# Patient Record
Sex: MALE | URBAN - METROPOLITAN AREA
[De-identification: names, ages, dates, MRNs, and addresses within clinical notes are randomized per-mention and may not be internally consistent; named-entity substitution may affect disease eponyms.]

---

## 2023-10-03 ENCOUNTER — HOSPITAL ENCOUNTER (EMERGENCY)
Facility: HOSPITAL | Age: 68
Discharge: HOME | End: 2023-10-03
Attending: EMERGENCY MEDICINE
Payer: MEDICARE

## 2023-10-03 VITALS
BODY MASS INDEX: 22.38 KG/M2 | OXYGEN SATURATION: 98 % | HEIGHT: 75 IN | SYSTOLIC BLOOD PRESSURE: 184 MMHG | TEMPERATURE: 99 F | WEIGHT: 180 LBS | HEART RATE: 76 BPM | RESPIRATION RATE: 18 BRPM | DIASTOLIC BLOOD PRESSURE: 90 MMHG
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
## 2023-10-03 DIAGNOSIS — M25.562 ACUTE PAIN OF BOTH KNEES: ICD-10-CM

## 2023-10-03 DIAGNOSIS — S39.012A STRAIN OF LUMBAR REGION, INITIAL ENCOUNTER: ICD-10-CM

## 2023-10-03 DIAGNOSIS — M25.561 ACUTE PAIN OF BOTH KNEES: ICD-10-CM

## 2023-10-03 PROBLEM — F11.10: Status: ACTIVE | Noted: 2023-10-03

## 2023-10-03 PROBLEM — R73.03 PREDIABETES: Status: ACTIVE | Noted: 2023-10-03

## 2023-10-03 PROBLEM — M54.9 CHRONIC BACK PAIN: Status: ACTIVE | Noted: 2023-10-03

## 2023-10-03 PROBLEM — F43.12 CHRONIC POST-TRAUMATIC STRESS DISORDER (PTSD): Status: ACTIVE | Noted: 2023-10-03

## 2023-10-03 PROBLEM — B18.2 CHRONIC HEPATITIS C VIRUS INFECTION: Status: ACTIVE | Noted: 2023-10-03

## 2023-10-03 PROBLEM — G89.29 CHRONIC BACK PAIN: Status: ACTIVE | Noted: 2023-10-03

## 2023-10-03 PROBLEM — M93.90 OSTEOCHONDROPATHY: Status: ACTIVE | Noted: 2023-10-03

## 2023-10-03 PROCEDURE — 99284 EMERGENCY DEPT VISIT MOD MDM: CPT

## 2023-10-03 PROCEDURE — 25000003 PHARM REV CODE 250: Performed by: PHYSICIAN ASSISTANT

## 2023-10-03 RX ORDER — METHOCARBAMOL 500 MG/1
1000 TABLET, FILM COATED ORAL 3 TIMES DAILY
Qty: 12 TABLET | Refills: 0 | Status: SHIPPED | OUTPATIENT
Start: 2023-10-03 | End: 2023-10-05

## 2023-10-03 RX ORDER — MELOXICAM 7.5 MG/1
7.5 TABLET ORAL DAILY
Qty: 12 TABLET | Refills: 0 | Status: SHIPPED | OUTPATIENT
Start: 2023-10-03

## 2023-10-03 RX ORDER — DICLOFENAC SODIUM 10 MG/G
2 GEL TOPICAL 3 TIMES DAILY
Qty: 100 G | Refills: 0 | Status: SHIPPED | OUTPATIENT
Start: 2023-10-03 | End: 2023-10-13

## 2023-10-03 RX ORDER — IBUPROFEN 600 MG/1
600 TABLET ORAL
Status: COMPLETED | OUTPATIENT
Start: 2023-10-03 | End: 2023-10-03

## 2023-10-03 RX ADMIN — IBUPROFEN 600 MG: 600 TABLET ORAL at 11:10
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
## 2023-10-03 NOTE — DISCHARGE INSTRUCTIONS
Thank you for coming to our Emergency Department today. It is important to remember that some problems or medical conditions are difficult to diagnose and may not be found or addressed during your Emergency Department visit.  These conditions often start with non-specific symptoms and can only be diagnosed on follow up visits with your primary care physician or specialist when the symptoms continue or change. Please remember that all medical conditions can change, and we cannot predict how you will be feeling tomorrow or the next day. Return to the ER with any questions/concerns, new/concerning symptoms, worsening or failure to improve.       Be sure to follow up with your primary care doctor and review all labs/imaging/tests that were performed during your ER visit with them. It is very common for us to identify non-emergent incidental findings which must be followed up with your primary care physician.  Some labs/imaging/tests may be outside of the normal range, and require non-emergent follow-up and/or further investigation/treatment/procedures/testing to help diagnose/exclude/prevent complications or other potentially serious medical conditions. Some abnormalities may not have been discussed or addressed during your ER visit.     An ER visit does not replace a primary care visit, and many screening tests or follow-up tests cannot be ordered by an ER doctor or performed by the ER. Some tests may even require pre-approval.    If you do not have a primary care doctor, you may contact the one listed on your discharge paperwork or you may also call the [facility] Appointment Desk at [phone] , or [address] [place] [address] at  [phone] to schedule an appointment, or establish care with a primary care doctor or even a specialist and to obtain information about local resources. It is important to your health that you have a primary care doctor.    Please take all medications as directed. We have done our best to select  a medication for you that will treat your condition however, all medications may potentially have side-effects and it is impossible to predict which medications may give you side-effects or what those side-effects (if any) those medications may give you.  If you feel that you are having a negative effect or side-effect of any medication you should stop taking those medications immediately and seek medical attention. If you feel that you are having a life-threatening reaction call 911.        Do not drive, swim, climb to height, take a bath, operate heavy machinery, drink alcohol or take potentially sedating medications, sign any legal documents or make any important decisions for 24 hours if you have received any pain medications, sedatives or mood altering drugs during your ER visit or within 24 hours of taking them if they have been prescribed to you.     You can find additional resources for Dentists, hearing aids, durable medical equipment, low cost pharmacies and other resources at [email or web address]

## 2025-07-01 ENCOUNTER — RESULTS FOLLOW-UP (OUTPATIENT)
Dept: EMERGENCY MEDICINE | Facility: HOSPITAL | Age: 70
End: 2025-07-01

## 2025-07-01 ENCOUNTER — HOSPITAL ENCOUNTER (INPATIENT)
Facility: HOSPITAL | Age: 70
LOS: 24 days | Discharge: HOSPICE | DRG: 025 | End: 2025-07-25
Attending: EMERGENCY MEDICINE | Admitting: EMERGENCY MEDICINE
Payer: MEDICARE

## 2025-07-01 DIAGNOSIS — Z51.5 PALLIATIVE CARE ENCOUNTER: ICD-10-CM

## 2025-07-01 DIAGNOSIS — Z71.89 ADVANCE CARE PLANNING: ICD-10-CM

## 2025-07-01 DIAGNOSIS — C79.31 SECONDARY MALIGNANT NEOPLASM OF BRAIN: ICD-10-CM

## 2025-07-01 DIAGNOSIS — G93.89 BRAIN MASS: Primary | ICD-10-CM

## 2025-07-01 DIAGNOSIS — R76.8 HEPATITIS C ANTIBODY POSITIVE IN BLOOD: ICD-10-CM

## 2025-07-01 DIAGNOSIS — R53.1 WEAKNESS: ICD-10-CM

## 2025-07-01 DIAGNOSIS — R27.0 ATAXIA: ICD-10-CM

## 2025-07-01 DIAGNOSIS — F19.90 IVDU (INTRAVENOUS DRUG USER): ICD-10-CM

## 2025-07-01 DIAGNOSIS — R41.82 ALTERED MENTAL STATUS: ICD-10-CM

## 2025-07-01 DIAGNOSIS — R53.81 DEBILITY: ICD-10-CM

## 2025-07-01 DIAGNOSIS — R13.10 DYSPHAGIA, UNSPECIFIED TYPE: ICD-10-CM

## 2025-07-01 DIAGNOSIS — R91.8 LUNG MASS: ICD-10-CM

## 2025-07-01 PROBLEM — G93.9 BRAIN LESION: Status: ACTIVE | Noted: 2025-07-01

## 2025-07-01 PROBLEM — R26.9 GAIT DISTURBANCE: Status: ACTIVE | Noted: 2025-07-01

## 2025-07-01 PROBLEM — F19.10 POLYSUBSTANCE ABUSE: Status: ACTIVE | Noted: 2025-07-01

## 2025-07-01 LAB
ABSOLUTE EOSINOPHIL (OHS): 0 K/UL
ABSOLUTE MONOCYTE (OHS): 0.32 K/UL (ref 0.3–1)
ABSOLUTE NEUTROPHIL COUNT (OHS): 4.85 K/UL (ref 1.8–7.7)
ALBUMIN SERPL BCP-MCNC: 4.5 G/DL (ref 3.5–5.2)
ALP SERPL-CCNC: 109 UNIT/L (ref 40–150)
ALT SERPL W/O P-5'-P-CCNC: 27 UNIT/L (ref 10–44)
AMMONIA PLAS-SCNC: 44 UMOL/L (ref 10–50)
AMPHET UR QL SCN: NEGATIVE
ANION GAP (OHS): 13 MMOL/L (ref 8–16)
AST SERPL-CCNC: 39 UNIT/L (ref 11–45)
BARBITURATE SCN PRESENT UR: NEGATIVE
BASOPHILS # BLD AUTO: 0.04 K/UL
BASOPHILS NFR BLD AUTO: 0.7 %
BENZODIAZ UR QL SCN: NEGATIVE
BILIRUB SERPL-MCNC: 0.8 MG/DL (ref 0.1–1)
BUN SERPL-MCNC: 9 MG/DL (ref 8–23)
CALCIUM SERPL-MCNC: 9.7 MG/DL (ref 8.7–10.5)
CANNABINOIDS UR QL SCN: NEGATIVE
CHLORIDE SERPL-SCNC: 100 MMOL/L (ref 95–110)
CO2 SERPL-SCNC: 21 MMOL/L (ref 23–29)
COCAINE UR QL SCN: NEGATIVE
CREAT SERPL-MCNC: 0.7 MG/DL (ref 0.5–1.4)
CREAT UR-MCNC: 26 MG/DL (ref 23–375)
CRP SERPL-MCNC: 8 MG/L
ERYTHROCYTE [DISTWIDTH] IN BLOOD BY AUTOMATED COUNT: 12.9 % (ref 11.5–14.5)
ERYTHROCYTE [SEDIMENTATION RATE] IN BLOOD BY PHOTOMETRIC METHOD: 24 MM/HR
ETHANOL SERPL-MCNC: <10 MG/DL
GFR SERPLBLD CREATININE-BSD FMLA CKD-EPI: >60 ML/MIN/1.73/M2
GLUCOSE SERPL-MCNC: 113 MG/DL (ref 70–110)
HCT VFR BLD AUTO: 45.1 % (ref 40–54)
HCV AB SERPL QL IA: REACTIVE
HGB BLD-MCNC: 14.9 GM/DL (ref 14–18)
HIV 1+2 AB+HIV1 P24 AG SERPL QL IA: NORMAL
IMM GRANULOCYTES # BLD AUTO: 0.01 K/UL (ref 0–0.04)
IMM GRANULOCYTES NFR BLD AUTO: 0.2 % (ref 0–0.5)
INFLUENZA A MOLECULAR (OHS): NEGATIVE
INFLUENZA B MOLECULAR (OHS): NEGATIVE
LYMPHOCYTES # BLD AUTO: 0.78 K/UL (ref 1–4.8)
MAGNESIUM SERPL-MCNC: 2 MG/DL (ref 1.6–2.6)
MCH RBC QN AUTO: 30.4 PG (ref 27–31)
MCHC RBC AUTO-ENTMCNC: 33 G/DL (ref 32–36)
MCV RBC AUTO: 92 FL (ref 82–98)
METHADONE UR QL SCN: NEGATIVE
NUCLEATED RBC (/100WBC) (OHS): 0 /100 WBC
OHS QRS DURATION: 104 MS
OHS QTC CALCULATION: 467 MS
OPIATES UR QL SCN: NEGATIVE
PCP UR QL: NEGATIVE
PHOSPHATE SERPL-MCNC: 3.6 MG/DL (ref 2.7–4.5)
PLATELET # BLD AUTO: 234 K/UL (ref 150–450)
PMV BLD AUTO: 9.1 FL (ref 9.2–12.9)
POTASSIUM SERPL-SCNC: 4 MMOL/L (ref 3.5–5.1)
PROT SERPL-MCNC: 8.6 GM/DL (ref 6–8.4)
RBC # BLD AUTO: 4.9 M/UL (ref 4.6–6.2)
RELATIVE EOSINOPHIL (OHS): 0 %
RELATIVE LYMPHOCYTE (OHS): 13 % (ref 18–48)
RELATIVE MONOCYTE (OHS): 5.3 % (ref 4–15)
RELATIVE NEUTROPHIL (OHS): 80.8 % (ref 38–73)
SARS-COV-2 RDRP RESP QL NAA+PROBE: NEGATIVE
SODIUM SERPL-SCNC: 134 MMOL/L (ref 136–145)
T PALLIDUM IGG+IGM SER QL: REACTIVE
WBC # BLD AUTO: 6 K/UL (ref 3.9–12.7)

## 2025-07-01 PROCEDURE — 87502 INFLUENZA DNA AMP PROBE: CPT | Performed by: PHYSICIAN ASSISTANT

## 2025-07-01 PROCEDURE — 86803 HEPATITIS C AB TEST: CPT | Performed by: PHYSICIAN ASSISTANT

## 2025-07-01 PROCEDURE — 99291 CRITICAL CARE FIRST HOUR: CPT | Mod: ,,,

## 2025-07-01 PROCEDURE — 84100 ASSAY OF PHOSPHORUS: CPT | Performed by: PHYSICIAN ASSISTANT

## 2025-07-01 PROCEDURE — 85025 COMPLETE CBC W/AUTO DIFF WBC: CPT | Performed by: EMERGENCY MEDICINE

## 2025-07-01 PROCEDURE — 25000003 PHARM REV CODE 250: Performed by: EMERGENCY MEDICINE

## 2025-07-01 PROCEDURE — U0002 COVID-19 LAB TEST NON-CDC: HCPCS | Performed by: PHYSICIAN ASSISTANT

## 2025-07-01 PROCEDURE — 80307 DRUG TEST PRSMV CHEM ANLYZR: CPT | Performed by: PHYSICIAN ASSISTANT

## 2025-07-01 PROCEDURE — 84425 ASSAY OF VITAMIN B-1: CPT | Performed by: PHYSICIAN ASSISTANT

## 2025-07-01 PROCEDURE — 85652 RBC SED RATE AUTOMATED: CPT | Performed by: STUDENT IN AN ORGANIZED HEALTH CARE EDUCATION/TRAINING PROGRAM

## 2025-07-01 PROCEDURE — 96365 THER/PROPH/DIAG IV INF INIT: CPT

## 2025-07-01 PROCEDURE — 93005 ELECTROCARDIOGRAM TRACING: CPT

## 2025-07-01 PROCEDURE — 99291 CRITICAL CARE FIRST HOUR: CPT

## 2025-07-01 PROCEDURE — 63600175 PHARM REV CODE 636 W HCPCS

## 2025-07-01 PROCEDURE — 94761 N-INVAS EAR/PLS OXIMETRY MLT: CPT

## 2025-07-01 PROCEDURE — 86592 SYPHILIS TEST NON-TREP QUAL: CPT | Performed by: PHYSICIAN ASSISTANT

## 2025-07-01 PROCEDURE — 87040 BLOOD CULTURE FOR BACTERIA: CPT | Performed by: PHYSICIAN ASSISTANT

## 2025-07-01 PROCEDURE — 20000000 HC ICU ROOM

## 2025-07-01 PROCEDURE — 87389 HIV-1 AG W/HIV-1&-2 AB AG IA: CPT | Performed by: PHYSICIAN ASSISTANT

## 2025-07-01 PROCEDURE — 83735 ASSAY OF MAGNESIUM: CPT | Performed by: EMERGENCY MEDICINE

## 2025-07-01 PROCEDURE — 96375 TX/PRO/DX INJ NEW DRUG ADDON: CPT

## 2025-07-01 PROCEDURE — 86593 SYPHILIS TEST NON-TREP QUANT: CPT | Performed by: PHYSICIAN ASSISTANT

## 2025-07-01 PROCEDURE — 96368 THER/DIAG CONCURRENT INF: CPT

## 2025-07-01 PROCEDURE — 93010 ELECTROCARDIOGRAM REPORT: CPT | Mod: ,,, | Performed by: INTERNAL MEDICINE

## 2025-07-01 PROCEDURE — 80053 COMPREHEN METABOLIC PANEL: CPT | Performed by: EMERGENCY MEDICINE

## 2025-07-01 PROCEDURE — 25000003 PHARM REV CODE 250: Performed by: PHYSICIAN ASSISTANT

## 2025-07-01 PROCEDURE — 63600175 PHARM REV CODE 636 W HCPCS: Mod: JZ,TB | Performed by: EMERGENCY MEDICINE

## 2025-07-01 PROCEDURE — 63600175 PHARM REV CODE 636 W HCPCS: Performed by: PHYSICIAN ASSISTANT

## 2025-07-01 PROCEDURE — 82077 ASSAY SPEC XCP UR&BREATH IA: CPT | Performed by: PHYSICIAN ASSISTANT

## 2025-07-01 PROCEDURE — 25500020 PHARM REV CODE 255: Performed by: EMERGENCY MEDICINE

## 2025-07-01 PROCEDURE — 82140 ASSAY OF AMMONIA: CPT | Performed by: PHYSICIAN ASSISTANT

## 2025-07-01 PROCEDURE — 86141 C-REACTIVE PROTEIN HS: CPT | Performed by: EMERGENCY MEDICINE

## 2025-07-01 RX ORDER — HEPARIN SODIUM 5000 [USP'U]/ML
5000 INJECTION, SOLUTION INTRAVENOUS; SUBCUTANEOUS EVERY 8 HOURS
Status: DISCONTINUED | OUTPATIENT
Start: 2025-07-02 | End: 2025-07-02

## 2025-07-01 RX ORDER — THIAMINE HYDROCHLORIDE 100 MG/ML
400 INJECTION, SOLUTION INTRAMUSCULAR; INTRAVENOUS
Status: COMPLETED | OUTPATIENT
Start: 2025-07-01 | End: 2025-07-01

## 2025-07-01 RX ORDER — SODIUM,POTASSIUM PHOSPHATES 280-250MG
2 POWDER IN PACKET (EA) ORAL
Status: DISCONTINUED | OUTPATIENT
Start: 2025-07-01 | End: 2025-07-02

## 2025-07-01 RX ORDER — HYDRALAZINE HYDROCHLORIDE 20 MG/ML
10 INJECTION INTRAMUSCULAR; INTRAVENOUS EVERY 4 HOURS PRN
Status: DISCONTINUED | OUTPATIENT
Start: 2025-07-01 | End: 2025-07-01

## 2025-07-01 RX ORDER — CEFEPIME HYDROCHLORIDE 2 G/1
2 INJECTION, POWDER, FOR SOLUTION INTRAVENOUS
Status: DISCONTINUED | OUTPATIENT
Start: 2025-07-01 | End: 2025-07-02

## 2025-07-01 RX ORDER — HALOPERIDOL LACTATE 5 MG/ML
5 INJECTION, SOLUTION INTRAMUSCULAR ONCE
Status: COMPLETED | OUTPATIENT
Start: 2025-07-01 | End: 2025-07-01

## 2025-07-01 RX ORDER — ACETAMINOPHEN 500 MG
1000 TABLET ORAL
Status: COMPLETED | OUTPATIENT
Start: 2025-07-01 | End: 2025-07-01

## 2025-07-01 RX ORDER — LEVETIRACETAM 500 MG/5ML
500 INJECTION, SOLUTION, CONCENTRATE INTRAVENOUS EVERY 12 HOURS
Status: DISCONTINUED | OUTPATIENT
Start: 2025-07-01 | End: 2025-07-02

## 2025-07-01 RX ORDER — ACETAMINOPHEN 325 MG/1
650 TABLET ORAL EVERY 6 HOURS PRN
Status: DISCONTINUED | OUTPATIENT
Start: 2025-07-01 | End: 2025-07-02

## 2025-07-01 RX ORDER — NICARDIPINE HYDROCHLORIDE 0.2 MG/ML
0-15 INJECTION INTRAVENOUS CONTINUOUS
Status: DISCONTINUED | OUTPATIENT
Start: 2025-07-01 | End: 2025-07-02

## 2025-07-01 RX ORDER — MIDAZOLAM HYDROCHLORIDE 1 MG/ML
1 INJECTION, SOLUTION INTRAMUSCULAR; INTRAVENOUS EVERY 10 MIN PRN
Status: COMPLETED | OUTPATIENT
Start: 2025-07-01 | End: 2025-07-01

## 2025-07-01 RX ORDER — LABETALOL HCL 20 MG/4 ML
10 SYRINGE (ML) INTRAVENOUS EVERY 4 HOURS PRN
Status: DISCONTINUED | OUTPATIENT
Start: 2025-07-01 | End: 2025-07-04

## 2025-07-01 RX ORDER — SODIUM CHLORIDE 0.9 % (FLUSH) 0.9 %
10 SYRINGE (ML) INJECTION
Status: DISCONTINUED | OUTPATIENT
Start: 2025-07-01 | End: 2025-07-25 | Stop reason: HOSPADM

## 2025-07-01 RX ORDER — ONDANSETRON HYDROCHLORIDE 2 MG/ML
4 INJECTION, SOLUTION INTRAVENOUS EVERY 4 HOURS PRN
Status: DISCONTINUED | OUTPATIENT
Start: 2025-07-01 | End: 2025-07-25 | Stop reason: HOSPADM

## 2025-07-01 RX ORDER — LANOLIN ALCOHOL/MO/W.PET/CERES
800 CREAM (GRAM) TOPICAL
Status: DISCONTINUED | OUTPATIENT
Start: 2025-07-01 | End: 2025-07-02

## 2025-07-01 RX ORDER — HYDRALAZINE HYDROCHLORIDE 20 MG/ML
10 INJECTION INTRAMUSCULAR; INTRAVENOUS EVERY 4 HOURS PRN
Status: DISCONTINUED | OUTPATIENT
Start: 2025-07-01 | End: 2025-07-04

## 2025-07-01 RX ORDER — AMOXICILLIN 250 MG
1 CAPSULE ORAL 2 TIMES DAILY
Status: DISCONTINUED | OUTPATIENT
Start: 2025-07-01 | End: 2025-07-02

## 2025-07-01 RX ORDER — BISACODYL 10 MG/1
10 SUPPOSITORY RECTAL DAILY PRN
Status: DISCONTINUED | OUTPATIENT
Start: 2025-07-01 | End: 2025-07-06

## 2025-07-01 RX ORDER — POLYETHYLENE GLYCOL 3350 17 G/17G
17 POWDER, FOR SOLUTION ORAL DAILY
Status: DISCONTINUED | OUTPATIENT
Start: 2025-07-02 | End: 2025-07-02

## 2025-07-01 RX ADMIN — LABETALOL HYDROCHLORIDE 10 MG: 5 INJECTION, SOLUTION INTRAVENOUS at 10:07

## 2025-07-01 RX ADMIN — MIDAZOLAM HYDROCHLORIDE 1 MG: 2 INJECTION, SOLUTION INTRAMUSCULAR; INTRAVENOUS at 08:07

## 2025-07-01 RX ADMIN — PENICILLIN G BENZATHINE 2.4 MILLION UNITS: 1200000 INJECTION, SUSPENSION INTRAMUSCULAR at 07:07

## 2025-07-01 RX ADMIN — HALOPERIDOL LACTATE 5 MG: 5 INJECTION, SOLUTION INTRAMUSCULAR at 10:07

## 2025-07-01 RX ADMIN — NICARDIPINE HYDROCHLORIDE 2.5 MG/HR: 0.2 INJECTION, SOLUTION INTRAVENOUS at 11:07

## 2025-07-01 RX ADMIN — ACETAMINOPHEN 1000 MG: 500 TABLET ORAL at 06:07

## 2025-07-01 RX ADMIN — VANCOMYCIN HYDROCHLORIDE 1500 MG: 1.5 INJECTION, POWDER, LYOPHILIZED, FOR SOLUTION INTRAVENOUS at 06:07

## 2025-07-01 RX ADMIN — PIPERACILLIN SODIUM AND TAZOBACTAM SODIUM 4.5 G: 4; .5 INJECTION, POWDER, LYOPHILIZED, FOR SOLUTION INTRAVENOUS at 05:07

## 2025-07-01 RX ADMIN — CEFEPIME 2 G: 2 INJECTION, POWDER, FOR SOLUTION INTRAVENOUS at 09:07

## 2025-07-01 RX ADMIN — LEVETIRACETAM 500 MG: 500 INJECTION, SOLUTION, CONCENTRATE INTRAVENOUS at 09:07

## 2025-07-01 RX ADMIN — SODIUM CHLORIDE, POTASSIUM CHLORIDE, SODIUM LACTATE AND CALCIUM CHLORIDE 1000 ML: 600; 310; 30; 20 INJECTION, SOLUTION INTRAVENOUS at 02:07

## 2025-07-01 RX ADMIN — THIAMINE HYDROCHLORIDE 400 MG: 100 INJECTION, SOLUTION INTRAMUSCULAR; INTRAVENOUS at 02:07

## 2025-07-01 RX ADMIN — IOHEXOL 100 ML: 350 INJECTION, SOLUTION INTRAVENOUS at 05:07

## 2025-07-01 RX ADMIN — HYDRALAZINE HYDROCHLORIDE 10 MG: 20 INJECTION, SOLUTION INTRAMUSCULAR; INTRAVENOUS at 09:07

## 2025-07-01 NOTE — ED TRIAGE NOTES
Via EMS from [facility] detox, been there for 1 month, detoxing from heroin. Past week he has had generalized weakness and falls. Pt c/o generalized weakness, denies any pain, CP or SOB.
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
## 2025-07-01 NOTE — HPI
Patient is 69yo M with generalized weakness, falls, and paucity of speech. 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 On exam, patient's voice is barely audible but patient appropriately answers questions and follows commands. Patient's brother states that patient had normal speech and gait when they last saw each other about 1 month ago.    Neurosurgery consulted due to multiple ring-enhancing lesions seen on CT brain.

## 2025-07-01 NOTE — ED PROVIDER NOTES
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      70-year-old male presenting for generalized weakness for about a week now.  History is difficult to obtain as the patient is altered.  Additional history provided by nurse at [facility] who reports that patient got to their facility about a month ago and at baseline walks with a cane and is alert and oriented.  Noticed that he has seemed weaker over the past week and over the past 3 days has had balance disturbance and gait issues.  They also noticed that he has been speaking more softly in his speech is hard to understand.  They denied any fever, he fell yesterday, unsure if he hit his head.      Review of patient's allergies indicates:  No Known Allergies  History reviewed. No pertinent past medical history.  History reviewed. No pertinent surgical history.  No family history on file.  Social History[1]  Review of Systems    Physical Exam     Initial Vitals [07/01/25 1351]   BP Pulse Resp Temp SpO2   (!) 150/84 90 18 98.4 °F (36.9 °C) 95 %      MAP       --         Physical Exam    Nursing note and vitals reviewed.  Constitutional: He appears ill.   HENT:   Head: Normocephalic and atraumatic.   Eyes: EOM are normal.   Pupils pinpoint bilaterally   Cardiovascular:  Normal rate, regular rhythm, normal heart sounds and intact distal pulses.     Exam reveals no gallop and no friction rub.       No murmur heard.  Pulmonary/Chest: Breath sounds normal.   Abdominal: Abdomen is soft.     Neurological: He is alert. GCS eye subscore is 4. GCS verbal subscore is 4. GCS motor subscore is 5.   Alert, no facial droop.  Speech is quiet but not slurred.  He follows most commands but not compliant with all cranial nerve testing   Skin: Skin is warm and dry.         ED Course   Critical Care    Date/Time: 7/1/2025 5:55 PM    Performed by:  Ivette Mi PA-C  Authorized by: Ariadne Medel MD  Direct patient critical care time: 10 minutes  Additional history critical care time: 5 minutes  Ordering / reviewing critical care time: 5 minutes  Documentation critical care time: 5 minutes  Consulting other physicians critical care time: 5 minutes  Consult with family critical care time: 5 minutes  Other critical care time: 0 minutes  Total critical care time (exclusive of procedural time) : 35 minutes  Critical care time was exclusive of separately billable procedures and treating other patients and teaching time.  Critical care was necessary to treat or prevent imminent or life-threatening deterioration of the following conditions: CNS failure or compromise.  Critical care was time spent personally by me on the following activities: development of treatment plan with patient or surrogate, discussions with consultants, evaluation of patient's response to treatment, examination of patient, obtaining history from patient or surrogate, ordering and performing treatments and interventions, ordering and review of laboratory studies, ordering and review of radiographic studies and re-evaluation of patient's condition.        Labs Reviewed   HEPATITIS C ANTIBODY - Abnormal       Result Value    Hep C Ab Interp Reactive (*)    COMPREHENSIVE METABOLIC PANEL - Abnormal    Sodium 134 (*)     Potassium 4.0      Chloride 100      CO2 21 (*)     Glucose 113 (*)     BUN 9      Creatinine 0.7      Calcium 9.7      Protein Total 8.6 (*)     Albumin 4.5      Bilirubin Total 0.8            AST 39      ALT 27      Anion Gap 13      eGFR >60     CBC WITH DIFFERENTIAL - Abnormal    WBC 6.00      RBC 4.90      HGB 14.9      HCT 45.1      MCV 92      MCH 30.4      MCHC 33.0      RDW 12.9      Platelet Count 234      MPV 9.1 (*)     Nucleated RBC 0      Neut % 80.8 (*)     Lymph % 13.0 (*)     Mono % 5.3      Eos % 0.0      Basophil % 0.7      Imm Grans % 0.2      Neut #  4.85      Lymph # 0.78 (*)     Mono # 0.32      Eos # 0.00      Baso # 0.04      Imm Grans # 0.01     TREPONEMA PALLIDIUM ANTIBODIES IGG, IGM - Abnormal    Treponema Pallidum Antibodies IgG, IgM Reactive (*)    SEDIMENTATION RATE - Abnormal    Sed Rate 24 (*)    HIGH SENSITIVITY CRP - Abnormal    CRP, High Sensitivity 8.00 (*)    INFLUENZA A & B BY MOLECULAR - Normal    INFLUENZA A MOLECULAR Negative      INFLUENZA B MOLECULAR  Negative     HIV 1 / 2 ANTIBODY - Normal    HIV 1/2 Ag/Ab Non-Reactive     MAGNESIUM - Normal    Magnesium  2.0     PHOSPHORUS - Normal    Phosphorus Level 3.6     ALCOHOL,MEDICAL (ETHANOL) - Normal    Alcohol, Serum <10     AMMONIA - Normal    Ammonia 44     SARS-COV-2 RNA AMPLIFICATION, QUAL - Normal    SARS COV-2 Molecular Negative     CULTURE, BLOOD   CULTURE, BLOOD   CBC W/ AUTO DIFFERENTIAL    Narrative:     The following orders were created for panel order CBC auto differential.  Procedure                               Abnormality         Status                     ---------                               -----------         ------                     CBC with Differential[[id number]]       Abnormal            Final result                 Please view results for these tests on the individual orders.   HEP C VIRUS HOLD SPECIMEN   VITAMIN B1   DRUG SCREEN PANEL, URINE EMERGENCY   RPR   HEPATITIS C RNA, QUANTITATIVE, PCR   PROCALCITONIN     EKG Readings: (Independently Interpreted)   Initial Reading: No STEMI. Rhythm: Normal Sinus Rhythm. Heart Rate: 78. ST Segments: Normal ST Segments. Clinical Impression: Normal Sinus Rhythm     ECG Results              EKG 12-lead (Final result)        Collection Time Result Time QRS Duration OHS QTC Calculation    07/01/25 14:01:41 07/01/25 15:35:55 104 467                     Final result by Interface, Lab In [facility] (07/01/25 15:36:10)                   Narrative:    Test Reason : R53.1,    Vent. Rate :  78 BPM     Atrial Rate :  78 BPM     P-R Int :  148 ms          QRS Dur : 104 ms      QT Int : 410 ms       P-R-T Axes :  83  77  62 degrees    QTcB Int : 467 ms    Sinus rhythm with Premature atrial complexes  Otherwise normal ECG  No previous ECGs available  Confirmed by Kike Begum ([id number]) on 7/1/2025 3:35:41 PM    Referred By:            Confirmed By: Kike Begum                                  Imaging Results              X-Ray Abdomen AP 1 View (KUB) (In process)                       CT Chest Abdomen Pelvis With IV Contrast (XPD) NO Oral Contrast (Final result)  Result time 07/01/25 18:25:11      Final result by Carter Ramirez MD (07/01/25 18:25:11)                   Impression:      3 x 2 x 1.6 cm spiculated mass anterior segment right upper lobe compatible with bronchogenic carcinoma.    Bulky adenopathy in the mediastinum and base of the neck on the right with largest matted adenopathy approaching 5 x 3.5 x 2 cm in the superior pericardial recess compressing but not obstructing the superior vena cava.  Findings most compatible with metastatic adenopathy.    Consider outpatient PET scan and pulmonary medicine follow-up.    Findings were communicated to Dr. Ariadne Medel in the ED with confirmation of receipt.    This report was flagged in Epic as abnormal.      Electronically signed by: Carter Ramirez  Date:    07/01/2025  Time:    18:25               Narrative:    EXAMINATION:  CT CHEST ABDOMEN PELVIS WITH IV CONTRAST (XPD)    CLINICAL HISTORY:  Metastatic disease evaluation;    TECHNIQUE:  Arterial phase axial CT images were obtained through the chest, abdomen and pelvis following IV administration of 100 mL of Omnipaque 350 contrast. Coronal, sagittal and 3D reconstructions were submitted and interpreted.    COMPARISON:  None    FINDINGS:  Thoracic aorta: Normal    Abdominal aorta: Normal    Abdominal arteries: Ectatic common iliac arteries with no dilatation or stenosis.    Base of Neck: No significant abnormality.    Thoracic soft tissues:  Unremarkable.    Heart: Normal size. No effusion.    Pulmonary vasculature: Pulmonary arteries distribute normally.  There are four pulmonary veins.    Bonny/Mediastinum: Matted bertram enlargement in the superior pericardial recess, right hilum, subcarinal and base of the neck with the largest bertram configuration in the superior pericardial recess measuring 5 x 3.5 x 2 cm.  It mildly compresses the superior vena cava with no chest wall varices.    Esophagus: Unremarkable.    Airways: Patent.    Lungs/Pleura: Spiculated mass in the anterior segment of the right upper lobe with solid component measuring 2 x 2.9 by 1.6 cm.  Scattered emphysematous change.  No lobar consolidation.  No significant effusion.    Liver: Normal in size and attenuation, with no focal hepatic lesions.    Gallbladder: No calcified gallstones.    Bile Ducts: No evidence of dilated ducts.    Pancreas: No mass or peripancreatic fat stranding.    Spleen: Unremarkable.    Adrenals: Unremarkable.    Kidneys/ Ureters: No radiodense stones or hydronephrosis.  The ureters are normal in course and caliber.    Bladder: No evidence of wall thickening.    Reproductive organs: Unremarkable.    GI Tract/Mesentery: No evidence of bowel obstruction or inflammation.    Peritoneal Space: No ascites. No free air.    Retroperitoneum: No significant adenopathy.    Abdominal wall: Unremarkable.    Bones: No acute fracture or aggressive-appearing lytic or blastic lesion.  Spondylosis throughout the thoracic and lumbar spine with multilevel lumbar stenosis due to hypertrophic bony formation and congenitally short pedicles.                                        CT Head Without Contrast (Final result)  Result time 07/01/25 17:23:03      Final result by Alex Mendoza MD (07/01/25 17:23:03)                   Impression:      1. Rounded lesions throughout the brain concerning for metastatic lesions versus intracranial abscesses.  Recommend further evaluation with  contrast-enhanced MRI.  2. Effacement of the 4th ventricle by the right cerebellar lesion with enlargement of the temporal horns of the lateral ventricles concerning for early hydrocephalus.  3. Diffuse hypoattenuation throughout the supratentorial white matter, nonspecific but may reflect severe chronic microvascular ischemic change, sequela of a nonspecific infectious or non-infectious/inflammatory leukoencephalopathy, post treatment changes or other toxic metabolic insult.  4. Additional findings as above.  This report was flagged in Epic as abnormal.    These findings were discovered at 16:25 on 07/01/2025 and relayed to Katherine Alvarenga RN via telephone by Rodriguez Luo MD at 16:35 on 07/01/2025.  Updated findings were relayed to Ivette Rosas PA-C via secure chat at 16:50 on 07/01/2025.    Electronically signed by resident: Rodriguez Luo  Date:    07/01/2025  Time:    16:21    Electronically signed by: Alex Mendoza MD  Date:    07/01/2025  Time:    17:23               Narrative:    EXAMINATION:  CT HEAD WITHOUT CONTRAST    CLINICAL HISTORY:  Mental status change, unknown cause    TECHNIQUE:  Low dose axial CT images obtained throughout the head without the use of intravenous contrast.  Axial, sagittal and coronal reconstructions were performed.    COMPARISON:  None.    FINDINGS:  Intracranial compartment:    Rounded hypodensity with hyperdense rim in the left frontal operculum measuring 1.8 x 1.9 x 2.1 cm with suspected adjacent vasogenic edema.  Additional rounded hypodensity with hyperdense rim measuring 2.6 x 2.4 x 2.2 cm with adjacent vasogenic edema in the right cerebellar hemisphere. Punctate hyperdense focus in the subcortical white matter of the right frontal parietal region (series 2, image 24). Additional questioned subcentimeter hyperdense rounded focus in the left parasagittal parietal lobe (series 2, image 27). No significant midline shift.    Diffuse and confluent hypoattenuation  throughout the supratentorial white matter.    The right cerebellar rounded lesion exerts mass effect on the 4th ventricle.  There is enlargement of the temporal horns of the lateral ventricles concerning for early hydrocephalus.    No extra-axial blood or fluid collections.    Skull/extracranial contents (limited evaluation):    Remote left medial orbital wall fracture.  No displaced calvarial fracture.    Mastoid air cells are clear.  Patchy mucosal thickening of the left frontal sinus and ethmoid air cells.                        Preliminary result by Rodriguez Luo MD (07/01/25 16:53:35)                   Impression:      1. Rounded lesions throughout the brain concerning for cerebral abscesses versus metastatic lesions.  Recommend further evaluation with contrast-enhanced MRI.  2. Effacement of the 4th ventricle by the right cerebellar lesion with enlargement of the temporal horns of the lateral ventricles concerning for early hydrocephalus.  3. Diffuse hypoattenuation throughout the supratentorial white matter, nonspecific but may reflect severe chronic microvascular ischemic change, sequela of a nonspecific infectious or non-infectious/inflammatory leukoencephalopathy, or other toxic metabolic insult.  4. Additional findings as above.  This report was flagged in Epic as abnormal.    These findings were discovered at 16:25 on 07/01/2025 and relayed to Katherine Alvarenga RN via telephone by Rodriguez Luo MD at 16:35 on 07/01/2025.  Updated findings were relayed to Ivette Rosas PA-C via secure chat at 16:50 on 07/01/2025.    Electronically signed by resident: Rodriguez Luo  Date:    07/01/2025  Time:    16:21                 Narrative:    EXAMINATION:  CT HEAD WITHOUT CONTRAST    CLINICAL HISTORY:  Mental status change, unknown cause    TECHNIQUE:  Low dose axial CT images obtained throughout the head without the use of intravenous contrast.  Axial, sagittal and coronal reconstructions were  performed.    COMPARISON:  None.    FINDINGS:  Intracranial compartment:    Rounded hypodensity with hyperdense rim in the left frontal operculum measuring 1.8 x 1.9 x 2.1 cm with suspected adjacent vasogenic edema.  Additional rounded hypodensity with hyperdense rim measuring 2.6 x 2.4 x 2.2 cm with adjacent vasogenic edema in the right cerebellar hemisphere. Punctate hyperdense focus in the subcortical white matter of the right frontal parietal region (series 2, image 24). Additional questioned subcentimeter hyperdense rounded focus in the left parasagittal parietal lobe (series 2, image 27). No significant midline shift.    Diffuse and confluent hypoattenuation throughout the supratentorial white matter.    The right cerebellar rounded lesion exerts mass effect on the 4th ventricle.  There is enlargement of the temporal horns of the lateral ventricles concerning for early hydrocephalus.    No extra-axial blood or fluid collections.    Skull/extracranial contents (limited evaluation):    Remote left medial orbital wall fracture.  No displaced calvarial fracture.    Mastoid air cells are clear.  Patchy mucosal thickening of the left frontal sinus and ethmoid air cells.                        Preliminary result by Rodriguez Luo MD (07/01/25 16:37:42)                   Impression:      1. Rounded hypodensities in the left frontal operculum and right cerebellar hemisphere with hyperdense rims and surrounding vasogenic edema.  Additional punctate hyperdense focus in the right frontoparietal region and left parasagittal parietal lobe.  Findings are nonspecific but worrisome for septic emboli with development of cerebral abscesses in this patient with reported history of IV drug use.  Differential considerations include hemorrhagic metastases versus subacute infarcts or contusions.  Further evaluation with contrast-enhanced MRI is recommended.  2. Diffuse in the fluid hypoattenuation throughout the supratentorial white  matter, nonspecific but may reflect severe chronic microvascular ischemic change, sequela of a nonspecific infectious or non-infectious/inflammatory leukoencephalopathy, or other toxic metabolic insult.  3. Additional findings as above.  This report was flagged in Epic as abnormal.    These findings were discovered at 16:25 on 07/01/2025 and relayed to Katherine Alvarenga RN via telephone by Rodriguez Luo MD at 16:35 on 07/01/2025.    Electronically signed by resident: Rodriguez Luo  Date:    07/01/2025  Time:    16:21                 Narrative:    EXAMINATION:  CT HEAD WITHOUT CONTRAST    CLINICAL HISTORY:  Mental status change, unknown cause;    TECHNIQUE:  Low dose axial CT images obtained throughout the head without the use of intravenous contrast.  Axial, sagittal and coronal reconstructions were performed.    COMPARISON:  None.    FINDINGS:  Intracranial compartment:    Mild generalized cerebral volume loss with compensatory enlargement ventricles and sulci.  No evidence of acute hydrocephalus.    Diffuse and confluent hypoattenuation throughout the supratentorial white matter.  Rounded hypodensity with hyperdense rim in the left frontal operculum measuring 1.8 x 1.9 x 2.1 cm with suspected adjacent vasogenic edema.  Additional rounded hypodensity with hyperdense rim measuring 2.6 x 2.4 x 2.2 cm with adjacent vasogenic edema in the right cerebellar hemisphere.  Punctate hyperdense focus in the subcortical white matter of the right frontal parietal region (series 2, image 24).  Additional questioned subcentimeter hyperdense rounded focus in the left parasagittal parietal lobe (series 2, image 27).  No significant midline shift.    No extra-axial blood or fluid collections.    Skull/extracranial contents (limited evaluation):    Remote left medial orbital wall fracture.  No displaced calvarial fracture.    Mastoid air cells are clear.  Patchy mucosal thickening of the left frontal sinus and ethmoid air cells.                                         X-Ray Chest AP Portable (Final result)  Result time 07/01/25 14:49:44      Final result by Kike Ruiz MD (07/01/25 14:49:44)                   Impression:      Possible lung mass.  This report was flagged in Epic as abnormal.      Electronically signed by: Kike Ruiz MD  Date:    07/01/2025  Time:    14:49               Narrative:    EXAMINATION:  XR CHEST AP PORTABLE    CLINICAL HISTORY:  altered mental status;    TECHNIQUE:  Single frontal view of the chest was performed.    COMPARISON:  None    FINDINGS:  Cardiac size is normal.  There is a possible mass above the right hilum measuring about 25 mm in diameter that may represent barium is lung cancer.  Minimal thickening of the pleura in the right hand is noted.  CT is recommended.                                       Medications   thiamine injection 400 mg (400 mg Intravenous Given 7/1/25 1445)   lactated ringers bolus 1,000 mL (0 mLs Intravenous Stopped 7/1/25 1705)   piperacillin-tazobactam (ZOSYN) 4.5 g in D5W 100 mL IVPB (MB+) (0 g Intravenous Stopped 7/1/25 1839)   vancomycin 1,500 mg in 0.9% NaCl 250 mL IVPB (admixture device) (1,500 mg Intravenous New Bag 7/1/25 1807)   iohexoL (OMNIPAQUE 350) injection 100 mL (100 mLs Intravenous Given 7/1/25 1752)   acetaminophen tablet 1,000 mg (1,000 mg Oral Given 7/1/25 1833)   penicillin G benzathine (BICILLIN LA) injection 2.4 Million Units (2.4 Million Units Intramuscular Given 7/1/25 1910)     Medical Decision Making  70-year-old male presenting for generalized weakness.  He is hypertensive with otherwise normal vitals.  He appears ill.    Differential diagnosis:  Differential is wide, concerns include thiamine deficiency, electrolyte derangement, dehydration, ICH.  I did consider CVA, however he is a window for treatment for acute stroke given symptoms for at least 3 days, no indication to call stroke code.  No overt infectious symptoms, however he does have  history of IVDU so he is high-risk    Will check labs, head CT, EKG, chest x-ray reassess.    CT shows a large ring enhancing lesion with subsequent development of hydrocephalus.  Concerning for brain abscess versus metastatic disease.  Covered with broad-spectrum antibiotics, blood cultures obtained and pending. Chest x-ray shows a mass long as well.  Case discussed with Neurosurgery who evaluated the patient, no acute surgical intervention at this time but they do recommend MRI and admission to neuro critical care.    Patient became febrile in the ED with low-grade fever of 100.6° F. CT chest/abdomen/pelvis shows spiculated mass in the lung concerning for malignancy.  MRI is pending.  Patient will be admitted to neuro ICU for further management.  Patient and family comfortable with admission.  I discussed this patient with my supervising physician.      Amount and/or Complexity of Data Reviewed  Labs: ordered. Decision-making details documented in ED Course.  Radiology: ordered and independent interpretation performed. Decision-making details documented in ED Course.  ECG/medicine tests: ordered and independent interpretation performed.    Risk  OTC drugs.  Prescription drug management.  Decision regarding hospitalization.               ED Course as of 07/01/25 1939 Tue Jul 01, 2025 1418 Case discussed with nurse at [facility] who reports that at baseline, the patient ambulated by himself with a cane, alert, oriented and normal speech.  However over the past week he has been seeming more weak and over the past 3 days has had unsteadiness with his gait and has a very low voice this has been gradually worsening but seemed worse this morning which prompted them to call an ambulance. [CC]   1433 Hemoglobin: 14.9 [CC]   1433 WBC: 6.00 [CC]   1433 Platelet Count: 234 [CC]   1446 Ammonia: 44 [CC]   1446 Alcohol, Serum: <10 [CC]   1446 Magnesium : 2.0 [CC]   1446 Phosphorus Level: 3.6 [CC]   1446 Sodium(!): 134 [CC]    1505 Treponema Pallidum Antibodies (IgG, IgM)(!): Reactive  I discussed this finding with the patient, he denies being treated for syphilis in the past.  Neuro syphilis? [CC]   1524 Hepatitis C Ab(!): Reactive  I discussed this finding with the patient.  He was not clear to me whether or not this is new but will follow up PCR [CC]   1524 HIV 1/2 Ag/Ab: Non-Reactive [CC]   1619 CT Head Without Contrast  Per my independent interpretation, there is significant encephalomalacia as well as a ring enhancing lesion [CC]   1652 Discussed with Radiology, concern for development of obstructive hydrocephalus.  Case discussed with Neurosurgery who will come evaluate the patient. [CC]   1715 Family requested that I update jean-paul Shah who is a nurse.  I spoke with her at [phone], updated on plan of care [CC]   1830 CT Chest Abdomen Pelvis With IV Contrast (XPD) NO Oral Contrast(!)  Concerning for lung malignancy [CC]   1832 Case discussed with neuro critical care who will admit to their service [CC]   1848 Jean-Paul updated on plan of care [CC]      ED Course User Index  [CC] Ivette Mi PA-C                           Clinical Impression:  Final diagnoses:  [R53.1] Weakness  [R41.82] Altered mental status  [G93.89] Brain mass (Primary)  [R91.8] Lung mass  [R76.8] Hepatitis C antibody positive in blood  [F19.90] IVDU (intravenous drug user)          ED Disposition Condition    Admit                       [1]   Social History  Tobacco Use    Smoking status: Unknown        Ivette Mi PA-C  07/01/25 1939

## 2025-07-01 NOTE — SUBJECTIVE & OBJECTIVE
"History reviewed. No pertinent past medical history.    History reviewed. No pertinent surgical history.    Social History[1]    No family history on file.    Review of patient's allergies indicates:  No Known Allergies      Medications:  Continuous Infusions:  Scheduled Meds:   penicillin G procaine-penicillin G benzathine  2.4 Million Units Intramuscular ED 1 Time    vancomycin (VANCOCIN) IV (PEDS and ADULTS)  20 mg/kg Intravenous ED 1 Time     PRN Meds:     Review of Systems  Objective:     Weight: 77.1 kg (170 lb)  Body mass index is 23.06 kg/m².  Vital Signs (Most Recent):  Temp: 98.4 °F (36.9 °C) (07/01/25 1351)  Pulse: 78 (07/01/25 1515)  Resp: 16 (07/01/25 1515)  BP: (!) 177/98 (07/01/25 1515)  SpO2: 99 % (07/01/25 1515) Vital Signs (24h Range):  Temp:  [98.4 °F (36.9 °C)] 98.4 °F (36.9 °C)  Pulse:  [74-90] 78  Resp:  [14-20] 16  SpO2:  [95 %-100 %] 99 %  BP: (150-177)/(84-98) 177/98         E4V5M6  AOx3  PERRL  EOMI  BUE 5/5  BLE 5/5  Pronator drift present on right side  Slower coordination seen with right hand on nose-to-finger test      Significant Labs:  Recent Labs   Lab 07/01/25  1409   *   *   K 4.0      CO2 21*   BUN 9   CREATININE 0.7   CALCIUM 9.7   MG 2.0     Recent Labs   Lab 07/01/25  1409   WBC 6.00   HGB 14.9   HCT 45.1        No results for input(s): "LABPT", "INR", "APTT" in the last 48 hours.  Microbiology Results (last 7 days)       Procedure Component Value Units Date/Time    Blood culture #1 **CANNOT BE ORDERED STAT** [[id number]] Collected: 07/01/25 1702    Order Status: Sent Specimen: Blood from Peripheral, Forearm, Left     Blood culture #2 **CANNOT BE ORDERED STAT** [[id number]] Collected: 07/01/25 1702    Order Status: Sent Specimen: Blood from Peripheral, Lower Arm, Left     Influenza A & B by Molecular [[id number]]  (Normal) Collected: 07/01/25 1449    Order Status: Completed Specimen: Nasal Swab Updated: 07/01/25 1537     INFLUENZA A MOLECULAR Negative "     INFLUENZA B MOLECULAR  Negative            Significant Diagnostics:  I have reviewed and interpreted all pertinent imaging results/findings within the past 24 hours.       [1]   Social History  Socioeconomic History    Marital status: Single   Tobacco Use    Smoking status: Unknown

## 2025-07-01 NOTE — FIRST PROVIDER EVALUATION
Medical screening examination initiated.  I have conducted a focused provider triage encounter, findings are as follows:    Brief history of present illness:  71 yo M detoxing from heroin and ETOH for 1month per EMS. C/o weakness    Vitals:    07/01/25 1351   BP: (!) 150/84   Pulse: 90   Resp: 18   Temp: 98.4 °F (36.9 °C)   TempSrc: Oral   SpO2: 95%   Weight: 77.1 kg (170 lb)   Height: 6' (1.829 m)       Pertinent physical exam:  paucity of speech    Brief workup plan:  cbc, cmp, magnesium    Preliminary workup initiated; this workup will be continued and followed by the physician or advanced practice provider that is assigned to the patient when roomed.

## 2025-07-01 NOTE — ASSESSMENT & PLAN NOTE
Assessment  71yo M with generalized weakness, falls, and paucity of speech presenting with multiple ring enhancing lesions on brain CT.    -No acute neurosurgical interventions   -Ok diet   -Ok subq heparin  -Pending MRI  -Inflammation panel - ESR, CRP, pro-all   -WBC wnl  -Admit to neuro ICU

## 2025-07-01 NOTE — CONSULTS
"[facility]  Neurosurgery  Consult Note    Subjective:     History of Present Illness: Patient is 71yo M with generalized weakness, falls, and paucity of speech. 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 On exam, patient's voice is barely audible but patient appropriately answers questions and follows commands. Patient's brother states that patient had normal speech and gait when they last saw each other about 1 month ago.    Neurosurgery consulted due to multiple ring-enhancing lesions seen on CT brain.    Post-Op Info:  * No surgery found *       History reviewed. No pertinent past medical history.    History reviewed. No pertinent surgical history.    Social History[1]    No family history on file.    Review of patient's allergies indicates:  No Known Allergies      Medications:  Continuous Infusions:  Scheduled Meds:   penicillin G procaine-penicillin G benzathine  2.4 Million Units Intramuscular ED 1 Time    vancomycin (VANCOCIN) IV (PEDS and ADULTS)  20 mg/kg Intravenous ED 1 Time     PRN Meds:     Review of Systems  Objective:     Weight: 77.1 kg (170 lb)  Body mass index is 23.06 kg/m².  Vital Signs (Most Recent):  Temp: 98.4 °F (36.9 °C) (07/01/25 1351)  Pulse: 78 (07/01/25 1515)  Resp: 16 (07/01/25 1515)  BP: (!) 177/98 (07/01/25 1515)  SpO2: 99 % (07/01/25 1515) Vital Signs (24h Range):  Temp:  [98.4 °F (36.9 °C)] 98.4 °F (36.9 °C)  Pulse:  [74-90] 78  Resp:  [14-20] 16  SpO2:  [95 %-100 %] 99 %  BP: (150-177)/(84-98) 177/98         E4V5M6  AOx3  PERRL  EOMI  BUE 5/5  BLE 5/5  Pronator drift present on right side  Slower coordination seen with right hand on nose-to-finger test      Significant Labs:  Recent Labs   Lab 07/01/25  1409   *   *   K 4.0      CO2 21*   BUN 9   CREATININE 0.7   CALCIUM 9.7   MG 2.0     Recent Labs   Lab 07/01/25  1409   WBC 6.00   HGB 14.9   HCT 45.1        No results for input(s): "LABPT", "INR", "APTT" in the " last 48 hours.  Microbiology Results (last 7 days)       Procedure Component Value Units Date/Time    Blood culture #1 **CANNOT BE ORDERED STAT** [[id number]] Collected: 07/01/25 1702    Order Status: Sent Specimen: Blood from Peripheral, Forearm, Left     Blood culture #2 **CANNOT BE ORDERED STAT** [[id number]] Collected: 07/01/25 1702    Order Status: Sent Specimen: Blood from Peripheral, Lower Arm, Left     Influenza A & B by Molecular [[id number]]  (Normal) Collected: 07/01/25 1449    Order Status: Completed Specimen: Nasal Swab Updated: 07/01/25 1530     INFLUENZA A MOLECULAR Negative     INFLUENZA B MOLECULAR  Negative            Significant Diagnostics:  I have reviewed and interpreted all pertinent imaging results/findings within the past 24 hours.    Assessment/Plan:     Ataxia  Assessment  71yo M with generalized weakness, falls, and paucity of speech presenting with multiple ring enhancing lesions on brain CT.    -No acute neurosurgical interventions   -Ok diet   -Ok subq heparin  -Pending MRI  -Inflammation panel - ESR, CRP, pro-all   -WBC wnl  -Admit to neuro ICU        Ronal Lima MD  Neurosurgery  [facility]         [1]   Social History  Socioeconomic History    Marital status: Single   Tobacco Use    Smoking status: Unknown

## 2025-07-02 PROBLEM — A53.0 POSITIVE SEROLOGY FOR SYPHILIS: Status: ACTIVE | Noted: 2025-07-02

## 2025-07-02 LAB
ABSOLUTE EOSINOPHIL (OHS): 0.07 K/UL
ABSOLUTE MONOCYTE (OHS): 0.61 K/UL (ref 0.3–1)
ABSOLUTE NEUTROPHIL COUNT (OHS): 9.87 K/UL (ref 1.8–7.7)
ALBUMIN SERPL BCP-MCNC: 4.2 G/DL (ref 3.5–5.2)
ALP SERPL-CCNC: 98 UNIT/L (ref 40–150)
ALT SERPL W/O P-5'-P-CCNC: 26 UNIT/L (ref 10–44)
ANION GAP (OHS): 13 MMOL/L (ref 8–16)
AORTIC SIZE INDEX (SOV): 1.6 CM/M2
APTT PPP: 26 SECONDS (ref 21–32)
AST SERPL-CCNC: 37 UNIT/L (ref 11–45)
AV AREA BY CONTINUOUS VTI: 3.3 CM2
AV INDEX (PROSTH): 1.06
AV LVOT MEAN GRADIENT: 1 MMHG
AV LVOT PEAK GRADIENT: 2 MMHG
AV MEAN GRADIENT: 1 MMHG
AV PEAK GRADIENT: 2 MMHG
AV VALVE AREA BY VELOCITY RATIO: 3.1 CM²
AV VALVE AREA: 3.3 CM2
AV VELOCITY RATIO: 1
BASOPHILS # BLD AUTO: 0.05 K/UL
BASOPHILS NFR BLD AUTO: 0.4 %
BILIRUB SERPL-MCNC: 1.2 MG/DL (ref 0.1–1)
BILIRUB UR QL STRIP.AUTO: NEGATIVE
BSA FOR ECHO PROCEDURE: 1.99 M2
BUN SERPL-MCNC: 11 MG/DL (ref 8–23)
CALCIUM SERPL-MCNC: 9.3 MG/DL (ref 8.7–10.5)
CHLORIDE SERPL-SCNC: 102 MMOL/L (ref 95–110)
CHOLEST SERPL-MCNC: 154 MG/DL (ref 120–199)
CHOLEST/HDLC SERPL: 2.5 {RATIO} (ref 2–5)
CLARITY CSF: CLEAR
CLARITY UR: CLEAR
CO2 SERPL-SCNC: 23 MMOL/L (ref 23–29)
COLOR CSF: COLORLESS
COLOR UR AUTO: YELLOW
CREAT SERPL-MCNC: 0.8 MG/DL (ref 0.5–1.4)
CSF TUBE NUMBER (OHS): 1
CSF TUBE NUMBER (OHS): 1
CV ECHO LV RWT: 0.4 CM
DOP CALC AO PEAK VEL: 0.7 M/S
DOP CALC AO VTI: 14.8 CM
DOP CALC LVOT AREA: 3.1 CM2
DOP CALC LVOT DIAMETER: 2 CM
DOP CALC LVOT PEAK VEL: 0.7 M/S
DOP CALC LVOT STROKE VOLUME: 49.3 CM3
DOP CALCLVOT PEAK VEL VTI: 15.7 CM
E WAVE DECELERATION TIME: 395 MS
E/A RATIO: 0.76
E/E' RATIO: 6 M/S
EAG (OHS): 120 MG/DL (ref 68–131)
ECHO EF ESTIMATED: 59 %
ECHO LV POSTERIOR WALL: 0.9 CM (ref 0.6–1.1)
ERYTHROCYTE [DISTWIDTH] IN BLOOD BY AUTOMATED COUNT: 12.8 % (ref 11.5–14.5)
FRACTIONAL SHORTENING: 31.1 % (ref 28–44)
FREEZE AND HOLD: NORMAL
GFR SERPLBLD CREATININE-BSD FMLA CKD-EPI: >60 ML/MIN/1.73/M2
GLUCOSE CSF-MCNC: 86 MG/DL (ref 40–70)
GLUCOSE SERPL-MCNC: 121 MG/DL (ref 70–110)
GLUCOSE UR QL STRIP: NEGATIVE
HBA1C MFR BLD: 5.8 % (ref 4–5.6)
HCT VFR BLD AUTO: 39.9 % (ref 40–54)
HDLC SERPL-MCNC: 61 MG/DL (ref 40–75)
HDLC SERPL: 39.6 % (ref 20–50)
HGB BLD-MCNC: 13.4 GM/DL (ref 14–18)
HGB UR QL STRIP: NEGATIVE
IMM GRANULOCYTES # BLD AUTO: 0.03 K/UL (ref 0–0.04)
IMM GRANULOCYTES NFR BLD AUTO: 0.3 % (ref 0–0.5)
INDIRECT COOMBS: NORMAL
INR PPP: 1.1 (ref 0.8–1.2)
INTERVENTRICULAR SEPTUM: 0.6 CM (ref 0.6–1.1)
KETONES UR QL STRIP: NEGATIVE
LA MAJOR: 4.7 CM
LA MINOR: 5.2 CM
LA WIDTH: 3.5 CM
LDLC SERPL CALC-MCNC: 83 MG/DL (ref 63–159)
LEFT ATRIUM SIZE: 2.6 CM
LEFT ATRIUM VOLUME INDEX: 19 ML/M2
LEFT ATRIUM VOLUME: 38 CM3
LEFT INTERNAL DIMENSION IN SYSTOLE: 3.1 CM (ref 2.1–4)
LEFT VENTRICLE DIASTOLIC VOLUME INDEX: 45.77 ML/M2
LEFT VENTRICLE DIASTOLIC VOLUME: 92 ML
LEFT VENTRICLE MASS INDEX: 52 G/M2
LEFT VENTRICLE SYSTOLIC VOLUME INDEX: 18.9 ML/M2
LEFT VENTRICLE SYSTOLIC VOLUME: 38 ML
LEFT VENTRICULAR INTERNAL DIMENSION IN DIASTOLE: 4.5 CM (ref 3.5–6)
LEFT VENTRICULAR MASS: 104.5 G
LEUKOCYTE ESTERASE UR QL STRIP: NEGATIVE
LV LATERAL E/E' RATIO: 5.1
LV SEPTAL E/E' RATIO: 8.2
LYMPHOCYTES # BLD AUTO: 0.81 K/UL (ref 1–4.8)
MAGNESIUM SERPL-MCNC: 2.1 MG/DL (ref 1.6–2.6)
MCH RBC QN AUTO: 30.3 PG (ref 27–31)
MCHC RBC AUTO-ENTMCNC: 33.6 G/DL (ref 32–36)
MCV RBC AUTO: 90 FL (ref 82–98)
MONOS+MACROS NFR CSF MANUAL: 100 % (ref 15–45)
MV PEAK A VEL: 0.54 M/S
MV PEAK E VEL: 0.41 M/S
NITRITE UR QL STRIP: NEGATIVE
NONHDLC SERPL-MCNC: 93 MG/DL
NUCLEATED RBC (/100WBC) (OHS): 0 /100 WBC
OHS CV RV/LV RATIO: 0.6 CM
PH UR STRIP: 8 [PH]
PHOSPHATE SERPL-MCNC: 3.1 MG/DL (ref 2.7–4.5)
PISA TR MAX VEL: 2.3 M/S
PLATELET # BLD AUTO: 252 K/UL (ref 150–450)
PMV BLD AUTO: 9.3 FL (ref 9.2–12.9)
POCT GLUCOSE: 102 MG/DL (ref 70–110)
POCT GLUCOSE: 138 MG/DL (ref 70–110)
POCT GLUCOSE: 147 MG/DL (ref 70–110)
POTASSIUM SERPL-SCNC: 3.7 MMOL/L (ref 3.5–5.1)
PROT CSF-MCNC: 9 MG/DL (ref 15–40)
PROT SERPL-MCNC: 8 GM/DL (ref 6–8.4)
PROT UR QL STRIP: ABNORMAL
PROTHROMBIN TIME: 11.7 SECONDS (ref 9–12.5)
RA MAJOR: 4.71 CM
RA PRESSURE ESTIMATED: 15 MMHG
RA WIDTH: 3.38 CM
RBC # BLD AUTO: 4.42 M/UL (ref 4.6–6.2)
RBC # CSF: 115 /CU MM
RELATIVE EOSINOPHIL (OHS): 0.6 %
RELATIVE LYMPHOCYTE (OHS): 7.1 % (ref 18–48)
RELATIVE MONOCYTE (OHS): 5.3 % (ref 4–15)
RELATIVE NEUTROPHIL (OHS): 86.3 % (ref 38–73)
RH BLD: NORMAL
RH BLD: NORMAL
RIGHT VENTRICLE DIASTOLIC BASEL DIMENSION: 2.7 CM
RPR SER QL: REACTIVE
RPR SER-TITR: ABNORMAL {TITER}
RV TB RVSP: 17 MMHG
SINUS: 3.3 CM
SODIUM SERPL-SCNC: 138 MMOL/L (ref 136–145)
SP GR UR STRIP: >=1.03
SPECIMEN OUTDATE: NORMAL
SPECIMEN VOL CSF: 4.5 ML
STJ: 3 CM
TDI LATERAL: 0.08 M/S
TDI SEPTAL: 0.05 M/S
TDI: 0.07 M/S
TRICUSPID ANNULAR PLANE SYSTOLIC EXCURSION: 2.4 CM
TRIGL SERPL-MCNC: 50 MG/DL (ref 30–150)
TSH SERPL-ACNC: 1.03 UIU/ML (ref 0.4–4)
TV PEAK GRADIENT: 21 MMHG
TV REST PULMONARY ARTERY PRESSURE: 36 MMHG
UROBILINOGEN UR STRIP-ACNC: NEGATIVE EU/DL
WBC # BLD AUTO: 11.44 K/UL (ref 3.9–12.7)
WBC # CSF: 1 /CU MM
Z-SCORE OF LEFT VENTRICULAR DIMENSION IN END DIASTOLE: -2.69
Z-SCORE OF LEFT VENTRICULAR DIMENSION IN END SYSTOLE: -1.21

## 2025-07-02 PROCEDURE — 86255 FLUORESCENT ANTIBODY SCREEN: CPT

## 2025-07-02 PROCEDURE — 5A1945Z RESPIRATORY VENTILATION, 24-96 CONSECUTIVE HOURS: ICD-10-PCS | Performed by: PSYCHIATRY & NEUROLOGY

## 2025-07-02 PROCEDURE — 84157 ASSAY OF PROTEIN OTHER: CPT | Performed by: STUDENT IN AN ORGANIZED HEALTH CARE EDUCATION/TRAINING PROGRAM

## 2025-07-02 PROCEDURE — 20000000 HC ICU ROOM

## 2025-07-02 PROCEDURE — 27100171 HC OXYGEN HIGH FLOW UP TO 24 HOURS

## 2025-07-02 PROCEDURE — 63600175 PHARM REV CODE 636 W HCPCS

## 2025-07-02 PROCEDURE — 87206 SMEAR FLUORESCENT/ACID STAI: CPT

## 2025-07-02 PROCEDURE — 51798 US URINE CAPACITY MEASURE: CPT

## 2025-07-02 PROCEDURE — 25000003 PHARM REV CODE 250: Performed by: INTERNAL MEDICINE

## 2025-07-02 PROCEDURE — 86592 SYPHILIS TEST NON-TREP QUAL: CPT | Performed by: PSYCHIATRY & NEUROLOGY

## 2025-07-02 PROCEDURE — 81003 URINALYSIS AUTO W/O SCOPE: CPT

## 2025-07-02 PROCEDURE — 87556 M.TUBERCULO DNA AMP PROBE: CPT

## 2025-07-02 PROCEDURE — 25000003 PHARM REV CODE 250: Performed by: PSYCHIATRY & NEUROLOGY

## 2025-07-02 PROCEDURE — 25500020 PHARM REV CODE 255: Performed by: PSYCHIATRY & NEUROLOGY

## 2025-07-02 PROCEDURE — 99900035 HC TECH TIME PER 15 MIN (STAT)

## 2025-07-02 PROCEDURE — 94761 N-INVAS EAR/PLS OXIMETRY MLT: CPT

## 2025-07-02 PROCEDURE — 85610 PROTHROMBIN TIME: CPT

## 2025-07-02 PROCEDURE — 86592 SYPHILIS TEST NON-TREP QUAL: CPT | Performed by: STUDENT IN AN ORGANIZED HEALTH CARE EDUCATION/TRAINING PROGRAM

## 2025-07-02 PROCEDURE — 99291 CRITICAL CARE FIRST HOUR: CPT | Mod: 25,,, | Performed by: PSYCHIATRY & NEUROLOGY

## 2025-07-02 PROCEDURE — 86592 SYPHILIS TEST NON-TREP QUAL: CPT

## 2025-07-02 PROCEDURE — 84443 ASSAY THYROID STIM HORMONE: CPT

## 2025-07-02 PROCEDURE — 0BH17EZ INSERTION OF ENDOTRACHEAL AIRWAY INTO TRACHEA, VIA NATURAL OR ARTIFICIAL OPENING: ICD-10-PCS | Performed by: PSYCHIATRY & NEUROLOGY

## 2025-07-02 PROCEDURE — 84100 ASSAY OF PHOSPHORUS: CPT

## 2025-07-02 PROCEDURE — 99223 1ST HOSP IP/OBS HIGH 75: CPT | Mod: ,,, | Performed by: STUDENT IN AN ORGANIZED HEALTH CARE EDUCATION/TRAINING PROGRAM

## 2025-07-02 PROCEDURE — 25000003 PHARM REV CODE 250

## 2025-07-02 PROCEDURE — 87522 HEPATITIS C REVRS TRNSCRPJ: CPT | Performed by: INTERNAL MEDICINE

## 2025-07-02 PROCEDURE — 85730 THROMBOPLASTIN TIME PARTIAL: CPT

## 2025-07-02 PROCEDURE — 99223 1ST HOSP IP/OBS HIGH 75: CPT | Mod: ,,, | Performed by: INTERNAL MEDICINE

## 2025-07-02 PROCEDURE — 99000 SPECIMEN HANDLING OFFICE-LAB: CPT | Performed by: STUDENT IN AN ORGANIZED HEALTH CARE EDUCATION/TRAINING PROGRAM

## 2025-07-02 PROCEDURE — 63600175 PHARM REV CODE 636 W HCPCS: Performed by: PSYCHIATRY & NEUROLOGY

## 2025-07-02 PROCEDURE — 99900026 HC AIRWAY MAINTENANCE (STAT)

## 2025-07-02 PROCEDURE — 83036 HEMOGLOBIN GLYCOSYLATED A1C: CPT

## 2025-07-02 PROCEDURE — 94799 UNLISTED PULMONARY SVC/PX: CPT

## 2025-07-02 PROCEDURE — 009630Z DRAINAGE OF CEREBRAL VENTRICLE WITH DRAINAGE DEVICE, PERCUTANEOUS APPROACH: ICD-10-PCS | Performed by: STUDENT IN AN ORGANIZED HEALTH CARE EDUCATION/TRAINING PROGRAM

## 2025-07-02 PROCEDURE — 86403 PARTICLE AGGLUT ANTBDY SCRN: CPT

## 2025-07-02 PROCEDURE — 86900 BLOOD TYPING SEROLOGIC ABO: CPT | Mod: 91

## 2025-07-02 PROCEDURE — 94002 VENT MGMT INPAT INIT DAY: CPT

## 2025-07-02 PROCEDURE — 80053 COMPREHEN METABOLIC PANEL: CPT

## 2025-07-02 PROCEDURE — 87070 CULTURE OTHR SPECIMN AEROBIC: CPT | Performed by: STUDENT IN AN ORGANIZED HEALTH CARE EDUCATION/TRAINING PROGRAM

## 2025-07-02 PROCEDURE — 89051 BODY FLUID CELL COUNT: CPT | Performed by: STUDENT IN AN ORGANIZED HEALTH CARE EDUCATION/TRAINING PROGRAM

## 2025-07-02 PROCEDURE — 85025 COMPLETE CBC W/AUTO DIFF WBC: CPT

## 2025-07-02 PROCEDURE — 86900 BLOOD TYPING SEROLOGIC ABO: CPT | Performed by: PSYCHIATRY & NEUROLOGY

## 2025-07-02 PROCEDURE — 87798 DETECT AGENT NOS DNA AMP: CPT

## 2025-07-02 PROCEDURE — 82945 GLUCOSE OTHER FLUID: CPT | Performed by: STUDENT IN AN ORGANIZED HEALTH CARE EDUCATION/TRAINING PROGRAM

## 2025-07-02 PROCEDURE — 80061 LIPID PANEL: CPT

## 2025-07-02 PROCEDURE — 83735 ASSAY OF MAGNESIUM: CPT

## 2025-07-02 PROCEDURE — 87116 MYCOBACTERIA CULTURE: CPT

## 2025-07-02 PROCEDURE — 31500 INSERT EMERGENCY AIRWAY: CPT | Mod: ,,, | Performed by: PSYCHIATRY & NEUROLOGY

## 2025-07-02 PROCEDURE — G0545 PR VISIT INHERENT TO INPT OR OBS CARE, INFECTIOUS DISEASE: HCPCS | Mod: ,,, | Performed by: INTERNAL MEDICINE

## 2025-07-02 PROCEDURE — A9585 GADOBUTROL INJECTION: HCPCS | Performed by: PSYCHIATRY & NEUROLOGY

## 2025-07-02 RX ORDER — MIDAZOLAM HYDROCHLORIDE 1 MG/ML
INJECTION, SOLUTION INTRAMUSCULAR; INTRAVENOUS
Status: COMPLETED
Start: 2025-07-02 | End: 2025-07-02

## 2025-07-02 RX ORDER — FENTANYL CITRATE 50 UG/ML
INJECTION, SOLUTION INTRAMUSCULAR; INTRAVENOUS
Status: COMPLETED
Start: 2025-07-02 | End: 2025-07-02

## 2025-07-02 RX ORDER — LIDOCAINE HYDROCHLORIDE AND EPINEPHRINE 10; 10 UG/ML; MG/ML
10 INJECTION, SOLUTION INFILTRATION; PERINEURAL ONCE
Status: COMPLETED | OUTPATIENT
Start: 2025-07-02 | End: 2025-07-02

## 2025-07-02 RX ORDER — ROCURONIUM BROMIDE 10 MG/ML
INJECTION, SOLUTION INTRAVENOUS
Status: COMPLETED
Start: 2025-07-02 | End: 2025-07-02

## 2025-07-02 RX ORDER — METRONIDAZOLE 500 MG/100ML
500 INJECTION, SOLUTION INTRAVENOUS
Status: DISCONTINUED | OUTPATIENT
Start: 2025-07-02 | End: 2025-07-03

## 2025-07-02 RX ORDER — FENTANYL CITRATE 50 UG/ML
100 INJECTION, SOLUTION INTRAMUSCULAR; INTRAVENOUS ONCE
Refills: 0 | Status: DISCONTINUED | OUTPATIENT
Start: 2025-07-02 | End: 2025-07-03

## 2025-07-02 RX ORDER — SODIUM,POTASSIUM PHOSPHATES 280-250MG
2 POWDER IN PACKET (EA) ORAL
Status: DISCONTINUED | OUTPATIENT
Start: 2025-07-02 | End: 2025-07-16

## 2025-07-02 RX ORDER — PROPOFOL 10 MG/ML
VIAL (ML) INTRAVENOUS
Status: DISPENSED
Start: 2025-07-02 | End: 2025-07-02

## 2025-07-02 RX ORDER — PROPOFOL 10 MG/ML
0-50 INJECTION, EMULSION INTRAVENOUS CONTINUOUS
Status: DISCONTINUED | OUTPATIENT
Start: 2025-07-02 | End: 2025-07-04

## 2025-07-02 RX ORDER — DEXMEDETOMIDINE HYDROCHLORIDE 4 UG/ML
0-1.4 INJECTION, SOLUTION INTRAVENOUS CONTINUOUS
Status: DISCONTINUED | OUTPATIENT
Start: 2025-07-02 | End: 2025-07-02

## 2025-07-02 RX ORDER — PROPOFOL 10 MG/ML
INJECTION, EMULSION INTRAVENOUS
Status: COMPLETED
Start: 2025-07-02 | End: 2025-07-02

## 2025-07-02 RX ORDER — MIDAZOLAM HYDROCHLORIDE 1 MG/ML
10 INJECTION, SOLUTION INTRAMUSCULAR; INTRAVENOUS ONCE
Status: DISCONTINUED | OUTPATIENT
Start: 2025-07-02 | End: 2025-07-03

## 2025-07-02 RX ORDER — GLUCAGON 1 MG
1 KIT INJECTION
Status: DISCONTINUED | OUTPATIENT
Start: 2025-07-02 | End: 2025-07-08

## 2025-07-02 RX ORDER — FAMOTIDINE 20 MG/1
20 TABLET, FILM COATED ORAL 2 TIMES DAILY
Status: DISCONTINUED | OUTPATIENT
Start: 2025-07-02 | End: 2025-07-03

## 2025-07-02 RX ORDER — PHENYLEPHRINE HCL IN 0.9% NACL 1 MG/10 ML
SYRINGE (ML) INTRAVENOUS
Status: COMPLETED
Start: 2025-07-02 | End: 2025-07-02

## 2025-07-02 RX ORDER — POLYETHYLENE GLYCOL 3350 17 G/17G
17 POWDER, FOR SOLUTION ORAL DAILY
Status: DISCONTINUED | OUTPATIENT
Start: 2025-07-03 | End: 2025-07-04

## 2025-07-02 RX ORDER — SUCCINYLCHOLINE CHLORIDE 20 MG/ML
INJECTION INTRAMUSCULAR; INTRAVENOUS
Status: COMPLETED
Start: 2025-07-02 | End: 2025-07-02

## 2025-07-02 RX ORDER — DEXAMETHASONE SODIUM PHOSPHATE 4 MG/ML
10 INJECTION, SOLUTION INTRA-ARTICULAR; INTRALESIONAL; INTRAMUSCULAR; INTRAVENOUS; SOFT TISSUE ONCE
Status: COMPLETED | OUTPATIENT
Start: 2025-07-02 | End: 2025-07-02

## 2025-07-02 RX ORDER — MIDAZOLAM HYDROCHLORIDE 1 MG/ML
2 INJECTION, SOLUTION INTRAMUSCULAR; INTRAVENOUS ONCE
Status: COMPLETED | OUTPATIENT
Start: 2025-07-02 | End: 2025-07-02

## 2025-07-02 RX ORDER — FENTANYL CITRATE-0.9 % NACL/PF 10 MCG/ML
0-250 PLASTIC BAG, INJECTION (ML) INTRAVENOUS CONTINUOUS
Status: DISCONTINUED | OUTPATIENT
Start: 2025-07-02 | End: 2025-07-04

## 2025-07-02 RX ORDER — FENTANYL CITRATE 50 UG/ML
50 INJECTION, SOLUTION INTRAMUSCULAR; INTRAVENOUS ONCE
Refills: 0 | Status: COMPLETED | OUTPATIENT
Start: 2025-07-02 | End: 2025-07-02

## 2025-07-02 RX ORDER — AMOXICILLIN 250 MG
1 CAPSULE ORAL 2 TIMES DAILY
Status: DISCONTINUED | OUTPATIENT
Start: 2025-07-02 | End: 2025-07-04

## 2025-07-02 RX ORDER — CEFTRIAXONE 2 G/1
2 INJECTION, POWDER, FOR SOLUTION INTRAMUSCULAR; INTRAVENOUS
Status: DISCONTINUED | OUTPATIENT
Start: 2025-07-02 | End: 2025-07-03

## 2025-07-02 RX ORDER — ACETAMINOPHEN 325 MG/1
650 TABLET ORAL EVERY 6 HOURS PRN
Status: DISCONTINUED | OUTPATIENT
Start: 2025-07-02 | End: 2025-07-07

## 2025-07-02 RX ORDER — FENTANYL CITRATE-0.9 % NACL/PF 10 MCG/ML
0-500 PLASTIC BAG, INJECTION (ML) INTRAVENOUS CONTINUOUS
Refills: 0 | Status: DISCONTINUED | OUTPATIENT
Start: 2025-07-02 | End: 2025-07-02

## 2025-07-02 RX ORDER — ETOMIDATE 2 MG/ML
20 INJECTION INTRAVENOUS ONCE
Status: COMPLETED | OUTPATIENT
Start: 2025-07-02 | End: 2025-07-02

## 2025-07-02 RX ORDER — DEXAMETHASONE SODIUM PHOSPHATE 10 MG/ML
10 INJECTION INTRAMUSCULAR; INTRAVENOUS ONCE
Status: DISCONTINUED | OUTPATIENT
Start: 2025-07-02 | End: 2025-07-02

## 2025-07-02 RX ORDER — ROCURONIUM BROMIDE 10 MG/ML
80 INJECTION, SOLUTION INTRAVENOUS ONCE
Status: COMPLETED | OUTPATIENT
Start: 2025-07-02 | End: 2025-07-02

## 2025-07-02 RX ORDER — DEXAMETHASONE SODIUM PHOSPHATE 4 MG/ML
4 INJECTION, SOLUTION INTRA-ARTICULAR; INTRALESIONAL; INTRAMUSCULAR; INTRAVENOUS; SOFT TISSUE EVERY 6 HOURS
Status: DISCONTINUED | OUTPATIENT
Start: 2025-07-02 | End: 2025-07-02

## 2025-07-02 RX ORDER — DEXAMETHASONE SODIUM PHOSPHATE 4 MG/ML
4 INJECTION, SOLUTION INTRA-ARTICULAR; INTRALESIONAL; INTRAMUSCULAR; INTRAVENOUS; SOFT TISSUE EVERY 6 HOURS
Status: DISCONTINUED | OUTPATIENT
Start: 2025-07-03 | End: 2025-07-10

## 2025-07-02 RX ORDER — ETOMIDATE 2 MG/ML
INJECTION INTRAVENOUS
Status: COMPLETED
Start: 2025-07-02 | End: 2025-07-02

## 2025-07-02 RX ORDER — CALCIUM GLUCONATE 20 MG/ML
2 INJECTION, SOLUTION INTRAVENOUS
Status: DISCONTINUED | OUTPATIENT
Start: 2025-07-02 | End: 2025-07-04

## 2025-07-02 RX ORDER — MAGNESIUM SULFATE HEPTAHYDRATE 40 MG/ML
2 INJECTION, SOLUTION INTRAVENOUS
Status: DISCONTINUED | OUTPATIENT
Start: 2025-07-02 | End: 2025-07-04

## 2025-07-02 RX ORDER — CALCIUM GLUCONATE 20 MG/ML
1 INJECTION, SOLUTION INTRAVENOUS
Status: DISCONTINUED | OUTPATIENT
Start: 2025-07-02 | End: 2025-07-04

## 2025-07-02 RX ORDER — CALCIUM GLUCONATE 20 MG/ML
3 INJECTION, SOLUTION INTRAVENOUS
Status: DISCONTINUED | OUTPATIENT
Start: 2025-07-02 | End: 2025-07-04

## 2025-07-02 RX ORDER — INSULIN ASPART 100 [IU]/ML
0-5 INJECTION, SOLUTION INTRAVENOUS; SUBCUTANEOUS EVERY 6 HOURS PRN
Status: DISCONTINUED | OUTPATIENT
Start: 2025-07-02 | End: 2025-07-08

## 2025-07-02 RX ORDER — GADOBUTROL 604.72 MG/ML
8 INJECTION INTRAVENOUS
Status: COMPLETED | OUTPATIENT
Start: 2025-07-02 | End: 2025-07-02

## 2025-07-02 RX ORDER — MUPIROCIN 20 MG/G
OINTMENT TOPICAL 2 TIMES DAILY
Status: COMPLETED | OUTPATIENT
Start: 2025-07-02 | End: 2025-07-06

## 2025-07-02 RX ADMIN — MIDAZOLAM HYDROCHLORIDE 2 MG: 1 INJECTION, SOLUTION INTRAMUSCULAR; INTRAVENOUS at 01:07

## 2025-07-02 RX ADMIN — ROCURONIUM BROMIDE 80 MG: 10 INJECTION, SOLUTION INTRAVENOUS at 11:07

## 2025-07-02 RX ADMIN — GADOBUTROL 8 ML: 604.72 INJECTION INTRAVENOUS at 03:07

## 2025-07-02 RX ADMIN — PROPOFOL 49.94 MCG/KG/MIN: 10 INJECTION, EMULSION INTRAVENOUS at 08:07

## 2025-07-02 RX ADMIN — Medication 100 MCG/HR: at 01:07

## 2025-07-02 RX ADMIN — MIDAZOLAM 2 MG: 1 INJECTION INTRAMUSCULAR; INTRAVENOUS at 03:07

## 2025-07-02 RX ADMIN — DEXMEDETOMIDINE HYDROCHLORIDE 0.2 MCG/KG/HR: 4 INJECTION INTRAVENOUS at 12:07

## 2025-07-02 RX ADMIN — LIDOCAINE HYDROCHLORIDE,EPINEPHRINE BITARTRATE 10 ML: 10; .01 INJECTION, SOLUTION INFILTRATION; PERINEURAL at 02:07

## 2025-07-02 RX ADMIN — MUPIROCIN: 20 OINTMENT TOPICAL at 09:07

## 2025-07-02 RX ADMIN — HYDRALAZINE HYDROCHLORIDE 10 MG: 20 INJECTION, SOLUTION INTRAMUSCULAR; INTRAVENOUS at 12:07

## 2025-07-02 RX ADMIN — MIDAZOLAM 2 MG: 1 INJECTION INTRAMUSCULAR; INTRAVENOUS at 01:07

## 2025-07-02 RX ADMIN — POTASSIUM BICARBONATE 50 MEQ: 977.5 TABLET, EFFERVESCENT ORAL at 09:07

## 2025-07-02 RX ADMIN — DEXMEDETOMIDINE HYDROCHLORIDE 1.4 MCG/KG/HR: 4 INJECTION INTRAVENOUS at 05:07

## 2025-07-02 RX ADMIN — PROPOFOL 20 MCG/KG/MIN: 10 INJECTION, EMULSION INTRAVENOUS at 12:07

## 2025-07-02 RX ADMIN — ETOMIDATE 20 MG: 2 INJECTION INTRAVENOUS at 11:07

## 2025-07-02 RX ADMIN — CEFTRIAXONE 2 G: 2 INJECTION, POWDER, FOR SOLUTION INTRAMUSCULAR; INTRAVENOUS at 06:07

## 2025-07-02 RX ADMIN — FENTANYL CITRATE 50 MCG: 50 INJECTION INTRAMUSCULAR; INTRAVENOUS at 11:07

## 2025-07-02 RX ADMIN — DEXAMETHASONE SODIUM PHOSPHATE 4 MG: 4 INJECTION INTRA-ARTICULAR; INTRALESIONAL; INTRAMUSCULAR; INTRAVENOUS; SOFT TISSUE at 11:07

## 2025-07-02 RX ADMIN — METRONIDAZOLE 500 MG: 5 INJECTION, SOLUTION INTRAVENOUS at 09:07

## 2025-07-02 RX ADMIN — CEFEPIME 2 G: 2 INJECTION, POWDER, FOR SOLUTION INTRAVENOUS at 05:07

## 2025-07-02 RX ADMIN — ROCURONIUM BROMIDE 80 MG: 10 INJECTION INTRAVENOUS at 11:07

## 2025-07-02 RX ADMIN — HEPARIN SODIUM 5000 UNITS: 5000 INJECTION, SOLUTION INTRAVENOUS; SUBCUTANEOUS at 06:07

## 2025-07-02 RX ADMIN — DEXMEDETOMIDINE HYDROCHLORIDE 1 MCG/KG/HR: 4 INJECTION INTRAVENOUS at 10:07

## 2025-07-02 RX ADMIN — METRONIDAZOLE 500 MG: 5 INJECTION, SOLUTION INTRAVENOUS at 06:07

## 2025-07-02 RX ADMIN — DEXAMETHASONE SODIUM PHOSPHATE 10 MG: 4 INJECTION INTRA-ARTICULAR; INTRALESIONAL; INTRAMUSCULAR; INTRAVENOUS; SOFT TISSUE at 02:07

## 2025-07-02 RX ADMIN — VANCOMYCIN HYDROCHLORIDE 1250 MG: 1.25 INJECTION, POWDER, LYOPHILIZED, FOR SOLUTION INTRAVENOUS at 06:07

## 2025-07-02 RX ADMIN — SENNOSIDES AND DOCUSATE SODIUM 1 TABLET: 50; 8.6 TABLET ORAL at 09:07

## 2025-07-02 RX ADMIN — Medication 25 MCG/HR: at 12:07

## 2025-07-02 RX ADMIN — LABETALOL HYDROCHLORIDE 10 MG: 5 INJECTION, SOLUTION INTRAVENOUS at 11:07

## 2025-07-02 RX ADMIN — FAMOTIDINE 20 MG: 20 TABLET, FILM COATED ORAL at 09:07

## 2025-07-02 RX ADMIN — PROPOFOL 50 MCG/KG/MIN: 10 INJECTION, EMULSION INTRAVENOUS at 04:07

## 2025-07-02 RX ADMIN — FENTANYL CITRATE 50 MCG: 50 INJECTION, SOLUTION INTRAMUSCULAR; INTRAVENOUS at 11:07

## 2025-07-02 RX ADMIN — MUPIROCIN: 20 OINTMENT TOPICAL at 06:07

## 2025-07-02 NOTE — SIGNIFICANT EVENT
Patient requiring ETT in order to get MRI to guide further treatment. Discussed risks, benefits, and alternatives with Adydennis Beaulieu (patients niece) who demonstrates understanding and agreed to the procedure. All questions answered to the best of my ability.    ____________________  Derick Gomez MD  Emergency Medicine Resident  10:54 AM 7/2/2025

## 2025-07-02 NOTE — EICU
Virtual ICU Quality Rounds    Admit Date: 7/1/2025  Hospital Day: 1    ICU Day: 11h    24H Vital Sign Range:  Temp:  [97.8 °F (36.6 °C)-100.6 °F (38.1 °C)]   Pulse:  [60-93]   Resp:  [10-72]   BP: ()/()   SpO2:  [95 %-100 %]     VICU Surveillance Screening  LDA reconciliation : Yes

## 2025-07-02 NOTE — PT/OT/SLP PROGRESS
Occupational Therapy      Patient Name:  Goran Carlos   MRN:  [id number]    Patient not seen today secondary to agitation/combativeness.   Will follow-up in pm 7/2 or on 7/3.    7/2/2025

## 2025-07-02 NOTE — ED NOTES
Report received from Yanique RN. Assume care of pt. Pt resting comfortably and independently repositioned in stretcher with bed locked in lowest position for safety. NAD noted at this time. Respirations even and unlabored and visible chest rise noted.  Patient offered bathroom assistance and denies need at this time. Pt instructed to call if assistance is needed. Pt on continuous cardiac, BP, and O2 monitoring. Call light within reach. Family at bedside. No needs at this time. Will continue to monitor periodically

## 2025-07-02 NOTE — PROGRESS NOTES
"Pharmacokinetic Initial Assessment: IV Vancomycin    Assessment/Plan:    Initiate intravenous vancomycin with loading dose of 1500 mg once followed by a maintenance dose of vancomycin 1250mg IV every 12 hours  Desired empiric serum trough concentration is 15 to 20 mcg/mL  Draw vancomycin trough level 60 min prior to fourth dose on 7/3 at approximately 0500  Pharmacy will continue to follow and monitor vancomycin.      Please contact pharmacy at extension [phone] with any questions regarding this assessment.     Thank you for the consult,   Hui Janie       Patient brief summary:  Goran Carlos is a 69 y.o. male initiated on antimicrobial therapy with IV Vancomycin for treatment of suspected potential     Drug Allergies:   Review of patient's allergies indicates:  No Known Allergies    Actual Body Weight:   77.1kg    Renal Function:   Estimated Creatinine Clearance: 108.6 mL/min (based on SCr of 0.7 mg/dL).,     Dialysis Method (if applicable):  N/A    CBC (last 72 hours):  Recent Labs   Lab Result Units 07/01/25  1409   WBC K/uL 6.00   HGB gm/dL 14.9   HCT % 45.1   Platelet Count K/uL 234   Lymph % % 13.0*   Mono % % 5.3   Eos % % 0.0   Basophil % % 0.7       Metabolic Panel (last 72 hours):  Recent Labs   Lab Result Units 07/01/25  1409 07/01/25  1916   Sodium mmol/L 134*  --    Potassium mmol/L 4.0  --    Chloride mmol/L 100  --    CO2 mmol/L 21*  --    Glucose mg/dL 113*  --    BUN mg/dL 9  --    Creatinine mg/dL 0.7  --    Urine Creatinine mg/dL  --  26.0   Albumin g/dL 4.5  --    Bilirubin Total mg/dL 0.8  --    ALP unit/L 109  --    AST unit/L 39  --    ALT unit/L 27  --    Magnesium  mg/dL 2.0  --    Phosphorus Level mg/dL 3.6  --        Drug levels (last 3 results):  No results for input(s): "VANCOMYCINRA", "VANCORANDOM", "VANCOMYCINPE", "VANCOPEAK", "VANCOMYCINTR", "VANCOTROUGH" in the last 72 hours.    Microbiologic Results:  Microbiology Results (last 7 days)       Procedure Component Value Units " Date/Time    Culture, Respiratory with Gram Stain [[id number]]     Order Status: Sent Specimen: Respiratory     Blood culture #1 **CANNOT BE ORDERED STAT** [[id number]] Collected: 07/01/25 1702    Order Status: Sent Specimen: Blood from Peripheral, Forearm, Left     Blood culture #2 **CANNOT BE ORDERED STAT** [[id number]] Collected: 07/01/25 1702    Order Status: Sent Specimen: Blood from Peripheral, Lower Arm, Left     Influenza A & B by Molecular [[id number]]  (Normal) Collected: 07/01/25 1449    Order Status: Completed Specimen: Nasal Swab Updated: 07/01/25 1537     INFLUENZA A MOLECULAR Negative     INFLUENZA B MOLECULAR  Negative

## 2025-07-02 NOTE — PROCEDURES
"Intubation    Date/Time: 7/2/2025 12:48 PM  Location procedure was performed: [facility] NEURO CRITICAL CARE    Performed by: Derick Gomez MD  Authorized by: Chilo Miguel MD  Assisting provider: Chilo Miguel MD  Consent Done: Yes  Consent: Verbal consent obtained. Written consent not obtained  Risks and benefits: risks, benefits and alternatives were discussed  Consent given by: Emergency contact (niece)  Patient identity confirmed: name  Time out: Immediately prior to procedure a "time out" was called to verify the correct patient, procedure, equipment, support staff and site/side marked as required.  Indications: airway protection  Intubation method: video-assisted  Patient status: paralyzed (RSI)  Preoxygenation: mask  Sedatives: etomidate  Paralytic: rocuronium  Laryngoscope size: Mac 4  Tube size: 7.5 mm  Tube type: cuffed  Number of attempts: 1  Cricoid pressure: no  Cords visualized: yes  Post-procedure assessment: chest rise and ETCO2 monitor  Breath sounds: clear  Cuff inflated: yes  ETT to lip: 24 cm  Tube secured with: ETT cuenca  Chest x-ray interpreted by me and radiologist.  Patient tolerance: Patient tolerated the procedure well with no immediate complications  Complications: No  Specimens: No  Implants: No         "

## 2025-07-02 NOTE — ASSESSMENT & PLAN NOTE
Was brought in from [facility], where he has been for the past month  Educate on cessation when apporpriate

## 2025-07-02 NOTE — PROGRESS NOTES
[facility]  Neurocritical Care  Progress Note    Admit Date: 7/1/2025  Service Date: 07/02/2025  Length of Stay: 1    Subjective:     Chief Complaint: Ataxia    History of Present Illness: 71 y/o M presenting from [facility] for generalized weakness for about a week now. History obtained from rehab nurse. She reported that he got to their facility about a month ago and, at baseline, walks with a cane and is alert and oriented. His nurse noticed that he has seemed weaker over the past week and over the past 3 days has had increased balance disturbance and gait issues. They also noticed that he has been speaking more softly in his speech is harder to understand. He has not had any infectious symptoms. He fell yesterday, unsure if he hit his head. CT head without contrast revealed multiple lesions of hypodensity with a hyperdense rim, notably in the left temporal lobe and right cerebellum. There were additional small hyperdensities in the right frontoparietal and left parasagittal parietal lobe. There is mass effect and partial effacement of the 4th ventricle secondary to the cerebellar lesion with developing hydrocephalus without MLS. CT of the chest, abdomen, and pelvis with IV contrast showed RUL mass with mediastinal adenopathy. Labs revealed Hep C and treponema antibodies were positive. Hep C PCR pending. Penicillin G was administered. A sepsis workup was completed and antibiotics were initiated. An MRI brain with and without contrast was ordered, but patient was unable to complete due to persistent movement after sedatives were administered. Prior to MRI, he was alert but drowsy. He was able to tell me his name, but told me he was 53 years old, the year was 2003, and he did not know the current place or reason for being here. He had generalized weakness but was slightly more weak on the right side. He followed simple commands, but did not attempt to follow more complex commands. He is admitted  to NCC with NSGY consult.    Hospital Course: 7/2/2025: MRI with significant motion artifact. Required presidex overnight secondary to agitation.     Interval History:  See hospital course      Objective:     Vitals:  Temp: 97.8 °F (36.6 °C)  Pulse: 60  Rhythm: normal sinus rhythm  BP: 104/67  MAP (mmHg): 81  Resp: 11  SpO2: 100 %    Temp  Min: 97.8 °F (36.6 °C)  Max: 100.6 °F (38.1 °C)  Pulse  Min: 60  Max: 93  BP  Min: 94/55  Max: 187/99  MAP (mmHg)  Min: 70  Max: 135  Resp  Min: 10  Max: 72  SpO2  Min: 95 %  Max: 100 %    07/01 0701 - 07/02 0700  In: 471.6 [I.V.:150.5]  Out: 1069 [Urine:1069]            Physical Exam  Vitals and nursing note reviewed.   Constitutional:       General: He is not in acute distress.     Comments: Somnolent but arousable to pain. Soft restrains in place   HENT:      Head: Normocephalic and atraumatic.      Nose:      Comments: NGT secured in place     Mouth/Throat:      Mouth: Mucous membranes are moist.      Pharynx: Oropharynx is clear.   Eyes:      Extraocular Movements: Extraocular movements intact.      Pupils: Pupils are equal, round, and reactive to light.   Cardiovascular:      Rate and Rhythm: Normal rate.   Pulmonary:      Effort: Pulmonary effort is normal. No respiratory distress.   Abdominal:      General: Abdomen is flat. There is no distension.      Palpations: Abdomen is soft.      Tenderness: There is no guarding or rebound.   Musculoskeletal:      Cervical back: Normal range of motion and neck supple.      Right lower leg: No edema.      Left lower leg: No edema.   Skin:     General: Skin is warm and dry.      Capillary Refill: Capillary refill takes less than 2 seconds.     Neuro:  E2 V3 M5 (GCS 10). Somnolent but arousable to pain. PERRL. On obvious facial asymmetry. HARRISON. Able to state name but very dysarthric. Unable to further test motor/sensation due to level of consciousness.       Medications:  ContinuousdexmedeTOMIDine (Precedex) infusion (titrating), Last  Rate: 1 mcg/kg/hr (07/02/25 1024)    ScheduledceFEPime IV (PEDS and ADULTS), 2 g, Q8H  dexAMETHasone (Decadron) IV (PEDS and ADULTS), 10 mg, Once  dexAMETHasone (Decadron) IV (PEDS and ADULTS), 4 mg, Q6H  metroNIDAZOLE IV (PEDS and ADULTS), 500 mg, Q8H  polyethylene glycol, 17 g, Daily  senna-docusate, 1 tablet, BID  vancomycin (VANCOCIN) IV (PEDS and ADULTS), 15 mg/kg, Q12H    PRNacetaminophen, 650 mg, Q6H PRN  bisacodyL, 10 mg, Daily PRN  dextrose 50%, 12.5 g, PRN  glucagon (human recombinant), 1 mg, PRN  hydrALAZINE, 10 mg, Q4H PRN  insulin aspart U-100, 0-5 Units, Q6H PRN  labetalol, 10 mg, Q4H PRN  magnesium oxide, 800 mg, PRN  magnesium oxide, 800 mg, PRN  ondansetron, 4 mg, Q4H PRN  potassium bicarbonate, 35 mEq, PRN  potassium bicarbonate, 50 mEq, PRN  potassium bicarbonate, 60 mEq, PRN  potassium, sodium phosphates, 2 packet, PRN  potassium, sodium phosphates, 2 packet, PRN  potassium, sodium phosphates, 2 packet, PRN  sodium chloride 0.9%, 10 mL, PRN  vancomycin - pharmacy to dose, , pharmacy to manage frequency      Today I personally reviewed pertinent medications, lines/drains/airways, imaging, cardiology results, laboratory results, microbiology results, notably:    Diet  Diet NPO  Diet NPO    Assessment/Plan:     Neuro  Brain lesion  70 y/o M brought to ED from [facility] (hx of heroin and ETOH) for progressive weakness, gait disturbance, confusion, and decreased volume when speaking. CT head without contrast revealed multiple lesions of hypodensity with a hyperdense rim, notably in the left temporal lobe and right cerebellum. There were additional small hyperdensities in the right frontoparietal and left parasagittal parietal lobe. There is mass effect and partial effacement of the 4th ventricle secondary to the cerebellar lesion with developing hydrocephalus without MLS. CT of the chest, abdomen, and pelvis with IV contrast showed RUL mass with mediastinal adenopathy. Labs revealed Hep C and  treponema antibodies were positive. Hep C PCR pending. Penicillin G was administered. A sepsis workup was completed and antibiotics were initiated. Plan for ETT intubation and brain MRI this AM. Neurosurgery plan to place EVD.    Admit to NCC  Q1h neuro checks, I&Os, and vitals   CBC, CMP, mag, phos daily   A1c, TSH, lipid panel, coags  Echo, EKG, CXR   NSGY following   Holding steroids until MRI complete  Vanc and Cefepime  Blood, sputum, urine pending  Reportedly has had no recent infectious symptoms  Received Penicillin G in ED  Keppra ppx  SBP <140   Cardene  Prn labetalol, hydralazine   PRN Zofran 4mg q4h  SCDs; [facility]  PT/OT/SLP    AMS (altered mental status)  See primary problem    Psychiatric  Polysubstance abuse  Was brought in from [facility], where he has been for the past month  Educate on cessation when apporpriate    Pulmonary  Mass of upper lobe of right lung  Noted on CXR and CT chest with IV contrast  Saturating well on RA  No lung consolidations or obstructive process  No known history of cancer    Other  Hepatitis C antibody positive  PCR pending  No known history of Hep C per patient    Gait disturbance  See primary problem          The patient is being Prophylaxed for:  Venous Thromboembolism with: Mechanical  Stress Ulcer with: None  Ventilator Pneumonia with: none    Activity Orders            Turn patient starting at 07/01 2200    Elevate HOB starting at 07/01 2040    Diet NPO: NPO starting at 07/01 2040    Straight Cath starting at 07/01 1925          Full Code    Derick Gomez MD  Neurocritical Care  [facility]

## 2025-07-02 NOTE — ASSESSMENT & PLAN NOTE
Noted on CXR and CT chest with IV contrast  Saturating well on RA  No lung consolidations or obstructive process  No known history of cancer

## 2025-07-02 NOTE — HPI
71 y/o M presenting from [facility] for generalized weakness for about a week now. History obtained from rehab nurse. She reported that he got to their facility about a month ago and, at baseline, walks with a cane and is alert and oriented. His nurse noticed that he has seemed weaker over the past week and over the past 3 days has had increased balance disturbance and gait issues. They also noticed that he has been speaking more softly in his speech is harder to understand. He has not had any infectious symptoms. He fell yesterday, unsure if he hit his head. CT head without contrast revealed multiple lesions of hypodensity with a hyperdense rim, notably in the left temporal lobe and right cerebellum. There were additional small hyperdensities in the right frontoparietal and left parasagittal parietal lobe. There is mass effect and partial effacement of the 4th ventricle secondary to the cerebellar lesion with developing hydrocephalus without MLS. CT of the chest, abdomen, and pelvis with IV contrast showed RUL mass with mediastinal adenopathy. Labs revealed Hep C and treponema antibodies were positive. Hep C PCR pending. Penicillin G was administered. A sepsis workup was completed and antibiotics were initiated. An MRI brain with and without contrast was ordered, but patient was unable to complete due to persistent movement after sedatives were administered. Prior to MRI, he was alert but drowsy. He was able to tell me his name, but told me he was 53 years old, the year was 2003, and he did not know the current place or reason for being here. He had generalized weakness but was slightly more weak on the right side. He followed simple commands, but did not attempt to follow more complex commands. He is admitted to [facility] with NSGY consult.

## 2025-07-02 NOTE — ASSESSMENT & PLAN NOTE
68 y/o M brought to ED from [facility] (hx of heroin and ETOH) for progressive weakness, gait disturbance, confusion, and decreased volume when speaking. CT head without contrast revealed multiple lesions of hypodensity with a hyperdense rim, notably in the left temporal lobe and right cerebellum. There were additional small hyperdensities in the right frontoparietal and left parasagittal parietal lobe. There is mass effect and partial effacement of the 4th ventricle secondary to the cerebellar lesion with developing hydrocephalus without MLS. CT of the chest, abdomen, and pelvis with IV contrast showed RUL mass with mediastinal adenopathy. Labs revealed Hep C and treponema antibodies were positive. Hep C PCR pending. Penicillin G was administered. A sepsis workup was completed and antibiotics were initiated. An MRI brain with and without contrast was ordered, but patient was unable to complete due to persistent movement after sedatives were administered.    Admit to [facility]  Q1h neuro checks, I&Os, and vitals   CBC, CMP, mag, phos daily   A1c, TSH, lipid panel, coags  Echo, EKG, CXR   NSGY following   Holding steroids until MRI complete  Vanc and Cefepime  Blood, sputum, urine pending  Reportedly has had no recent infectious symptoms  Received Penicillin G in ED  Keppra ppx  SBP <140   Cardene  Prn labetalol, hydralazine   PRN Zofran 4mg q4h  SCDs; [facility]  PT/OT/SLP

## 2025-07-02 NOTE — PLAN OF CARE
"[facility] Care Plan  POC reviewed with [place] and family at 1400. Patient verbalized understanding. Questions and concerns addressed. No acute events today. Pt progressing toward goals. Will continue to monitor. See below and flowsheets for full assessment and VS info.     -Patient intubated  -EVD placed  -Traveled to CT x 2 and MRI w/wo  -CXG bath and linen changed  -Propofol and Fentanyl infusions  -EVD open at 20        Is this a stroke patient? no    Neuro:  [place] Coma Scale  Best Eye Response: 3-->(E3) to speech  Best Motor Response: 5-->(M5) localizes pain  Best Verbal Response: 1-->(V1) none (Intubated)  [place] Coma Scale Score: 9  Assessment Qualifiers: Patient chemically sedated or paralyzed, No eye obstruction present  Pupil PERRLA: yes     24 hr Temp:  [97.8 °F (36.6 °C)-99.2 °F (37.3 °C)]     CV:   Rhythm: normal sinus rhythm  BP goals:   SBP < 160  MAP > 65    Resp:      Vent Mode: A/C  Set Rate: 18 BPM  Oxygen Concentration (%): 50  Vt Set: 470 mL  PEEP/CPAP: 5 cmH20    Plan: Intubated today for airway protection    GI/:     Diet/Nutrition Received: NPO  Last Bowel Movement: 07/01/25  Voiding Characteristics: external catheter    Intake/Output Summary (Last 24 hours) at 7/2/2025 1848  Last data filed at 7/2/2025 1845  Gross per 24 hour   Intake 1151.59 ml   Output 1819 ml   Net -667.41 ml          Labs/Accuchecks:  Recent Labs   Lab 07/02/25  0324   WBC 11.44   RBC 4.42*   HGB 13.4*   HCT 39.9*         Recent Labs   Lab 07/02/25  0324      K 3.7   CO2 23      BUN 11   CREATININE 0.8   ALKPHOS 98   ALT 26   AST 37   BILITOT 1.2*      Recent Labs   Lab 07/02/25  0324   PROTIME 11.7   INR 1.1   APTT 26.0    No results for input(s): "CPK", "CPKMB", "TROPONINI", "MB" in the last 168 hours.    Electrolytes: N/A - electrolytes WDL  Accuchecks: none    Gtts:   fentanyl  0-250 mcg/hr Intravenous Continuous 7.5 mL/hr at 07/02/25 1801 75 mcg/hr at 07/02/25 1801    propofoL  0-50 " mcg/kg/min Intravenous Continuous 23.1 mL/hr at 25 50 mcg/kg/min at 25 180       LDA/Wounds:    Davi Risk Assessment  Sensory Perception: 3-->slightly limited  Moisture: 3-->occasionally moist  Activity: 1-->bedfast  Mobility: 3-->slightly limited  Nutrition: 2-->probably inadequate  Friction and Shear: 2-->potential problem  Davi Score: 14    Is your davi score 12 or less? no            Restraints:   Restraint Order  Length of Order: Order good for next 24 hours or when removed.  Date that the current order will : 25  Time that the current order will : 514  Order Upon Application: Yes    [facility]

## 2025-07-02 NOTE — NURSING
1330- MD Orlin & MD Janie with NSGY team at bedside at this time preparing for EVD placement at bedside. All equipment/supplies set up and meds ready at bedside. Timeout: Completed.     1331 - 2mg Versed IVP administered as ordered.     1400 - EVD placed without any complications. Orders carried out.     1450 - Pt transported to CT as ordered with IV pump pole, portable monitor & ventilator by RN x 2 & RT via ICU bed. VSS. Will continue to monitor closely.

## 2025-07-02 NOTE — SUBJECTIVE & OBJECTIVE
Past Medical History:   Diagnosis Date    ETOH abuse     Gait disturbance     Heroin abuse      History reviewed. No pertinent surgical history.   Medications Ordered Prior to Encounter[1]   Allergies: Patient has no known allergies.  No family history on file.  Social History[2]  Review of Systems   Reason unable to perform ROS: Confused.     Objective:     Vitals:    Temp: 98.3 °F (36.8 °C)  Pulse: 75  BP: (!) 181/103  MAP (mmHg): 135  Resp: 14  SpO2: 98 %    Temp  Min: 98.3 °F (36.8 °C)  Max: 100.6 °F (38.1 °C)  Pulse  Min: 74  Max: 91  BP  Min: 150/84  Max: 187/99  MAP (mmHg)  Min: 122  Max: 135  Resp  Min: 14  Max: 20  SpO2  Min: 95 %  Max: 100 %    No intake/output data recorded.            Physical Exam  Vitals and nursing note reviewed.   Constitutional:       General: He is not in acute distress.     Appearance: He is normal weight. He is not toxic-appearing.   HENT:      Head: Normocephalic and atraumatic.      Nose: Nose normal.      Mouth/Throat:      Mouth: Mucous membranes are moist.      Pharynx: Oropharynx is clear.   Cardiovascular:      Rate and Rhythm: Normal rate and regular rhythm.      Pulses: Normal pulses.   Pulmonary:      Effort: Pulmonary effort is normal.      Comments: On RA  Abdominal:      General: Abdomen is flat. There is no distension.      Palpations: Abdomen is soft.      Tenderness: There is no abdominal tenderness.   Musculoskeletal:         General: No swelling or tenderness. Normal range of motion.      Cervical back: Normal range of motion. No rigidity or tenderness.   Skin:     General: Skin is warm and dry.      Capillary Refill: Capillary refill takes less than 2 seconds.   Neurological:      Mental Status: He is alert.      Comments:   E4 V4 M6  Alert but drowsy. He was able to tell me his name, but told me he was 53 years old, the year was 2003, and he did not know the current place or reason for being here. He had generalized weakness but was slightly more weak on the  right side. He followed simple commands, but did not attempt to follow more complex commands. Speech soft. Mild dysarthria. Word finding difficulties.   Pupils equal and pinpoint.  No obvious facial asymmetry.  Tongue midline. Shoulder shrug symmetric.  Motor:  RUE 4/5  RLE 4/5  LUE 4/5  LLE 4/5  Coordination intact in LUE. Would not attempt in RUE.  Sensation intact and symmetric throughout      Gait deferred     Today I personally reviewed pertinent medications, lines/drains/airways, imaging, cardiology results, laboratory results, microbiology results         [1]   No current facility-administered medications on file prior to encounter.     No current outpatient medications on file prior to encounter.   [2]   Social History  Tobacco Use    Smoking status: Former     Types: Cigarettes   Substance Use Topics    Alcohol use: Not Currently    Drug use: Not Currently     Types: Heroin

## 2025-07-02 NOTE — CONSULTS
[facility]  Infectious Disease  Consult Note    Patient Name: Goran Carlos  MRN: [id number]  Admission Date: 7/1/2025  Hospital Length of Stay: 1 days  Attending Physician: Chilo Miguel MD  Primary Care Provider: No primary care provider on file.     Isolation Status: No active isolations    Patient information was obtained from past medical records and ER records.      Inpatient consult to Infectious Diseases  Consult performed by: Brandie Henderson MD  Consult ordered by: Juanjo Corea, [facility]        Assessment/Plan:     Neuro  Brain lesion  Etiology of brain lesions unclear. Unable to obtain history from patient however potential infectious etiologies include abscess and septic emboli. In the absence of bacteremia septic emboli would be less likely. HIV screening test negative.  Agree with empiric antimicrobials however, favor de-escalating cefepime to ceftriaxone to reduce risk of encephalopathy from cefepime neurotoxicity.   Continue metronidazole and vancomycin.  Monitor vancomycin trough.   Further management per NCC and Neurosurgery.     ID  Positive serology for syphilis  I have reviewed hospital notes from  NCC service and other specialty providers. I have also reviewed CBC, CMP/BMP,  cultures and imaging with my interpretation as documented.     Positive Treponema pallidum antibody. Pending reflex RPR to assess for false positive vs active infection. Received penicillin G 2.4 million units x 1 on 7/1.  Will follow RPR.  May need to consider diagnostic LP to assess for neurosyphilis once RPR is available.  Discussed management plan with the staff and/or members from NCC service.      Other  Hepatitis C antibody positive  No past testing available for review.  Will add on HCV RNA PCR to assess for chronic infection.         Thank you for your consult. I will follow-up with patient. Please contact us if you have any additional questions.    Brandie Hines  "MD  Infectious Disease  [facility]    75 minutes of total time spent on the encounter, which includes face to face time and non-face to face time preparing to see the patient (eg, review of tests), obtaining and/or reviewing separately obtained history, documenting clinical information in the electronic or other health record, independently interpreting results (not separately reported) and communicating results to the patient/family/caregiver, or care coordination (not separately reported).     Subjective:     Principal Problem: Ataxia    HPI: A 70-year-old man with substance abuse disorder undergoing [facility] program who was brought in for evaluation due to generalized weakness of one week duration. This was associated with gait and balance disturbances over 3 days. In [facility] ED he was afebrile. Laboratory work up showed normal WBC count, a creatinine level of 0.7, a positive RPR, and a positive HCV ab. Imaging revealed rounded lesions throughout the brain and a spiculated mass in the RUL with adenopathy. He was admitted to [facility] for further management however the patient became agitated requiring chemical and physical restraints.     Infectious Disease consulted for "potential cns infection, treponema positive, recent hx of IV drug use"        Past Medical History:   Diagnosis Date    ETOH abuse     Gait disturbance     Heroin abuse        History reviewed. No pertinent surgical history.    Review of patient's allergies indicates:  No Known Allergies    Medications:  No medications prior to admission.     Antibiotics (From admission, onward)      Start     Stop Route Frequency Ordered    07/02/25 1145  mupirocin 2 % ointment         07/07/25 0859 Nasl 2 times daily 07/02/25 1043    07/02/25 0900  metronidazole IVPB 500 mg         -- IV Every 8 hours (non-standard times) 07/02/25 0753    07/02/25 0600  vancomycin 1,250 mg in 0.9% NaCl 250 mL IVPB (admixture device)         -- IV Every 12 hours " "(non-standard times) 07/01/25 2151 07/01/25 2245  ceFEPIme injection 2 g         -- IV Every 8 hours (non-standard times) 07/01/25 2136 07/01/25 2234  vancomycin - pharmacy to dose  (vancomycin IVPB (PEDS and ADULTS))        Placed in "And" Linked Group    -- IV pharmacy to manage frequency 07/01/25 2136          Antifungals (From admission, onward)      None          Antivirals (From admission, onward)      None               There is no immunization history on file for this patient.    Family History    None       Social History     Socioeconomic History    Marital status: Single   Tobacco Use    Smoking status: Former     Types: Cigarettes   Substance and Sexual Activity    Alcohol use: Not Currently    Drug use: Not Currently     Types: Heroin     Social Drivers of Health     Financial Resource Strain: Patient Unable To Answer (7/2/2025)    Overall Financial Resource Strain (CARDIA)     Difficulty of Paying Living Expenses: Patient unable to answer   Food Insecurity: Patient Unable To Answer (7/2/2025)    Hunger Vital Sign     Worried About Running Out of Food in the Last Year: Patient unable to answer     Ran Out of Food in the Last Year: Patient unable to answer   Transportation Needs: Patient Unable To Answer (7/2/2025)    PRAPARE - Transportation     Lack of Transportation (Medical): Patient unable to answer     Lack of Transportation (Non-Medical): Patient unable to answer   Stress: Patient Unable To Answer (7/2/2025)    [place] [employer] [place] [employer] - Occupational Stress Questionnaire     Feeling of Stress : Patient unable to answer   Housing Stability: Patient Unable To Answer (7/2/2025)    Housing Stability Vital Sign     Unable to Pay for Housing in the Last Year: Patient unable to answer     Homeless in the Last Year: Patient unable to answer     Review of Systems   Unable to perform ROS: Mental status change     Objective:     Vital Signs (Most Recent):  Temp: 97.8 °F (36.6 °C) " (07/02/25 1330)  Pulse: 75 (07/02/25 1350)  Resp: 18 (07/02/25 1130)  BP: (!) 84/56 (07/02/25 1350)  SpO2: 100 % (07/02/25 1350) Vital Signs (24h Range):  Temp:  [97.8 °F (36.6 °C)-100.6 °F (38.1 °C)] 97.8 °F (36.6 °C)  Pulse:  [60-93] 75  Resp:  [10-72] 18  SpO2:  [95 %-100 %] 100 %  BP: ()/() 84/56     Weight: 77.1 kg (169 lb 15.6 oz)  Body mass index is 22.43 kg/m².    Estimated Creatinine Clearance: 95 mL/min (based on SCr of 0.8 mg/dL).     Physical Exam  Vitals reviewed.   Constitutional:       Appearance: He is well-developed.   HENT:      Head: Normocephalic and atraumatic.      Right Ear: External ear normal.      Left Ear: External ear normal.   Eyes:      Conjunctiva/sclera: Conjunctivae normal.   Neck:      Thyroid: No thyromegaly.   Cardiovascular:      Rate and Rhythm: Normal rate and regular rhythm.      Heart sounds: Normal heart sounds. No murmur heard.  Pulmonary:      Effort: Pulmonary effort is normal.      Breath sounds: Normal breath sounds. No wheezing or rales.   Abdominal:      General: Bowel sounds are normal.      Palpations: Abdomen is soft. There is no mass.      Tenderness: There is no abdominal tenderness. There is no rebound.   Musculoskeletal:         General: Normal range of motion.      Cervical back: Normal range of motion and neck supple.   Lymphadenopathy:      Cervical: No cervical adenopathy.   Skin:     General: Skin is warm and dry.   Neurological:      Mental Status: He is easily aroused. He is lethargic and disoriented.          Significant Labs: CBC:   Recent Labs   Lab 07/01/25  1409 07/02/25  0324   WBC 6.00 11.44   HGB 14.9 13.4*   HCT 45.1 39.9*    252     CMP:   Recent Labs   Lab 07/01/25  1409 07/02/25  0324   * 138   K 4.0 3.7    102   CO2 21* 23   * 121*   BUN 9 11   CREATININE 0.7 0.8   CALCIUM 9.7 9.3   PROT 8.6* 8.0   ALBUMIN 4.5 4.2   BILITOT 0.8 1.2*   ALKPHOS 109 98   AST 39 37   ALT 27 26   ANIONGAP 13 13      Microbiology Results (last 7 days)       Procedure Component Value Units Date/Time    Blood culture #1 **CANNOT BE ORDERED STAT** [[id number]]  (Normal) Collected: 07/01/25 1702    Order Status: Completed Specimen: Blood from Peripheral, Forearm, Left Updated: 07/02/25 0602     Blood Culture No Growth After 6 Hours    Blood culture #2 **CANNOT BE ORDERED STAT** [[id number]]  (Normal) Collected: 07/01/25 1702    Order Status: Completed Specimen: Blood from Peripheral, Lower Arm, Left Updated: 07/02/25 0602     Blood Culture No Growth After 6 Hours    Culture, Respiratory with Gram Stain [[id number]]     Order Status: Sent Specimen: Respiratory     Influenza A & B by Molecular [[id number]]  (Normal) Collected: 07/01/25 1449    Order Status: Completed Specimen: Nasal Swab Updated: 07/01/25 1537     INFLUENZA A MOLECULAR Negative     INFLUENZA B MOLECULAR  Negative            Significant Imaging: I have reviewed all pertinent imaging results/findings within the past 24 hours.

## 2025-07-02 NOTE — EICU
Called to the room for Time out, Procedural surveillance, and Documentation assistance    [x] Verified patient name   [x] Verified patient   [x] Read from armband    11:27  Etomidate 20 mg IV followed by Rocuronium 80 mg IV given per bedside RN.  11:29  Intubation per Dr. Gomez supervised by Dr. Miguel w/ # 7.5 ETT using u/s guidance.    Procedure checklist: Time out flowsheet complete, CXR ordered, LDA added, Patient tolerated well, and RSI.

## 2025-07-02 NOTE — ASSESSMENT & PLAN NOTE
Was brought in from [facility], where he has been for the past month  Educate on cessation when apporpriate

## 2025-07-02 NOTE — PROGRESS NOTES
[facility]  Neurosurgery  Progress Note    Subjective:     History of Present Illness: Patient is 69yo M with generalized weakness, falls, and paucity of speech. 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 On exam, patient's voice is barely audible but patient appropriately answers questions and follows commands. Patient's brother states that patient had normal speech and gait when they last saw each other about 1 month ago.    Neurosurgery consulted due to multiple ring-enhancing lesions seen on CT brain.    Post-Op Info:  * No surgery found *       Interval History: patient more combative yesterday and  overnight and requiring sedation with precedex and versed. In 4 point restratins. MRI brain w wo attempted and aborted due to patient moving too much                  Review of Systems  Objective:     Weight: 77.1 kg (170 lb)  Body mass index is 23.06 kg/m².  Vital Signs (Most Recent):  Temp: 99.2 °F (37.3 °C) (07/02/25 0331)  Pulse: 64 (07/02/25 0501)  Resp: 10 (07/02/25 0501)  BP: 121/69 (07/02/25 0501)  SpO2: 98 % (07/02/25 0501) Vital Signs (24h Range):  Temp:  [98.3 °F (36.8 °C)-100.6 °F (38.1 °C)] 99.2 °F (37.3 °C)  Pulse:  [64-93] 64  Resp:  [10-72] 10  SpO2:  [95 %-100 %] 98 %  BP: ()/() 121/69         E2V4M5; sedated with precedex and versed  PERRL  AAOx0, mumbling some understandable words  Doesn't follow commands  Moves all extremities at least antigravity strength, in 4 point restraints  Non focal motor exam          Significant Diagnostics:  I have reviewed and interpreted all pertinent imaging results/findings within the past 24 hours.  Assessment/Plan:     * Ataxia  Assessment  69yo M with generalized weakness, falls, and paucity of speech presenting with multiple ring enhancing lesions on brain CT.    Imaging:  - CTH 7/1 showed multiple brain masses concerning for mets with surrounding edema, can't rule out abscesses. Mass in cerebellum with  effacement of 4th ventricule outflow with concern for developing hydrocephalus  - MRI brain 7/1: non diagnostic due to motion  - CT CAP 7/1: spiculated R lung mass and lymph node adenopathy in chest and neck concerning for metastatic disease  - repeat CTH pending today to rule out worsening hydro  - MRI brain w wo pending    Plan:  - admitted to [facility]  - EVD watch, if ventricles bigger on CTH will place EVD  - recommend oncology consult; would appreciate opinion on life expectancy when able to comment  - recommend patient get lung biopsy for workup of new mass  - infectious workup less concerning for abscesses but need MRI to rule out  - no plan for surgical intervention at this time; will follow for biopsy results of lung  - final neurosurgical decision pending results of lung biopsy    Dispo: ongoing    Discussed with Dr. Ric Lomas MD  Neurosurgery  [facility]

## 2025-07-02 NOTE — PLAN OF CARE
[facility] - Neuro Critical Care  Initial Discharge Assessment       Primary Care Provider: No primary care provider on file.    Admission Diagnosis: Lung mass [R91.8]  Altered mental status [R41.82]  Weakness [R53.1]  Brain mass [G93.89]  IVDU (intravenous drug user) [F19.90]  Hepatitis C antibody positive in blood [R76.8]    Admission Date: 7/1/2025  Expected Discharge Date: 7/8/2025    Transition of Care Barriers: None    Payor: [employer] MEDICARE / Plan: [employer] MEDICARE PPO / Product Type: Medicare Advantage /     Extended Emergency Contact Information  Primary Emergency Contact: BrittneePablo  Mobile Phone: [phone]  Relation: Other  Preferred language: English   needed? No  Secondary Emergency Contact: Pepper Ramirez  Mobile Phone: [phone]  Relation: Other  Preferred language: English   needed? No    Discharge Plan A: Home  Discharge Plan B: Other    No Pharmacies Listed    Initial Assessment (most recent)       Adult Discharge Assessment - 07/02/25 1528          Discharge Assessment    Assessment Type Discharge Planning Assessment     Confirmed/corrected address, phone number and insurance Yes     Confirmed Demographics Correct on Facesheet     Source of Information family     Communicated CARA with patient/caregiver Yes     People in Home alone     Do you expect to return to your current living situation? Yes     Do you have help at home or someone to help you manage your care at home? No     Prior to hospitilization cognitive status: Unable to Assess     Current cognitive status: Coma/Sedated/Intubated     Walking or Climbing Stairs Difficulty yes     Walking or Climbing Stairs ambulation difficulty, requires equipment     Mobility Management cane     Dressing/Bathing Difficulty no     Home Layout Able to live on 1st floor     Equipment Currently Used at Home none     Readmission within 30 days? No     Patient currently being followed by outpatient case management? No     Do  you currently have service(s) that help you manage your care at home? No     Do you take prescription medications? Yes     Do you have prescription coverage? Yes     Do you have any problems affording any of your prescribed medications? No     Is the patient taking medications as prescribed? yes     Who is going to help you get home at discharge? Pepper Ramirez  [phone]     How do you get to doctors appointments? family or friend will provide     Are you on dialysis? No     Do you take coumadin? No     Discharge Plan A Home     Discharge Plan B Other     DME Needed Upon Discharge  none     Discharge Plan discussed with: --   jean-paul Pabon and kal    Transition of Care Barriers None        Physical Activity    On average, how many days per week do you engage in moderate to strenuous exercise (like a brisk walk)? 0 days     On average, how many minutes do you engage in exercise at this level? 0 min        Financial Resource Strain    How hard is it for you to pay for the very basics like food, housing, medical care, and heating? Patient unable to answer        Housing Stability    In the last 12 months, was there a time when you were not able to pay the mortgage or rent on time? Patient unable to answer     At any time in the past 12 months, were you homeless or living in a shelter (including now)? Patient unable to answer        Transportation Needs    In the past 12 months, has lack of transportation kept you from medical appointments or from getting medications? Patient unable to answer     In the past 12 months, has lack of transportation kept you from meetings, work, or from getting things needed for daily living? Patient unable to answer        Food Insecurity    Within the past 12 months, you worried that your food would run out before you got the money to buy more. Patient unable to answer     Within the past 12 months, the food you bought just didn't last and you didn't have money to get more.  Patient unable to answer        Stress    Do you feel stress - tense, restless, nervous, or anxious, or unable to sleep at night because your mind is troubled all the time - these days? Patient unable to answer        Social Isolation    How often do you feel lonely or isolated from those around you?  Patient unable to answer        Alcohol Use    Q1: How often do you have a drink containing alcohol? Patient unable to answer     Q2: How many drinks containing alcohol do you have on a typical day when you are drinking? Patient unable to answer     Q3: How often do you have six or more drinks on one occasion? Patient unable to answer        Utilities    In the past 12 months has the electric, gas, oil, or water company threatened to shut off services in your home? Patient unable to answer        Health Literacy    How often do you need to have someone help you when you read instructions, pamphlets, or other written material from your doctor or pharmacy? Patient unable to respond                      The SW met with the patient efrain Pabon James [phone] and  Fabiana Blas at bedside. The SW placed name and contact information on the blackboard in the patient's room. Use preferred pharmacy / bedside delivery for any necessary medications at the time of discharge. The patient is independent with all ADLs. The patient is not on Dialysis or Coumadin. The Patient uses a cane but does not use home oxygen.  The patient's relatives will provide assistance to the patient upon discharge. The patient's relatives will provide transportation upon discharge. SW will continue to follow for course of hospitalization.  A discharge planning booklet was left at bedside.        Discharge Plan A and Plan B have been determined by review of patient's clinical status, future medical and therapeutic needs, and coverage/benefits for post-acute care in coordination with multidisciplinary team members.    Ele Richmond MSW, LCSW [facility] [place]  Case Management Dept.

## 2025-07-02 NOTE — PROCEDURES
Indications, risks, and benefits explained to surrogate decision maker and informed consent obtained. A time-out was completed verifying correct patient, procedure, and site. All pre-operative labs and imaging were reviewed. The scalp was clipped. Patient was prepped with ChloraPrep and draped in a sterile manner. Incisions were infiltrated with 1% Xylocaine with epinephrine 1:[id number] and post-procedural antibiotics were given. An incision was made on the right side of the head at the mid-pupillary line, 11 cm behind the nasion. A self-retaining retractor was placed. A burrhole was drilled with the cranial twist drill and the dura was punctured with the EVD catheter trochar. The ventricular catheter was then passed into the ventricle and brought out through a separate stab wound, the depth of catheter was 7 cm from the outer table of the skull. There was intermittent drainage of CSF after that. The catheter was secured to the scalp with #2-0 silk suture, stapled to the scalp at various places along its length, and the incision was closed with staples. The patient tolerated the procedure well. No complications. Sponge and needle counts were correct. Blood loss is minimal. Post-operative CTH was ordered for confirmation of catheter placement.    Ashutosh Pettit  Neurosurgery  [facility]

## 2025-07-02 NOTE — NURSING
OK to give 10mg Dexamethasone IVP once. Scheduled 4mg Dexamethasone dose to be rescheduled per PharmD. Order carried out. Will continue to monitor closely.

## 2025-07-02 NOTE — CONSULTS
Inpatient consult to Physical Medicine Rehab  Consult performed by: Vaishali Agosto NP  Consult ordered by: Juanjo Corea, AGACNP-BC        Consult received.      Vaishali Agosto NP  Physical Medicine & Rehabilitation   07/02/2025

## 2025-07-02 NOTE — SUBJECTIVE & OBJECTIVE
Interval History:  See hospital course      Objective:     Vitals:  Temp: 97.8 °F (36.6 °C)  Pulse: 60  Rhythm: normal sinus rhythm  BP: 104/67  MAP (mmHg): 81  Resp: 11  SpO2: 100 %    Temp  Min: 97.8 °F (36.6 °C)  Max: 100.6 °F (38.1 °C)  Pulse  Min: 60  Max: 93  BP  Min: 94/55  Max: 187/99  MAP (mmHg)  Min: 70  Max: 135  Resp  Min: 10  Max: 72  SpO2  Min: 95 %  Max: 100 %    07/01 0701 - 07/02 0700  In: 471.6 [I.V.:150.5]  Out: 1069 [Urine:1069]            Physical Exam  Vitals and nursing note reviewed.   Constitutional:       General: He is not in acute distress.     Comments: Somnolent but arousable to pain. Soft restrains in place   HENT:      Head: Normocephalic and atraumatic.      Nose:      Comments: NGT secured in place     Mouth/Throat:      Mouth: Mucous membranes are moist.      Pharynx: Oropharynx is clear.   Eyes:      Extraocular Movements: Extraocular movements intact.      Pupils: Pupils are equal, round, and reactive to light.   Cardiovascular:      Rate and Rhythm: Normal rate.   Pulmonary:      Effort: Pulmonary effort is normal. No respiratory distress.   Abdominal:      General: Abdomen is flat. There is no distension.      Palpations: Abdomen is soft.      Tenderness: There is no guarding or rebound.   Musculoskeletal:      Cervical back: Normal range of motion and neck supple.      Right lower leg: No edema.      Left lower leg: No edema.   Skin:     General: Skin is warm and dry.      Capillary Refill: Capillary refill takes less than 2 seconds.     Neuro:  E2 V3 M5 (GCS 10). Somnolent but arousable to pain. PERRL. On obvious facial asymmetry. HARRISON. Able to state name but very dysarthric. Unable to further test motor/sensation due to level of consciousness.       Medications:  ContinuousdexmedeTOMIDine (Precedex) infusion (titrating), Last Rate: 1 mcg/kg/hr (07/02/25 1024)    ScheduledceFEPime IV (PEDS and ADULTS), 2 g, Q8H  dexAMETHasone (Decadron) IV (PEDS and ADULTS), 10 mg,  Once  dexAMETHasone (Decadron) IV (PEDS and ADULTS), 4 mg, Q6H  metroNIDAZOLE IV (PEDS and ADULTS), 500 mg, Q8H  polyethylene glycol, 17 g, Daily  senna-docusate, 1 tablet, BID  vancomycin (VANCOCIN) IV (PEDS and ADULTS), 15 mg/kg, Q12H    PRNacetaminophen, 650 mg, Q6H PRN  bisacodyL, 10 mg, Daily PRN  dextrose 50%, 12.5 g, PRN  glucagon (human recombinant), 1 mg, PRN  hydrALAZINE, 10 mg, Q4H PRN  insulin aspart U-100, 0-5 Units, Q6H PRN  labetalol, 10 mg, Q4H PRN  magnesium oxide, 800 mg, PRN  magnesium oxide, 800 mg, PRN  ondansetron, 4 mg, Q4H PRN  potassium bicarbonate, 35 mEq, PRN  potassium bicarbonate, 50 mEq, PRN  potassium bicarbonate, 60 mEq, PRN  potassium, sodium phosphates, 2 packet, PRN  potassium, sodium phosphates, 2 packet, PRN  potassium, sodium phosphates, 2 packet, PRN  sodium chloride 0.9%, 10 mL, PRN  vancomycin - pharmacy to dose, , pharmacy to manage frequency      Today I personally reviewed pertinent medications, lines/drains/airways, imaging, cardiology results, laboratory results, microbiology results, notably:    Diet  Diet NPO  Diet NPO

## 2025-07-02 NOTE — ASSESSMENT & PLAN NOTE
Assessment  69yo M with generalized weakness, falls, and paucity of speech presenting with multiple ring enhancing lesions on brain CT.    Imaging:  - CTH 7/1 showed multiple brain masses concerning for mets with surrounding edema, can't rule out abscesses. Mass in cerebellum with effacement of 4th ventricule outflow with concern for developing hydrocephalus  - MRI brain 7/1: non diagnostic due to motion  - CT CAP 7/1: spiculated R lung mass and lymph node adenopathy in chest and neck concerning for metastatic disease    Plan:  - admitted to [facility]  - EVD watch  - MRI brain w wo pending  - recommend oncology consult; would appreciate opinion on life expectancy when able to comment  - recommend patient get lung biopsy for workup of new mass  - infectious workup less concerning for abscesses but need MRI to rule out  - no plan for surgical intervention at this time; will follow for biopsy results of lung  - final neurosurgical decision pending results of lung biopsy    Dispo: ongoing    Discussed with Dr. Larios

## 2025-07-02 NOTE — H&P
[facility] - Neuro Critical Care  Neurocritical Care  History & Physical    Admit Date: 7/1/2025  Service Date: 07/01/2025  Length of Stay: 0    Subjective:     Chief Complaint: Brain lesion    History of Present Illness: 69 y/o M presenting from [facility] for generalized weakness for about a week now. History obtained from rehab nurse. She reported that he got to their facility about a month ago and, at baseline, walks with a cane and is alert and oriented. His nurse noticed that he has seemed weaker over the past week and over the past 3 days has had increased balance disturbance and gait issues. They also noticed that he has been speaking more softly in his speech is harder to understand. He has not had any infectious symptoms. He fell yesterday, unsure if he hit his head. CT head without contrast revealed multiple lesions of hypodensity with a hyperdense rim, notably in the left temporal lobe and right cerebellum. There were additional small hyperdensities in the right frontoparietal and left parasagittal parietal lobe. There is mass effect and partial effacement of the 4th ventricle secondary to the cerebellar lesion with developing hydrocephalus without MLS. CT of the chest, abdomen, and pelvis with IV contrast showed RUL mass with mediastinal adenopathy. Labs revealed Hep C and treponema antibodies were positive. Hep C PCR pending. Penicillin G was administered. A sepsis workup was completed and antibiotics were initiated. An MRI brain with and without contrast was ordered, but patient was unable to complete due to persistent movement after sedatives were administered. Prior to MRI, he was alert but drowsy. He was able to tell me his name, but told me he was 53 years old, the year was 2003, and he did not know the current place or reason for being here. He had generalized weakness but was slightly more weak on the right side. He followed simple commands, but did not attempt to follow more complex commands. He  is admitted to [facility] with NSGY consult.      Past Medical History:   Diagnosis Date    ETOH abuse     Gait disturbance     Heroin abuse      History reviewed. No pertinent surgical history.   Medications Ordered Prior to Encounter[1]   Allergies: Patient has no known allergies.  No family history on file.  Social History[2]  Review of Systems   Reason unable to perform ROS: Confused.     Objective:     Vitals:    Temp: 98.3 °F (36.8 °C)  Pulse: 75  BP: (!) 181/103  MAP (mmHg): 135  Resp: 14  SpO2: 98 %    Temp  Min: 98.3 °F (36.8 °C)  Max: 100.6 °F (38.1 °C)  Pulse  Min: 74  Max: 91  BP  Min: 150/84  Max: 187/99  MAP (mmHg)  Min: 122  Max: 135  Resp  Min: 14  Max: 20  SpO2  Min: 95 %  Max: 100 %    No intake/output data recorded.            Physical Exam  Vitals and nursing note reviewed.   Constitutional:       General: He is not in acute distress.     Appearance: He is normal weight. He is not toxic-appearing.   HENT:      Head: Normocephalic and atraumatic.      Nose: Nose normal.      Mouth/Throat:      Mouth: Mucous membranes are moist.      Pharynx: Oropharynx is clear.   Cardiovascular:      Rate and Rhythm: Normal rate and regular rhythm.      Pulses: Normal pulses.   Pulmonary:      Effort: Pulmonary effort is normal.      Comments: On RA  Abdominal:      General: Abdomen is flat. There is no distension.      Palpations: Abdomen is soft.      Tenderness: There is no abdominal tenderness.   Musculoskeletal:         General: No swelling or tenderness. Normal range of motion.      Cervical back: Normal range of motion. No rigidity or tenderness.   Skin:     General: Skin is warm and dry.      Capillary Refill: Capillary refill takes less than 2 seconds.   Neurological:      Mental Status: He is alert.      Comments:   E4 V4 M6  Alert but drowsy. He was able to tell me his name, but told me he was 53 years old, the year was 2003, and he did not know the current place or reason for being here. He had generalized  weakness but was slightly more weak on the right side. He followed simple commands, but did not attempt to follow more complex commands. Speech soft. Mild dysarthria. Word finding difficulties.   Pupils equal and pinpoint.  No obvious facial asymmetry.  Tongue midline. Shoulder shrug symmetric.  Motor:  RUE 4/5  RLE 4/5  LUE 4/5  LLE 4/5  Coordination intact in LUE. Would not attempt in RUE.  Sensation intact and symmetric throughout      Gait deferred     Today I personally reviewed pertinent medications, lines/drains/airways, imaging, cardiology results, laboratory results, microbiology results    Assessment/Plan:     Neuro  * Brain lesion  70 y/o M brought to ED from [facility] (hx of heroin and ETOH) for progressive weakness, gait disturbance, confusion, and decreased volume when speaking. CT head without contrast revealed multiple lesions of hypodensity with a hyperdense rim, notably in the left temporal lobe and right cerebellum. There were additional small hyperdensities in the right frontoparietal and left parasagittal parietal lobe. There is mass effect and partial effacement of the 4th ventricle secondary to the cerebellar lesion with developing hydrocephalus without MLS. CT of the chest, abdomen, and pelvis with IV contrast showed RUL mass with mediastinal adenopathy. Labs revealed Hep C and treponema antibodies were positive. Hep C PCR pending. Penicillin G was administered. A sepsis workup was completed and antibiotics were initiated. An MRI brain with and without contrast was ordered, but patient was unable to complete due to persistent movement after sedatives were administered.    Admit to [facility]  Q1h neuro checks, I&Os, and vitals   CBC, CMP, mag, phos daily   A1c, TSH, lipid panel, coags  Echo, EKG, CXR   NSGY following   Holding steroids until MRI complete  Vanc and Cefepime  Blood, sputum, urine pending  Reportedly has had no recent infectious symptoms  Received Penicillin G in ED  Keppra ppx  SBP  <140   Cardene  Prn labetalol, hydralazine   PRN Zofran 4mg q4h  SCDs; SQH  PT/OT/SLP    AMS (altered mental status)  See primary problem    Psychiatric  Polysubstance abuse  Was brought in from [facility] [employer] [employer] [facility], where he has been for the past month  Educate on cessation when apporpriate    Pulmonary  Mass of upper lobe of right lung  Noted on CXR and CT chest with IV contrast  Saturating well on RA  No lung consolidations or obstructive process  No known history of cancer    Other  Hepatitis C antibody positive  PCR pending  No known history of Hep C per patient    Gait disturbance  See primary problem          The patient is being Prophylaxed for:  Venous Thromboembolism with: Mechanical or Chemical  Stress Ulcer with: Not Applicable   Ventilator Pneumonia with: not applicable    Activity Orders            Turn patient starting at 07/01 2200    Elevate HOB starting at 07/01 2040    Diet NPO: NPO starting at 07/01 2040    Straight Cath starting at 07/01 1925          Full Code    Critical care time spent on the evaluation and treatment of severe organ dysfunction, review of pertinent labs and imaging studies, discussions with consulting providers and discussions with patient/family: 55 minutes.     Juanjo Corea [facility]-BC  Neurocritical Care  [facility] Neuro Critical Care       [1]   No current facility-administered medications on file prior to encounter.     No current outpatient medications on file prior to encounter.   [2]   Social History  Tobacco Use    Smoking status: Former     Types: Cigarettes   Substance Use Topics    Alcohol use: Not Currently    Drug use: Not Currently     Types: Heroin

## 2025-07-02 NOTE — PT/OT/SLP PROGRESS
Speech Language Pathology  Discharge    Goran Carlos  MRN: [id number]    Patient not seen today secondary to pt intubated at this time. Please re-consult when medically appropriate. No further ST warranted at this time.   7/2/2025

## 2025-07-02 NOTE — HPI
"A 70-year-old man with substance abuse disorder undergoing [facility] program who was brought in for evaluation due to generalized weakness of one week duration. This was associated with gait and balance disturbances over 3 days. In [facility] ED he was afebrile. Laboratory work up showed normal WBC count, a creatinine level of 0.7, a positive RPR, and a positive HCV ab. Imaging revealed rounded lesions throughout the brain and a spiculated mass in the RUL with adenopathy. He was admitted to [facility] for further management however the patient became agitated requiring chemical and physical restraints.     Infectious Disease consulted for "potential cns infection, treponema positive, recent hx of IV drug use"      "

## 2025-07-02 NOTE — ASSESSMENT & PLAN NOTE
Etiology of brain lesions unclear. Unable to obtain history from patient however potential infectious etiologies include abscess and septic emboli. In the absence of bacteremia septic emboli would be less likely. HIV screening test negative.  Agree with empiric antimicrobials however, favor de-escalating cefepime to ceftriaxone to reduce risk of encephalopathy from cefepime neurotoxicity.   Continue metronidazole and vancomycin.  Monitor vancomycin trough.   Further management per NCC and Neurosurgery.

## 2025-07-02 NOTE — RESPIRATORY THERAPY
Patient intubated @ bedside with size 7.5 ETT placed 24 @ lips.  ETT is intact, patent and secured with commercial tube cuenca.  Placed patient on MV set on documented settings.

## 2025-07-02 NOTE — PLAN OF CARE
07/02/25 1432   Rounds   Attendance Provider;;Charge nurse;Occupational therapist   Discharge Plan A Other  ([facility])   Why the patient remains in the hospital Requires continued medical care   Transition of Care Barriers None     Ele Richmond MSW, LCSW  [facility]  Case Management Dept.

## 2025-07-02 NOTE — ASSESSMENT & PLAN NOTE
I have reviewed hospital notes from  [facility] service and other specialty providers. I have also reviewed CBC, CMP/BMP,  cultures and imaging with my interpretation as documented.     Positive Treponema pallidum antibody. Pending reflex RPR to assess for false positive vs active infection. Received penicillin G 2.4 million units x 1 on 7/1.  Will follow RPR.  May need to consider diagnostic LP to assess for neurosyphilis once RPR is available.  Discussed management plan with the staff and/or members from [facility] service.

## 2025-07-02 NOTE — NURSING
Patient arrived to [facility] from ED     Report received from: ED R[place]    Type of stroke/diagnosis:  Lesions     Current symptoms: Alert and oriented to self somewhat situation, FREYA CASIANO, following commands x4    Skin Assessment done: Yes  Wounds noted: none    Skin Assessment Verified by:  JHON Richey RN [place] Completed? Pending     Patient Belongings on Admit: Cane, glasses, water bottle, dentures, chain    NCC notified: CLEO Corea

## 2025-07-02 NOTE — EICU
Virtual ICU Admission    Admit Date: 2025  LOS: 0  Code Status: Full Code   : 1955  Bed: 9078/9078 A:     Diagnosis: Altered Mental Status,Brain mass     Patient  has a past medical history of ETOH abuse, Gait disturbance, and Heroin abuse.    Last VS: BP (!) 181/103 (BP Location: Right arm, Patient Position: Lying)   Pulse 75   Temp 98.3 °F (36.8 °C) (Oral)   Resp 14   Ht 6' (1.829 m)   Wt 77.1 kg (170 lb)   SpO2 98%   BMI 23.06 kg/m²       VICU Review    VICU nurse assessment :  [place] completed, LDA documentation reconciliation completed, and VTE prophylaxis review        Nursing orders placed : IP MAXINE Peripheral IV Access

## 2025-07-02 NOTE — ASSESSMENT & PLAN NOTE
70 y/o M brought to ED from [facility] (hx of heroin and ETOH) for progressive weakness, gait disturbance, confusion, and decreased volume when speaking. CT head without contrast revealed multiple lesions of hypodensity with a hyperdense rim, notably in the left temporal lobe and right cerebellum. There were additional small hyperdensities in the right frontoparietal and left parasagittal parietal lobe. There is mass effect and partial effacement of the 4th ventricle secondary to the cerebellar lesion with developing hydrocephalus without MLS. CT of the chest, abdomen, and pelvis with IV contrast showed RUL mass with mediastinal adenopathy. Labs revealed Hep C and treponema antibodies were positive. Hep C PCR pending. Penicillin G was administered. A sepsis workup was completed and antibiotics were initiated. Plan for ETT intubation and brain MRI this AM. Neurosurgery plan to place EVD.    Admit to NCC  Q1h neuro checks, I&Os, and vitals   CBC, CMP, mag, phos daily   A1c, TSH, lipid panel, coags  Echo, EKG, CXR   NSGY following   Holding steroids until MRI complete  Vanc and Cefepime  Blood, sputum, urine pending  Reportedly has had no recent infectious symptoms  Received Penicillin G in ED  Keppra ppx  SBP <140   Cardene  Prn labetalol, hydralazine   PRN Zofran 4mg q4h  SCDs; SQH  PT/OT/SLP

## 2025-07-02 NOTE — SUBJECTIVE & OBJECTIVE
"Past Medical History:   Diagnosis Date    ETOH abuse     Gait disturbance     Heroin abuse        History reviewed. No pertinent surgical history.    Review of patient's allergies indicates:  No Known Allergies    Medications:  No medications prior to admission.     Antibiotics (From admission, onward)      Start     Stop Route Frequency Ordered    07/02/25 1145  mupirocin 2 % ointment         07/07/25 0859 Nasl 2 times daily 07/02/25 1043    07/02/25 0900  metronidazole IVPB 500 mg         -- IV Every 8 hours (non-standard times) 07/02/25 0753    07/02/25 0600  vancomycin 1,250 mg in 0.9% NaCl 250 mL IVPB (admixture device)         -- IV Every 12 hours (non-standard times) 07/01/25 2151    07/01/25 2245  ceFEPIme injection 2 g         -- IV Every 8 hours (non-standard times) 07/01/25 2136    07/01/25 2234  vancomycin - pharmacy to dose  (vancomycin IVPB (PEDS and ADULTS))        Placed in "And" Linked Group    -- IV pharmacy to manage frequency 07/01/25 2136          Antifungals (From admission, onward)      None          Antivirals (From admission, onward)      None               There is no immunization history on file for this patient.    Family History    None       Social History     Socioeconomic History    Marital status: Single   Tobacco Use    Smoking status: Former     Types: Cigarettes   Substance and Sexual Activity    Alcohol use: Not Currently    Drug use: Not Currently     Types: Heroin     Social Drivers of Health     Financial Resource Strain: Patient Unable To Answer (7/2/2025)    Overall Financial Resource Strain (CARDIA)     Difficulty of Paying Living Expenses: Patient unable to answer   Food Insecurity: Patient Unable To Answer (7/2/2025)    Hunger Vital Sign     Worried About Running Out of Food in the Last Year: Patient unable to answer     Ran Out of Food in the Last Year: Patient unable to answer   Transportation Needs: Patient Unable To Answer (7/2/2025)    PRAPARE - Transportation     " Lack of Transportation (Medical): Patient unable to answer     Lack of Transportation (Non-Medical): Patient unable to answer   Stress: Patient Unable To Answer (7/2/2025)    [employer] - Occupational Stress Questionnaire     Feeling of Stress : Patient unable to answer   Housing Stability: Patient Unable To Answer (7/2/2025)    Housing Stability Vital Sign     Unable to Pay for Housing in the Last Year: Patient unable to answer     Homeless in the Last Year: Patient unable to answer     Review of Systems   Unable to perform ROS: Mental status change     Objective:     Vital Signs (Most Recent):  Temp: 97.8 °F (36.6 °C) (07/02/25 1330)  Pulse: 75 (07/02/25 1350)  Resp: 18 (07/02/25 1130)  BP: (!) 84/56 (07/02/25 1350)  SpO2: 100 % (07/02/25 1350) Vital Signs (24h Range):  Temp:  [97.8 °F (36.6 °C)-100.6 °F (38.1 °C)] 97.8 °F (36.6 °C)  Pulse:  [60-93] 75  Resp:  [10-72] 18  SpO2:  [95 %-100 %] 100 %  BP: ()/() 84/56     Weight: 77.1 kg (169 lb 15.6 oz)  Body mass index is 22.43 kg/m².    Estimated Creatinine Clearance: 95 mL/min (based on SCr of 0.8 mg/dL).     Physical Exam  Vitals reviewed.   Constitutional:       Appearance: He is well-developed.   HENT:      Head: Normocephalic and atraumatic.      Right Ear: External ear normal.      Left Ear: External ear normal.   Eyes:      Conjunctiva/sclera: Conjunctivae normal.   Neck:      Thyroid: No thyromegaly.   Cardiovascular:      Rate and Rhythm: Normal rate and regular rhythm.      Heart sounds: Normal heart sounds. No murmur heard.  Pulmonary:      Effort: Pulmonary effort is normal.      Breath sounds: Normal breath sounds. No wheezing or rales.   Abdominal:      General: Bowel sounds are normal.      Palpations: Abdomen is soft. There is no mass.      Tenderness: There is no abdominal tenderness. There is no rebound.   Musculoskeletal:         General: Normal range of motion.      Cervical back: Normal range of motion  and neck supple.   Lymphadenopathy:      Cervical: No cervical adenopathy.   Skin:     General: Skin is warm and dry.   Neurological:      Mental Status: He is easily aroused. He is lethargic and disoriented.          Significant Labs: CBC:   Recent Labs   Lab 07/01/25  1409 07/02/25  0324   WBC 6.00 11.44   HGB 14.9 13.4*   HCT 45.1 39.9*    252     CMP:   Recent Labs   Lab 07/01/25  1409 07/02/25  0324   * 138   K 4.0 3.7    102   CO2 21* 23   * 121*   BUN 9 11   CREATININE 0.7 0.8   CALCIUM 9.7 9.3   PROT 8.6* 8.0   ALBUMIN 4.5 4.2   BILITOT 0.8 1.2*   ALKPHOS 109 98   AST 39 37   ALT 27 26   ANIONGAP 13 13     Microbiology Results (last 7 days)       Procedure Component Value Units Date/Time    Blood culture #1 **CANNOT BE ORDERED STAT** [[id number]]  (Normal) Collected: 07/01/25 1702    Order Status: Completed Specimen: Blood from Peripheral, Forearm, Left Updated: 07/02/25 0602     Blood Culture No Growth After 6 Hours    Blood culture #2 **CANNOT BE ORDERED STAT** [[id number]]  (Normal) Collected: 07/01/25 1702    Order Status: Completed Specimen: Blood from Peripheral, Lower Arm, Left Updated: 07/02/25 0602     Blood Culture No Growth After 6 Hours    Culture, Respiratory with Gram Stain [[id number]]     Order Status: Sent Specimen: Respiratory     Influenza A & B by Molecular [[id number]]  (Normal) Collected: 07/01/25 1449    Order Status: Completed Specimen: Nasal Swab Updated: 07/01/25 1537     INFLUENZA A MOLECULAR Negative     INFLUENZA B MOLECULAR  Negative            Significant Imaging: I have reviewed all pertinent imaging results/findings within the past 24 hours.

## 2025-07-02 NOTE — HOSPITAL COURSE
7/2/2025: MRI with significant motion artifact. Required presidex overnight secondary to agitation  7/3/2025: MRI favors neoplastic brain lesions. CSF studies pending. Started on Zyprexa and phenobarbital for agitation, R subclavian CVC placed for central access  7/4/2025: NAEON. Extubated without complications.  07/05/2025 NAEON. EVD @20. Adjusted oxycodone frequency q8. Increased zyprexa 20 BID. Precedex for agitation. Plan for biopsy Monday w/ NSGY.  07/06/2025 NAEON. Added on 100q4 FWF. Continues to require precedex for agitation. OR tomorrow.  07/07/2025: AF. SHARRON. Exam stable. OR today for SOC crani for tumor resection. ICPs 0-14, 44cc output. PRN versed given for agitation. Precedex at 1.2. Hydral prn x1 for SBP >160. Remains in restraints.  07/08/2025: AF. SHARRON. POD1 s/p SOC. CT/MRI from overnight reviewed. EVD open at 10, ICPs -2-14, output 33cc. Versed PRN x1 given for agitation. Agitation improved with Clonidine. Cardene at 12.5 for SBP <160. Hydralazine prn x1 given. Multiple loose BMs. Off propofol and ketamine. Extubated this morning.  07/09/2025: Tolerated extubation overnight, more awake and cooperative on exam this AM, off precedex gtt overnight w/ versed PRN x1 only. Maxed on cardene gtt, add losartan 50 mg qday, increase clonidine to 0.2 mg TID for BP control. EVD @ 10, plan for repeat head CT Friday w/ EVD wean over weekend per discussion w/ NSGY  07/10/2025: NAEON. Afebrile. Pt removed NGT overnight, replaced. No longer on cardene, required hydralazine x1 and labetalol x2 PRNs. Losartan increased to 100mg. EVD open at 10, will plan to keep and wean over weekend per NSGY. Steroid taper per NSGY beginning today.  07/11/2025 NAEON. Neuro exam slowly improving, weaning phenobarb. Remains hypertensive, increasing clonidine to 0.3 mg TID and adding amlodipine 10 mg qday. CT head stable, EVD clamped -> plan for repeat head CT Monday morning per NSGY  07/12/2025 NAEON. Neuro exam stable, weaning  phenobarb and dexamethasone.EVD clamped, CT Monday.  07/13/2025 NAEON, tolerating EVD clamp trial. Neuro exam continuing to slowly improved. Repeat head CT tomorrow.   07/14/2025 NAEON, repeat head CT overnight stable w/ increased hydrocephalus or evidence of encephalocele formation, plan to pull EVD today. Add coreg 6.25 mg BID for uptrending BP. Holding FWF given borderline low Na. Weaning dex per NSGY  7/15/2025 NAEO, continue phenobarb taper. SLP following, failed modified today. Rehab placement pending. Intermittently agitated but overall improved from last week.

## 2025-07-02 NOTE — NURSING
Decision to intubated made by Lamont GRESHAM and health care team. Sandoval GRESHAM performed with assistance of Lamont GRESHAM.    1127 20 mg etomidate IVP. 80 mg rocuronium given IVP.    1129 7.5 ETT secured 24 @ the lip. Color change noted. Bilateral breath sounds auscultated. CXR obtained. Precedex gtt restarted @ 1.5 mcg/kg/min per Lamont GRESHAM.

## 2025-07-03 ENCOUNTER — ANESTHESIA EVENT (OUTPATIENT)
Dept: SURGERY | Facility: HOSPITAL | Age: 70
End: 2025-07-03
Payer: MEDICARE

## 2025-07-03 LAB
ABSOLUTE EOSINOPHIL (OHS): 0.03 K/UL
ABSOLUTE MONOCYTE (OHS): 0.69 K/UL (ref 0.3–1)
ABSOLUTE NEUTROPHIL COUNT (OHS): 7.04 K/UL (ref 1.8–7.7)
ACID FAST MOD KINY STN SPEC: NORMAL
ALBUMIN SERPL BCP-MCNC: 3.8 G/DL (ref 3.5–5.2)
ALLENS TEST: ABNORMAL
ALP SERPL-CCNC: 84 UNIT/L (ref 40–150)
ALT SERPL W/O P-5'-P-CCNC: 20 UNIT/L (ref 10–44)
ANION GAP (OHS): 14 MMOL/L (ref 8–16)
AST SERPL-CCNC: 34 UNIT/L (ref 11–45)
BASOPHILS # BLD AUTO: 0.04 K/UL
BASOPHILS NFR BLD AUTO: 0.5 %
BILIRUB SERPL-MCNC: 0.6 MG/DL (ref 0.1–1)
BUN SERPL-MCNC: 30 MG/DL (ref 8–23)
CALCIUM SERPL-MCNC: 9.1 MG/DL (ref 8.7–10.5)
CHLORIDE SERPL-SCNC: 103 MMOL/L (ref 95–110)
CO2 SERPL-SCNC: 21 MMOL/L (ref 23–29)
CREAT SERPL-MCNC: 1.5 MG/DL (ref 0.5–1.4)
CRYPTOC AG CSF QL IA.RAPID: NEGATIVE
DELSYS: ABNORMAL
ERYTHROCYTE [DISTWIDTH] IN BLOOD BY AUTOMATED COUNT: 13.4 % (ref 11.5–14.5)
ERYTHROCYTE [SEDIMENTATION RATE] IN BLOOD BY WESTERGREN METHOD: 18 MM/H
FIO2: 50
GFR SERPLBLD CREATININE-BSD FMLA CKD-EPI: 50 ML/MIN/1.73/M2
GLUCOSE SERPL-MCNC: 118 MG/DL (ref 70–110)
HCO3 UR-SCNC: 23.3 MMOL/L (ref 24–28)
HCT VFR BLD AUTO: 41.4 % (ref 40–54)
HCV RNA SERPL NAA+PROBE-LOG IU: NOT DETECTED {LOG_IU}/ML
HGB BLD-MCNC: 13.2 GM/DL (ref 14–18)
HOLD SPECIMEN: NORMAL
HOLD SPECIMEN: NORMAL
IMM GRANULOCYTES # BLD AUTO: 0.05 K/UL (ref 0–0.04)
IMM GRANULOCYTES NFR BLD AUTO: 0.6 % (ref 0–0.5)
LYMPHOCYTES # BLD AUTO: 0.81 K/UL (ref 1–4.8)
MAGNESIUM SERPL-MCNC: 2.2 MG/DL (ref 1.6–2.6)
MCH RBC QN AUTO: 30.6 PG (ref 27–31)
MCHC RBC AUTO-ENTMCNC: 31.9 G/DL (ref 32–36)
MCV RBC AUTO: 96 FL (ref 82–98)
MODE: ABNORMAL
NUCLEATED RBC (/100WBC) (OHS): 0 /100 WBC
PCO2 BLDA: 37.2 MMHG (ref 35–45)
PEEP: 5
PH SMN: 7.41 [PH] (ref 7.35–7.45)
PHOSPHATE SERPL-MCNC: 5.6 MG/DL (ref 2.7–4.5)
PLATELET # BLD AUTO: 226 K/UL (ref 150–450)
PMV BLD AUTO: 9.4 FL (ref 9.2–12.9)
PO2 BLDA: 50 MMHG (ref 40–60)
POC BE: -1 MMOL/L (ref -2–2)
POC SATURATED O2: 85 % (ref 95–100)
POC TCO2: 24 MMOL/L (ref 24–29)
POCT GLUCOSE: 105 MG/DL (ref 70–110)
POCT GLUCOSE: 112 MG/DL (ref 70–110)
POTASSIUM SERPL-SCNC: 4.5 MMOL/L (ref 3.5–5.1)
PROT SERPL-MCNC: 7.2 GM/DL (ref 6–8.4)
RBC # BLD AUTO: 4.32 M/UL (ref 4.6–6.2)
RELATIVE EOSINOPHIL (OHS): 0.3 %
RELATIVE LYMPHOCYTE (OHS): 9.4 % (ref 18–48)
RELATIVE MONOCYTE (OHS): 8 % (ref 4–15)
RELATIVE NEUTROPHIL (OHS): 81.2 % (ref 38–73)
RPR SER QL: NORMAL
SAMPLE: ABNORMAL
SITE: ABNORMAL
SODIUM SERPL-SCNC: 138 MMOL/L (ref 136–145)
SP02: 100
VANCOMYCIN TROUGH SERPL-MCNC: 28.3 UG/ML (ref 10–22)
VT: 470
WBC # BLD AUTO: 8.66 K/UL (ref 3.9–12.7)

## 2025-07-03 PROCEDURE — 99900035 HC TECH TIME PER 15 MIN (STAT)

## 2025-07-03 PROCEDURE — 02HV33Z INSERTION OF INFUSION DEVICE INTO SUPERIOR VENA CAVA, PERCUTANEOUS APPROACH: ICD-10-PCS | Performed by: PSYCHIATRY & NEUROLOGY

## 2025-07-03 PROCEDURE — 97167 OT EVAL HIGH COMPLEX 60 MIN: CPT

## 2025-07-03 PROCEDURE — 63600175 PHARM REV CODE 636 W HCPCS: Performed by: PSYCHIATRY & NEUROLOGY

## 2025-07-03 PROCEDURE — 80053 COMPREHEN METABOLIC PANEL: CPT

## 2025-07-03 PROCEDURE — 99232 SBSQ HOSP IP/OBS MODERATE 35: CPT | Mod: GC,,, | Performed by: NEUROLOGICAL SURGERY

## 2025-07-03 PROCEDURE — G0545 PR VISIT INHERENT TO INPT OR OBS CARE, INFECTIOUS DISEASE: HCPCS | Mod: ,,, | Performed by: INTERNAL MEDICINE

## 2025-07-03 PROCEDURE — 25000003 PHARM REV CODE 250

## 2025-07-03 PROCEDURE — 25000003 PHARM REV CODE 250: Performed by: PSYCHIATRY & NEUROLOGY

## 2025-07-03 PROCEDURE — 20000000 HC ICU ROOM

## 2025-07-03 PROCEDURE — 27100171 HC OXYGEN HIGH FLOW UP TO 24 HOURS

## 2025-07-03 PROCEDURE — 94761 N-INVAS EAR/PLS OXIMETRY MLT: CPT

## 2025-07-03 PROCEDURE — 83735 ASSAY OF MAGNESIUM: CPT

## 2025-07-03 PROCEDURE — 63600175 PHARM REV CODE 636 W HCPCS

## 2025-07-03 PROCEDURE — 36556 INSERT NON-TUNNEL CV CATH: CPT | Mod: ,,, | Performed by: PSYCHIATRY & NEUROLOGY

## 2025-07-03 PROCEDURE — 85025 COMPLETE CBC W/AUTO DIFF WBC: CPT

## 2025-07-03 PROCEDURE — 99291 CRITICAL CARE FIRST HOUR: CPT | Mod: 25,GC,, | Performed by: PSYCHIATRY & NEUROLOGY

## 2025-07-03 PROCEDURE — 63600175 PHARM REV CODE 636 W HCPCS: Performed by: NURSE PRACTITIONER

## 2025-07-03 PROCEDURE — 84100 ASSAY OF PHOSPHORUS: CPT

## 2025-07-03 PROCEDURE — 25000003 PHARM REV CODE 250: Performed by: INTERNAL MEDICINE

## 2025-07-03 PROCEDURE — 99233 SBSQ HOSP IP/OBS HIGH 50: CPT | Mod: ,,, | Performed by: INTERNAL MEDICINE

## 2025-07-03 PROCEDURE — 94003 VENT MGMT INPAT SUBQ DAY: CPT

## 2025-07-03 PROCEDURE — 99900026 HC AIRWAY MAINTENANCE (STAT)

## 2025-07-03 PROCEDURE — 97112 NEUROMUSCULAR REEDUCATION: CPT

## 2025-07-03 PROCEDURE — 80202 ASSAY OF VANCOMYCIN: CPT | Performed by: PSYCHIATRY & NEUROLOGY

## 2025-07-03 PROCEDURE — 87070 CULTURE OTHR SPECIMN AEROBIC: CPT

## 2025-07-03 RX ORDER — PHENOBARBITAL 30 MG/1
60 TABLET ORAL EVERY 8 HOURS
Status: DISCONTINUED | OUTPATIENT
Start: 2025-07-03 | End: 2025-07-04

## 2025-07-03 RX ORDER — MIDAZOLAM HYDROCHLORIDE 1 MG/ML
5 INJECTION, SOLUTION INTRAMUSCULAR; INTRAVENOUS ONCE
Status: COMPLETED | OUTPATIENT
Start: 2025-07-03 | End: 2025-07-03

## 2025-07-03 RX ORDER — FOLIC ACID 1 MG/1
1 TABLET ORAL DAILY
Status: DISCONTINUED | OUTPATIENT
Start: 2025-07-03 | End: 2025-07-22

## 2025-07-03 RX ORDER — OLANZAPINE 5 MG/1
15 TABLET, FILM COATED ORAL 2 TIMES DAILY
Status: DISCONTINUED | OUTPATIENT
Start: 2025-07-03 | End: 2025-07-05

## 2025-07-03 RX ORDER — HALOPERIDOL LACTATE 5 MG/ML
5 INJECTION, SOLUTION INTRAMUSCULAR ONCE
Status: COMPLETED | OUTPATIENT
Start: 2025-07-03 | End: 2025-07-03

## 2025-07-03 RX ORDER — CEFAZOLIN SODIUM 1 G/3ML
1 INJECTION, POWDER, FOR SOLUTION INTRAMUSCULAR; INTRAVENOUS
Status: DISCONTINUED | OUTPATIENT
Start: 2025-07-03 | End: 2025-07-14

## 2025-07-03 RX ORDER — MIDAZOLAM HYDROCHLORIDE 1 MG/ML
2 INJECTION, SOLUTION INTRAMUSCULAR; INTRAVENOUS ONCE
Status: DISCONTINUED | OUTPATIENT
Start: 2025-07-03 | End: 2025-07-03

## 2025-07-03 RX ORDER — MIDAZOLAM HYDROCHLORIDE 1 MG/ML
2 INJECTION, SOLUTION INTRAMUSCULAR; INTRAVENOUS ONCE
Status: COMPLETED | OUTPATIENT
Start: 2025-07-03 | End: 2025-07-03

## 2025-07-03 RX ORDER — PHENOBARBITAL 60 MG/1
60 TABLET ORAL ONCE
Status: COMPLETED | OUTPATIENT
Start: 2025-07-03 | End: 2025-07-03

## 2025-07-03 RX ORDER — MIDAZOLAM HYDROCHLORIDE 1 MG/ML
INJECTION, SOLUTION INTRAMUSCULAR; INTRAVENOUS
Status: COMPLETED
Start: 2025-07-03 | End: 2025-07-03

## 2025-07-03 RX ORDER — MIDAZOLAM HYDROCHLORIDE 5 MG/ML
5 INJECTION INTRAMUSCULAR; INTRAVENOUS ONCE
Status: DISCONTINUED | OUTPATIENT
Start: 2025-07-03 | End: 2025-07-03

## 2025-07-03 RX ORDER — OXYCODONE HYDROCHLORIDE 5 MG/1
5 TABLET ORAL EVERY 6 HOURS
Refills: 0 | Status: DISCONTINUED | OUTPATIENT
Start: 2025-07-03 | End: 2025-07-07

## 2025-07-03 RX ORDER — HALOPERIDOL LACTATE 5 MG/ML
5 INJECTION, SOLUTION INTRAMUSCULAR EVERY 6 HOURS PRN
Status: DISCONTINUED | OUTPATIENT
Start: 2025-07-03 | End: 2025-07-03

## 2025-07-03 RX ORDER — THIAMINE HCL 100 MG
100 TABLET ORAL DAILY
Status: DISCONTINUED | OUTPATIENT
Start: 2025-07-03 | End: 2025-07-22

## 2025-07-03 RX ORDER — HALOPERIDOL LACTATE 5 MG/ML
INJECTION, SOLUTION INTRAMUSCULAR
Status: COMPLETED
Start: 2025-07-03 | End: 2025-07-03

## 2025-07-03 RX ORDER — FAMOTIDINE 20 MG/1
20 TABLET, FILM COATED ORAL DAILY
Status: DISCONTINUED | OUTPATIENT
Start: 2025-07-04 | End: 2025-07-07

## 2025-07-03 RX ORDER — MIDAZOLAM HYDROCHLORIDE 1 MG/ML
10 INJECTION, SOLUTION INTRAMUSCULAR; INTRAVENOUS ONCE
Status: DISCONTINUED | OUTPATIENT
Start: 2025-07-03 | End: 2025-07-03

## 2025-07-03 RX ORDER — MIDAZOLAM HYDROCHLORIDE 1 MG/ML
INJECTION, SOLUTION INTRAMUSCULAR; INTRAVENOUS
Status: DISPENSED
Start: 2025-07-03 | End: 2025-07-03

## 2025-07-03 RX ORDER — HEPARIN SODIUM 5000 [USP'U]/ML
5000 INJECTION, SOLUTION INTRAVENOUS; SUBCUTANEOUS EVERY 8 HOURS
Status: DISCONTINUED | OUTPATIENT
Start: 2025-07-03 | End: 2025-07-06

## 2025-07-03 RX ORDER — MIDAZOLAM HYDROCHLORIDE 1 MG/ML
2 INJECTION, SOLUTION INTRAMUSCULAR; INTRAVENOUS ONCE
Status: CANCELLED | OUTPATIENT
Start: 2025-07-03

## 2025-07-03 RX ADMIN — PROPOFOL 50 MCG/KG/MIN: 10 INJECTION, EMULSION INTRAVENOUS at 12:07

## 2025-07-03 RX ADMIN — CEFTRIAXONE 2 G: 2 INJECTION, POWDER, FOR SOLUTION INTRAMUSCULAR; INTRAVENOUS at 04:07

## 2025-07-03 RX ADMIN — FOLIC ACID 1 MG: 1 TABLET ORAL at 09:07

## 2025-07-03 RX ADMIN — PROPOFOL 50 MCG/KG/MIN: 10 INJECTION, EMULSION INTRAVENOUS at 06:07

## 2025-07-03 RX ADMIN — PROPOFOL 50 MCG/KG/MIN: 10 INJECTION, EMULSION INTRAVENOUS at 11:07

## 2025-07-03 RX ADMIN — Medication 150 MCG/HR: at 03:07

## 2025-07-03 RX ADMIN — MIDAZOLAM 5 MG: 1 INJECTION INTRAMUSCULAR; INTRAVENOUS at 10:07

## 2025-07-03 RX ADMIN — CEFAZOLIN 1 G: 330 INJECTION, POWDER, FOR SOLUTION INTRAMUSCULAR; INTRAVENOUS at 05:07

## 2025-07-03 RX ADMIN — OXYCODONE HYDROCHLORIDE 5 MG: 5 TABLET ORAL at 05:07

## 2025-07-03 RX ADMIN — PROPOFOL 50 MCG/KG/MIN: 10 INJECTION, EMULSION INTRAVENOUS at 01:07

## 2025-07-03 RX ADMIN — PHENOBARBITAL 60 MG: 30 TABLET ORAL at 09:07

## 2025-07-03 RX ADMIN — PHENOBARBITAL 60 MG: 30 TABLET ORAL at 01:07

## 2025-07-03 RX ADMIN — HALOPERIDOL LACTATE 5 MG: 5 INJECTION, SOLUTION INTRAMUSCULAR at 07:07

## 2025-07-03 RX ADMIN — HEPARIN SODIUM 5000 UNITS: 5000 INJECTION INTRAVENOUS; SUBCUTANEOUS at 01:07

## 2025-07-03 RX ADMIN — SENNOSIDES AND DOCUSATE SODIUM 1 TABLET: 50; 8.6 TABLET ORAL at 09:07

## 2025-07-03 RX ADMIN — OLANZAPINE 15 MG: 5 TABLET, FILM COATED ORAL at 09:07

## 2025-07-03 RX ADMIN — PROPOFOL 50 MCG/KG/MIN: 10 INJECTION, EMULSION INTRAVENOUS at 05:07

## 2025-07-03 RX ADMIN — Medication 100 MG: at 09:07

## 2025-07-03 RX ADMIN — THERA TABS 1 TABLET: TAB at 09:07

## 2025-07-03 RX ADMIN — MIDAZOLAM HYDROCHLORIDE 5 MG: 1 INJECTION, SOLUTION INTRAMUSCULAR; INTRAVENOUS at 10:07

## 2025-07-03 RX ADMIN — HEPARIN SODIUM 5000 UNITS: 5000 INJECTION INTRAVENOUS; SUBCUTANEOUS at 09:07

## 2025-07-03 RX ADMIN — FAMOTIDINE 20 MG: 20 TABLET, FILM COATED ORAL at 09:07

## 2025-07-03 RX ADMIN — DEXAMETHASONE SODIUM PHOSPHATE 4 MG: 4 INJECTION INTRA-ARTICULAR; INTRALESIONAL; INTRAMUSCULAR; INTRAVENOUS; SOFT TISSUE at 05:07

## 2025-07-03 RX ADMIN — DEXAMETHASONE SODIUM PHOSPHATE 4 MG: 4 INJECTION INTRA-ARTICULAR; INTRALESIONAL; INTRAMUSCULAR; INTRAVENOUS; SOFT TISSUE at 11:07

## 2025-07-03 RX ADMIN — CEFAZOLIN 1 G: 330 INJECTION, POWDER, FOR SOLUTION INTRAMUSCULAR; INTRAVENOUS at 09:07

## 2025-07-03 RX ADMIN — MUPIROCIN: 20 OINTMENT TOPICAL at 09:07

## 2025-07-03 RX ADMIN — METRONIDAZOLE 500 MG: 5 INJECTION, SOLUTION INTRAVENOUS at 12:07

## 2025-07-03 RX ADMIN — POLYETHYLENE GLYCOL 3350 17 G: 17 POWDER, FOR SOLUTION ORAL at 09:07

## 2025-07-03 RX ADMIN — SODIUM CHLORIDE, POTASSIUM CHLORIDE, SODIUM LACTATE AND CALCIUM CHLORIDE 1000 ML: 600; 310; 30; 20 INJECTION, SOLUTION INTRAVENOUS at 08:07

## 2025-07-03 RX ADMIN — MIDAZOLAM 2 MG: 1 INJECTION INTRAMUSCULAR; INTRAVENOUS at 02:07

## 2025-07-03 RX ADMIN — OXYCODONE HYDROCHLORIDE 5 MG: 5 TABLET ORAL at 12:07

## 2025-07-03 RX ADMIN — VANCOMYCIN HYDROCHLORIDE 1250 MG: 1.25 INJECTION, POWDER, LYOPHILIZED, FOR SOLUTION INTRAVENOUS at 05:07

## 2025-07-03 RX ADMIN — PROPOFOL 50 MCG/KG/MIN: 10 INJECTION, EMULSION INTRAVENOUS at 10:07

## 2025-07-03 RX ADMIN — MIDAZOLAM 5 MG: 1 INJECTION INTRAMUSCULAR; INTRAVENOUS at 08:07

## 2025-07-03 RX ADMIN — PHENOBARBITAL 60 MG: 60 TABLET ORAL at 11:07

## 2025-07-03 NOTE — PROCEDURES
Central Line    Date/Time: 7/3/2025 11:26 AM    Performed by: Derick Gomez MD  Authorized by: Chilo Miguel MD    Supervisor Name:  Chilo Miguel MD  Location procedure was performed:  [facility]URO [facility]  Assisting Provider:  Chilo Miguel MD  Consent Done ?:  Yes  Time out complete?: Verified correct patient, procedure, equipment, staff, and site/side    Indications:  Vascular access  Anesthesia:  Local infiltration  Local anesthetic:  Lidocaine 1% without epinephrine  Anesthetic total (ml):  7  Preparation:  Skin prepped with ChloraPrep  Skin prep agent dried: Skin prep agent completely dried prior to procedure    Sterile barriers: All five maximal sterile barriers used - gloves, gown, cap, mask and large sterile sheet    Hand hygiene: Hand hygiene performed immediately prior to central venous catheter insertion    Location:  Right subclavian  Catheter type:  Triple lumen  Catheter size:  7 Fr  Inserted Catheter Length (cm):  16  Ultrasound guidance: No    Manometry: Yes    Number of attempts:  2  Securement:  Line sutured  Complications: No    Specimens: No    Implants: No    Guidewire: guidewire removed intact    Adverse Events:  None

## 2025-07-03 NOTE — PROGRESS NOTES
[facility]  Neurocritical Care  Progress Note    Admit Date: 7/1/2025  Service Date: 07/03/2025  Length of Stay: 2    Subjective:     Chief Complaint: Ataxia    History of Present Illness: 69 y/o M presenting from [facility] beds at the [facility] for generalized weakness for about a week now. History obtained from rehab nurse. She reported that he got to their facility about a month ago and, at baseline, walks with a cane and is alert and oriented. His nurse noticed that he has seemed weaker over the past week and over the past 3 days has had increased balance disturbance and gait issues. They also noticed that he has been speaking more softly in his speech is harder to understand. He has not had any infectious symptoms. He fell yesterday, unsure if he hit his head. CT head without contrast revealed multiple lesions of hypodensity with a hyperdense rim, notably in the left temporal lobe and right cerebellum. There were additional small hyperdensities in the right frontoparietal and left parasagittal parietal lobe. There is mass effect and partial effacement of the 4th ventricle secondary to the cerebellar lesion with developing hydrocephalus without MLS. CT of the chest, abdomen, and pelvis with IV contrast showed RUL mass with mediastinal adenopathy. Labs revealed Hep C and treponema antibodies were positive. Hep C PCR pending. Penicillin G was administered. A sepsis workup was completed and antibiotics were initiated. An MRI brain with and without contrast was ordered, but patient was unable to complete due to persistent movement after sedatives were administered. Prior to MRI, he was alert but drowsy. He was able to tell me his name, but told me he was 53 years old, the year was 2003, and he did not know the current place or reason for being here. He had generalized weakness but was slightly more weak on the right side. He followed simple commands, but did not attempt to follow more complex commands. He is admitted  to NCC with NSGY consult.    Hospital Course: 7/2/2025: MRI with significant motion artifact. Required presidex overnight secondary to agitation  7/3/2025: MRI favors neoplastic brain lesions. CSF studies pending. Started on Zyprexa and phenobarbital for agitation, R subclavian CVC placed for central access    Interval History:  See hospital course      Objective:     Vitals:  Temp: 97.8 °F (36.6 °C)  Pulse: 64  Rhythm: normal sinus rhythm  BP: 101/67  MAP (mmHg): 79  ICP Mean (mmHg): 14 mmHg  Resp: 18  SpO2: 100 %  Oxygen Concentration (%): 50  Vent Mode: A/C  Set Rate: 18 BPM  Vt Set: 470 mL  PEEP/CPAP: 5 cmH20  Peak Airway Pressure: 15 cmH20  Mean Airway Pressure: 7 cmH20  Plateau Pressure: 0 cmH20    Temp  Min: 97.4 °F (36.3 °C)  Max: 98.7 °F (37.1 °C)  Pulse  Min: 64  Max: 108  BP  Min: 80/52  Max: 199/113  MAP (mmHg)  Min: 62  Max: 119  ICP Mean (mmHg)  Min: -3 mmHg  Max: 16 mmHg  Resp  Min: 16  Max: 45  SpO2  Min: 95 %  Max: 100 %  Oxygen Concentration (%)  Min: 50  Max: 100    07/02 0701 - 07/03 0700  In: 1675.4 [I.V.:744.9]  Out: 1615 [Urine:1600; Drains:15]            Physical Exam  Vitals and nursing note reviewed.   Constitutional:       General: He is not in acute distress.     Comments: Somnolent but arousable to pain. Soft restrains in place   HENT:      Head: Normocephalic and atraumatic.      Nose:      Comments: NGT secured in place     Mouth/Throat:      Mouth: Mucous membranes are moist.      Pharynx: Oropharynx is clear.   Eyes:      Extraocular Movements: Extraocular movements intact.      Pupils: Pupils are equal, round, and reactive to light.   Cardiovascular:      Rate and Rhythm: Normal rate.   Pulmonary:      Effort: Pulmonary effort is normal. No respiratory distress.      Comments: ETT secured in place. Equal breath sounds bilaterally.  Abdominal:      General: Abdomen is flat. There is no distension.      Palpations: Abdomen is soft.      Tenderness: There is no guarding or rebound.    Musculoskeletal:      Cervical back: Neck supple.      Right lower leg: No edema.      Left lower leg: No edema.   Skin:     General: Skin is warm and dry.      Capillary Refill: Capillary refill takes less than 2 seconds.   Neurological:      Comments: Sedated and comfortable appearing. Arousable to pain, moves all extremities            Medications:  Continuousfentanyl, Last Rate: 250 mcg/hr (07/03/25 1101)  propofoL, Last Rate: 50 mcg/kg/min (07/03/25 1101)    ScheduledceFAZolin (Ancef) IV (PEDS and ADULTS), 1 g, Q8H  dexAMETHasone (Decadron) IV (PEDS and ADULTS), 4 mg, Q6H  [START ON 7/4/2025] famotidine, 20 mg, Daily  folic acid, 1 mg, Daily  heparin (porcine), 5,000 Units, Q8H  lactated ringers, 1,000 mL, Once  multivitamin, 1 tablet, Daily  mupirocin, , BID  OLANZapine, 15 mg, BID  oxyCODONE, 5 mg, Q6H  PHENobarbitaL, 60 mg, Q8H  polyethylene glycol, 17 g, Daily  senna-docusate, 1 tablet, BID  thiamine, 100 mg, Daily    PRNacetaminophen, 650 mg, Q6H PRN  bisacodyL, 10 mg, Daily PRN  calcium gluconate IVPB, 1 g, PRN  calcium gluconate IVPB, 2 g, PRN  calcium gluconate IVPB, 3 g, PRN  dextrose 50%, 12.5 g, PRN  fentanyl, 50 mcg, Q1H PRN  glucagon (human recombinant), 1 mg, PRN  hydrALAZINE, 10 mg, Q4H PRN  insulin aspart U-100, 0-5 Units, Q6H PRN  labetalol, 10 mg, Q4H PRN  magnesium sulfate 2 g IVPB, 2 g, PRN  magnesium sulfate 2 g IVPB, 2 g, PRN  ondansetron, 4 mg, Q4H PRN  potassium bicarbonate, 35 mEq, PRN  potassium bicarbonate, 50 mEq, PRN  potassium bicarbonate, 60 mEq, PRN  potassium, sodium phosphates, 2 packet, PRN  potassium, sodium phosphates, 2 packet, PRN  potassium, sodium phosphates, 2 packet, PRN  sodium chloride 0.9%, 10 mL, PRN      Today I personally reviewed pertinent medications, lines/drains/airways, imaging, cardiology results, laboratory results, microbiology results, notably:    Diet  Diet NPO  Diet NPO    Review of Systems  Assessment/Plan:     Neuro  Brain lesion  70 y/o M brought  to ED from [facility] (hx of heroin and ETOH) for progressive weakness, gait disturbance, confusion, and decreased volume when speaking. CT head without contrast revealed multiple lesions of hypodensity with a hyperdense rim, notably in the left temporal lobe and right cerebellum. There were additional small hyperdensities in the right frontoparietal and left parasagittal parietal lobe. There is mass effect and partial effacement of the 4th ventricle secondary to the cerebellar lesion with developing hydrocephalus without MLS. CT of the chest, abdomen, and pelvis with IV contrast showed RUL mass with mediastinal adenopathy. Labs revealed Hep C and treponema antibodies were positive. Hep C PCR pending. Penicillin G was administered. A sepsis workup was completed and antibiotics were initiated. Plan for ETT intubation and brain MRI this AM. Neurosurgery plan to place EVD.    Admit to NCC  Q1h neuro checks, I&Os, and vitals   Daily CBC, CMP, mag, phos  NSGY following; plan for OR Monday  Holding steroids until MRI complete  Discontinue Vanc and Cefepime  CSF studies pending  Received Penicillin G in ED  Zyprexa and phenobarbital for agitation/EtOH withdrawal  Starting TF  Folate/thiamine/MV  SBP <140   Cardene  Prn labetalol, hydralazine   PRN Zofran 4mg q4h  SCDs; SQH  PT/OT/SLP    AMS (altered mental status)  See primary problem    Psychiatric  Polysubstance abuse  Was brought in from [facility], where he has been for the past month  Educate on cessation when appropriate  Starting phenobarbital  Home Oxycodone to avoid withdrawals    Pulmonary  Mass of upper lobe of right lung  Noted on CXR and CT chest with IV contrast  Saturating well on RA; incidental PTX on CXR, CT chest pending  No lung consolidations or obstructive process  No known history of cancer    Other  Hepatitis C antibody positive  PCR pending  No known history of Hep C per patient    Gait disturbance  See primary problem          The patient is being  Prophylaxed for:  Venous Thromboembolism with: None  Stress Ulcer with: H2B  Ventilator Pneumonia with: chlorhexidine oral care    Activity Orders            Turn patient starting at 07/03 1124    Elevate HOB Elevate HOB 30-45 degrees during feeding unless contraindicated starting at 07/03 0802    Elevate HOB starting at 07/01 2040    Diet NPO: NPO starting at 07/01 2040    Straight Cath starting at 07/01 1925          Full Code    Derick Gomez MD  Neurocritical Care  [facility]

## 2025-07-03 NOTE — ANESTHESIA PREPROCEDURE EVALUATION
[facility]  Anesthesia Pre-Operative Evaluation     Patient Name: Goran Carlos  YOB: 1955  MRN: [id number]  [facility]: [id number]      Code Status: Full Code   Date of Procedure: 7/7/2025  Anesthesia: General Procedure: Procedure(s) (LRB):  CRANIOTOMY, SUBOCCIPITAL (Right)  Pre-Operative Diagnosis: Brain mass [G93.89]  Proceduralist: Surgeons and Role:     * Blane Larios, DO - Primary          SUBJECTIVE:     Pre-operative evaluation for Procedure(s) (LRB):  CRANIOTOMY, SUBOCCIPITAL (Right)     07/03/2025    Patient intubated. Consent obtained over the phone with daughter Ron Carlos. Two nurses witnessed.    Goran Carlos is a 68 y/o M with PMHx substance use disorder undergoing [facility] program who was brought in for evaluation due to generalized weakness of one week duration. This was associated with gait and balance disturbances over 3 days. In [facility] [place] he was afebrile. Laboratory work up showed normal WBC count, a creatinine level of 0.7, a positive RPR, and a positive HCV ab. Imaging revealed rounded lesions throughout the brain and a spiculated mass in the RUL with adenopathy. He was admitted to [facility].    Patient now presents for the above procedure(s).       Anticoagulants   Medication Route Frequency        ________________________________________  Results for orders placed during the hospital encounter of 07/01/25    Echo    Interpretation Summary    Left Ventricle: The left ventricle is normal in size. Ventricular mass is normal. Normal wall thickness. Normal wall motion. There is low normal systolic function with a visually estimated ejection fraction of 50 - 55%. There is normal diastolic function.    Right Ventricle: The right ventricle is normal in size measuring 2.7 cm. Wall thickness is normal. Systolic function is normal.    Right Atrium: The right atrium is dilated.    Pulmonary Artery: The estimated pulmonary artery systolic pressure is 36 mmHg.    IVC/SVC: Elevated  venous pressure at 15 mmHg.    ________________________________________    Prev airway: 7/2/2025  Intubation method: video-assisted  Patient status: paralyzed (RSI)  Preoxygenation: mask  Sedatives: etomidate  Paralytic: rocuronium  Laryngoscope size: Mac 4  Tube size: 7.5 mm  Tube type: cuffed  Number of attempts: 1  Cricoid pressure: no  Cords visualized: yes  Post-procedure assessment: chest rise and ETCO2 monitor  Breath sounds: clear  Cuff inflated: yes  ETT to lip: 24 cm  Tube secured with: ETT cuenca  Chest x-ray interpreted by me and radiologist.  Patient tolerance: Patient tolerated the procedure well with no immediate complications  Complications: No  Specimens: No  Implants: No    Vent Mode: A/C  Oxygen Concentration (%):  [] 50  Resp Rate Total:  [18 br/min-32 br/min] 18 br/min  Vt Set:  [470 mL] 470 mL  PEEP/CPAP:  [5 cmH20] 5 cmH20  Mean Airway Pressure:  [8.2 hzG52-21 cmH20] 9.2 cmH20         LDA:   Peripheral IV 07/01/25 1703 20 G 1 3/4 in Anterior;Left;Proximal Forearm (Active)   Site Assessment Clean;Dry;Intact;No redness;No swelling 07/03/25 0301   Line Securement Device Secured with sutureless device 07/02/25 1901   Extremity Assessment Distal to IV No swelling;No warmth;No redness;No abnormal discoloration 07/03/25 0301   Dressing Status Clean;Dry;Intact 07/03/25 0301   Dressing Intervention Integrity maintained 07/03/25 0301   Dressing Change Due 07/07/25 07/03/25 0301   Site Change Due 07/07/25 07/02/25 1901   Reason Not Rotated Not due 07/03/25 0301   Number of days: 1       Peripheral IV 07/01/25 1859 20 G Anterior;Right Upper Arm (Active)   Site Assessment Clean;Dry;Intact;No redness;No swelling 07/03/25 0301   Line Securement Device Secured with sutureless device 07/02/25 1901   Extremity Assessment Distal to IV No warmth;No swelling;No redness;No abnormal discoloration 07/03/25 0301   Dressing Status Clean;Dry;Intact 07/03/25 0301   Dressing Intervention Integrity maintained  07/03/25 0301   Dressing Change Due 07/07/25 07/03/25 0301   Site Change Due 07/07/25 07/02/25 1901   Reason Not Rotated Not due 07/03/25 0301   Number of days: 1            NG/OG Tube 07/02/25 0522 Left nostril (Active)   Placement Check placement verified by x-ray 07/03/25 0301   Tolerance no signs/symptoms of discomfort 07/03/25 0301   Securement secured to nostril center w/ adhesive device 07/03/25 0301   Clamp Status/Tolerance clamped 07/03/25 0301   Suction Setting/Drainage Method suction at the bedside 07/03/25 0301   Insertion Site Appearance no redness, warmth, tenderness, skin breakdown, drainage 07/03/25 0301   Drainage None 07/03/25 0301   Flush/Irrigation flushed w/;water;no resistance met 07/03/25 0301   Number of days: 1       Male External Urinary Catheter 07/02/25 0521 Large (Active)   Collection Container Urimeter 07/03/25 0301   Securement Method secured to top of thigh w/ adhesive device 07/03/25 0301   Skin no redness;no breakdown 07/03/25 0301   Tolerance no signs/symptoms of discomfort 07/03/25 0301   Output (mL) 200 mL 07/03/25 0501   Catheter Change Date 07/02/25 07/03/25 0301   Catheter Change Time 0501 07/03/25 0301   Number of days: 1            ICP/Ventriculostomy 07/02/25 1409 Right (Active)   Level of Ventriculostomy (cm above) 20 07/02/25 1201   Status Open to drainage 07/03/25 0601   Site Assessment Clean;Dry 07/03/25 0601   Site Drainage No drainage 07/03/25 0601   Waveform normal waveform 07/02/25 1901   Output (mL) 15 mL 07/03/25 0702   CSF Color clear 07/03/25 0601   Dressing Status Clean;Dry;Intact 07/03/25 0601   Interventions HOB degrees;bed controls locked;level adjusted per order;zeroed 07/03/25 0601   Number of days: 0       Drips:    fentanyl  0-250 mcg/hr Intravenous Continuous 7.5 mL/hr at 07/03/25 0601 75 mcg/hr at 07/03/25 0601    propofoL  0-50 mcg/kg/min Intravenous Continuous 20.8 mL/hr at 07/03/25 0601 45 mcg/kg/min at 07/03/25 0601       Problem List[1]    Review  of patient's allergies indicates:  No Known Allergies    Current Inpatient Medications:    cefTRIAXone (Rocephin) IV (PEDS and ADULTS)  2 g Intravenous Q12H    dexAMETHasone (Decadron) IV (PEDS and ADULTS)  4 mg Intravenous Q6H    famotidine  20 mg Per NG tube BID    fentaNYL  100 mcg Intravenous Once    metroNIDAZOLE IV (PEDS and ADULTS)  500 mg Intravenous Q8H    midazolam        midazolam  2 mg Intravenous Once    mupirocin   Nasal BID    polyethylene glycol  17 g Per NG tube Daily    senna-docusate  1 tablet Per NG tube BID       Medications Ordered Prior to Encounter[2]    History reviewed. No pertinent surgical history.    Social History:  Tobacco Use: Medium Risk (7/1/2025)    Patient History     Smoking Tobacco Use: Former     Smokeless Tobacco Use: Unknown     Passive Exposure: Not on file       Alcohol Use: Patient Unable To Answer (7/2/2025)    AUDIT-C     Frequency of Alcohol Consumption: Patient unable to answer     Average Number of Drinks: Patient unable to answer     Frequency of Binge Drinking: Patient unable to answer       OBJECTIVE:     Vital Signs Range:  BMI Readings from Last 1 Encounters:   07/02/25 22.43 kg/m²       Temp:  [36.7 °C (98 °F)-37.1 °C (98.7 °F)]   Pulse:  [71-88]   Resp:  [17-30]   BP: ()/(56-78)   SpO2:  [100 %]        Significant Labs:        Component Value Date/Time    WBC 8.66 07/03/2025 0023    HGB 13.2 (L) 07/03/2025 0023    HCT 41.4 07/03/2025 0023     07/03/2025 0023     07/03/2025 0154    K 4.5 07/03/2025 0154     07/03/2025 0154    CO2 21 (L) 07/03/2025 0154     (H) 07/03/2025 0154    BUN 30 (H) 07/03/2025 0154    CREATININE 1.5 (H) 07/03/2025 0154    MG 2.2 07/03/2025 0154    PHOS 5.6 (H) 07/03/2025 0154    CALCIUM 9.1 07/03/2025 0154    ALBUMIN 3.8 07/03/2025 0154    PROT 7.2 07/03/2025 0154    ALKPHOS 84 07/03/2025 0154    BILITOT 0.6 07/03/2025 0154    AST 34 07/03/2025 0154    ALT 20 07/03/2025 0154    INR 1.1 07/02/2025 0324     HGBA1C 5.8 (H) 07/02/2025 0324        Please see Results Review for additional labs.     Diagnostic Studies: No relevant studies.    EKG:   Results for orders placed or performed during the hospital encounter of 07/01/25   EKG 12-lead    Collection Time: 07/01/25  2:01 PM   Result Value Ref Range    QRS Duration 104 ms    [facility] QTC Calculation 467 ms    Narrative    Test Reason : R53.1,    Vent. Rate :  78 BPM     Atrial Rate :  78 BPM     P-R Int : 148 ms          QRS Dur : 104 ms      QT Int : 410 ms       P-R-T Axes :  83  77  62 degrees    QTcB Int : 467 ms    Sinus rhythm with Premature atrial complexes  Otherwise normal ECG  No previous ECGs available  Confirmed by Kike Begum ([id number]) on 7/1/2025 3:35:41 PM    Referred By:            Confirmed By: Kike Begum       ECHO:  See subjective, if available.      ASSESSMENT/PLAN:     Pre-op Assessment    I have reviewed the Patient Summary Reports.     I have reviewed the Nursing Notes. I have reviewed the NPO Status.   I have reviewed the Medications.     Review of Systems  Anesthesia Hx:  No previous Anesthesia   History of prior surgery of interest to airway management or planning:          Denies Family Hx of Anesthesia complications.    Denies Personal Hx of Anesthesia complications.                    Social:  Recreational Drugs, Former Smoker, Alcohol Use       Hematology/Oncology:       -- Denies Anemia:                                  Cardiovascular:     Denies Pacemaker.  Denies Hypertension.   Denies MI.  Denies CAD.    Denies CABG/stent.             Patient not on beta blockers                          Pulmonary:    Denies COPD.  Denies Asthma.     RUL mass               Renal/:   Denies Chronic Renal Disease.                Hepatic/GI:      Denies GERD.    Not Taking GLP-1 Agonists            Neurological:    Denies CVA.    Denies Seizures.    Brain lesion                            Endocrine:  Denies Diabetes.         Denies Obesity / BMI > 30, Denies  Morbid Obesity / BMI > 40      Physical Exam  General: Intubated  Airway:  Pre-Existing Airway: Oral Endotracheal tube        Anesthesia Plan  Type of Anesthesia, risks & benefits discussed:    Anesthesia Type: Gen ETT, Gen Supraglottic Airway  Intra-op Monitoring Plan: Standard ASA Monitors and Art Line  Post Op Pain Control Plan: multimodal analgesia and IV/PO Opioids PRN  Induction:  IV  Airway Plan: Direct and Video, Post-Induction  Informed Consent: Informed consent signed with the Patient and all parties understand the risks and agree with anesthesia plan.  All questions answered.   ASA Score: 3  Day of Surgery Review of History & Physical: H&P Update referred to the surgeon/provider.    Ready For Surgery From Anesthesia Perspective.     .           [1]   Patient Active Problem List  Diagnosis    AMS (altered mental status)    Ataxia    Polysubstance abuse    Brain lesion    Gait disturbance    Hepatitis C antibody positive    Mass of upper lobe of right lung    Positive serology for syphilis   [2]   No current facility-administered medications on file prior to encounter.     No current outpatient medications on file prior to encounter.

## 2025-07-03 NOTE — CONSULTS
VANCOMYCIN DOSING BY PHARMACY DISCONTINUATION NOTE    Goran Carlos is a 69 y.o. male who had been consulted for vancomycin dosing.    The pharmacy consult for vancomycin dosing has been discontinued.     Vancomycin Dosing by Pharmacy Consult will sign-off. Please reconsult if necessary. Thank you for allowing us to participate in this patient's care.     Katie Burns, MaximilianoD   [phone]

## 2025-07-03 NOTE — SUBJECTIVE & OBJECTIVE
"Interval History: ". Patient sedated and intubated. Underwent EVC placement. CSF cell count not suggestive of infectious etiology.    Review of Systems   Unable to perform ROS: Other     Objective:     Vital Signs (Most Recent):  Temp: 97.8 °F (36.6 °C) (07/03/25 0915)  Pulse: 68 (07/03/25 1000)  Resp: 18 (07/03/25 1000)  BP: 109/66 (07/03/25 1000)  SpO2: 100 % (07/03/25 1000) Vital Signs (24h Range):  Temp:  [97.4 °F (36.3 °C)-98.7 °F (37.1 °C)] 97.8 °F (36.6 °C)  Pulse:  [] 68  Resp:  [16-45] 18  SpO2:  [95 %-100 %] 100 %  BP: ()/() 109/66     Weight: 77.1 kg (169 lb 15.6 oz)  Body mass index is 22.43 kg/m².    Estimated Creatinine Clearance: 50.7 mL/min (A) (based on SCr of 1.5 mg/dL (H)).     Physical Exam  Vitals and nursing note reviewed.   Constitutional:       Appearance: He is ill-appearing.      Interventions: He is sedated and intubated.   HENT:      Head:     Cardiovascular:      Rate and Rhythm: Normal rate and regular rhythm.      Heart sounds: No murmur heard.  Pulmonary:      Effort: Pulmonary effort is normal. He is intubated.      Breath sounds: Normal breath sounds.   Skin:     General: Skin is warm and dry.   Neurological:      Mental Status: He is lethargic.          Significant Labs: CBC:   Recent Labs   Lab 07/01/25  1409 07/02/25  0324 07/03/25  0023   WBC 6.00 11.44 8.66   HGB 14.9 13.4* 13.2*   HCT 45.1 39.9* 41.4    252 226     CMP:   Recent Labs   Lab 07/01/25  1409 07/02/25  0324 07/03/25  0154   * 138 138   K 4.0 3.7 4.5    102 103   CO2 21* 23 21*   * 121* 118*   BUN 9 11 30*   CREATININE 0.7 0.8 1.5*   CALCIUM 9.7 9.3 9.1   PROT 8.6* 8.0 7.2   ALBUMIN 4.5 4.2 3.8   BILITOT 0.8 1.2* 0.6   ALKPHOS 109 98 84   AST 39 37 34   ALT 27 26 20   ANIONGAP 13 13 14     CSF: No results for input(s): "CSFCULTURE" in the last 4320 hours.  Microbiology Results (last 7 days)       Procedure Component Value Units Date/Time    CSF culture [[id number]] " Collected: 07/02/25 1808    Order Status: Completed Specimen: CSF (Spinal Fluid) from CSF Tap, Tube 3 Updated: 07/03/25 0249     GRAM STAIN Cytospin indicates:      No WBCs      No organisms seen    Gram stain [[id number]] Collected: 07/02/25 1808    Order Status: Canceled Specimen: CSF (Spinal Fluid) from CSF Tap, Tube 3 Updated: 07/03/25 0203    Afb Culture Stain [[id number]] Collected: 07/02/25 1808    Order Status: Sent Specimen: CSF (Spinal Fluid) from CSF Tap, Tube 3 Updated: 07/03/25 0202    AFB Culture & Smear [[id number]] Collected: 07/02/25 1808    Order Status: Sent Specimen: CSF (Spinal Fluid) from CSF Tap, Tube 3 Updated: 07/03/25 0202    Blood culture #1 **CANNOT BE ORDERED STAT** [[id number]]  (Normal) Collected: 07/01/25 1702    Order Status: Completed Specimen: Blood from Peripheral, Forearm, Left Updated: 07/03/25 0011     Blood Culture No Growth After 24 Hours    Blood culture #2 **CANNOT BE ORDERED STAT** [[id number]]  (Normal) Collected: 07/01/25 1702    Order Status: Completed Specimen: Blood from Peripheral, Lower Arm, Left Updated: 07/03/25 0011     Blood Culture No Growth After 24 Hours    Cryptococcal antigen, CSF [[id number]] Collected: 07/02/25 1808    Order Status: Sent Specimen: CSF (Spinal Fluid) from CSF Tap, Tube 3 Updated: 07/02/25 2017    Culture, Respiratory with Gram Stain [[id number]]     Order Status: Sent Specimen: Respiratory     Influenza A & B by Molecular [[id number]]  (Normal) Collected: 07/01/25 1449    Order Status: Completed Specimen: Nasal Swab Updated: 07/01/25 1537     INFLUENZA A MOLECULAR Negative     INFLUENZA B MOLECULAR  Negative            Significant Imaging: I have reviewed all pertinent imaging results/findings within the past 24 hours.

## 2025-07-03 NOTE — SUBJECTIVE & OBJECTIVE
Interval History: MRI completed overnight, remains agitated on Prop/Fent. EVD in good position, ICP 8 in room. ICP 3-18.     Medications:  Continuous Infusions:   fentanyl  0-250 mcg/hr Intravenous Continuous 7.5 mL/hr at 07/03/25 0501 75 mcg/hr at 07/03/25 0501    propofoL  0-50 mcg/kg/min Intravenous Continuous 23.1 mL/hr at 07/03/25 0536 50 mcg/kg/min at 07/03/25 0536     Scheduled Meds:   cefTRIAXone (Rocephin) IV (PEDS and ADULTS)  2 g Intravenous Q12H    dexAMETHasone (Decadron) IV (PEDS and ADULTS)  4 mg Intravenous Q6H    famotidine  20 mg Per NG tube BID    fentaNYL  100 mcg Intravenous Once    metroNIDAZOLE IV (PEDS and ADULTS)  500 mg Intravenous Q8H    mupirocin   Nasal BID    polyethylene glycol  17 g Per NG tube Daily    senna-docusate  1 tablet Per NG tube BID    vancomycin (VANCOCIN) IV (PEDS and ADULTS)  15 mg/kg Intravenous Q12H     PRN Meds:  Current Facility-Administered Medications:     acetaminophen, 650 mg, Per NG tube, Q6H PRN    bisacodyL, 10 mg, Rectal, Daily PRN    calcium gluconate IVPB, 1 g, Intravenous, PRN    calcium gluconate IVPB, 2 g, Intravenous, PRN    calcium gluconate IVPB, 3 g, Intravenous, PRN    dextrose 50%, 12.5 g, Intravenous, PRN    fentanyl, 50 mcg, Intravenous, Q1H PRN    glucagon (human recombinant), 1 mg, Intramuscular, PRN    hydrALAZINE, 10 mg, Intravenous, Q4H PRN    insulin aspart U-100, 0-5 Units, Subcutaneous, Q6H PRN    labetalol, 10 mg, Intravenous, Q4H PRN    magnesium sulfate 2 g IVPB, 2 g, Intravenous, PRN    magnesium sulfate 2 g IVPB, 2 g, Intravenous, PRN    ondansetron, 4 mg, Intravenous, Q4H PRN    potassium bicarbonate, 35 mEq, Per NG tube, PRN    potassium bicarbonate, 50 mEq, Per NG tube, PRN    potassium bicarbonate, 60 mEq, Per NG tube, PRN    potassium, sodium phosphates, 2 packet, Per NG tube, PRN    potassium, sodium phosphates, 2 packet, Per NG tube, PRN    potassium, sodium phosphates, 2 packet, Per NG tube, PRN    sodium chloride 0.9%, 10  mL, Intravenous, PRN    Pharmacy to dose Vancomycin consult, , , Once **AND** vancomycin - pharmacy to dose, , Intravenous, pharmacy to manage frequency     Review of Systems  Objective:     Weight: 77.1 kg (169 lb 15.6 oz)  Body mass index is 22.43 kg/m².  Vital Signs (Most Recent):  Temp: 98 °F (36.7 °C) (07/03/25 0301)  Pulse: 71 (07/03/25 0501)  Resp: (!) 28 (07/03/25 0620)  BP: 115/62 (07/03/25 0501)  SpO2: 100 % (07/03/25 0501) Vital Signs (24h Range):  Temp:  [97.4 °F (36.3 °C)-98.7 °F (37.1 °C)] 98 °F (36.7 °C)  Pulse:  [60-88] 71  Resp:  [11-34] 28  SpO2:  [95 %-100 %] 100 %  BP: ()/() 115/62                Vent Mode: A/C  Oxygen Concentration (%):  [] 50  Resp Rate Total:  [18 br/min-32 br/min] 18 br/min  Vt Set:  [470 mL] 470 mL  PEEP/CPAP:  [5 cmH20] 5 cmH20  Mean Airway Pressure:  [8.2 lzR92-95 cmH20] 9.2 cmH20             NG/OG Tube 07/02/25 0522 Left nostril (Active)   Placement Check placement verified by x-ray 07/03/25 0301   Tolerance no signs/symptoms of discomfort 07/03/25 0301   Securement secured to nostril center w/ adhesive device 07/03/25 0301   Clamp Status/Tolerance clamped 07/03/25 0301   Suction Setting/Drainage Method suction at the bedside 07/03/25 0301   Insertion Site Appearance no redness, warmth, tenderness, skin breakdown, drainage 07/03/25 0301   Drainage None 07/03/25 0301   Flush/Irrigation flushed w/;water;no resistance met 07/03/25 0301       Male External Urinary Catheter 07/02/25 0521 Large (Active)   Collection Container Urimeter 07/03/25 0301   Securement Method secured to top of thigh w/ adhesive device 07/03/25 0301   Skin no redness;no breakdown 07/03/25 0301   Tolerance no signs/symptoms of discomfort 07/03/25 0301   Output (mL) 200 mL 07/03/25 0501   Catheter Change Date 07/02/25 07/03/25 0301   Catheter Change Time 0501 07/03/25 0301            ICP/Ventriculostomy 07/02/25 1409 Right (Active)   Level of Ventriculostomy (cm above) 20 07/02/25 1201    Status Open to drainage 07/03/25 0501   Site Assessment Clean;Dry 07/03/25 0501   Site Drainage No drainage 07/03/25 0501   Waveform normal waveform 07/02/25 1901   Output (mL) 15 mL 07/03/25 0401   CSF Color clear 07/03/25 0501   Dressing Status Clean;Dry;Intact 07/03/25 0501   Interventions HOB degrees;bed controls locked;level adjusted per order;zeroed 07/03/25 0501          Physical Exam         Neurosurgery Physical Exam  [id number]; sedated with precedex and fent  PERRL  Does not nod to questions  Doesn't follow commands  Moves all extremities at least antigravity strength, in 4 point restraints  Non focal motor exam    Significant Labs:  Recent Labs   Lab 07/01/25  1409 07/02/25  0324 07/03/25  0154   * 121* 118*   * 138 138   K 4.0 3.7 4.5    102 103   CO2 21* 23 21*   BUN 9 11 30*   CREATININE 0.7 0.8 1.5*   CALCIUM 9.7 9.3 9.1   MG 2.0 2.1 2.2     Recent Labs   Lab 07/01/25  1409 07/02/25  0324 07/03/25  0023   WBC 6.00 11.44 8.66   HGB 14.9 13.4* 13.2*   HCT 45.1 39.9* 41.4    252 226     Recent Labs   Lab 07/02/25  0324   INR 1.1   APTT 26.0     Microbiology Results (last 7 days)       Procedure Component Value Units Date/Time    CSF culture [[id number]] Collected: 07/02/25 1808    Order Status: Completed Specimen: CSF (Spinal Fluid) from CSF Tap, Tube 3 Updated: 07/03/25 0249     GRAM STAIN Cytospin indicates:      No WBCs      No organisms seen    Gram stain [[id number]] Collected: 07/02/25 1808    Order Status: Canceled Specimen: CSF (Spinal Fluid) from CSF Tap, Tube 3 Updated: 07/03/25 0203    Afb Culture Stain [[id number]] Collected: 07/02/25 1808    Order Status: Sent Specimen: CSF (Spinal Fluid) from CSF Tap, Tube 3 Updated: 07/03/25 0202    AFB Culture & Smear [[id number]] Collected: 07/02/25 1808    Order Status: Sent Specimen: CSF (Spinal Fluid) from CSF Tap, Tube 3 Updated: 07/03/25 0202    Blood culture #1 **CANNOT BE ORDERED STAT** [[id number]]  (Normal) Collected:  07/01/25 1702    Order Status: Completed Specimen: Blood from Peripheral, Forearm, Left Updated: 07/03/25 0011     Blood Culture No Growth After 24 Hours    Blood culture #2 **CANNOT BE ORDERED STAT** [[id number]]  (Normal) Collected: 07/01/25 1702    Order Status: Completed Specimen: Blood from Peripheral, Lower Arm, Left Updated: 07/03/25 0011     Blood Culture No Growth After 24 Hours    Cryptococcal antigen, CSF [[id number]] Collected: 07/02/25 1808    Order Status: Sent Specimen: CSF (Spinal Fluid) from CSF Tap, Tube 3 Updated: 07/02/25 2017    Culture, Respiratory with Gram Stain [[id number]]     Order Status: Sent Specimen: Respiratory     Influenza A & B by Molecular [[id number]]  (Normal) Collected: 07/01/25 1449    Order Status: Completed Specimen: Nasal Swab Updated: 07/01/25 1537     INFLUENZA A MOLECULAR Negative     INFLUENZA B MOLECULAR  Negative          All pertinent labs from the last 24 hours have been reviewed.    Significant Diagnostics:  I have reviewed all pertinent imaging results/findings within the past 24 hours.

## 2025-07-03 NOTE — EICU
Called to the room for Time out    [x] Verified patient name   [x] Verified patient   [x] Read from armband    Central line    Procedure checklist: Time out flowsheet complete, Bedside procedure charge ordered, and Other supply charges

## 2025-07-03 NOTE — PLAN OF CARE
"[facility] Care Plan  POC reviewed with Burr Brown and family at 0800. Family verbalized understanding. Questions and concerns addressed. Will continue to monitor. See below and flowsheets for full assessment and VS info.     Extreme agitation this AM  Fentanyl @ 150mcg/hr.  Propofol @ 40mcg/kg/min  Right subclavian TLC inserted.  CT chest done.   EVD @ 20cm H20  TF started @ 10cc/hr goal 30cc/hr  In/out cath x1 500cc.   Plan for brain biopsy Monday      Is this a stroke patient? no    Neuro:  [place] Coma Scale  Best Eye Response: 4-->(E4) spontaneous  Best Motor Response: 4-->(M4) withdraws from pain  Best Verbal Response: 1-->(V1) none  Goldie Coma Scale Score: 9  Assessment Qualifiers: Patient chemically sedated or paralyzed, Patient intubated  Pupil PERRLA: yes     24 hr Temp:  [97.4 °F (36.3 °C)-98.9 °F (37.2 °C)]     CV:   Rhythm: normal sinus rhythm  BP goals:   SBP < 140  MAP > 65    Resp:      Vent Mode: A/C  Set Rate: 18 BPM  Oxygen Concentration (%): 50  Vt Set: 470 mL  PEEP/CPAP: 5 cmH20    Plan: wean to extubate    GI/:     Diet/Nutrition Received: NPO  Last Bowel Movement: 07/01/25  Voiding Characteristics: external catheter    Intake/Output Summary (Last 24 hours) at 7/3/2025 1614  Last data filed at 7/3/2025 1601  Gross per 24 hour   Intake 2688.55 ml   Output 1938 ml   Net 750.55 ml          Labs/Accuchecks:  Recent Labs   Lab 07/03/25  0023   WBC 8.66   RBC 4.32*   HGB 13.2*   HCT 41.4         Recent Labs   Lab 07/03/25  0154      K 4.5   CO2 21*      BUN 30*   CREATININE 1.5*   ALKPHOS 84   ALT 20   AST 34   BILITOT 0.6      Recent Labs   Lab 07/02/25  0324   PROTIME 11.7   INR 1.1   APTT 26.0    No results for input(s): "CPK", "CPKMB", "TROPONINI", "MB" in the last 168 hours.    Electrolytes: N/A - electrolytes WDL  Accuchecks: Q6H    Gtts:   fentanyl  0-250 mcg/hr Intravenous Continuous 15 mL/hr at 07/03/25 1601 150 mcg/hr at 07/03/25 1601    propofoL  0-50 mcg/kg/min " Intravenous Continuous 18.5 mL/hr at 25 1601 40 mcg/kg/min at 25 1601       LDA/Wounds:    Davi Risk Assessment  Sensory Perception: 3-->slightly limited  Moisture: 3-->occasionally moist  Activity: 1-->bedfast  Mobility: 3-->slightly limited  Nutrition: 2-->probably inadequate  Friction and Shear: 2-->potential problem  Davi Score: 14    Is your davi score 12 or less? no            Restraints:   Restraint Order  Length of Order: Order good for next 24 hours or when removed.  Date that the current order will : 25  Time that the current order will : 324  Order Upon Application: Yes    [facility]

## 2025-07-03 NOTE — PROGRESS NOTES
Pharmacokinetic Assessment Follow Up: IV Vancomycin    Vancomycin serum concentration assessment/plan:  Trough resulted as 28.3 mcg/mL; Goal 15-20 mcg/mL, Meningitis  Patient meets criteria for YOVANNY  Hold Vancomycin today; re-dose when random level is less than 20 mcg/mL  Next level to be drawn on 7/4/25 with AM labs    Drug levels (last 3 results):  Recent Labs   Lab Result Units 07/03/25  0048   Vancomycin Trough ug/ml 28.3*       Pharmacy will continue to follow and monitor vancomycin.    Please contact pharmacy at [email or web address] [phone] for questions regarding this assessment.    Thank you for the consult,   Katie Burns       Patient brief summary:  Goran Carlos is a 69 y.o. male initiated on antimicrobial therapy with IV Vancomycin for treatment of meningitis    The patient's current regimen is pulse dosing     Drug Allergies:   Review of patient's allergies indicates:  No Known Allergies    Actual Body Weight:   77.1 kg     Renal Function:   Estimated Creatinine Clearance: 50.7 mL/min (A) (based on SCr of 1.5 mg/dL (H)).,     Dialysis Method (if applicable):  N/A    CBC (last 72 hours):  Recent Labs   Lab Result Units 07/01/25  1409 07/02/25  0324 07/03/25  0023   WBC K/uL 6.00 11.44 8.66   HGB gm/dL 14.9 13.4* 13.2*   Hemoglobin A1c %  --  5.8*  --    HCT % 45.1 39.9* 41.4   Platelet Count K/uL 234 252 226   Lymph % % 13.0* 7.1* 9.4*   Mono % % 5.3 5.3 8.0   Eos % % 0.0 0.6 0.3   Basophil % % 0.7 0.4 0.5       Metabolic Panel (last 72 hours):  Recent Labs   Lab Result Units 07/01/25  1409 07/01/25  1916 07/02/25  0324 07/02/25  0338 07/02/25  1808 07/03/25  0154   Sodium mmol/L 134*  --  138  --   --  138   Potassium mmol/L 4.0  --  3.7  --   --  4.5   Chloride mmol/L 100  --  102  --   --  103   CO2 mmol/L 21*  --  23  --   --  21*   Glucose mg/dL 113*  --  121*  --   --  118*   Glucose CSF  mg/dL  --   --   --   --  86*  --    Glucose, UA   --   --   --  Negative  --   --    BUN mg/dL 9  --  11  --   --  30*    Creatinine mg/dL 0.7  --  0.8  --   --  1.5*   Urine Creatinine mg/dL  --  26.0  --   --   --   --    Albumin g/dL 4.5  --  4.2  --   --  3.8   Bilirubin Total mg/dL 0.8  --  1.2*  --   --  0.6   ALP unit/L 109  --  98  --   --  84   AST unit/L 39  --  37  --   --  34   ALT unit/L 27  --  26  --   --  20   Magnesium  mg/dL 2.0  --  2.1  --   --  2.2   Phosphorus Level mg/dL 3.6  --  3.1  --   --  5.6*       Vancomycin Administrations:  vancomycin given in the last 96 hours                     vancomycin 1,250 mg in 0.9% NaCl 250 mL IVPB (admixture device) (mg) 1,250 mg New Bag 07/03/25 0511     1,250 mg New Bag 07/02/25 1845     1,250 mg New Bag  0621    vancomycin 1,500 mg in 0.9% NaCl 250 mL IVPB (admixture device) (mg) 1,500 mg New Bag 07/01/25 1807                    Microbiologic Results:  Microbiology Results (last 7 days)       Procedure Component Value Units Date/Time    CSF culture [[id number]] Collected: 07/02/25 1808    Order Status: Completed Specimen: CSF (Spinal Fluid) from CSF Tap, Tube 3 Updated: 07/03/25 0249     GRAM STAIN Cytospin indicates:      No WBCs      No organisms seen    Gram stain [[id number]] Collected: 07/02/25 1808    Order Status: Canceled Specimen: CSF (Spinal Fluid) from CSF Tap, Tube 3 Updated: 07/03/25 0203    Afb Culture Stain [[id number]] Collected: 07/02/25 1808    Order Status: Sent Specimen: CSF (Spinal Fluid) from CSF Tap, Tube 3 Updated: 07/03/25 0202    AFB Culture & Smear [[id number]] Collected: 07/02/25 1808    Order Status: Sent Specimen: CSF (Spinal Fluid) from CSF Tap, Tube 3 Updated: 07/03/25 0202    Blood culture #1 **CANNOT BE ORDERED STAT** [[id number]]  (Normal) Collected: 07/01/25 1702    Order Status: Completed Specimen: Blood from Peripheral, Forearm, Left Updated: 07/03/25 0011     Blood Culture No Growth After 24 Hours    Blood culture #2 **CANNOT BE ORDERED STAT** [[id number]]  (Normal) Collected: 07/01/25 1702    Order Status: Completed Specimen: Blood  from Peripheral, Lower Arm, Left Updated: 07/03/25 0011     Blood Culture No Growth After 24 Hours    Cryptococcal antigen, CSF [[id number]] Collected: 07/02/25 1808    Order Status: Sent Specimen: CSF (Spinal Fluid) from CSF Tap, Tube 3 Updated: 07/02/25 2017    Culture, Respiratory with Gram Stain [[id number]]     Order Status: Sent Specimen: Respiratory     Influenza A & B by Molecular [[id number]]  (Normal) Collected: 07/01/25 1449    Order Status: Completed Specimen: Nasal Swab Updated: 07/01/25 1537     INFLUENZA A MOLECULAR Negative     INFLUENZA B MOLECULAR  Negative

## 2025-07-03 NOTE — PROGRESS NOTES
[facility]  Infectious Disease  Progress Note    Patient Name: Goran Carlos  MRN: [id number]  Admission Date: 7/1/2025  Length of Stay: 2 days  Attending Physician: Chilo Miguel MD  Primary Care Provider: No primary care provider on file.    Isolation Status: No active isolations  Assessment/Plan:      Neuro  Brain lesion  Etiology of brain lesions unclear. Highly suspicious for neoplastic disease. HIV screening test negative. Underwent EVD placement. CSF studies not suggestive of infectious process.   Agree with cefazolin per EVD protocol.  Further management per NCC.  Re-consult if fluid cultures become positive or as needed after surgical intervention.     ID  Positive serology for syphilis  I have reviewed hospital notes from  NCC service and other specialty providers. I have also reviewed CBC, CMP/BMP,  cultures and imaging with my interpretation as documented.     Positive Treponema pallidum antibody. RPR 1:1 suggestive of false positive or past treated infection. Received penicillin G 2.4 million units x 1 on 7/1. CSF analysis not suggestive of neurosyphilis at this time however VDRL in process.   Re-consult if RPR is positive for further recommendations.       Other  Hepatitis C antibody positive  No past testing available for review.  HCV RNA PCR  in process to assess for chronic infection.   If detectable HCV PCR then will need outpatient referral to HCV Clinic.         Anticipated Disposition: per primary    Thank you for your consult. I will sign off. Please contact us if you have any additional questions.    Brandie Hines MD  Infectious Disease  [facility]    50 minutes of total time spent on the encounter, which includes face to face time and non-face to face time preparing to see the patient (eg, review of tests), obtaining and/or reviewing separately obtained history, documenting clinical information in the electronic or other health record,  "independently interpreting results (not separately reported) and communicating results to the patient/family/caregiver, or care coordination (not separately reported).     Subjective:     Principal Problem:Ataxia    HPI: A 70-year-old man with substance abuse disorder undergoing [facility] program who was brought in for evaluation due to generalized weakness of one week duration. This was associated with gait and balance disturbances over 3 days. In [facility] ED he was afebrile. Laboratory work up showed normal WBC count, a creatinine level of 0.7, a positive RPR, and a positive HCV ab. Imaging revealed rounded lesions throughout the brain and a spiculated mass in the RUL with adenopathy. He was admitted to [facility] for further management however the patient became agitated requiring chemical and physical restraints.     Infectious Disease consulted for "potential cns infection, treponema positive, recent hx of IV drug use"      Interval History: "". Patient sedated and intubated. Underwent EVC placement. CSF cell count not suggestive of infectious etiology.    Review of Systems   Unable to perform ROS: Other     Objective:     Vital Signs (Most Recent):  Temp: 97.8 °F (36.6 °C) (07/03/25 0915)  Pulse: 68 (07/03/25 1000)  Resp: 18 (07/03/25 1000)  BP: 109/66 (07/03/25 1000)  SpO2: 100 % (07/03/25 1000) Vital Signs (24h Range):  Temp:  [97.4 °F (36.3 °C)-98.7 °F (37.1 °C)] 97.8 °F (36.6 °C)  Pulse:  [] 68  Resp:  [16-45] 18  SpO2:  [95 %-100 %] 100 %  BP: ()/() 109/66     Weight: 77.1 kg (169 lb 15.6 oz)  Body mass index is 22.43 kg/m².    Estimated Creatinine Clearance: 50.7 mL/min (A) (based on SCr of 1.5 mg/dL (H)).     Physical Exam  Vitals and nursing note reviewed.   Constitutional:       Appearance: He is ill-appearing.      Interventions: He is sedated and intubated.   HENT:      Head:     Cardiovascular:      Rate and Rhythm: Normal rate and regular rhythm.      Heart sounds: No murmur " "heard.  Pulmonary:      Effort: Pulmonary effort is normal. He is intubated.      Breath sounds: Normal breath sounds.   Skin:     General: Skin is warm and dry.   Neurological:      Mental Status: He is lethargic.          Significant Labs: CBC:   Recent Labs   Lab 07/01/25  1409 07/02/25  0324 07/03/25  0023   WBC 6.00 11.44 8.66   HGB 14.9 13.4* 13.2*   HCT 45.1 39.9* 41.4    252 226     CMP:   Recent Labs   Lab 07/01/25  1409 07/02/25  0324 07/03/25  0154   * 138 138   K 4.0 3.7 4.5    102 103   CO2 21* 23 21*   * 121* 118*   BUN 9 11 30*   CREATININE 0.7 0.8 1.5*   CALCIUM 9.7 9.3 9.1   PROT 8.6* 8.0 7.2   ALBUMIN 4.5 4.2 3.8   BILITOT 0.8 1.2* 0.6   ALKPHOS 109 98 84   AST 39 37 34   ALT 27 26 20   ANIONGAP 13 13 14     CSF: No results for input(s): "CSFCULTURE" in the last 4320 hours.  Microbiology Results (last 7 days)       Procedure Component Value Units Date/Time    CSF culture [[id number]] Collected: 07/02/25 1808    Order Status: Completed Specimen: CSF (Spinal Fluid) from CSF Tap, Tube 3 Updated: 07/03/25 0249     GRAM STAIN Cytospin indicates:      No WBCs      No organisms seen    Gram stain [[id number]] Collected: 07/02/25 1808    Order Status: Canceled Specimen: CSF (Spinal Fluid) from CSF Tap, Tube 3 Updated: 07/03/25 0203    Afb Culture Stain [[id number]] Collected: 07/02/25 1808    Order Status: Sent Specimen: CSF (Spinal Fluid) from CSF Tap, Tube 3 Updated: 07/03/25 0202    AFB Culture & Smear [[id number]] Collected: 07/02/25 1808    Order Status: Sent Specimen: CSF (Spinal Fluid) from CSF Tap, Tube 3 Updated: 07/03/25 0202    Blood culture #1 **CANNOT BE ORDERED STAT** [[id number]]  (Normal) Collected: 07/01/25 1702    Order Status: Completed Specimen: Blood from Peripheral, Forearm, Left Updated: 07/03/25 0011     Blood Culture No Growth After 24 Hours    Blood culture #2 **CANNOT BE ORDERED STAT** [[id number]]  (Normal) Collected: 07/01/25 1702    Order Status: " Completed Specimen: Blood from Peripheral, Lower Arm, Left Updated: 07/03/25 0011     Blood Culture No Growth After 24 Hours    Cryptococcal antigen, CSF [[id number]] Collected: 07/02/25 1808    Order Status: Sent Specimen: CSF (Spinal Fluid) from CSF Tap, Tube 3 Updated: 07/02/25 2017    Culture, Respiratory with Gram Stain [[id number]]     Order Status: Sent Specimen: Respiratory     Influenza A & B by Molecular [[id number]]  (Normal) Collected: 07/01/25 1449    Order Status: Completed Specimen: Nasal Swab Updated: 07/01/25 1537     INFLUENZA A MOLECULAR Negative     INFLUENZA B MOLECULAR  Negative            Significant Imaging: I have reviewed all pertinent imaging results/findings within the past 24 hours.

## 2025-07-03 NOTE — ASSESSMENT & PLAN NOTE
68 y/o M brought to ED from [facility] Det[facility] (hx of heroin and ETOH) for progressive weakness, gait disturbance, confusion, and decreased volume when speaking. CT head without contrast revealed multiple lesions of hypodensity with a hyperdense rim, notably in the left temporal lobe and right cerebellum. There were additional small hyperdensities in the right frontoparietal and left parasagittal parietal lobe. There is mass effect and partial effacement of the 4th ventricle secondary to the cerebellar lesion with developing hydrocephalus without MLS. CT of the chest, abdomen, and pelvis with IV contrast showed RUL mass with mediastinal adenopathy. Labs revealed Hep C and treponema antibodies were positive. Hep C PCR pending. Penicillin G was administered. A sepsis workup was completed and antibiotics were initiated. Plan for ETT intubation and brain MRI this AM. Neurosurgery plan to place EVD.    Admit to NCC  Q1h neuro checks, I&Os, and vitals   Daily CBC, CMP, mag, phos  NSGY following; plan for OR Monday  Holding steroids until MRI complete  Discontinue Vanc and Cefepime  CSF studies pending  Received Penicillin G in ED  Zyprexa and phenobarbital for agitation/EtOH withdrawal  Starting TF  Folate/thiamine/MV  SBP <140   Cardene  Prn labetalol, hydralazine   PRN Zofran 4mg q4h  SCDs; SQH  PT/OT/SLP

## 2025-07-03 NOTE — ASSESSMENT & PLAN NOTE
Assessment  71yo M with generalized weakness, falls, and paucity of speech presenting with multiple ring enhancing lesions on brain CT.    Imaging:  - CTH 7/1 showed multiple brain masses concerning for mets with surrounding edema, can't rule out abscesses. Mass in cerebellum with effacement of 4th ventricule outflow with concern for developing hydrocephalus  - MRI brain 7/1: non diagnostic due to motion  - CT CAP 7/1: spiculated R lung mass and lymph node adenopathy in chest and neck concerning for metastatic disease  - MRI Brain W/WO 7/2: multifocal enhancing lesions c/f metastatic disease, largest R cerebellum w/mass effect on 4th.    Plan:  - admitted to NCC  - EVD open at 20, abx while in place; transduce hourly and document hourly output; M/Th CSF while remains in place starting 7/7  - Cont Dex 4q6 with Ppi  - AED per NCC  - recommend oncology consult; would appreciate opinion on life expectancy when able to comment  - infectious workup less concerning for abscesses final Cx pending  - tentatively scheduled for SOC for tumor on 7/7 for resection of cerebellar mass  - medical management/WTE per NCC    Dispo: ongoing, tentative OR Monday    Discussed with Dr. Larios

## 2025-07-03 NOTE — SUBJECTIVE & OBJECTIVE
Interval History:  See hospital course      Objective:     Vitals:  Temp: 97.8 °F (36.6 °C)  Pulse: 64  Rhythm: normal sinus rhythm  BP: 101/67  MAP (mmHg): 79  ICP Mean (mmHg): 14 mmHg  Resp: 18  SpO2: 100 %  Oxygen Concentration (%): 50  Vent Mode: A/C  Set Rate: 18 BPM  Vt Set: 470 mL  PEEP/CPAP: 5 cmH20  Peak Airway Pressure: 15 cmH20  Mean Airway Pressure: 7 cmH20  Plateau Pressure: 0 cmH20    Temp  Min: 97.4 °F (36.3 °C)  Max: 98.7 °F (37.1 °C)  Pulse  Min: 64  Max: 108  BP  Min: 80/52  Max: 199/113  MAP (mmHg)  Min: 62  Max: 119  ICP Mean (mmHg)  Min: -3 mmHg  Max: 16 mmHg  Resp  Min: 16  Max: 45  SpO2  Min: 95 %  Max: 100 %  Oxygen Concentration (%)  Min: 50  Max: 100    07/02 0701 - 07/03 0700  In: 1675.4 [I.V.:744.9]  Out: 1615 [Urine:1600; Drains:15]            Physical Exam  Vitals and nursing note reviewed.   Constitutional:       General: He is not in acute distress.     Comments: Somnolent but arousable to pain. Soft restrains in place   HENT:      Head: Normocephalic and atraumatic.      Nose:      Comments: NGT secured in place     Mouth/Throat:      Mouth: Mucous membranes are moist.      Pharynx: Oropharynx is clear.   Eyes:      Extraocular Movements: Extraocular movements intact.      Pupils: Pupils are equal, round, and reactive to light.   Cardiovascular:      Rate and Rhythm: Normal rate.   Pulmonary:      Effort: Pulmonary effort is normal. No respiratory distress.      Comments: ETT secured in place. Equal breath sounds bilaterally.  Abdominal:      General: Abdomen is flat. There is no distension.      Palpations: Abdomen is soft.      Tenderness: There is no guarding or rebound.   Musculoskeletal:      Cervical back: Neck supple.      Right lower leg: No edema.      Left lower leg: No edema.   Skin:     General: Skin is warm and dry.      Capillary Refill: Capillary refill takes less than 2 seconds.   Neurological:      Comments: Sedated and comfortable appearing. Arousable to pain, moves  all extremities            Medications:  Continuousfentanyl, Last Rate: 250 mcg/hr (07/03/25 1101)  propofoL, Last Rate: 50 mcg/kg/min (07/03/25 1101)    ScheduledceFAZolin (Ancef) IV (PEDS and ADULTS), 1 g, Q8H  dexAMETHasone (Decadron) IV (PEDS and ADULTS), 4 mg, Q6H  [START ON 7/4/2025] famotidine, 20 mg, Daily  folic acid, 1 mg, Daily  heparin (porcine), 5,000 Units, Q8H  lactated ringers, 1,000 mL, Once  multivitamin, 1 tablet, Daily  mupirocin, , BID  OLANZapine, 15 mg, BID  oxyCODONE, 5 mg, Q6H  PHENobarbitaL, 60 mg, Q8H  polyethylene glycol, 17 g, Daily  senna-docusate, 1 tablet, BID  thiamine, 100 mg, Daily    PRNacetaminophen, 650 mg, Q6H PRN  bisacodyL, 10 mg, Daily PRN  calcium gluconate IVPB, 1 g, PRN  calcium gluconate IVPB, 2 g, PRN  calcium gluconate IVPB, 3 g, PRN  dextrose 50%, 12.5 g, PRN  fentanyl, 50 mcg, Q1H PRN  glucagon (human recombinant), 1 mg, PRN  hydrALAZINE, 10 mg, Q4H PRN  insulin aspart U-100, 0-5 Units, Q6H PRN  labetalol, 10 mg, Q4H PRN  magnesium sulfate 2 g IVPB, 2 g, PRN  magnesium sulfate 2 g IVPB, 2 g, PRN  ondansetron, 4 mg, Q4H PRN  potassium bicarbonate, 35 mEq, PRN  potassium bicarbonate, 50 mEq, PRN  potassium bicarbonate, 60 mEq, PRN  potassium, sodium phosphates, 2 packet, PRN  potassium, sodium phosphates, 2 packet, PRN  potassium, sodium phosphates, 2 packet, PRN  sodium chloride 0.9%, 10 mL, PRN      Today I personally reviewed pertinent medications, lines/drains/airways, imaging, cardiology results, laboratory results, microbiology results, notably:    Diet  Diet NPO  Diet NPO    Review of Systems

## 2025-07-03 NOTE — ASSESSMENT & PLAN NOTE
Was brought in from [facility], where he has been for the past month  Educate on cessation when appropriate  Starting phenobarbital  Home Oxycodone to avoid withdrawals

## 2025-07-03 NOTE — PT/OT/SLP EVAL
Occupational Therapy   Evaluation    Name: Goran Carlos  MRN: [id number]  Admitting Diagnosis: Ataxia  Recent Surgery: Procedure(s) (LRB):  CRANIOTOMY, SUBOCCIPITAL (Right)      Recommendations:     Discharge Recommendations:  (pending extubation)  Discharge Equipment Recommendations:   (pending extubation)  Barriers to discharge:  None    Assessment:     Goran Carlos is a 69 y.o. male with a medical diagnosis of Ataxia.  He presents with performance deficits affecting function: impaired functional mobility, impaired self care skills, impaired balance, decreased lower extremity function, decreased upper extremity function, decreased coordination, impaired cognition, impaired endurance.      Rehab Prognosis: Fair; patient would benefit from acute skilled OT services to address these deficits and reach maximum level of function.       Plan:     Patient to be seen 3 x/week to address the above listed problems via cognitive retraining, neuromuscular re-education, therapeutic exercises, therapeutic activities, self-care/home management  Plan of Care Expires: 07/30/25  Plan of Care Reviewed with: patient    Subjective     Patient:  Nonverbal; intubated    Occupational Profile:  Per chart review, at baseline patient walking with cane; alert and oriented.  Balance declined a week ago and speech became soft and harder to understand.      Per dtg who lives out of town: patient resides in [place] alone, (perhaps his brother resides with him) in one story home.  Patient is right handed.  PTA patient not driving, independent with ADLs; unemployed.  Hobbies:  swimming.  :      Pain/Comfort:  Pain Rating 1: 0/10  Pain Rating Post-Intervention 1: 0/10    Patients cultural, spiritual, Latter-day conflicts given the current situation: no    Objective:     Communicated with: Nurse prior to session.  Patient found supine with bed alarm, restraints, Condom Catheter, NG tube, peripheral IV, pulse ox (continuous), ventilator, telemetry  upon OT entry to room.    General Precautions: Standard, NPO, aspiration, seizure  Orthopedic Precautions: N/A  Braces: N/A  Respiratory Status: Ventilator    Occupational Performance:    Bed Mobility:    Patient completed Rolling/Turning to Left with  maximal assistance  Patient completed Rolling/Turning to Right with maximal assistance    Functional Mobility/Transfers:  Dependent drawsheet transfers to lift up to the HOB    Activities of Daily Living:  Feeding:  NPO      Cognitive/Visual Perceptual:  Cognitive/Psychosocial Skills:     -       Oriented to: Daily orientation provided   -       Follows Commands/attention:Unable to follow commands    Physical Exam:  Postural examination/scapula alignment:    -       Rounded shoulders  Upper Extremity Range of Motion:     -       Right Upper Extremity: AAROM WNL  -       Left Upper Extremity: AAROM WNL    AMPAC 6 Click ADL:  AMPAC Total Score: 6    Treatment & Education:  Daily orientation provided.  AAROM performed bilateral UE/LEs one set x 10 rep in all planes of motion.  Patient unable to follow commands.  Patient calm throughout the session.  Provided multi-sensory stimulation to prevent sensory deprivation and improve clinical outcomes.  Positioning provided for midline orientation with bilateral UEs elevated and heels lifted off mattress.   Family not present during the session.    Patient left supine with all lines intact, call button in reach, bed alarm on, and restraints reapplied at end of session    GOALS:   Multidisciplinary Problems       Occupational Therapy Goals          Problem: Occupational Therapy    Goal Priority Disciplines Outcome Interventions   Occupational Therapy Goal     OT, PT/OT Progressing    Description: Goals set 7/3 with an expiration date, 7/30:  Patient will increase functional independence with ADLs by performing:    Patient will demonstrate rolling to the right with min assist.  Not met   Patient will demonstrate rolling to the  left with min assist.   Not met  Patient will demonstrate supine -sit with min  assist.   Not met  Patient will demonstrate stand pivot transfers with mod assist.   Not met  Patient will demonstrate grooming while seated with min assist.   Not met  Patient will demonstrate upper body dressing with min assist while seated EOB.   Not met  Patient will demonstrate lower body dressing with mod assist while seated EOB.   Not met  Patient will demonstrate toileting with mod assist.   Not met  Patient will demonstrate bathing while seated EOB with mod assist.   Not met  Patient's family / caregiver will demonstrate independence and safety with assisting patient with self-care skills and functional mobility.     Not met  Patient's family / caregiver will demonstrate independence with providing ROM and changes in bed positioning.   Not met  Patient and/or patient's family will verbalize understanding of stroke prevention guidelines, personal risk factors and stroke warning signs via teachback method.  Not met                                History:     Past Medical History:   Diagnosis Date    ETOH abuse     Gait disturbance     Heroin abuse        History reviewed. No pertinent surgical history.    Time Tracking:     OT Date of Treatment: 07/03/25  OT Start Time: 0400  OT Stop Time: 0421  OT Total Time (min): 21 min    Billable Minutes:Evaluation 11  Neuromuscular Re-education 10    7/3/2025

## 2025-07-03 NOTE — PROGRESS NOTES
Verbal order with readback from Dr. Miguel for additional dose of phenobarbital 60 mg NGT to be given now in addition to scheduled dose at 1400.

## 2025-07-03 NOTE — PROGRESS NOTES
[facility]  Neurosurgery  Progress Note    Subjective:     History of Present Illness: Patient is 69yo M with generalized weakness, falls, and paucity of speech. 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 On exam, patient's voice is barely audible but patient appropriately answers questions and follows commands. Patient's brother states that patient had normal speech and gait when they last saw each other about 1 month ago.    Neurosurgery consulted due to multiple ring-enhancing lesions seen on CT brain.    Post-Op Info:  Procedure(s) (LRB):  CRANIOTOMY, SUBOCCIPITAL (Right)       Interval History: MRI completed overnight, remains agitated on Prop/Fent. EVD in good position, ICP 8 in room. ICP 3-18.     Medications:  Continuous Infusions:   fentanyl  0-250 mcg/hr Intravenous Continuous 7.5 mL/hr at 07/03/25 0501 75 mcg/hr at 07/03/25 0501    propofoL  0-50 mcg/kg/min Intravenous Continuous 23.1 mL/hr at 07/03/25 0536 50 mcg/kg/min at 07/03/25 0536     Scheduled Meds:   cefTRIAXone (Rocephin) IV (PEDS and ADULTS)  2 g Intravenous Q12H    dexAMETHasone (Decadron) IV (PEDS and ADULTS)  4 mg Intravenous Q6H    famotidine  20 mg Per NG tube BID    fentaNYL  100 mcg Intravenous Once    metroNIDAZOLE IV (PEDS and ADULTS)  500 mg Intravenous Q8H    mupirocin   Nasal BID    polyethylene glycol  17 g Per NG tube Daily    senna-docusate  1 tablet Per NG tube BID    vancomycin (VANCOCIN) IV (PEDS and ADULTS)  15 mg/kg Intravenous Q12H     PRN Meds:  Current Facility-Administered Medications:     acetaminophen, 650 mg, Per NG tube, Q6H PRN    bisacodyL, 10 mg, Rectal, Daily PRN    calcium gluconate IVPB, 1 g, Intravenous, PRN    calcium gluconate IVPB, 2 g, Intravenous, PRN    calcium gluconate IVPB, 3 g, Intravenous, PRN    dextrose 50%, 12.5 g, Intravenous, PRN    fentanyl, 50 mcg, Intravenous, Q1H PRN    glucagon (human recombinant), 1 mg, Intramuscular, PRN    hydrALAZINE, 10  mg, Intravenous, Q4H PRN    insulin aspart U-100, 0-5 Units, Subcutaneous, Q6H PRN    labetalol, 10 mg, Intravenous, Q4H PRN    magnesium sulfate 2 g IVPB, 2 g, Intravenous, PRN    magnesium sulfate 2 g IVPB, 2 g, Intravenous, PRN    ondansetron, 4 mg, Intravenous, Q4H PRN    potassium bicarbonate, 35 mEq, Per NG tube, PRN    potassium bicarbonate, 50 mEq, Per NG tube, PRN    potassium bicarbonate, 60 mEq, Per NG tube, PRN    potassium, sodium phosphates, 2 packet, Per NG tube, PRN    potassium, sodium phosphates, 2 packet, Per NG tube, PRN    potassium, sodium phosphates, 2 packet, Per NG tube, PRN    sodium chloride 0.9%, 10 mL, Intravenous, PRN    Pharmacy to dose Vancomycin consult, , , Once **AND** vancomycin - pharmacy to dose, , Intravenous, pharmacy to manage frequency     Review of Systems  Objective:     Weight: 77.1 kg (169 lb 15.6 oz)  Body mass index is 22.43 kg/m².  Vital Signs (Most Recent):  Temp: 98 °F (36.7 °C) (07/03/25 0301)  Pulse: 71 (07/03/25 0501)  Resp: (!) 28 (07/03/25 0620)  BP: 115/62 (07/03/25 0501)  SpO2: 100 % (07/03/25 0501) Vital Signs (24h Range):  Temp:  [97.4 °F (36.3 °C)-98.7 °F (37.1 °C)] 98 °F (36.7 °C)  Pulse:  [60-88] 71  Resp:  [11-34] 28  SpO2:  [95 %-100 %] 100 %  BP: ()/() 115/62                Vent Mode: A/C  Oxygen Concentration (%):  [] 50  Resp Rate Total:  [18 br/min-32 br/min] 18 br/min  Vt Set:  [470 mL] 470 mL  PEEP/CPAP:  [5 cmH20] 5 cmH20  Mean Airway Pressure:  [8.2 keI75-44 cmH20] 9.2 cmH20             NG/OG Tube 07/02/25 0522 Left nostril (Active)   Placement Check placement verified by x-ray 07/03/25 0301   Tolerance no signs/symptoms of discomfort 07/03/25 0301   Securement secured to nostril center w/ adhesive device 07/03/25 0301   Clamp Status/Tolerance clamped 07/03/25 0301   Suction Setting/Drainage Method suction at the bedside 07/03/25 0301   Insertion Site Appearance no redness, warmth, tenderness, skin breakdown, drainage 07/03/25  0301   Drainage None 07/03/25 0301   Flush/Irrigation flushed w/;water;no resistance met 07/03/25 0301       Male External Urinary Catheter 07/02/25 0521 Large (Active)   Collection Container Urimeter 07/03/25 0301   Securement Method secured to top of thigh w/ adhesive device 07/03/25 0301   Skin no redness;no breakdown 07/03/25 0301   Tolerance no signs/symptoms of discomfort 07/03/25 0301   Output (mL) 200 mL 07/03/25 0501   Catheter Change Date 07/02/25 07/03/25 0301   Catheter Change Time 0501 07/03/25 0301            ICP/Ventriculostomy 07/02/25 1409 Right (Active)   Level of Ventriculostomy (cm above) 20 07/02/25 1201   Status Open to drainage 07/03/25 0501   Site Assessment Clean;Dry 07/03/25 0501   Site Drainage No drainage 07/03/25 0501   Waveform normal waveform 07/02/25 1901   Output (mL) 15 mL 07/03/25 0401   CSF Color clear 07/03/25 0501   Dressing Status Clean;Dry;Intact 07/03/25 0501   Interventions HOB degrees;bed controls locked;level adjusted per order;zeroed 07/03/25 0501          Physical Exam         Neurosurgery Physical Exam  E2VTM5; sedated with precedex and fent  PERRL  Does not nod to questions  Doesn't follow commands  Moves all extremities at least antigravity strength, in 4 point restraints  Non focal motor exam    Significant Labs:  Recent Labs   Lab 07/01/25  1409 07/02/25 0324 07/03/25  0154   * 121* 118*   * 138 138   K 4.0 3.7 4.5    102 103   CO2 21* 23 21*   BUN 9 11 30*   CREATININE 0.7 0.8 1.5*   CALCIUM 9.7 9.3 9.1   MG 2.0 2.1 2.2     Recent Labs   Lab 07/01/25  1409 07/02/25  0324 07/03/25  0023   WBC 6.00 11.44 8.66   HGB 14.9 13.4* 13.2*   HCT 45.1 39.9* 41.4    252 226     Recent Labs   Lab 07/02/25  0324   INR 1.1   APTT 26.0     Microbiology Results (last 7 days)       Procedure Component Value Units Date/Time    CSF culture [[id number]] Collected: 07/02/25 9040    Order Status: Completed Specimen: CSF (Spinal Fluid) from CSF Tap, Tube 3  Updated: 07/03/25 0249     GRAM STAIN Cytospin indicates:      No WBCs      No organisms seen    Gram stain [[id number]] Collected: 07/02/25 1808    Order Status: Canceled Specimen: CSF (Spinal Fluid) from CSF Tap, Tube 3 Updated: 07/03/25 0203    Afb Culture Stain [[id number]] Collected: 07/02/25 1808    Order Status: Sent Specimen: CSF (Spinal Fluid) from CSF Tap, Tube 3 Updated: 07/03/25 0202    AFB Culture & Smear [[id number]] Collected: 07/02/25 1808    Order Status: Sent Specimen: CSF (Spinal Fluid) from CSF Tap, Tube 3 Updated: 07/03/25 0202    Blood culture #1 **CANNOT BE ORDERED STAT** [[id number]]  (Normal) Collected: 07/01/25 1702    Order Status: Completed Specimen: Blood from Peripheral, Forearm, Left Updated: 07/03/25 0011     Blood Culture No Growth After 24 Hours    Blood culture #2 **CANNOT BE ORDERED STAT** [[id number]]  (Normal) Collected: 07/01/25 1702    Order Status: Completed Specimen: Blood from Peripheral, Lower Arm, Left Updated: 07/03/25 0011     Blood Culture No Growth After 24 Hours    Cryptococcal antigen, CSF [[id number]] Collected: 07/02/25 1808    Order Status: Sent Specimen: CSF (Spinal Fluid) from CSF Tap, Tube 3 Updated: 07/02/25 2017    Culture, Respiratory with Gram Stain [[id number]]     Order Status: Sent Specimen: Respiratory     Influenza A & B by Molecular [[id number]]  (Normal) Collected: 07/01/25 1449    Order Status: Completed Specimen: Nasal Swab Updated: 07/01/25 1537     INFLUENZA A MOLECULAR Negative     INFLUENZA B MOLECULAR  Negative          All pertinent labs from the last 24 hours have been reviewed.    Significant Diagnostics:  I have reviewed all pertinent imaging results/findings within the past 24 hours.  Assessment/Plan:     * Ataxia  Assessment  71yo M with generalized weakness, falls, and paucity of speech presenting with multiple ring enhancing lesions on brain CT.    Imaging:  - CT 7/1 showed multiple brain masses concerning for mets with surrounding  edema, can't rule out abscesses. Mass in cerebellum with effacement of 4th ventricule outflow with concern for developing hydrocephalus  - MRI brain 7/1: non diagnostic due to motion  - CT CAP 7/1: spiculated R lung mass and lymph node adenopathy in chest and neck concerning for metastatic disease  - MRI Brain W/WO 7/2: multifocal enhancing lesions c/f metastatic disease, largest R cerebellum w/mass effect on 4th.    Plan:  - admitted to [facility]  - EVD open at 20, abx while in place; transduce hourly and document hourly output; M/Th CSF while remains in place starting 7/7  - Cont Dex 4q6 with Ppi  - AED per NCC  - recommend oncology consult; would appreciate opinion on life expectancy when able to comment  - infectious workup less concerning for abscesses final Cx pending  - tentatively scheduled for SOC for tumor on 7/7 for resection of cerebellar mass  - medical management/WTE per NCC    Dispo: ongoing, tentative OR Monday    Discussed with Dr. Ric Rodriguez MD  Neurosurgery  [facility] - Neuro Critical Care

## 2025-07-03 NOTE — PLAN OF CARE
Recommendations  With propofol:   --Continuous TF recommendation: Impact Peptide 1.5 @ 30 mL/hr to provide 720 mL total volume, 1080 kcal, 101 g CHO, 68 g protein, 0 g fiber, 556 mL free water, 72% DRI         --130 mL flush q6 hrs to provide additional 520 mL free water (Total 1076 mL)    --Add Prosource daily as tube feed modifier to provide an additional 15 g protein    Without propofol:   --Continuous TF recommendation: Impact Peptide 1.5 @ 45 mL/hr to provide 1080 mL total volume, 1620 kcal, 151 g CHO, 102 g protein, 0 g fiber, 834 mL free water, 108% DRI         --130 mL flush q4 hrs to provide additional 780 mL free water (Total 1614 mL)    --Nursing: please continue to document rate and formula on flowsheet    --RD to monitor weight, rate, tolerance    Goals: Meet % EEN/EPN by next RD follow-up  Nutrition Goal Status: new  Communication of RD Recs:  (POC)

## 2025-07-03 NOTE — ASSESSMENT & PLAN NOTE
Etiology of brain lesions unclear. Highly suspicious for neoplastic disease. HIV screening test negative. Underwent EVD placement. CSF studies not suggestive of infectious process.   Agree with cefazolin per EVD protocol.  Further management per NCC.  Re-consult if fluid cultures become positive or as needed after surgical intervention.

## 2025-07-03 NOTE — PLAN OF CARE
07/03/25 1420   Rounds   Attendance Provider;   Discharge Plan A Other  (tbd)   Why the patient remains in the hospital Requires continued medical care   Transition of Care Barriers None     Ele Richmond MSW, LCSW  [facility]  Case Management Dept.

## 2025-07-03 NOTE — EICU
Virtual ICU Quality Rounds    Admit Date: 7/1/2025  Hospital Day: 2    ICU Day: 1d 10h    24H Vital Sign Range:  Temp:  [97.4 °F (36.3 °C)-98.7 °F (37.1 °C)]   Pulse:  []   Resp:  [11-34]   BP: ()/()   SpO2:  [95 %-100 %]     VICU Surveillance Screening  LDA reconciliation : Yes

## 2025-07-03 NOTE — PLAN OF CARE
"[facility] Care Plan  POC reviewed with Rushford Brown and family at 0701. Daughter verbalized understanding via phone. Questions and concerns addressed.  Will continue to monitor. See below and flowsheets for full assessment and VS info.             Is this a stroke patient? {STROKE PATIENT?:45490}    Neuro:  Goldie Coma Scale  Best Eye Response: 4-->(E4) spontaneous  Best Motor Response: 4-->(M4) withdraws from pain  Best Verbal Response: 1-->(V1) none  Goldie Coma Scale Score: 9  Assessment Qualifiers: Patient chemically sedated or paralyzed, Patient intubated  Pupil PERRLA: yes     24 hr Temp:  [97.4 °F (36.3 °C)-98.9 °F (37.2 °C)]     CV:   Rhythm: normal sinus rhythm  BP goals:   SBP < {SBP <:92678}  MAP > {MAPS:27341}    Resp:      Vent Mode: A/C  Set Rate: 18 BPM  Oxygen Concentration (%): 50  Vt Set: 470 mL  PEEP/CPAP: 5 cmH20    Plan: {resp plan:72717}    GI/:     Diet/Nutrition Received: NPO  Last Bowel Movement: 07/01/25  Voiding Characteristics: external catheter    Intake/Output Summary (Last 24 hours) at 7/3/2025 1549  Last data filed at 7/3/2025 1501  Gross per 24 hour   Intake 2655.34 ml   Output 1431 ml   Net 1224.34 ml          Labs/Accuchecks:  Recent Labs   Lab 07/03/25  0023   WBC 8.66   RBC 4.32*   HGB 13.2*   HCT 41.4         Recent Labs   Lab 07/03/25  0154      K 4.5   CO2 21*      BUN 30*   CREATININE 1.5*   ALKPHOS 84   ALT 20   AST 34   BILITOT 0.6      Recent Labs   Lab 07/02/25  0324   PROTIME 11.7   INR 1.1   APTT 26.0    No results for input(s): "CPK", "CPKMB", "TROPONINI", "MB" in the last 168 hours.    Electrolytes: {electrolyte replacement:06378}  Accuchecks: {accuchecks:17906}    Gtts:   fentanyl  0-250 mcg/hr Intravenous Continuous 15 mL/hr at 07/03/25 1501 150 mcg/hr at 07/03/25 1501    propofoL  0-50 mcg/kg/min Intravenous Continuous 18.5 mL/hr at 07/03/25 1501 40 mcg/kg/min at 07/03/25 1501       LDA/Wounds:    Davi Risk Assessment  Sensory Perception: " 3-->slightly limited  Moisture: 3-->occasionally moist  Activity: 1-->bedfast  Mobility: 3-->slightly limited  Nutrition: 2-->probably inadequate  Friction and Shear: 2-->potential problem  Davi Score: 14    Is your davi score 12 or less? {PPyesno:98262}            Restraints:   Restraint Order  Length of Order: Order good for next 24 hours or when removed.  Date that the current order will : 25  Time that the current order will : 324  Order Upon Application: Yes    [facility]

## 2025-07-03 NOTE — CONSULTS
[facility] Neuro Critical Care  Adult Nutrition  Consult Note    SUMMARY     Recommendations  With propofol:   --Continuous TF recommendation: Impact Peptide 1.5 @ 30 mL/hr to provide 720 mL total volume, 1080 kcal, 101 g CHO, 68 g protein, 0 g fiber, 556 mL free water, 72% DRI         --130 mL flush q6 hrs to provide additional 520 mL free water (Total 1076 mL)    --Add Prosource daily as tube feed modifier to provide an additional 15 g protein    Without propofol:   --Continuous TF recommendation: Impact Peptide 1.5 @ 45 mL/hr to provide 1080 mL total volume, 1620 kcal, 151 g CHO, 102 g protein, 0 g fiber, 834 mL free water, 108% DRI         --130 mL flush q4 hrs to provide additional 780 mL free water (Total 1614 mL)    --Nursing: please continue to document rate and formula on flowsheet    --RD to monitor weight, rate, tolerance    Goals: Meet % EEN/EPN by next RD follow-up  Nutrition Goal Status: new  Communication of RD Recs:  (POC)    Nutrition Discharge Planning     Nutrition Discharge Planning: Too early to determine, pending clinical course    Reason for Assessment    Reason For Assessment: consult (Tube feed recommendations)  Diagnosis:  (Ataxia)  General Information Comments: 69 y/o M presenting from [facility] for generalized weakness for about a week now. RD consult for tube feed recommendations. Pt intubated and sedated - Vtot 9.21 L/m. Propofol running at 23.1 mL/hr, providing an additional 610 kcal/day. See above for tube feed recommendations. Limited weight history. NFPE following extubation if warranted.     Nutrition Related Social Determinants of Health: SDOH: Unable to assess at this time.      Food Insecurity: Patient Unable To Answer (7/2/2025)    Hunger Vital Sign     Worried About Running Out of Food in the Last Year: Patient unable to answer     Ran Out of Food in the Last Year: Patient unable to answer       Nutrition/Diet History    Spiritual, Cultural Beliefs, [place]  "Practices, Values that Affect Care: no  Food Allergies: NKFA  Factors Affecting Nutritional Intake: NPO, on mechanical ventilation    Anthropometrics    Height: 6' 1" (185.4 cm)  Height (inches): 73 in  Height Method: Estimated  Weight: 77.1 kg (169 lb 15.6 oz)  Weight (lb): 169.98 lb  Weight Method: Bed Scale  Ideal Body Weight (IBW), Male: 184 lb  % Ideal Body Weight, Male (lb): 92.38 %  BMI (Calculated): 22.4  BMI Grade: less than 23 (older than 65 years) - underweight       Lab/Procedures/Meds    Pertinent Labs Reviewed: reviewed - BUN 30, creatinine 1.5, eGFR 50, glucose 118, P 5.6, A1c 5.8    Pertinent Medications Reviewed: reviewed - folic acid, heparin, MVI, oxycodone, bowel reg, thiamine     Estimated/Assessed Needs    Weight Used For Calorie Calculations: 77.1 kg (169 lb 15.6 oz)  Energy Calorie Requirements (kcal): 1788 kcal/day  Energy Need Method: [facility]  Protein Requirements:  g/day (1.2-2.0 g/kg)  Weight Used For Protein Calculations: 77.1 kg (169 lb 15.6 oz)     Estimated Fluid Requirement Method: RDA Method  RDA Method (mL): 1788         Nutrition Prescription Ordered    Current Diet Order: NPO  Current Nutrition Support Formula Ordered: Impact Peptide 1.5  Current Nutrition Support Rate Ordered: 30 (ml)  Current Nutrition Support Frequency Ordered: x 24 hours    Evaluation of Received Nutrient/Fluid Intake    Enteral Calories (kcal): 1080  Enteral Protein (gm): 68  Enteral (Free Water) Fluid (mL): 556  Other Calories (kcal): 610  Total Calories (kcal): 1690  % Kcal Needs: 95  % Protein Needs: 73  Energy Calories Required: meeting needs  Protein Required: not meeting needs  Fluid Required:  (Per MD)  Comments: LBM 7/1  Tolerance: tolerating  % Intake of Estimated Energy Needs: 75 - 100 %  % Meal Intake: NPO     PES Statement  Inadequate enteral nutrition infusion related to  (Infusion volume not reached) as evidenced by  (Inadequate EN volume compared to estimated requirements)  Status: " New    Nutrition Risk    Level of Risk/Frequency of Follow-up: high       Monitor and Evaluation    Monitor and Evaluation: Enteral and parenteral nutrition administration, Weight, Electrolyte and renal panel, Gastrointestinal profile, Glucose/endocrine profile, Nutrition focused physical findings, Lipid profile, Inflammatory profile, Skin       Nutrition Follow-Up    RD Follow-up?: Yes

## 2025-07-03 NOTE — PLAN OF CARE
Patient Active Problem List    Diagnosis Date Noted    Positive serology for syphilis 07/02/2025    AMS (altered mental status) 07/01/2025    Ataxia 07/01/2025    Polysubstance abuse 07/01/2025    Brain lesion 07/01/2025    Gait disturbance 07/01/2025    Hepatitis C antibody positive 07/01/2025    Mass of upper lobe of right lung 07/01/2025

## 2025-07-03 NOTE — ASSESSMENT & PLAN NOTE
No past testing available for review.  HCV RNA PCR  in process to assess for chronic infection.   If detectable HCV PCR then will need outpatient referral to HCV Clinic.

## 2025-07-03 NOTE — PLAN OF CARE
Pt transported to CT via bed with portable vent and portable CM, remained stable during CT of chest and returned to room. ICP and EVD output continued. Monitoring closely.

## 2025-07-03 NOTE — PLAN OF CARE
"[facility] Care Plan    POC reviewed with Toronto Brown and family at 0300. Patient verbalized understanding. Questions and concerns addressed. No acute events today. Pt progressing toward goals. Will continue to monitor. See below and flowsheets for full assessment and VS info.     -Patient intubated  -EVD placed and open @20  -Propofol and Fentanyl drips  -fent. Bolus x2  -midazolam x1  -restraint order renewed      Is this a stroke patient? no    Neuro:  Goldie Coma Scale  Best Eye Response: 2-->(E2) to pain  Best Motor Response: 5-->(M5) localizes pain  Best Verbal Response: 1-->(V1) none (intubated)  Goldie Coma Scale Score: 8  Assessment Qualifiers: Patient chemically sedated or paralyzed  Pupil PERRLA: yes     24 hr Temp:  [97.4 °F (36.3 °C)-98.7 °F (37.1 °C)]     CV:   Rhythm: normal sinus rhythm  BP goals:   SBP < 140  MAP > 65    Resp:      Vent Mode: A/C  Set Rate: 18 BPM  Oxygen Concentration (%): 50  Vt Set: 470 mL  PEEP/CPAP: 5 cmH20    Plan: N/A and wean to extubate    GI/:     Diet/Nutrition Received: NPO  Last Bowel Movement: 07/01/25  Voiding Characteristics: external catheter    Intake/Output Summary (Last 24 hours) at 7/3/2025 0342  Last data filed at 7/3/2025 0301  Gross per 24 hour   Intake 1539.44 ml   Output 1400 ml   Net 139.44 ml          Labs/Accuchecks:  Recent Labs   Lab 07/03/25  0023   WBC 8.66   RBC 4.32*   HGB 13.2*   HCT 41.4         Recent Labs   Lab 07/03/25  0154      K 4.5   CO2 21*      BUN 30*   CREATININE 1.5*   ALKPHOS 84   ALT 20   AST 34   BILITOT 0.6      Recent Labs   Lab 07/02/25  0324   PROTIME 11.7   INR 1.1   APTT 26.0    No results for input(s): "CPK", "CPKMB", "TROPONINI", "MB" in the last 168 hours.    Electrolytes: N/A - electrolytes WDL  Accuchecks: Q6H    Gtts:   fentanyl  0-250 mcg/hr Intravenous Continuous 6.3 mL/hr at 07/03/25 0301 62.5 mcg/hr at 07/03/25 0301    propofoL  0-50 mcg/kg/min Intravenous Continuous 13.9 mL/hr at 07/03/25 0301 " 30 mcg/kg/min at 25 0301       LDA/Wounds:    Davi Risk Assessment  Sensory Perception: 2-->very limited  Moisture: 3-->occasionally moist  Activity: 1-->bedfast  Mobility: 2-->very limited  Nutrition: 2-->probably inadequate  Friction and Shear: 2-->potential problem  Davi Score: 12  Is your davi score 12 or less? no          Restraints:   Restraint Order  Length of Order: Order good for next 24 hours or when removed.  Date that the current order will : 25  Time that the current order will : 514  Order Upon Application: Yes    [facility]

## 2025-07-03 NOTE — ASSESSMENT & PLAN NOTE
Noted on CXR and CT chest with IV contrast  Saturating well on RA; incidental PTX on CXR, CT chest pending  No lung consolidations or obstructive process  No known history of cancer

## 2025-07-03 NOTE — PROGRESS NOTES
0705--Pt noted to have fentanyl gtt infusing at 200 mcg/hr upon initial assessment while receiving report from previous RN. Night RN last verified infusion at 0600, which noted 75 mcg/hr. This RN captured/filed values upon assuming care of pt at 0700, which included the doses between 8661-8132 that were administered by previous RN. Pt very restless/agitated upon assessment, attempting to sit up with EVD drain and ETT, repeatedly turning head from side to side. Fentanyl gtt increased to 225 mcg/hr.    0730--Pt very agitated, sitting straight up in bed and thrashing head from side to side. IV site not patent, switched to other IV site. Prn fentanyl bolus given. KIRIT Hairston at bedside to assess. Verbal order for IV versed 2 mg but both IV's now aren't patent. Pt now very agitated, thrashing in bed, multiple RN's attempting IV access.  Dr. Miguel now at bedside, Haldol 5 mg IM ordered and given.  IV access achieved, 5 mg Versed IV ordered and given. Fent gtt continued at 250 mcg/hr, propofol gtt continued at 50 mcg/kg/min.      0800--Pt resting calmly with VSS, ICP 14.

## 2025-07-03 NOTE — ASSESSMENT & PLAN NOTE
I have reviewed hospital notes from  NCC service and other specialty providers. I have also reviewed CBC, CMP/BMP,  cultures and imaging with my interpretation as documented.     Positive Treponema pallidum antibody. RPR 1:1 suggestive of false positive or past treated infection. Received penicillin G 2.4 million units x 1 on 7/1. CSF analysis not suggestive of neurosyphilis at this time however VDRL in process.   Re-consult if RPR is positive for further recommendations.

## 2025-07-03 NOTE — PLAN OF CARE
Goals remain appropriate.  Problem: Occupational Therapy  Goal: Occupational Therapy Goal  Description: Goals set 7/3 with an expiration date, 7/30:  Patient will increase functional independence with ADLs by performing:    Patient will demonstrate rolling to the right with min assist.  Not met   Patient will demonstrate rolling to the left with min assist.   Not met  Patient will demonstrate supine -sit with min  assist.   Not met  Patient will demonstrate stand pivot transfers with mod assist.   Not met  Patient will demonstrate grooming while seated with min assist.   Not met  Patient will demonstrate upper body dressing with min assist while seated EOB.   Not met  Patient will demonstrate lower body dressing with mod assist while seated EOB.   Not met  Patient will demonstrate toileting with mod assist.   Not met  Patient will demonstrate bathing while seated EOB with mod assist.   Not met  Patient's family / caregiver will demonstrate independence and safety with assisting patient with self-care skills and functional mobility.     Not met  Patient's family / caregiver will demonstrate independence with providing ROM and changes in bed positioning.   Not met  Patient and/or patient's family will verbalize understanding of stroke prevention guidelines, personal risk factors and stroke warning signs via teachback method.  Not met           Outcome: Progressing

## 2025-07-03 NOTE — EICU
Intervention Initiated From:  COR / DIAMANTEU    Alex intervened regarding:  Rounding (Video assessment)    VICU Night Rounds Checklist  24H Vital Sign Range:  Temp:  [97.4 °F (36.3 °C)-99.2 °F (37.3 °C)]   Pulse:  [60-93]   Resp:  [10-72]   BP: ()/()   SpO2:  [95 %-100 %]     Video rounds and LDA reconciliation

## 2025-07-04 LAB
ABSOLUTE EOSINOPHIL (OHS): 0.03 K/UL
ABSOLUTE EOSINOPHIL (OHS): 0.06 K/UL
ABSOLUTE MONOCYTE (OHS): 0.5 K/UL (ref 0.3–1)
ABSOLUTE MONOCYTE (OHS): 0.52 K/UL (ref 0.3–1)
ABSOLUTE NEUTROPHIL COUNT (OHS): 7.57 K/UL (ref 1.8–7.7)
ABSOLUTE NEUTROPHIL COUNT (OHS): 8.79 K/UL (ref 1.8–7.7)
ALBUMIN SERPL BCP-MCNC: 3 G/DL (ref 3.5–5.2)
ALLENS TEST: ABNORMAL
ALP SERPL-CCNC: 66 UNIT/L (ref 40–150)
ALT SERPL W/O P-5'-P-CCNC: 16 UNIT/L (ref 10–44)
ANION GAP (OHS): 10 MMOL/L (ref 8–16)
AST SERPL-CCNC: 27 UNIT/L (ref 11–45)
BASOPHILS # BLD AUTO: 0.03 K/UL
BASOPHILS # BLD AUTO: 0.03 K/UL
BASOPHILS NFR BLD AUTO: 0.3 %
BASOPHILS NFR BLD AUTO: 0.3 %
BILIRUB SERPL-MCNC: 0.2 MG/DL (ref 0.1–1)
BUN SERPL-MCNC: 47 MG/DL (ref 8–23)
CALCIUM SERPL-MCNC: 8.3 MG/DL (ref 8.7–10.5)
CHLORIDE SERPL-SCNC: 104 MMOL/L (ref 95–110)
CO2 SERPL-SCNC: 21 MMOL/L (ref 23–29)
CREAT SERPL-MCNC: 1.7 MG/DL (ref 0.5–1.4)
DELSYS: ABNORMAL
ERYTHROCYTE [DISTWIDTH] IN BLOOD BY AUTOMATED COUNT: 13.6 % (ref 11.5–14.5)
ERYTHROCYTE [DISTWIDTH] IN BLOOD BY AUTOMATED COUNT: 13.6 % (ref 11.5–14.5)
ERYTHROCYTE [SEDIMENTATION RATE] IN BLOOD BY WESTERGREN METHOD: 18 MM/H
FIO2: 40
GFR SERPLBLD CREATININE-BSD FMLA CKD-EPI: 43 ML/MIN/1.73/M2
GLUCOSE SERPL-MCNC: 132 MG/DL (ref 70–110)
HCO3 UR-SCNC: 21.6 MMOL/L (ref 24–28)
HCT VFR BLD AUTO: 32.6 % (ref 40–54)
HCT VFR BLD AUTO: 36 % (ref 40–54)
HGB BLD-MCNC: 10.9 GM/DL (ref 14–18)
HGB BLD-MCNC: 11.7 GM/DL (ref 14–18)
HOLD SPECIMEN: NORMAL
IMM GRANULOCYTES # BLD AUTO: 0.04 K/UL (ref 0–0.04)
IMM GRANULOCYTES # BLD AUTO: 0.07 K/UL (ref 0–0.04)
IMM GRANULOCYTES NFR BLD AUTO: 0.5 % (ref 0–0.5)
IMM GRANULOCYTES NFR BLD AUTO: 0.7 % (ref 0–0.5)
LYMPHOCYTES # BLD AUTO: 0.55 K/UL (ref 1–4.8)
LYMPHOCYTES # BLD AUTO: 0.59 K/UL (ref 1–4.8)
MAGNESIUM SERPL-MCNC: 2.4 MG/DL (ref 1.6–2.6)
MCH RBC QN AUTO: 29.8 PG (ref 27–31)
MCH RBC QN AUTO: 31 PG (ref 27–31)
MCHC RBC AUTO-ENTMCNC: 32.5 G/DL (ref 32–36)
MCHC RBC AUTO-ENTMCNC: 33.4 G/DL (ref 32–36)
MCV RBC AUTO: 92 FL (ref 82–98)
MCV RBC AUTO: 93 FL (ref 82–98)
MODE: ABNORMAL
NUCLEATED RBC (/100WBC) (OHS): 0 /100 WBC
NUCLEATED RBC (/100WBC) (OHS): 0 /100 WBC
PCO2 BLDA: 33.5 MMHG (ref 35–45)
PEEP: 5
PH SMN: 7.42 [PH] (ref 7.35–7.45)
PHOSPHATE SERPL-MCNC: 6.1 MG/DL (ref 2.7–4.5)
PLATELET # BLD AUTO: 209 K/UL (ref 150–450)
PLATELET # BLD AUTO: 214 K/UL (ref 150–450)
PMV BLD AUTO: 9.2 FL (ref 9.2–12.9)
PMV BLD AUTO: 9.7 FL (ref 9.2–12.9)
PO2 BLDA: 143 MMHG (ref 80–100)
POC BE: -3 MMOL/L (ref -2–2)
POC SATURATED O2: 99 % (ref 95–100)
POC TCO2: 23 MMOL/L (ref 23–27)
POCT GLUCOSE: 110 MG/DL (ref 70–110)
POCT GLUCOSE: 126 MG/DL (ref 70–110)
POCT GLUCOSE: 152 MG/DL (ref 70–110)
POTASSIUM SERPL-SCNC: 4.2 MMOL/L (ref 3.5–5.1)
PROT SERPL-MCNC: 6.2 GM/DL (ref 6–8.4)
RBC # BLD AUTO: 3.52 M/UL (ref 4.6–6.2)
RBC # BLD AUTO: 3.92 M/UL (ref 4.6–6.2)
RELATIVE EOSINOPHIL (OHS): 0.3 %
RELATIVE EOSINOPHIL (OHS): 0.7 %
RELATIVE LYMPHOCYTE (OHS): 5.5 % (ref 18–48)
RELATIVE LYMPHOCYTE (OHS): 6.7 % (ref 18–48)
RELATIVE MONOCYTE (OHS): 5 % (ref 4–15)
RELATIVE MONOCYTE (OHS): 5.9 % (ref 4–15)
RELATIVE NEUTROPHIL (OHS): 85.9 % (ref 38–73)
RELATIVE NEUTROPHIL (OHS): 88.2 % (ref 38–73)
SAMPLE: ABNORMAL
SITE: ABNORMAL
SODIUM SERPL-SCNC: 135 MMOL/L (ref 136–145)
VT: 470
WBC # BLD AUTO: 8.81 K/UL (ref 3.9–12.7)
WBC # BLD AUTO: 9.97 K/UL (ref 3.9–12.7)

## 2025-07-04 PROCEDURE — 25000003 PHARM REV CODE 250

## 2025-07-04 PROCEDURE — 83735 ASSAY OF MAGNESIUM: CPT

## 2025-07-04 PROCEDURE — 94150 VITAL CAPACITY TEST: CPT

## 2025-07-04 PROCEDURE — 63600175 PHARM REV CODE 636 W HCPCS

## 2025-07-04 PROCEDURE — 99233 SBSQ HOSP IP/OBS HIGH 50: CPT | Mod: GC,,, | Performed by: NEUROLOGICAL SURGERY

## 2025-07-04 PROCEDURE — 85025 COMPLETE CBC W/AUTO DIFF WBC: CPT

## 2025-07-04 PROCEDURE — 82803 BLOOD GASES ANY COMBINATION: CPT

## 2025-07-04 PROCEDURE — 99291 CRITICAL CARE FIRST HOUR: CPT | Mod: GC,,, | Performed by: PSYCHIATRY & NEUROLOGY

## 2025-07-04 PROCEDURE — 99900026 HC AIRWAY MAINTENANCE (STAT)

## 2025-07-04 PROCEDURE — 27100171 HC OXYGEN HIGH FLOW UP TO 24 HOURS

## 2025-07-04 PROCEDURE — 20000000 HC ICU ROOM

## 2025-07-04 PROCEDURE — 25000003 PHARM REV CODE 250: Performed by: PSYCHIATRY & NEUROLOGY

## 2025-07-04 PROCEDURE — 94003 VENT MGMT INPAT SUBQ DAY: CPT

## 2025-07-04 PROCEDURE — 99900035 HC TECH TIME PER 15 MIN (STAT)

## 2025-07-04 PROCEDURE — 84100 ASSAY OF PHOSPHORUS: CPT

## 2025-07-04 PROCEDURE — 51798 US URINE CAPACITY MEASURE: CPT

## 2025-07-04 PROCEDURE — 82040 ASSAY OF SERUM ALBUMIN: CPT

## 2025-07-04 PROCEDURE — 63600175 PHARM REV CODE 636 W HCPCS: Performed by: PSYCHIATRY & NEUROLOGY

## 2025-07-04 PROCEDURE — 94761 N-INVAS EAR/PLS OXIMETRY MLT: CPT | Mod: XB

## 2025-07-04 PROCEDURE — 36600 WITHDRAWAL OF ARTERIAL BLOOD: CPT

## 2025-07-04 PROCEDURE — 51701 INSERT BLADDER CATHETER: CPT

## 2025-07-04 RX ORDER — DEXMEDETOMIDINE HYDROCHLORIDE 4 UG/ML
0-1.4 INJECTION, SOLUTION INTRAVENOUS CONTINUOUS
Status: DISCONTINUED | OUTPATIENT
Start: 2025-07-04 | End: 2025-07-07

## 2025-07-04 RX ORDER — OLANZAPINE 10 MG/2ML
2.5 INJECTION, POWDER, FOR SOLUTION INTRAMUSCULAR ONCE AS NEEDED
Status: COMPLETED | OUTPATIENT
Start: 2025-07-04 | End: 2025-07-04

## 2025-07-04 RX ORDER — AMOXICILLIN 250 MG
2 CAPSULE ORAL 2 TIMES DAILY
Status: DISCONTINUED | OUTPATIENT
Start: 2025-07-04 | End: 2025-07-08

## 2025-07-04 RX ORDER — POLYETHYLENE GLYCOL 3350 17 G/17G
17 POWDER, FOR SOLUTION ORAL 2 TIMES DAILY
Status: DISCONTINUED | OUTPATIENT
Start: 2025-07-04 | End: 2025-07-08

## 2025-07-04 RX ORDER — PHENOBARBITAL 100 MG/1
100 TABLET ORAL EVERY 8 HOURS
Status: DISCONTINUED | OUTPATIENT
Start: 2025-07-04 | End: 2025-07-06

## 2025-07-04 RX ORDER — HYDRALAZINE HYDROCHLORIDE 20 MG/ML
10 INJECTION INTRAMUSCULAR; INTRAVENOUS EVERY 4 HOURS PRN
Status: DISCONTINUED | OUTPATIENT
Start: 2025-07-04 | End: 2025-07-16

## 2025-07-04 RX ORDER — LABETALOL HCL 20 MG/4 ML
10 SYRINGE (ML) INTRAVENOUS EVERY 4 HOURS PRN
Status: DISCONTINUED | OUTPATIENT
Start: 2025-07-04 | End: 2025-07-16

## 2025-07-04 RX ADMIN — CEFAZOLIN 1 G: 330 INJECTION, POWDER, FOR SOLUTION INTRAMUSCULAR; INTRAVENOUS at 08:07

## 2025-07-04 RX ADMIN — PHENOBARBITAL 100 MG: 100 TABLET ORAL at 09:07

## 2025-07-04 RX ADMIN — MUPIROCIN: 20 OINTMENT TOPICAL at 08:07

## 2025-07-04 RX ADMIN — FOLIC ACID 1 MG: 1 TABLET ORAL at 08:07

## 2025-07-04 RX ADMIN — HEPARIN SODIUM 5000 UNITS: 5000 INJECTION INTRAVENOUS; SUBCUTANEOUS at 09:07

## 2025-07-04 RX ADMIN — SENNOSIDES AND DOCUSATE SODIUM 2 TABLET: 50; 8.6 TABLET ORAL at 08:07

## 2025-07-04 RX ADMIN — PROPOFOL 25 MCG/KG/MIN: 10 INJECTION, EMULSION INTRAVENOUS at 03:07

## 2025-07-04 RX ADMIN — CEFAZOLIN 1 G: 330 INJECTION, POWDER, FOR SOLUTION INTRAMUSCULAR; INTRAVENOUS at 12:07

## 2025-07-04 RX ADMIN — DEXMEDETOMIDINE HYDROCHLORIDE 0.8 MCG/KG/HR: 4 INJECTION INTRAVENOUS at 02:07

## 2025-07-04 RX ADMIN — OLANZAPINE 15 MG: 5 TABLET, FILM COATED ORAL at 08:07

## 2025-07-04 RX ADMIN — PHENOBARBITAL 60 MG: 30 TABLET ORAL at 05:07

## 2025-07-04 RX ADMIN — FAMOTIDINE 20 MG: 20 TABLET, FILM COATED ORAL at 08:07

## 2025-07-04 RX ADMIN — DEXAMETHASONE SODIUM PHOSPHATE 4 MG: 4 INJECTION INTRA-ARTICULAR; INTRALESIONAL; INTRAMUSCULAR; INTRAVENOUS; SOFT TISSUE at 12:07

## 2025-07-04 RX ADMIN — CEFAZOLIN 1 G: 330 INJECTION, POWDER, FOR SOLUTION INTRAMUSCULAR; INTRAVENOUS at 05:07

## 2025-07-04 RX ADMIN — DEXMEDETOMIDINE HYDROCHLORIDE 1.3 MCG/KG/HR: 4 INJECTION INTRAVENOUS at 10:07

## 2025-07-04 RX ADMIN — PHENOBARBITAL 60 MG: 30 TABLET ORAL at 01:07

## 2025-07-04 RX ADMIN — OXYCODONE HYDROCHLORIDE 5 MG: 5 TABLET ORAL at 05:07

## 2025-07-04 RX ADMIN — OLANZAPINE 2.5 MG: 10 INJECTION, POWDER, FOR SOLUTION INTRAMUSCULAR at 05:07

## 2025-07-04 RX ADMIN — POLYETHYLENE GLYCOL 3350 17 G: 17 POWDER, FOR SOLUTION ORAL at 08:07

## 2025-07-04 RX ADMIN — OXYCODONE HYDROCHLORIDE 5 MG: 5 TABLET ORAL at 12:07

## 2025-07-04 RX ADMIN — MUPIROCIN: 20 OINTMENT TOPICAL at 09:07

## 2025-07-04 RX ADMIN — Medication 100 MG: at 08:07

## 2025-07-04 RX ADMIN — OLANZAPINE 15 MG: 5 TABLET, FILM COATED ORAL at 09:07

## 2025-07-04 RX ADMIN — DEXAMETHASONE SODIUM PHOSPHATE 4 MG: 4 INJECTION INTRA-ARTICULAR; INTRALESIONAL; INTRAMUSCULAR; INTRAVENOUS; SOFT TISSUE at 05:07

## 2025-07-04 RX ADMIN — THERA TABS 1 TABLET: TAB at 08:07

## 2025-07-04 RX ADMIN — HEPARIN SODIUM 5000 UNITS: 5000 INJECTION INTRAVENOUS; SUBCUTANEOUS at 01:07

## 2025-07-04 RX ADMIN — DEXMEDETOMIDINE HYDROCHLORIDE 0.5 MCG/KG/HR: 4 INJECTION INTRAVENOUS at 08:07

## 2025-07-04 RX ADMIN — POLYETHYLENE GLYCOL 3350 17 G: 17 POWDER, FOR SOLUTION ORAL at 09:07

## 2025-07-04 RX ADMIN — PHENOBARBITAL 45 MG: 30 TABLET ORAL at 06:07

## 2025-07-04 RX ADMIN — HYDRALAZINE HYDROCHLORIDE 10 MG: 20 INJECTION, SOLUTION INTRAMUSCULAR; INTRAVENOUS at 07:07

## 2025-07-04 RX ADMIN — HEPARIN SODIUM 5000 UNITS: 5000 INJECTION INTRAVENOUS; SUBCUTANEOUS at 05:07

## 2025-07-04 RX ADMIN — SENNOSIDES AND DOCUSATE SODIUM 2 TABLET: 50; 8.6 TABLET ORAL at 09:07

## 2025-07-04 NOTE — ASSESSMENT & PLAN NOTE
Was brought in from [facility], where he has been for the past month  Educate on cessation when appropriate  On phenobarbital for agitation, no signs of EtOH withdrawl  Home Oxycodone to avoid withdrawals

## 2025-07-04 NOTE — PROGRESS NOTES
[facility] - Neuro Critical Care  Neurocritical Care  Progress Note    Admit Date: 7/1/2025  Service Date: 07/04/2025  Length of Stay: 3    Subjective:     Chief Complaint: Ataxia    History of Present Illness: 69 y/o M presenting from [facility] for generalized weakness for about a week now. History obtained from rehab nurse. She reported that he got to their facility about a month ago and, at baseline, walks with a cane and is alert and oriented. His nurse noticed that he has seemed weaker over the past week and over the past 3 days has had increased balance disturbance and gait issues. They also noticed that he has been speaking more softly in his speech is harder to understand. He has not had any infectious symptoms. He fell yesterday, unsure if he hit his head. CT head without contrast revealed multiple lesions of hypodensity with a hyperdense rim, notably in the left temporal lobe and right cerebellum. There were additional small hyperdensities in the right frontoparietal and left parasagittal parietal lobe. There is mass effect and partial effacement of the 4th ventricle secondary to the cerebellar lesion with developing hydrocephalus without MLS. CT of the chest, abdomen, and pelvis with IV contrast showed RUL mass with mediastinal adenopathy. Labs revealed Hep C and treponema antibodies were positive. Hep C PCR pending. Penicillin G was administered. A sepsis workup was completed and antibiotics were initiated. An MRI brain with and without contrast was ordered, but patient was unable to complete due to persistent movement after sedatives were administered. Prior to MRI, he was alert but drowsy. He was able to tell me his name, but told me he was 53 years old, the year was 2003, and he did not know the current place or reason for being here. He had generalized weakness but was slightly more weak on the right side. He followed simple commands, but did not attempt to follow more complex commands. He is admitted  to NCC with NSGY consult.    Hospital Course: 7/2/2025: MRI with significant motion artifact. Required presidex overnight secondary to agitation  7/3/2025: MRI favors neoplastic brain lesions. CSF studies pending. Started on Zyprexa and phenobarbital for agitation, R subclavian CVC placed for central access  7/4/2025: NAEON. Extubated without complications    Interval History:  See hospital course      Objective:     Vitals:  Temp: 97.8 °F (36.6 °C)  Pulse: 71  Rhythm: normal sinus rhythm  BP: 125/75  MAP (mmHg): 94  ICP Mean (mmHg): 10 mmHg  Resp: 11  SpO2: 100 %  Oxygen Concentration (%): 40  Vent Mode: (S) Spont  Set Rate: 18 BPM  Vt Set: 470 mL  Pressure Support: (S) 10 cmH20  PEEP/CPAP: (S) 5 cmH20  Peak Airway Pressure: 15 cmH20  Mean Airway Pressure: 7.7 cmH20  Plateau Pressure: 0 cmH20    Temp  Min: 97.6 °F (36.4 °C)  Max: 98.9 °F (37.2 °C)  Pulse  Min: 52  Max: 71  BP  Min: 90/62  Max: 155/81  MAP (mmHg)  Min: 67  Max: 112  ICP Mean (mmHg)  Min: 9 mmHg  Max: 21 mmHg  Resp  Min: 10  Max: 30  SpO2  Min: 100 %  Max: 100 %  Oxygen Concentration (%)  Min: 40  Max: 50    07/03 0701 - 07/04 0700  In: 2629.1 [I.V.:972.4]  Out: 1341 [Urine:1175; Drains:166]            Physical Exam  Vitals and nursing note reviewed.   Constitutional:       General: He is not in acute distress.     Comments: Somnolent but arousable to pain. Soft restrains in place   HENT:      Head: Normocephalic and atraumatic.      Nose:      Comments: NGT secured in place     Mouth/Throat:      Mouth: Mucous membranes are moist.      Pharynx: Oropharynx is clear.   Eyes:      Extraocular Movements: Extraocular movements intact.      Pupils: Pupils are equal, round, and reactive to light.   Cardiovascular:      Rate and Rhythm: Normal rate.   Pulmonary:      Effort: Pulmonary effort is normal. No respiratory distress.      Comments: Equal breath sounds bilaterally.  Abdominal:      General: Abdomen is flat. There is no distension.      Palpations:  Abdomen is soft.      Tenderness: There is no guarding or rebound.   Musculoskeletal:      Cervical back: Neck supple.      Right lower leg: No edema.      Left lower leg: No edema.   Skin:     General: Skin is warm and dry.      Capillary Refill: Capillary refill takes less than 2 seconds.   Neurological:      Comments: On sedation and comfortable appearing. Arousable to pain, moves all extremities            Medications:  ContinuousdexmedeTOMIDine (Precedex) infusion (titrating), Last Rate: 0.5 mcg/kg/hr (07/04/25 0901)  fentanyl, Last Rate: 75 mcg/hr (07/04/25 0901)  propofoL, Last Rate: 20 mcg/kg/min (07/04/25 0901)    ScheduledceFAZolin (Ancef) IV (PEDS and ADULTS), 1 g, Q8H  dexAMETHasone (Decadron) IV (PEDS and ADULTS), 4 mg, Q6H  famotidine, 20 mg, Daily  folic acid, 1 mg, Daily  heparin (porcine), 5,000 Units, Q8H  multivitamin, 1 tablet, Daily  mupirocin, , BID  OLANZapine, 15 mg, BID  oxyCODONE, 5 mg, Q6H  PHENobarbitaL, 60 mg, Q8H  polyethylene glycol, 17 g, BID  senna-docusate, 2 tablet, BID  thiamine, 100 mg, Daily    PRNacetaminophen, 650 mg, Q6H PRN  bisacodyL, 10 mg, Daily PRN  calcium gluconate IVPB, 1 g, PRN  calcium gluconate IVPB, 2 g, PRN  calcium gluconate IVPB, 3 g, PRN  dextrose 50%, 12.5 g, PRN  fentanyl, 50 mcg, Q1H PRN  glucagon (human recombinant), 1 mg, PRN  hydrALAZINE, 10 mg, Q4H PRN  insulin aspart U-100, 0-5 Units, Q6H PRN  labetalol, 10 mg, Q4H PRN  magnesium sulfate 2 g IVPB, 2 g, PRN  magnesium sulfate 2 g IVPB, 2 g, PRN  ondansetron, 4 mg, Q4H PRN  potassium bicarbonate, 35 mEq, PRN  potassium bicarbonate, 50 mEq, PRN  potassium bicarbonate, 60 mEq, PRN  potassium, sodium phosphates, 2 packet, PRN  potassium, sodium phosphates, 2 packet, PRN  potassium, sodium phosphates, 2 packet, PRN  sodium chloride 0.9%, 10 mL, PRN      Today I personally reviewed pertinent medications, lines/drains/airways, imaging, cardiology results, laboratory results, microbiology results,  notably:    Diet  Diet NPO  Diet NPO    Review of Systems  Assessment/Plan:     Neuro  Brain lesion  70 y/o M brought to ED from [facility] (hx of heroin and ETOH) for progressive weakness, gait disturbance, confusion, and decreased volume when speaking. CT head without contrast revealed multiple lesions of hypodensity with a hyperdense rim, notably in the left temporal lobe and right cerebellum. There were additional small hyperdensities in the right frontoparietal and left parasagittal parietal lobe. There is mass effect and partial effacement of the 4th ventricle secondary to the cerebellar lesion with developing hydrocephalus without MLS. CT of the chest, abdomen, and pelvis with IV contrast showed RUL mass with mediastinal adenopathy. Labs revealed Hep C and treponema antibodies were positive. Hep C PCR pending. Penicillin G was administered. A sepsis workup was completed and antibiotics were initiated. Intubated to get MRI. S/p EVD. Extubated 7/4/25 without difficulty.    Admit to [facility]  Q1h neuro checks, I&Os, and vitals   Daily CBC, CMP, Mag, Phos  NSGY following; plan for OR Monday  Steroids  CSF studies pending  Received Penicillin G in ED  Zyprexa and phenobarbital for agitation/EtOH withdrawal  Tube feeds  Folate/thiamine/MV  SBP <160   Prn labetalol, hydralazine   PRN Zofran 4mg q4h  SCDs; SQH  PT/OT/SLP    AMS (altered mental status)  See primary problem    Psychiatric  Polysubstance abuse  Was brought in from [facility], where he has been for the past month  Educate on cessation when appropriate  On phenobarbital for agitation, no signs of EtOH withdrawl  Home Oxycodone to avoid withdrawals    Pulmonary  Mass of upper lobe of right lung  Noted on CXR and CT chest with IV contrast  Saturating well on RA; No PTX on CT-chest  No lung consolidations or obstructive process  No known history of cancer    Other  Hepatitis C antibody positive  PCR pending  No known history of Hep C per patient    Gait  disturbance  See primary problem          The patient is being Prophylaxed for:  Venous Thromboembolism with: Mechanical or Chemical  Stress Ulcer with: Not Applicable   Ventilator Pneumonia with: not applicable    Activity Orders            Turn patient starting at 07/03 1124    Elevate HOB Elevate HOB 30-45 degrees during feeding unless contraindicated starting at 07/03 0802    Elevate HOB starting at 07/01 2040    Diet NPO: NPO starting at 07/01 2040    Straight Cath starting at 07/01 1925          Full Code    Derick Gomez MD  Neurocritical Care  [facility] - Neuro Critical Care

## 2025-07-04 NOTE — HOSPITAL COURSE
7/4: NAEO. Pending extubation. Booked for biopsy Monday. 162cc out from EVD on interval. ICP 5-17.  7/5: extubated yesterday, oriented to self, agitated in restraints  7/6: On pcdx 1 today. OR tomorrow for crani for tumor. EVD functioning well at 20, ICP wnl  7/7: NAEON. OR today for crani for tumor. ICP 5-13.  7/8: NAEON. Remained intubated postoperatively given concern for suctioning TF. Post op CT/MRI satisfactory, hemostatic agents left at superior medial aspect of resection cavity. ICP 5-14 with minimal hourly output, EVD lowered to 10.   7/9: NAEON. Remains agitated, Ox1, believes he is at detox rehab and 2075. ICP WNL, 5-13cc/hr since lowering to 10. Will continue to drain at 10 for several days for wound healing with plan for interval CT on Friday. Incision flat, c/d/I w/o fluctuance.   7/10: NAEON. Incision c/d/I. ICP WNL. ICP <10.  7/11: NAEON. Standing up bedside with OT this AM. CT this AM w/o pseudomeningocele. ICP 5-11. Will wean EVD today. Anticipate clamp trial over wkend to assess for psuedomeningocele collection.  7/12: NAEON. Asking to walk with therapy this AM. ICP WNL. EVD site c/d/I, posterior incision c/d/I w/o fluctuance.   7/13: NAEON. EVD remains clamped, posterior scalp incision w/o flucutance. Anticipate EVD removal tomorrow following CTH.   7/14: NAEON. CTH w/o pseuodomeningocele, grossly stable ventricular caliber. Anticipate EVD removal with surveillance CSF studies today and CT tomorrow.   7/15: PRN IV Zyprexa dose given for agitation overnight, in 4 point restraints/roll belt. Sedated this AM, will open eyes to voice and HARRISON spon but mumbles s/p Zyprexa dose, not following commands. Incision flat, c/d/I w/o pseudomeningocele. CTH this AM w/grossly stable ventricular caliber s/p EVD removal, no new tract hemorrhage, persistent vasogenic edema around resection cavity and L frontal lesion.   7/16: NAEON. Stepped down to . Ongoing PT/OT/SLP, MBSS yesterday. Incision flat, c/d/I.

## 2025-07-04 NOTE — SUBJECTIVE & OBJECTIVE
Interval History: 7/4: NAEO. Pending extubation. Booked for biopsy Monday. 162cc out from EVD on interval. ICP 5-17.    Medications:  Continuous Infusions:   fentanyl  0-250 mcg/hr Intravenous Continuous 7.5 mL/hr at 07/04/25 0801 75 mcg/hr at 07/04/25 0801    propofoL  0-50 mcg/kg/min Intravenous Continuous 9.3 mL/hr at 07/04/25 0801 20 mcg/kg/min at 07/04/25 0801     Scheduled Meds:   ceFAZolin (Ancef) IV (PEDS and ADULTS)  1 g Intravenous Q8H    dexAMETHasone (Decadron) IV (PEDS and ADULTS)  4 mg Intravenous Q6H    famotidine  20 mg Per NG tube Daily    folic acid  1 mg Per NG tube Daily    heparin (porcine)  5,000 Units Subcutaneous Q8H    multivitamin  1 tablet Per NG tube Daily    mupirocin   Nasal BID    OLANZapine  15 mg Per NG tube BID    oxyCODONE  5 mg Per NG tube Q6H    PHENobarbitaL  60 mg Per NG tube Q8H    polyethylene glycol  17 g Per NG tube BID    senna-docusate  2 tablet Per NG tube BID    thiamine  100 mg Per NG tube Daily     PRN Meds:  Current Facility-Administered Medications:     acetaminophen, 650 mg, Per NG tube, Q6H PRN    bisacodyL, 10 mg, Rectal, Daily PRN    calcium gluconate IVPB, 1 g, Intravenous, PRN    calcium gluconate IVPB, 2 g, Intravenous, PRN    calcium gluconate IVPB, 3 g, Intravenous, PRN    dextrose 50%, 12.5 g, Intravenous, PRN    fentanyl, 50 mcg, Intravenous, Q1H PRN    glucagon (human recombinant), 1 mg, Intramuscular, PRN    hydrALAZINE, 10 mg, Intravenous, Q4H PRN    insulin aspart U-100, 0-5 Units, Subcutaneous, Q6H PRN    labetalol, 10 mg, Intravenous, Q4H PRN    magnesium sulfate 2 g IVPB, 2 g, Intravenous, PRN    magnesium sulfate 2 g IVPB, 2 g, Intravenous, PRN    ondansetron, 4 mg, Intravenous, Q4H PRN    potassium bicarbonate, 35 mEq, Per NG tube, PRN    potassium bicarbonate, 50 mEq, Per NG tube, PRN    potassium bicarbonate, 60 mEq, Per NG tube, PRN    potassium, sodium phosphates, 2 packet, Per NG tube, PRN    potassium, sodium phosphates, 2 packet, Per NG  tube, PRN    potassium, sodium phosphates, 2 packet, Per NG tube, PRN    sodium chloride 0.9%, 10 mL, Intravenous, PRN     Review of Systems  Objective:     Weight: 77.1 kg (169 lb 15.6 oz)  Body mass index is 22.43 kg/m².  Vital Signs (Most Recent):  Temp: 97.8 °F (36.6 °C) (07/04/25 0701)  Pulse: 71 (07/04/25 0806)  Resp: 11 (07/04/25 0801)  BP: 125/75 (07/04/25 0801)  SpO2: 100 % (07/04/25 0801) Vital Signs (24h Range):  Temp:  [97.6 °F (36.4 °C)-98.9 °F (37.2 °C)] 97.8 °F (36.6 °C)  Pulse:  [52-83] 71  Resp:  [10-30] 11  SpO2:  [100 %] 100 %  BP: ()/(50-99) 125/75     Date 07/04/25 0700 - 07/05/25 0659   Shift 3548-9548 1268-6873 7073-0292 24 Hour Total   INTAKE   I.V.(mL/kg) 43.1(0.6)   43.1(0.6)   NG/GT 30   30   Shift Total(mL/kg) 73.1(0.9)   73.1(0.9)   OUTPUT   Drains 11   11   Shift Total(mL/kg) 11(0.1)   11(0.1)   Weight (kg) 77.1 77.1 77.1 77.1              Vent Mode: Spont  Oxygen Concentration (%):  [40-50] 40  Resp Rate Total:  [12 br/min-60 br/min] 12 br/min  Vt Set:  [470 mL] 470 mL  PEEP/CPAP:  [5 cmH20] 5 cmH20  Pressure Support:  [10 cmH20] 10 cmH20  Mean Airway Pressure:  [7.7 ouW45-19 cmH20] 7.7 cmH20             NG/OG Tube 07/02/25 0522 Left nostril (Active)   Placement Check placement verified by x-ray;placement verified by distal tube length measurement 07/04/25 0505   Tolerance no signs/symptoms of discomfort 07/04/25 0505   Securement secured to nostril center w/ adhesive device 07/04/25 0505   Clamp Status/Tolerance unclamped 07/04/25 0505   Suction Setting/Drainage Method suction at the bedside 07/04/25 0505   Insertion Site Appearance no redness, warmth, tenderness, skin breakdown, drainage 07/04/25 0505   Drainage None 07/04/25 0505   Flush/Irrigation flushed w/;water;no resistance met 07/04/25 0505   Feeding Type continuous;by pump 07/04/25 0505   Feeding Action feeding continued 07/04/25 0505   Current Rate (mL/hr) 20 mL/hr 07/03/25 1905   Goal Rate (mL/hr) 30 mL/hr 07/03/25  "1905   Intake (mL) 50 mL 07/04/25 0526   Water Bolus (mL) 30 mL 07/03/25 1724   Formula Name Pivot 07/04/25 0505   Intake (mL) - Formula Tube Feeding 30 07/04/25 0701       Male External Urinary Catheter 07/02/25 0521 Large (Active)   Collection Container Urimeter 07/04/25 0505   Securement Method secured to top of thigh w/ tape 07/04/25 0505   Skin no redness;no breakdown 07/04/25 0505   Tolerance no signs/symptoms of discomfort 07/04/25 0505   Output (mL) 175 mL 07/04/25 0405   Catheter Change Date 07/03/25 07/03/25 1701   Catheter Change Time 1018 07/03/25 1701            ICP/Ventriculostomy 07/02/25 1409 Right (Active)   Level of Ventriculostomy (cm above) 20 07/03/25 1905   Status Open to drainage 07/04/25 0801   Site Assessment Clean;Dry 07/04/25 0801   Site Drainage No drainage 07/04/25 0801   Waveform normal waveform 07/03/25 1905   Output (mL) 7 mL 07/04/25 0801   CSF Color clear 07/04/25 0801   Dressing Status Clean;Dry;Intact 07/04/25 0801   Interventions HOB degrees;bed controls locked;zeroed 07/04/25 0801       NSGY Physical Exam 07/04/2025:  [id number]  On prop/fent  Localizing x4    EVD patent with good waveform    Significant Labs:  Recent Labs   Lab 07/03/25  0154 07/04/25  0208   * 132*    135*   K 4.5 4.2    104   CO2 21* 21*   BUN 30* 47*   CREATININE 1.5* 1.7*   CALCIUM 9.1 8.3*   MG 2.2 2.4     Recent Labs   Lab 07/03/25  0023 07/04/25  0208   WBC 8.66 8.81   HGB 13.2* 10.9*   HCT 41.4 32.6*    209     No results for input(s): "LABPT", "INR", "APTT" in the last 48 hours.  Microbiology Results (last 7 days)       Procedure Component Value Units Date/Time    CSF culture [[id number]] Collected: 07/02/25 1808    Order Status: Completed Specimen: CSF (Spinal Fluid) from CSF Tap, Tube 3 Updated: 07/04/25 0731     CULTURE, CSF No Growth To Date     GRAM STAIN Cytospin indicates:      No WBCs      No organisms seen    Culture, Respiratory with Gram Stain [[id number]] Collected: " 07/03/25 1130    Order Status: Completed Specimen: Respiratory from Endotracheal Aspirate Updated: 07/04/25 0147     GRAM STAIN <10 Epithelial Cells/LPF      Rare WBC seen      No organisms seen    Blood culture #1 **CANNOT BE ORDERED STAT** [[id number]]  (Normal) Collected: 07/01/25 1702    Order Status: Completed Specimen: Blood from Peripheral, Forearm, Left Updated: 07/04/25 0005     Blood Culture No Growth After 48 Hours    Blood culture #2 **CANNOT BE ORDERED STAT** [[id number]]  (Normal) Collected: 07/01/25 1702    Order Status: Completed Specimen: Blood from Peripheral, Lower Arm, Left Updated: 07/04/25 0005     Blood Culture No Growth After 48 Hours    Cryptococcal antigen, CSF [[id number]]  (Normal) Collected: 07/02/25 1808    Order Status: Completed Specimen: CSF (Spinal Fluid) from CSF Tap, Tube 3 Updated: 07/03/25 1458     Cryptococcal Antigen, CSF Negative    Afb Culture Stain [[id number]] Collected: 07/02/25 1808    Order Status: Completed Specimen: CSF (Spinal Fluid) from CSF Tap, Tube 3 Updated: 07/03/25 1346     ACID FAST STAIN  No acid fast bacilli seen    Gram stain [[id number]] Collected: 07/02/25 1808    Order Status: Canceled Specimen: CSF (Spinal Fluid) from CSF Tap, Tube 3 Updated: 07/03/25 0203    AFB Culture & Smear [[id number]] Collected: 07/02/25 1808    Order Status: Sent Specimen: CSF (Spinal Fluid) from CSF Tap, Tube 3 Updated: 07/03/25 0202    Influenza A & B by Molecular [[id number]]  (Normal) Collected: 07/01/25 1449    Order Status: Completed Specimen: Nasal Swab Updated: 07/01/25 1537     INFLUENZA A MOLECULAR Negative     INFLUENZA B MOLECULAR  Negative          All pertinent labs from the last 24 hours have been reviewed.    Significant Diagnostics:  I have reviewed all pertinent imaging results/findings within the past 24 hours.

## 2025-07-04 NOTE — PLAN OF CARE
"[facility] Care Plan    POC reviewed with Goran Carlos  at 0300. Patient unable to verbalize understanding. Questions and concerns addressed. No acute events today. Pt progressing toward goals. Will continue to monitor. See below and flowsheets for full assessment and VS info.     - weaning sedations for possible extubation today   -Propofol at 35 mcg/kg/min   -Fentanyl at 125 mcg/hr   -Prn Fentanyl 50 mcg bolus given x2  -TF at goal 30 ml/hr  -EVD open at 20 ICP 11-17 O-1-8  -straight cath x1          Is this a stroke patient? no    Neuro:  [place] Coma Scale  Best Eye Response: 4-->(E4) spontaneous  Best Motor Response: 5-->(M5) localizes pain  Best Verbal Response: 1-->(V1) none  [place] Coma Scale Score: 10  Assessment Qualifiers: Patient chemically sedated or paralyzed, Patient intubated  Pupil PERRLA: yes     24 hr Temp:  [97.6 °F (36.4 °C)-98.9 °F (37.2 °C)]     CV:   Rhythm: normal sinus rhythm  BP goals:   SBP < 140  MAP > 65    Resp:      Vent Mode: A/C  Set Rate: 18 BPM  Oxygen Concentration (%): 40  Vt Set: 470 mL  PEEP/CPAP: 5 cmH20    Plan: wean to extubate    GI/:     Diet/Nutrition Received: tube feeding  Last Bowel Movement: 07/01/25  Voiding Characteristics: external catheter    Intake/Output Summary (Last 24 hours) at 7/4/2025 0636  Last data filed at 7/4/2025 0636  Gross per 24 hour   Intake 2629.06 ml   Output 1341 ml   Net 1288.06 ml          Labs/Accuchecks:  Recent Labs   Lab 07/04/25  0208   WBC 8.81   RBC 3.52*   HGB 10.9*   HCT 32.6*         Recent Labs   Lab 07/04/25  0208   *   K 4.2   CO2 21*      BUN 47*   CREATININE 1.7*   ALKPHOS 66   ALT 16   AST 27   BILITOT 0.2      Recent Labs   Lab 07/02/25  0324   PROTIME 11.7   INR 1.1   APTT 26.0    No results for input(s): "CPK", "CPKMB", "TROPONINI", "MB" in the last 168 hours.    Electrolytes: Contraindicated  Accuchecks: Q6H    Gtts:   fentanyl  0-250 mcg/hr Intravenous Continuous 15 mL/hr at 07/04/25 0605 150 mcg/hr at " 25 0605    propofoL  0-50 mcg/kg/min Intravenous Continuous 16.2 mL/hr at 25 0636 35 mcg/kg/min at 25 0636       LDA/Wounds:    Davi Risk Assessment  Sensory Perception: 3-->slightly limited  Moisture: 3-->occasionally moist  Activity: 1-->bedfast  Mobility: 3-->slightly limited  Nutrition: 2-->probably inadequate  Friction and Shear: 3-->no apparent problem  Davi Score: 15  Is your davi score 12 or less? no          Restraints:   Restraint Order  Length of Order: Order good for next 24 hours or when removed.  Date that the current order will : 25  Time that the current order will : 1447  Order Upon Application: Yes    [facility]

## 2025-07-04 NOTE — EICU
Intervention Initiated From:  COR / DIAMANTEU    Alex intervened regarding:  Rounding (Video assessment)    VICU Night Rounds Checklist  24H Vital Sign Range:  Temp:  [97.8 °F (36.6 °C)-98.9 °F (37.2 °C)]   Pulse:  []   Resp:  [15-45]   BP: ()/(55-99)   SpO2:  [98 %-100 %]     Video rounds and LDA reconciliation

## 2025-07-04 NOTE — ASSESSMENT & PLAN NOTE
Noted on CXR and CT chest with IV contrast  Saturating well on RA; No PTX on CT-chest  No lung consolidations or obstructive process  No known history of cancer

## 2025-07-04 NOTE — ASSESSMENT & PLAN NOTE
70 y/o M brought to ED from [facility] (hx of heroin and ETOH) for progressive weakness, gait disturbance, confusion, and decreased volume when speaking. CT head without contrast revealed multiple lesions of hypodensity with a hyperdense rim, notably in the left temporal lobe and right cerebellum. There were additional small hyperdensities in the right frontoparietal and left parasagittal parietal lobe. There is mass effect and partial effacement of the 4th ventricle secondary to the cerebellar lesion with developing hydrocephalus without MLS. CT of the chest, abdomen, and pelvis with IV contrast showed RUL mass with mediastinal adenopathy. Labs revealed Hep C and treponema antibodies were positive. Hep C PCR pending. Penicillin G was administered. A sepsis workup was completed and antibiotics were initiated. Intubated to get MRI. S/p EVD. Extubated 7/4/25 without difficulty.    Admit to [facility]  Q1h neuro checks, I&Os, and vitals   Daily CBC, CMP, Mag, Phos  NSGY following; plan for OR Monday  Steroids  CSF studies pending  Received Penicillin G in ED  Zyprexa and phenobarbital for agitation/EtOH withdrawal  Tube feeds  Folate/thiamine/MV  SBP <160   Prn labetalol, hydralazine   PRN Zofran 4mg q4h  SCDs; SQH  PT/OT/SLP

## 2025-07-04 NOTE — PLAN OF CARE
"[facility] Care Plan  POC reviewed with Osborne Brown and family at 1400. Patient verbalized understanding. Questions and concerns addressed. No acute events today. Pt progressing toward goals. Will continue to monitor. See below and flowsheets for full assessment and VS info.     -Extubation today. Tolerated well.   -Started on Precedex for agitation. Precedex @ 0.9  -EVD @ 20. ICP: 5-14 OP: 2-11  -IM Zyprexa administered secondary to pt agitation preventing pt care.   -Straight cath x1.   -SBP liberalized to <160  -Roll belt placed for agitation.         Is this a stroke patient? no    Neuro:  [place] Coma Scale  Best Eye Response: 4-->(E4) spontaneous  Best Motor Response: 6-->(M6) obeys commands  Best Verbal Response: 4-->(V4) confused  [place] Coma Scale Score: 14  Assessment Qualifiers: Patient chemically sedated or paralyzed, Patient intubated  Pupil PERRLA: yes     24 hr Temp:  [97.6 °F (36.4 °C)-97.9 °F (36.6 °C)]     CV:   Rhythm: normal sinus rhythm  BP goals:   SBP < 160  MAP > 65    Resp:      Vent Mode: (S) Spont  Set Rate: 18 BPM  Oxygen Concentration (%): 40  Vt Set: 470 mL  PEEP/CPAP: (S) 5 cmH20  Pressure Support: (S) 10 cmH20    Plan: N/A    GI/:     Diet/Nutrition Received: tube feeding  Last Bowel Movement: 07/01/25  Voiding Characteristics: external catheter    Intake/Output Summary (Last 24 hours) at 7/4/2025 1755  Last data filed at 7/4/2025 1740  Gross per 24 hour   Intake 1104.25 ml   Output 1602 ml   Net -497.75 ml          Labs/Accuchecks:  Recent Labs   Lab 07/04/25  1452   WBC 9.97   RBC 3.92*   HGB 11.7*   HCT 36.0*         Recent Labs   Lab 07/04/25  0208   *   K 4.2   CO2 21*      BUN 47*   CREATININE 1.7*   ALKPHOS 66   ALT 16   AST 27   BILITOT 0.2      Recent Labs   Lab 07/02/25  0324   PROTIME 11.7   INR 1.1   APTT 26.0    No results for input(s): "CPK", "CPKMB", "TROPONINI", "MB" in the last 168 hours.    Electrolytes: N/A - electrolytes WDL  Accuchecks: " Q6H    Gtts:   dexmedeTOMIDine (Precedex) infusion (titrating)  0-1.4 mcg/kg/hr Intravenous Continuous 15.42 mL/hr at 25 1701 0.8 mcg/kg/hr at 25 1701       LDA/Wounds:    Davi Risk Assessment  Sensory Perception: 3-->slightly limited  Moisture: 3-->occasionally moist  Activity: 1-->bedfast  Mobility: 3-->slightly limited  Nutrition: 2-->probably inadequate  Friction and Shear: 2-->potential problem  Davi Score: 14    Is your davi score 12 or less? no            Restraints:   Restraint Order  Length of Order: Order good for next 24 hours or when removed.  Date that the current order will : 25  Time that the current order will :   Order Upon Application: Yes    [facility]

## 2025-07-04 NOTE — NURSING
Dr. Miguel at bedside verbal order to decrease Propofol to 20 and Fentanyl to 75 for possible extubation.

## 2025-07-04 NOTE — EICU
Virtual ICU Quality Rounds    Admit Date: 7/1/2025  Hospital Day: 3    ICU Day: 2d 13h    24H Vital Sign Range:  Temp:  [97.6 °F (36.4 °C)-98.9 °F (37.2 °C)]   Pulse:  [52-81]   Resp:  [7-30]   BP: ()/(50-99)   SpO2:  [98 %-100 %]     VICU Surveillance Screening    Daily review of  line necessity(optional): Central line(s) in place    Central line type (optional): Triple lumen catheter    Central line indications : Multiple infusions        LDA reconciliation : Yes

## 2025-07-04 NOTE — PROGRESS NOTES
[facility] - Neuro Critical Care  Neurosurgery  Progress Note    Subjective:     History of Present Illness: Patient is 69yo M with generalized weakness, falls, and paucity of speech. 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 On exam, patient's voice is barely audible but patient appropriately answers questions and follows commands. Patient's brother states that patient had normal speech and gait when they last saw each other about 1 month ago.    Neurosurgery consulted due to multiple ring-enhancing lesions seen on CT brain.    Post-Op Info:  Procedure(s) (LRB):  CRANIOTOMY, SUBOCCIPITAL (Right)       Interval History: 7/4: NAEO. Pending extubation. Booked for biopsy Monday. 162cc out from EVD on interval. ICP 5-17.    Medications:  Continuous Infusions:   fentanyl  0-250 mcg/hr Intravenous Continuous 7.5 mL/hr at 07/04/25 0801 75 mcg/hr at 07/04/25 0801    propofoL  0-50 mcg/kg/min Intravenous Continuous 9.3 mL/hr at 07/04/25 0801 20 mcg/kg/min at 07/04/25 0801     Scheduled Meds:   ceFAZolin (Ancef) IV (PEDS and ADULTS)  1 g Intravenous Q8H    dexAMETHasone (Decadron) IV (PEDS and ADULTS)  4 mg Intravenous Q6H    famotidine  20 mg Per NG tube Daily    folic acid  1 mg Per NG tube Daily    heparin (porcine)  5,000 Units Subcutaneous Q8H    multivitamin  1 tablet Per NG tube Daily    mupirocin   Nasal BID    OLANZapine  15 mg Per NG tube BID    oxyCODONE  5 mg Per NG tube Q6H    PHENobarbitaL  60 mg Per NG tube Q8H    polyethylene glycol  17 g Per NG tube BID    senna-docusate  2 tablet Per NG tube BID    thiamine  100 mg Per NG tube Daily     PRN Meds:  Current Facility-Administered Medications:     acetaminophen, 650 mg, Per NG tube, Q6H PRN    bisacodyL, 10 mg, Rectal, Daily PRN    calcium gluconate IVPB, 1 g, Intravenous, PRN    calcium gluconate IVPB, 2 g, Intravenous, PRN    calcium gluconate IVPB, 3 g, Intravenous, PRN    dextrose 50%, 12.5 g, Intravenous, PRN    fentanyl, 50 mcg,  Intravenous, Q1H PRN    glucagon (human recombinant), 1 mg, Intramuscular, PRN    hydrALAZINE, 10 mg, Intravenous, Q4H PRN    insulin aspart U-100, 0-5 Units, Subcutaneous, Q6H PRN    labetalol, 10 mg, Intravenous, Q4H PRN    magnesium sulfate 2 g IVPB, 2 g, Intravenous, PRN    magnesium sulfate 2 g IVPB, 2 g, Intravenous, PRN    ondansetron, 4 mg, Intravenous, Q4H PRN    potassium bicarbonate, 35 mEq, Per NG tube, PRN    potassium bicarbonate, 50 mEq, Per NG tube, PRN    potassium bicarbonate, 60 mEq, Per NG tube, PRN    potassium, sodium phosphates, 2 packet, Per NG tube, PRN    potassium, sodium phosphates, 2 packet, Per NG tube, PRN    potassium, sodium phosphates, 2 packet, Per NG tube, PRN    sodium chloride 0.9%, 10 mL, Intravenous, PRN     Review of Systems  Objective:     Weight: 77.1 kg (169 lb 15.6 oz)  Body mass index is 22.43 kg/m².  Vital Signs (Most Recent):  Temp: 97.8 °F (36.6 °C) (07/04/25 0701)  Pulse: 71 (07/04/25 0806)  Resp: 11 (07/04/25 0801)  BP: 125/75 (07/04/25 0801)  SpO2: 100 % (07/04/25 0801) Vital Signs (24h Range):  Temp:  [97.6 °F (36.4 °C)-98.9 °F (37.2 °C)] 97.8 °F (36.6 °C)  Pulse:  [52-83] 71  Resp:  [10-30] 11  SpO2:  [100 %] 100 %  BP: ()/(50-99) 125/75     Date 07/04/25 0700 - 07/05/25 0659   Shift 3273-8338 7093-1547 5860-5399 24 Hour Total   INTAKE   I.V.(mL/kg) 43.1(0.6)   43.1(0.6)   NG/GT 30   30   Shift Total(mL/kg) 73.1(0.9)   73.1(0.9)   OUTPUT   Drains 11   11   Shift Total(mL/kg) 11(0.1)   11(0.1)   Weight (kg) 77.1 77.1 77.1 77.1              Vent Mode: Spont  Oxygen Concentration (%):  [40-50] 40  Resp Rate Total:  [12 br/min-60 br/min] 12 br/min  Vt Set:  [470 mL] 470 mL  PEEP/CPAP:  [5 cmH20] 5 cmH20  Pressure Support:  [10 cmH20] 10 cmH20  Mean Airway Pressure:  [7.7 leB83-59 cmH20] 7.7 cmH20             NG/OG Tube 07/02/25 0522 Left nostril (Active)   Placement Check placement verified by x-ray;placement verified by distal tube length measurement 07/04/25  0505   Tolerance no signs/symptoms of discomfort 07/04/25 0505   Securement secured to nostril center w/ adhesive device 07/04/25 0505   Clamp Status/Tolerance unclamped 07/04/25 0505   Suction Setting/Drainage Method suction at the bedside 07/04/25 0505   Insertion Site Appearance no redness, warmth, tenderness, skin breakdown, drainage 07/04/25 0505   Drainage None 07/04/25 0505   Flush/Irrigation flushed w/;water;no resistance met 07/04/25 0505   Feeding Type continuous;by pump 07/04/25 0505   Feeding Action feeding continued 07/04/25 0505   Current Rate (mL/hr) 20 mL/hr 07/03/25 1905   Goal Rate (mL/hr) 30 mL/hr 07/03/25 1905   Intake (mL) 50 mL 07/04/25 0526   Water Bolus (mL) 30 mL 07/03/25 1724   Formula Name Pivot 07/04/25 0505   Intake (mL) - Formula Tube Feeding 30 07/04/25 0701       Male External Urinary Catheter 07/02/25 0521 Large (Active)   Collection Container Urimeter 07/04/25 0505   Securement Method secured to top of thigh w/ tape 07/04/25 0505   Skin no redness;no breakdown 07/04/25 0505   Tolerance no signs/symptoms of discomfort 07/04/25 0505   Output (mL) 175 mL 07/04/25 0405   Catheter Change Date 07/03/25 07/03/25 1701   Catheter Change Time 1018 07/03/25 1701            ICP/Ventriculostomy 07/02/25 1409 Right (Active)   Level of Ventriculostomy (cm above) 20 07/03/25 1905   Status Open to drainage 07/04/25 0801   Site Assessment Clean;Dry 07/04/25 0801   Site Drainage No drainage 07/04/25 0801   Waveform normal waveform 07/03/25 1905   Output (mL) 7 mL 07/04/25 0801   CSF Color clear 07/04/25 0801   Dressing Status Clean;Dry;Intact 07/04/25 0801   Interventions HOB degrees;bed controls locked;zeroed 07/04/25 0801       NSGY Physical Exam 07/04/2025:  [id number]  On prop/fent  Localizing x4    EVD patent with good waveform    Significant Labs:  Recent Labs   Lab 07/03/25  0154 07/04/25  0208   * 132*    135*   K 4.5 4.2    104   CO2 21* 21*   BUN 30* 47*   CREATININE 1.5* 1.7*  "  CALCIUM 9.1 8.3*   MG 2.2 2.4     Recent Labs   Lab 07/03/25  0023 07/04/25  0208   WBC 8.66 8.81   HGB 13.2* 10.9*   HCT 41.4 32.6*    209     No results for input(s): "LABPT", "INR", "APTT" in the last 48 hours.  Microbiology Results (last 7 days)       Procedure Component Value Units Date/Time    CSF culture [[id number]] Collected: 07/02/25 1808    Order Status: Completed Specimen: CSF (Spinal Fluid) from CSF Tap, Tube 3 Updated: 07/04/25 0731     CULTURE, CSF No Growth To Date     GRAM STAIN Cytospin indicates:      No WBCs      No organisms seen    Culture, Respiratory with Gram Stain [[id number]] Collected: 07/03/25 1130    Order Status: Completed Specimen: Respiratory from Endotracheal Aspirate Updated: 07/04/25 0147     GRAM STAIN <10 Epithelial Cells/LPF      Rare WBC seen      No organisms seen    Blood culture #1 **CANNOT BE ORDERED STAT** [[id number]]  (Normal) Collected: 07/01/25 1702    Order Status: Completed Specimen: Blood from Peripheral, Forearm, Left Updated: 07/04/25 0005     Blood Culture No Growth After 48 Hours    Blood culture #2 **CANNOT BE ORDERED STAT** [[id number]]  (Normal) Collected: 07/01/25 1702    Order Status: Completed Specimen: Blood from Peripheral, Lower Arm, Left Updated: 07/04/25 0005     Blood Culture No Growth After 48 Hours    Cryptococcal antigen, CSF [[id number]]  (Normal) Collected: 07/02/25 1808    Order Status: Completed Specimen: CSF (Spinal Fluid) from CSF Tap, Tube 3 Updated: 07/03/25 1458     Cryptococcal Antigen, CSF Negative    Afb Culture Stain [[id number]] Collected: 07/02/25 1808    Order Status: Completed Specimen: CSF (Spinal Fluid) from CSF Tap, Tube 3 Updated: 07/03/25 1346     ACID FAST STAIN  No acid fast bacilli seen    Gram stain [[id number]] Collected: 07/02/25 1808    Order Status: Canceled Specimen: CSF (Spinal Fluid) from CSF Tap, Tube 3 Updated: 07/03/25 0203    AFB Culture & Smear [[id number]] Collected: 07/02/25 1808    Order Status: " Sent Specimen: CSF (Spinal Fluid) from CSF Tap, Tube 3 Updated: 07/03/25 0202    Influenza A & B by Molecular [[id number]]  (Normal) Collected: 07/01/25 1449    Order Status: Completed Specimen: Nasal Swab Updated: 07/01/25 1537     INFLUENZA A MOLECULAR Negative     INFLUENZA B MOLECULAR  Negative          All pertinent labs from the last 24 hours have been reviewed.    Significant Diagnostics:  I have reviewed all pertinent imaging results/findings within the past 24 hours.  Assessment/Plan:     * Ataxia  Assessment  69yo M with generalized weakness, falls, and paucity of speech presenting with multiple ring enhancing lesions on brain CT.    Imaging:  - CTH 7/1 showed multiple brain masses concerning for mets with surrounding edema, can't rule out abscesses. Mass in cerebellum with effacement of 4th ventricule outflow with concern for developing hydrocephalus  - MRI brain 7/1: non diagnostic due to motion  - CT CAP 7/1: spiculated R lung mass and lymph node adenopathy in chest and neck concerning for metastatic disease  - MRI Brain W/WO 7/2: multifocal enhancing lesions c/f metastatic disease, largest R cerebellum w/mass effect on 4th.    Plan:  - admitted to NCC  - EVD open at 20, abx while in place; transduce hourly and document hourly output; M/Th CSF while remains in place starting 7/7  - Cont Dex 4q6 with Ppi  - AED per NCC  - recommend oncology consult; would appreciate opinion on life expectancy when able to comment  - infectious workup less concerning for abscesses final Cx pending  - tentatively scheduled for SOC for tumor on 7/7 for resection of cerebellar mass  - medical management/WTE per [facility]    Dispo: ongoing, tentative OR Monday    Discussed with Dr. Ric Ordaz MD  Neurosurgery  [facility] - Neuro Critical Care

## 2025-07-04 NOTE — RESPIRATORY THERAPY
Extubated per MD order. Patient is on room air with no respiratory distress noted. Will continue to monitor.

## 2025-07-04 NOTE — SUBJECTIVE & OBJECTIVE
Interval History:  See hospital course      Objective:     Vitals:  Temp: 97.8 °F (36.6 °C)  Pulse: 71  Rhythm: normal sinus rhythm  BP: 125/75  MAP (mmHg): 94  ICP Mean (mmHg): 10 mmHg  Resp: 11  SpO2: 100 %  Oxygen Concentration (%): 40  Vent Mode: (S) Spont  Set Rate: 18 BPM  Vt Set: 470 mL  Pressure Support: (S) 10 cmH20  PEEP/CPAP: (S) 5 cmH20  Peak Airway Pressure: 15 cmH20  Mean Airway Pressure: 7.7 cmH20  Plateau Pressure: 0 cmH20    Temp  Min: 97.6 °F (36.4 °C)  Max: 98.9 °F (37.2 °C)  Pulse  Min: 52  Max: 71  BP  Min: 90/62  Max: 155/81  MAP (mmHg)  Min: 67  Max: 112  ICP Mean (mmHg)  Min: 9 mmHg  Max: 21 mmHg  Resp  Min: 10  Max: 30  SpO2  Min: 100 %  Max: 100 %  Oxygen Concentration (%)  Min: 40  Max: 50    07/03 0701 - 07/04 0700  In: 2629.1 [I.V.:972.4]  Out: 1341 [Urine:1175; Drains:166]            Physical Exam  Vitals and nursing note reviewed.   Constitutional:       General: He is not in acute distress.     Comments: Somnolent but arousable to pain. Soft restrains in place   HENT:      Head: Normocephalic and atraumatic.      Nose:      Comments: NGT secured in place     Mouth/Throat:      Mouth: Mucous membranes are moist.      Pharynx: Oropharynx is clear.   Eyes:      Extraocular Movements: Extraocular movements intact.      Pupils: Pupils are equal, round, and reactive to light.   Cardiovascular:      Rate and Rhythm: Normal rate.   Pulmonary:      Effort: Pulmonary effort is normal. No respiratory distress.      Comments: Equal breath sounds bilaterally.  Abdominal:      General: Abdomen is flat. There is no distension.      Palpations: Abdomen is soft.      Tenderness: There is no guarding or rebound.   Musculoskeletal:      Cervical back: Neck supple.      Right lower leg: No edema.      Left lower leg: No edema.   Skin:     General: Skin is warm and dry.      Capillary Refill: Capillary refill takes less than 2 seconds.   Neurological:      Comments: On sedation and comfortable appearing.  Arousable to pain, moves all extremities            Medications:  ContinuousdexmedeTOMIDine (Precedex) infusion (titrating), Last Rate: 0.5 mcg/kg/hr (07/04/25 0901)  fentanyl, Last Rate: 75 mcg/hr (07/04/25 0901)  propofoL, Last Rate: 20 mcg/kg/min (07/04/25 0901)    ScheduledceFAZolin (Ancef) IV (PEDS and ADULTS), 1 g, Q8H  dexAMETHasone (Decadron) IV (PEDS and ADULTS), 4 mg, Q6H  famotidine, 20 mg, Daily  folic acid, 1 mg, Daily  heparin (porcine), 5,000 Units, Q8H  multivitamin, 1 tablet, Daily  mupirocin, , BID  OLANZapine, 15 mg, BID  oxyCODONE, 5 mg, Q6H  PHENobarbitaL, 60 mg, Q8H  polyethylene glycol, 17 g, BID  senna-docusate, 2 tablet, BID  thiamine, 100 mg, Daily    PRNacetaminophen, 650 mg, Q6H PRN  bisacodyL, 10 mg, Daily PRN  calcium gluconate IVPB, 1 g, PRN  calcium gluconate IVPB, 2 g, PRN  calcium gluconate IVPB, 3 g, PRN  dextrose 50%, 12.5 g, PRN  fentanyl, 50 mcg, Q1H PRN  glucagon (human recombinant), 1 mg, PRN  hydrALAZINE, 10 mg, Q4H PRN  insulin aspart U-100, 0-5 Units, Q6H PRN  labetalol, 10 mg, Q4H PRN  magnesium sulfate 2 g IVPB, 2 g, PRN  magnesium sulfate 2 g IVPB, 2 g, PRN  ondansetron, 4 mg, Q4H PRN  potassium bicarbonate, 35 mEq, PRN  potassium bicarbonate, 50 mEq, PRN  potassium bicarbonate, 60 mEq, PRN  potassium, sodium phosphates, 2 packet, PRN  potassium, sodium phosphates, 2 packet, PRN  potassium, sodium phosphates, 2 packet, PRN  sodium chloride 0.9%, 10 mL, PRN      Today I personally reviewed pertinent medications, lines/drains/airways, imaging, cardiology results, laboratory results, microbiology results, notably:    Diet  Diet NPO  Diet NPO    Review of Systems

## 2025-07-05 LAB
ABSOLUTE EOSINOPHIL (OHS): 0.02 K/UL
ABSOLUTE MONOCYTE (OHS): 0.44 K/UL (ref 0.3–1)
ABSOLUTE NEUTROPHIL COUNT (OHS): 8.52 K/UL (ref 1.8–7.7)
ALBUMIN SERPL BCP-MCNC: 3.2 G/DL (ref 3.5–5.2)
ALP SERPL-CCNC: 73 UNIT/L (ref 40–150)
ALT SERPL W/O P-5'-P-CCNC: 18 UNIT/L (ref 10–44)
ANION GAP (OHS): 10 MMOL/L (ref 8–16)
AST SERPL-CCNC: 33 UNIT/L (ref 11–45)
BASOPHILS # BLD AUTO: 0.03 K/UL
BASOPHILS NFR BLD AUTO: 0.3 %
BILIRUB SERPL-MCNC: 0.4 MG/DL (ref 0.1–1)
BUN SERPL-MCNC: 40 MG/DL (ref 8–23)
CALCIUM SERPL-MCNC: 8.4 MG/DL (ref 8.7–10.5)
CHLORIDE SERPL-SCNC: 107 MMOL/L (ref 95–110)
CO2 SERPL-SCNC: 21 MMOL/L (ref 23–29)
CREAT SERPL-MCNC: 1.3 MG/DL (ref 0.5–1.4)
ERYTHROCYTE [DISTWIDTH] IN BLOOD BY AUTOMATED COUNT: 13.4 % (ref 11.5–14.5)
GFR SERPLBLD CREATININE-BSD FMLA CKD-EPI: 59 ML/MIN/1.73/M2
GLUCOSE SERPL-MCNC: 118 MG/DL (ref 70–110)
HCT VFR BLD AUTO: 38.6 % (ref 40–54)
HGB BLD-MCNC: 12.8 GM/DL (ref 14–18)
IMM GRANULOCYTES # BLD AUTO: 0.04 K/UL (ref 0–0.04)
IMM GRANULOCYTES NFR BLD AUTO: 0.4 % (ref 0–0.5)
LYMPHOCYTES # BLD AUTO: 0.66 K/UL (ref 1–4.8)
MAGNESIUM SERPL-MCNC: 2.4 MG/DL (ref 1.6–2.6)
MCH RBC QN AUTO: 30.2 PG (ref 27–31)
MCHC RBC AUTO-ENTMCNC: 33.2 G/DL (ref 32–36)
MCV RBC AUTO: 91 FL (ref 82–98)
NUCLEATED RBC (/100WBC) (OHS): 0 /100 WBC
OHS QRS DURATION: 106 MS
OHS QRS DURATION: 110 MS
OHS QTC CALCULATION: 433 MS
OHS QTC CALCULATION: 443 MS
PHOSPHATE SERPL-MCNC: 3.8 MG/DL (ref 2.7–4.5)
PLATELET # BLD AUTO: 226 K/UL (ref 150–450)
PMV BLD AUTO: 9.5 FL (ref 9.2–12.9)
POCT GLUCOSE: 112 MG/DL (ref 70–110)
POTASSIUM SERPL-SCNC: 3.9 MMOL/L (ref 3.5–5.1)
PROT SERPL-MCNC: 6.6 GM/DL (ref 6–8.4)
RBC # BLD AUTO: 4.24 M/UL (ref 4.6–6.2)
RELATIVE EOSINOPHIL (OHS): 0.2 %
RELATIVE LYMPHOCYTE (OHS): 6.8 % (ref 18–48)
RELATIVE MONOCYTE (OHS): 4.5 % (ref 4–15)
RELATIVE NEUTROPHIL (OHS): 87.8 % (ref 38–73)
SODIUM SERPL-SCNC: 138 MMOL/L (ref 136–145)
WBC # BLD AUTO: 9.71 K/UL (ref 3.9–12.7)

## 2025-07-05 PROCEDURE — 80053 COMPREHEN METABOLIC PANEL: CPT

## 2025-07-05 PROCEDURE — 85025 COMPLETE CBC W/AUTO DIFF WBC: CPT

## 2025-07-05 PROCEDURE — 83735 ASSAY OF MAGNESIUM: CPT

## 2025-07-05 PROCEDURE — 99499 UNLISTED E&M SERVICE: CPT | Mod: ,,,

## 2025-07-05 PROCEDURE — 25000003 PHARM REV CODE 250

## 2025-07-05 PROCEDURE — 84100 ASSAY OF PHOSPHORUS: CPT

## 2025-07-05 PROCEDURE — 94761 N-INVAS EAR/PLS OXIMETRY MLT: CPT

## 2025-07-05 PROCEDURE — 97162 PT EVAL MOD COMPLEX 30 MIN: CPT

## 2025-07-05 PROCEDURE — 99232 SBSQ HOSP IP/OBS MODERATE 35: CPT | Mod: FS,,, | Performed by: PSYCHIATRY & NEUROLOGY

## 2025-07-05 PROCEDURE — 99232 SBSQ HOSP IP/OBS MODERATE 35: CPT | Mod: GC,,, | Performed by: NEUROLOGICAL SURGERY

## 2025-07-05 PROCEDURE — 63600175 PHARM REV CODE 636 W HCPCS

## 2025-07-05 PROCEDURE — 20000000 HC ICU ROOM

## 2025-07-05 PROCEDURE — 63600175 PHARM REV CODE 636 W HCPCS: Performed by: PSYCHIATRY & NEUROLOGY

## 2025-07-05 PROCEDURE — 97530 THERAPEUTIC ACTIVITIES: CPT

## 2025-07-05 PROCEDURE — 51798 US URINE CAPACITY MEASURE: CPT

## 2025-07-05 PROCEDURE — 25000003 PHARM REV CODE 250: Performed by: PSYCHIATRY & NEUROLOGY

## 2025-07-05 RX ORDER — OLANZAPINE 5 MG/1
5 TABLET, FILM COATED ORAL DAILY
Status: DISCONTINUED | OUTPATIENT
Start: 2025-07-05 | End: 2025-07-05

## 2025-07-05 RX ORDER — OLANZAPINE 5 MG/1
5 TABLET, FILM COATED ORAL ONCE
Status: COMPLETED | OUTPATIENT
Start: 2025-07-05 | End: 2025-07-05

## 2025-07-05 RX ORDER — OLANZAPINE 5 MG/1
20 TABLET, FILM COATED ORAL 2 TIMES DAILY
Status: DISCONTINUED | OUTPATIENT
Start: 2025-07-05 | End: 2025-07-22

## 2025-07-05 RX ADMIN — DEXMEDETOMIDINE HYDROCHLORIDE 0.9 MCG/KG/HR: 4 INJECTION INTRAVENOUS at 05:07

## 2025-07-05 RX ADMIN — DEXMEDETOMIDINE HYDROCHLORIDE 0.5 MCG/KG/HR: 4 INJECTION INTRAVENOUS at 05:07

## 2025-07-05 RX ADMIN — PHENOBARBITAL 100 MG: 100 TABLET ORAL at 01:07

## 2025-07-05 RX ADMIN — DEXAMETHASONE SODIUM PHOSPHATE 4 MG: 4 INJECTION INTRA-ARTICULAR; INTRALESIONAL; INTRAMUSCULAR; INTRAVENOUS; SOFT TISSUE at 05:07

## 2025-07-05 RX ADMIN — POLYETHYLENE GLYCOL 3350 17 G: 17 POWDER, FOR SOLUTION ORAL at 08:07

## 2025-07-05 RX ADMIN — OXYCODONE HYDROCHLORIDE 5 MG: 5 TABLET ORAL at 11:07

## 2025-07-05 RX ADMIN — PHENOBARBITAL 100 MG: 100 TABLET ORAL at 09:07

## 2025-07-05 RX ADMIN — FAMOTIDINE 20 MG: 20 TABLET, FILM COATED ORAL at 08:07

## 2025-07-05 RX ADMIN — DEXAMETHASONE SODIUM PHOSPHATE 4 MG: 4 INJECTION INTRA-ARTICULAR; INTRALESIONAL; INTRAMUSCULAR; INTRAVENOUS; SOFT TISSUE at 11:07

## 2025-07-05 RX ADMIN — SENNOSIDES AND DOCUSATE SODIUM 2 TABLET: 50; 8.6 TABLET ORAL at 09:07

## 2025-07-05 RX ADMIN — DEXAMETHASONE SODIUM PHOSPHATE 4 MG: 4 INJECTION INTRA-ARTICULAR; INTRALESIONAL; INTRAMUSCULAR; INTRAVENOUS; SOFT TISSUE at 12:07

## 2025-07-05 RX ADMIN — OLANZAPINE 15 MG: 5 TABLET, FILM COATED ORAL at 08:07

## 2025-07-05 RX ADMIN — PHENOBARBITAL 100 MG: 100 TABLET ORAL at 05:07

## 2025-07-05 RX ADMIN — HEPARIN SODIUM 5000 UNITS: 5000 INJECTION INTRAVENOUS; SUBCUTANEOUS at 01:07

## 2025-07-05 RX ADMIN — CEFAZOLIN 1 G: 330 INJECTION, POWDER, FOR SOLUTION INTRAMUSCULAR; INTRAVENOUS at 08:07

## 2025-07-05 RX ADMIN — POLYETHYLENE GLYCOL 3350 17 G: 17 POWDER, FOR SOLUTION ORAL at 09:07

## 2025-07-05 RX ADMIN — MUPIROCIN: 20 OINTMENT TOPICAL at 08:07

## 2025-07-05 RX ADMIN — HEPARIN SODIUM 5000 UNITS: 5000 INJECTION INTRAVENOUS; SUBCUTANEOUS at 09:07

## 2025-07-05 RX ADMIN — OLANZAPINE 5 MG: 5 TABLET, FILM COATED ORAL at 11:07

## 2025-07-05 RX ADMIN — FOLIC ACID 1 MG: 1 TABLET ORAL at 08:07

## 2025-07-05 RX ADMIN — MUPIROCIN: 20 OINTMENT TOPICAL at 09:07

## 2025-07-05 RX ADMIN — HEPARIN SODIUM 5000 UNITS: 5000 INJECTION INTRAVENOUS; SUBCUTANEOUS at 05:07

## 2025-07-05 RX ADMIN — THERA TABS 1 TABLET: TAB at 08:07

## 2025-07-05 RX ADMIN — SENNOSIDES AND DOCUSATE SODIUM 2 TABLET: 50; 8.6 TABLET ORAL at 08:07

## 2025-07-05 RX ADMIN — CEFAZOLIN 1 G: 330 INJECTION, POWDER, FOR SOLUTION INTRAMUSCULAR; INTRAVENOUS at 05:07

## 2025-07-05 RX ADMIN — DEXMEDETOMIDINE HYDROCHLORIDE 1.4 MCG/KG/HR: 4 INJECTION INTRAVENOUS at 01:07

## 2025-07-05 RX ADMIN — Medication 100 MG: at 08:07

## 2025-07-05 RX ADMIN — CEFAZOLIN 1 G: 330 INJECTION, POWDER, FOR SOLUTION INTRAMUSCULAR; INTRAVENOUS at 12:07

## 2025-07-05 RX ADMIN — OLANZAPINE 20 MG: 5 TABLET, FILM COATED ORAL at 09:07

## 2025-07-05 NOTE — PLAN OF CARE
"[facility] Care Plan  POC reviewed with [place] Brown and family at 1400. Patient verbalized understanding. Questions and concerns addressed. No acute events today. Pt progressing toward goals. Will continue to monitor. See below and flowsheets for full assessment and VS info.       -EVD open @ 20  -precedex gtt continued  -tube feeds at goal  -Straight cath x1  -b/l wrist restraints and mitts continued  -plan for bx Monday 7/7 per Neurosurgery          Is this a stroke patient? no    Neuro:  Goldie Coma Scale  Best Eye Response: 3-->(E3) to speech  Best Motor Response: 5-->(M5) localizes pain  Best Verbal Response: 4-->(V4) confused  Goldie Coma Scale Score: 12  Assessment Qualifiers: No eye obstruction present  Pupil PERRLA: yes     24 hr Temp:  [96.6 °F (35.9 °C)-97.9 °F (36.6 °C)]     CV:   Rhythm: sinus bradycardia  BP goals:   SBP < 160  MAP > 65    Resp:      Vent Mode: (S) Spont  Set Rate: 18 BPM  Oxygen Concentration (%): 40  Vt Set: 470 mL  PEEP/CPAP: (S) 5 cmH20  Pressure Support: (S) 10 cmH20    Plan: N/A    GI/:     Diet/Nutrition Received: NPO, tube feeding  Last Bowel Movement: 07/01/25  Voiding Characteristics: external catheter    Intake/Output Summary (Last 24 hours) at 7/5/2025 1712  Last data filed at 7/5/2025 1601  Gross per 24 hour   Intake 759.05 ml   Output 1063 ml   Net -303.95 ml          Labs/Accuchecks:  Recent Labs   Lab 07/05/25  0025   WBC 9.71   RBC 4.24*   HGB 12.8*   HCT 38.6*         Recent Labs   Lab 07/05/25  0025      K 3.9   CO2 21*      BUN 40*   CREATININE 1.3   ALKPHOS 73   ALT 18   AST 33   BILITOT 0.4      Recent Labs   Lab 07/02/25  0324   PROTIME 11.7   INR 1.1   APTT 26.0    No results for input(s): "CPK", "CPKMB", "TROPONINI", "MB" in the last 168 hours.    Electrolytes: N/A - electrolytes WDL  Accuchecks: none    Gtts:   dexmedeTOMIDine (Precedex) infusion (titrating)  0-1.4 mcg/kg/hr Intravenous Continuous 25.06 mL/hr at 07/05/25 1601 1.3 mcg/kg/hr " at 25 1601       LDA/Wounds:    Davi Risk Assessment  Sensory Perception: 3-->slightly limited  Moisture: 3-->occasionally moist  Activity: 1-->bedfast  Mobility: 3-->slightly limited  Nutrition: 2-->probably inadequate  Friction and Shear: 2-->potential problem  Davi Score: 14    Is your davi score 12 or less? no            Restraints:   Restraint Order  Length of Order: Order good for next 24 hours or when removed.  Date that the current order will : 25  Time that the current order will :   Order Upon Application: Yes    [facility]       Problem: Adult Inpatient Plan of Care  Goal: Plan of Care Review  Outcome: Progressing  Goal: Patient-Specific Goal (Individualized)  Outcome: Progressing  Goal: Absence of Hospital-Acquired Illness or Injury  Outcome: Progressing  Goal: Optimal Comfort and Wellbeing  Outcome: Progressing  Goal: Readiness for Transition of Care  Outcome: Progressing     Problem: Infection  Goal: Absence of Infection Signs and Symptoms  Outcome: Progressing     Problem: Stroke, Intracerebral Hemorrhage  Goal: Optimal Coping  Outcome: Progressing  Goal: Effective Bowel Elimination  Outcome: Progressing  Goal: Optimal Cerebral Tissue Perfusion  Outcome: Progressing  Goal: Optimal Cognitive Function  Outcome: Progressing  Goal: Effective Communication Skills  Outcome: Progressing  Goal: Optimal Functional Ability  Outcome: Progressing  Goal: Optimal Nutrition Intake  Outcome: Progressing  Goal: Optimal Pain Control and Function  Outcome: Progressing  Goal: Effective Oxygenation and Ventilation  Outcome: Progressing  Goal: Improved Sensorimotor Function  Outcome: Progressing  Goal: Safe and Effective Swallow  Outcome: Progressing  Goal: Effective Urinary Elimination  Outcome: Progressing     Problem: Skin Injury Risk Increased  Goal: Skin Health and Integrity  Outcome: Progressing     Problem: Delirium  Goal: Optimal Coping  Outcome: Progressing  Goal: Improved Behavioral  Control  Outcome: Progressing  Goal: Improved Attention and Thought Clarity  Outcome: Progressing  Goal: Improved Sleep  Outcome: Progressing     Problem: Fall Injury Risk  Goal: Absence of Fall and Fall-Related Injury  Outcome: Progressing     Problem: Restraint, Nonviolent  Goal: Absence of Harm or Injury  Outcome: Progressing     Problem: Mechanical Ventilation Invasive  Goal: Effective Communication  Outcome: Progressing  Goal: Optimal Device Function  Outcome: Progressing  Goal: Mechanical Ventilation Liberation  Outcome: Progressing  Goal: Optimal Nutrition Delivery  Outcome: Progressing  Goal: Absence of Device-Related Skin and Tissue Injury  Outcome: Progressing  Goal: Absence of Ventilator-Induced Lung Injury  Outcome: Progressing     Problem: Artificial Airway  Goal: Effective Communication  Outcome: Progressing  Goal: Optimal Device Function  Outcome: Progressing  Goal: Absence of Device-Related Skin or Tissue Injury  Outcome: Progressing

## 2025-07-05 NOTE — PROGRESS NOTES
[facility] - Neuro Critical Care  Neurocritical Care  Progress Note    Admit Date: 7/1/2025  Service Date: 07/05/2025  Length of Stay: 4    Subjective:     Chief Complaint: Ataxia    History of Present Illness: 69 y/o M presenting from [facility] for generalized weakness for about a week now. History obtained from rehab nurse. She reported that he got to their facility about a month ago and, at baseline, walks with a cane and is alert and oriented. His nurse noticed that he has seemed weaker over the past week and over the past 3 days has had increased balance disturbance and gait issues. They also noticed that he has been speaking more softly in his speech is harder to understand. He has not had any infectious symptoms. He fell yesterday, unsure if he hit his head. CT head without contrast revealed multiple lesions of hypodensity with a hyperdense rim, notably in the left temporal lobe and right cerebellum. There were additional small hyperdensities in the right frontoparietal and left parasagittal parietal lobe. There is mass effect and partial effacement of the 4th ventricle secondary to the cerebellar lesion with developing hydrocephalus without MLS. CT of the chest, abdomen, and pelvis with IV contrast showed RUL mass with mediastinal adenopathy. Labs revealed Hep C and treponema antibodies were positive. Hep C PCR pending. Penicillin G was administered. A sepsis workup was completed and antibiotics were initiated. An MRI brain with and without contrast was ordered, but patient was unable to complete due to persistent movement after sedatives were administered. Prior to MRI, he was alert but drowsy. He was able to tell me his name, but told me he was 53 years old, the year was 2003, and he did not know the current place or reason for being here. He had generalized weakness but was slightly more weak on the right side. He followed simple commands, but did not attempt to follow more complex commands. He is admitted  to NCC with NSGY consult.    Hospital Course: 7/2/2025: MRI with significant motion artifact. Required presidex overnight secondary to agitation  7/3/2025: MRI favors neoplastic brain lesions. CSF studies pending. Started on Zyprexa and phenobarbital for agitation, R subclavian CVC placed for central access  7/4/2025: NAEON. Extubated without complications.  07/05/2025 NAEON. EVD @20. Adjusted oxycodone frequency q8. Increased zyprexa 20 BID. Precedex for agitation. Plan for biopsy Monday w/ NSGY.    Interval History: see hospital course    Objective:     Vitals:  Temp: 97.9 °F (36.6 °C)  Pulse: (!) 57  Rhythm: normal sinus rhythm  BP: 125/73  MAP (mmHg): 90  ICP Mean (mmHg): 14 mmHg  Resp: 19  SpO2: 100 %    Temp  Min: 96.6 °F (35.9 °C)  Max: 97.9 °F (36.6 °C)  Pulse  Min: 47  Max: 62  BP  Min: 103/62  Max: 180/99  MAP (mmHg)  Min: 76  Max: 124  ICP Mean (mmHg)  Min: 4 mmHg  Max: 17 mmHg  Resp  Min: 9  Max: 35  SpO2  Min: 91 %  Max: 100 %    07/04 0701 - 07/05 0700  In: 669.4 [I.V.:399.4]  Out: 1478 [Urine:1400; Drains:78]            Physical Exam  Vitals and nursing note reviewed.   Constitutional:       General: He is not in acute distress.     Comments: Somnolent but arousable to pain. Soft restrains in place   HENT:      Head: Normocephalic and atraumatic.      Nose:      Comments: NGT secured in place     Mouth/Throat:      Mouth: Mucous membranes are moist.      Pharynx: Oropharynx is clear.   Eyes:      Extraocular Movements: Extraocular movements intact.      Pupils: Pupils are equal, round, and reactive to light.   Cardiovascular:      Rate and Rhythm: Normal rate.   Pulmonary:      Effort: Pulmonary effort is normal. No respiratory distress.      Comments: Equal breath sounds bilaterally.  Abdominal:      General: Abdomen is flat. There is no distension.      Palpations: Abdomen is soft.      Tenderness: There is no guarding or rebound.   Musculoskeletal:      Cervical back: Neck supple.      Right lower  leg: No edema.      Left lower leg: No edema.   Skin:     General: Skin is warm and dry.      Capillary Refill: Capillary refill takes less than 2 seconds.   Neurological:      Comments:     E2VTM5  On prop/fent; comfortable appearing. Arousable to pain, moves all extremities.  Localizing x4     EVD patent with good waveform            Medications:  ContinuousdexmedeTOMIDine (Precedex) infusion (titrating), Last Rate: 1 mcg/kg/hr (07/05/25 1201)    ScheduledceFAZolin (Ancef) IV (PEDS and ADULTS), 1 g, Q8H  dexAMETHasone (Decadron) IV (PEDS and ADULTS), 4 mg, Q6H  famotidine, 20 mg, Daily  folic acid, 1 mg, Daily  heparin (porcine), 5,000 Units, Q8H  multivitamin, 1 tablet, Daily  mupirocin, , BID  OLANZapine, 20 mg, BID  oxyCODONE, 5 mg, Q6H  PHENobarbitaL, 100 mg, Q8H  polyethylene glycol, 17 g, BID  senna-docusate, 2 tablet, BID  thiamine, 100 mg, Daily    PRNacetaminophen, 650 mg, Q6H PRN  bisacodyL, 10 mg, Daily PRN  dextrose 50%, 12.5 g, PRN  glucagon (human recombinant), 1 mg, PRN  hydrALAZINE, 10 mg, Q4H PRN  insulin aspart U-100, 0-5 Units, Q6H PRN  labetalol, 10 mg, Q4H PRN  ondansetron, 4 mg, Q4H PRN  potassium bicarbonate, 35 mEq, PRN  potassium bicarbonate, 50 mEq, PRN  potassium bicarbonate, 60 mEq, PRN  potassium, sodium phosphates, 2 packet, PRN  potassium, sodium phosphates, 2 packet, PRN  potassium, sodium phosphates, 2 packet, PRN  sodium chloride 0.9%, 10 mL, PRN      Today I personally reviewed pertinent medications, lines/drains/airways, imaging, cardiology results, laboratory results, microbiology results, notably:    Diet  Diet NPO  Diet NPO    CTH  CSF cultures  CMP, CBC  Mag, Phos   Assessment/Plan:     Neuro  Brain lesion  70 y/o M brought to ED from [facility]ox (hx of heroin and ETOH) for progressive weakness, gait disturbance, confusion, and decreased volume when speaking. CT head without contrast revealed multiple lesions of hypodensity with a hyperdense rim, notably in the left temporal  lobe and right cerebellum. There were additional small hyperdensities in the right frontoparietal and left parasagittal parietal lobe. There is mass effect and partial effacement of the 4th ventricle secondary to the cerebellar lesion with developing hydrocephalus without MLS. CT of the chest, abdomen, and pelvis with IV contrast showed RUL mass with mediastinal adenopathy. Labs revealed Hep C and treponema antibodies were positive. Hep C PCR pending. Penicillin G was administered. A sepsis workup was completed and antibiotics were initiated. Intubated to get MRI. S/p EVD. Extubated 7/4/25 without difficulty.    Admit to [facility]  Q1h neuro checks, I&Os, and vitals   Daily CBC, CMP, Mag, Phos  NSGY following; plan for OR Monday  Steroids  CSF studies pending  Received Penicillin G in ED  Zyprexa and phenobarbital for agitation/EtOH withdrawal  Tube feeds  Folate/thiamine/MV  SBP <160   Prn labetalol, hydralazine   PRN Zofran 4mg q4h  SCDs; SQH  PT/OT/SLP    AMS (altered mental status)  See primary problem    Psychiatric  Polysubstance abuse  Was brought in from [place], where he has been for the past month  Educate on cessation when appropriate  On phenobarbital for agitation, no signs of EtOH withdrawl  Home Oxycodone to avoid withdrawals    Pulmonary  Mass of upper lobe of right lung  Noted on CXR and CT chest with IV contrast  Saturating well on RA; No PTX on CT-chest  No lung consolidations or obstructive process  No known history of cancer    Other  Hepatitis C antibody positive  PCR pending  No known history of Hep C per patient    Gait disturbance  See primary problem          The patient is being Prophylaxed for:  Venous Thromboembolism with: Mechanical or Chemical  Stress Ulcer with: H2B  Ventilator Pneumonia with: not applicable    Activity Orders            Turn patient starting at 07/03 1124    Elevate HOB Elevate HOB 30-45 degrees during feeding unless contraindicated starting at 07/03 0802    Elevate HOB  starting at 07/01 2040    Diet NPO: NPO starting at 07/01 2040    Straight Cath starting at 07/01 1925          Full Code    Critical care time spent on the evaluation and treatment of severe organ dysfunction, review of pertinent labs and imaging studies, discussions with consulting providers and discussions with patient/family: 35 minutes.    Marjorie York PA-C  Neurocritical Care  [facility] - Neuro Critical Care

## 2025-07-05 NOTE — PLAN OF CARE
Problem: Physical Therapy  Goal: Physical Therapy Goal  Description: Goals to be met by: 2025     Patient will increase functional independence with mobility by performin. Supine to sit with Stand-by Assistance  2. Sit to supine with Stand-by Assistance  3. Sit to stand transfer with Stand-by Assistance  4. Bed to chair transfer with Stand-by Assistance using LRAD  5. Gait  x 100 feet with Stand-by Assistance using LRAD.   6. Lower extremity exercise program x15 reps per handout, with supervision    Outcome: Progressing

## 2025-07-05 NOTE — EICU
Virtual ICU Quality Rounds    Admit Date: 7/1/2025  Hospital Day: 4    ICU Day: 3d 15h    24H Vital Sign Range:  Temp:  [96.6 °F (35.9 °C)-97.9 °F (36.6 °C)]   Pulse:  [47-62]   Resp:  [9-36]   BP: (103-180)/()   SpO2:  [91 %-100 %]     VICU Surveillance Screening    Daily review of  line necessity(optional): Central line(s) in place    Central line type (optional): Triple lumen catheter    Central line indications : Poor IV access        LDA reconciliation : Yes

## 2025-07-05 NOTE — PT/OT/SLP PROGRESS
Speech Language Pathology      Goran Carlos  MRN: [id number]    Bedside evaluation orders received and reviewed. Patient not seen today secondary to RN hold. RN reports patient on precedex for agitation, does not follow 1-step commands, and presents with decreased levels of wakefulness/alertness. Patient not appropriate for a bedside swallow evaluation at this time. SLP will follow-up.

## 2025-07-05 NOTE — EICU
Intervention Initiated From:  COR / DIAMANTEU    Alex intervened regarding:  Rounding (Video assessment)    VICU Night Rounds Checklist  24H Vital Sign Range:  Temp:  [97.6 °F (36.4 °C)-97.9 °F (36.6 °C)]   Pulse:  [52-81]   Resp:  [7-30]   BP: ()/(50-99)   SpO2:  [98 %-100 %]     Video rounds and LDA reconciliation

## 2025-07-05 NOTE — PT/OT/SLP EVAL
Physical Therapy Evaluation    Patient Name:  Goran Carlos   MRN:  [id number]    Recommendations:     Discharge Recommendations: High Intensity Therapy   Discharge Equipment Recommendations: to be determined by next level of care   Barriers to discharge: None    Assessment:     Goran Carlos is a 69 y.o. male admitted with a medical diagnosis of Ataxia.  He presents with the following impairments/functional limitations: weakness, impaired endurance, impaired self care skills, impaired functional mobility, gait instability, impaired balance, impaired cognition, decreased safety awareness, decreased coordination, decreased upper extremity function, decreased lower extremity function Pt. cooperative and tolerated treatment fairly well, but easily agitated and combative at times. Pt. progressing with mobility with Mod assistance.    Rehab Prognosis: Good; patient would benefit from acute skilled PT services to address these deficits and reach maximum level of function.    Recent Surgery: Procedure(s) (LRB):  CRANIOTOMY, SUBOCCIPITAL (Right)      Plan:     During this hospitalization, patient to be seen 4 x/week to address the identified rehab impairments via gait training, therapeutic activities, therapeutic exercises, neuromuscular re-education and progress toward the following goals:    Plan of Care Expires:  08/04/25    Subjective     Chief Complaint: pt. did not specify  Patient/Family Comments/goals: wanting to go home, agreeable to PT  Pain/Comfort:  Pain Rating 1: 0/10  Pain Rating Post-Intervention 1: 0/10    Patients cultural, spiritual, Episcopal conflicts given the current situation: no    Living Environment:  Per chart, pt. Lives alone in [facility]  Prior to admission, patients level of function was indep.  Equipment used at home: none.  Upon discharge, patient may need to have assistance.    Objective:     Communicated with nursing prior to session.  Patient found supine with bed alarm, Condom Catheter, NG  tube, peripheral IV, pulse ox (continuous), telemetry, blood pressure cuff, restraints, external ventricular drain (lap belt, mitts)  upon PT entry to room.    General Precautions: Standard, fall  Orthopedic Precautions:N/A   Braces: N/A  Respiratory Status: Room air    Exams:  RLE ROM: WFL  RLE Strength: WFL  LLE ROM: WFL  LLE Strength: WFL    Functional Mobility:  Bed Mobility:     Rolling Right: minimum assistance  Scooting: minimum assistance  Supine to Sit: moderate assistance  Sit to Supine: moderate assistance  Transfers:     Sit to Stand:  moderate assistance with hand-held assist  Balance: fair sitting and poor standing      AM-PAC 6 CLICK MOBILITY  Total Score:13       Treatment & Education:  Discussed therapy needs, goals, and POC. Pt. Performed sitting balance/tolerance/postural control on EOB  with CGA and x2 trials of static standing at bedside with Mod A    Patient left supine with all lines intact, call button in reach, restraints reapplied at end of session, and nursing present.    GOALS:   Multidisciplinary Problems       Physical Therapy Goals          Problem: Physical Therapy    Goal Priority Disciplines Outcome Interventions   Physical Therapy Goal     PT, PT/OT Progressing    Description: Goals to be met by: 2025     Patient will increase functional independence with mobility by performin. Supine to sit with Stand-by Assistance  2. Sit to supine with Stand-by Assistance  3. Sit to stand transfer with Stand-by Assistance  4. Bed to chair transfer with Stand-by Assistance using LRAD  5. Gait  x 100 feet with Stand-by Assistance using LRAD.   6. Lower extremity exercise program x15 reps per handout, with supervision                         DME Justifications:  No DME recommended requiring DME justifications    History:     Past Medical History:   Diagnosis Date    ETOH abuse     Gait disturbance     Heroin abuse        History reviewed. No pertinent surgical history.    Time Tracking:      PT Received On: 07/05/25  PT Start Time: 0908     PT Stop Time: 0928  PT Total Time (min): 20 min     Billable Minutes: Evaluation 10 and Therapeutic Activity 10      07/05/2025

## 2025-07-05 NOTE — PLAN OF CARE
"[facility] Care Plan    POC reviewed with High Amana Brown and family at 0300. Patient verbalized understanding. Questions and concerns addressed. No acute events today. Pt progressing toward goals. Will continue to monitor. See below and flowsheets for full assessment and VS info.       - Precedex gtt @0.5 for agitation   - EVD open @ 20   - ICPs 7-17, Output 0-8   - PRN Hydralazine x1  - Scheduled Oxy 5 held for bradycardia   - Straight cath x1, retaining urine   - Plan for biopsy Monday        Is this a stroke patient? no    Neuro:  Goldie Coma Scale  Best Eye Response: 4-->(E4) spontaneous  Best Motor Response: 6-->(M6) obeys commands  Best Verbal Response: 4-->(V4) confused  Ogldie Coma Scale Score: 14  Assessment Qualifiers: No eye obstruction present  Pupil PERRLA: yes     24 hr Temp:  [96.6 °F (35.9 °C)-97.9 °F (36.6 °C)]     CV:   Rhythm: sinus bradycardia  BP goals:   SBP < 160  MAP > 65    Resp:      Vent Mode: (S) Spont  Set Rate: 18 BPM  Oxygen Concentration (%): 40  Vt Set: 470 mL  PEEP/CPAP: (S) 5 cmH20  Pressure Support: (S) 10 cmH20    Plan: N/A    GI/:     Diet/Nutrition Received: NPO, tube feeding  Last Bowel Movement: 07/01/25  Voiding Characteristics: external catheter    Intake/Output Summary (Last 24 hours) at 7/5/2025 0659  Last data filed at 7/5/2025 0601  Gross per 24 hour   Intake 669.42 ml   Output 1478 ml   Net -808.58 ml          Labs/Accuchecks:  Recent Labs   Lab 07/05/25  0025   WBC 9.71   RBC 4.24*   HGB 12.8*   HCT 38.6*         Recent Labs   Lab 07/05/25  0025      K 3.9   CO2 21*      BUN 40*   CREATININE 1.3   ALKPHOS 73   ALT 18   AST 33   BILITOT 0.4      Recent Labs   Lab 07/02/25  0324   PROTIME 11.7   INR 1.1   APTT 26.0    No results for input(s): "CPK", "CPKMB", "TROPONINI", "MB" in the last 168 hours.    Electrolytes: N/A - electrolytes WDL  Accuchecks: Q6H    Gtts:   dexmedeTOMIDine (Precedex) infusion (titrating)  0-1.4 mcg/kg/hr Intravenous Continuous " 9.64 mL/hr at 25 0601 0.5 mcg/kg/hr at 25 0601       LDA/Wounds:    Davi Risk Assessment  Sensory Perception: 3-->slightly limited  Moisture: 3-->occasionally moist  Activity: 1-->bedfast  Mobility: 3-->slightly limited  Nutrition: 2-->probably inadequate  Friction and Shear: 2-->potential problem  Davi Score: 14  Is your davi score 12 or less? no          Restraints:   Restraint Order  Length of Order: Order good for next 24 hours or when removed.  Date that the current order will : 25  Time that the current order will :   Order Upon Application: Yes    [facility]

## 2025-07-05 NOTE — SUBJECTIVE & OBJECTIVE
Interval History: see hospital course    Objective:     Vitals:  Temp: 97.9 °F (36.6 °C)  Pulse: (!) 57  Rhythm: normal sinus rhythm  BP: 125/73  MAP (mmHg): 90  ICP Mean (mmHg): 14 mmHg  Resp: 19  SpO2: 100 %    Temp  Min: 96.6 °F (35.9 °C)  Max: 97.9 °F (36.6 °C)  Pulse  Min: 47  Max: 62  BP  Min: 103/62  Max: 180/99  MAP (mmHg)  Min: 76  Max: 124  ICP Mean (mmHg)  Min: 4 mmHg  Max: 17 mmHg  Resp  Min: 9  Max: 35  SpO2  Min: 91 %  Max: 100 %    07/04 0701 - 07/05 0700  In: 669.4 [I.V.:399.4]  Out: 1478 [Urine:1400; Drains:78]            Physical Exam  Vitals and nursing note reviewed.   Constitutional:       General: He is not in acute distress.     Comments: Somnolent but arousable to pain. Soft restrains in place   HENT:      Head: Normocephalic and atraumatic.      Nose:      Comments: NGT secured in place     Mouth/Throat:      Mouth: Mucous membranes are moist.      Pharynx: Oropharynx is clear.   Eyes:      Extraocular Movements: Extraocular movements intact.      Pupils: Pupils are equal, round, and reactive to light.   Cardiovascular:      Rate and Rhythm: Normal rate.   Pulmonary:      Effort: Pulmonary effort is normal. No respiratory distress.      Comments: Equal breath sounds bilaterally.  Abdominal:      General: Abdomen is flat. There is no distension.      Palpations: Abdomen is soft.      Tenderness: There is no guarding or rebound.   Musculoskeletal:      Cervical back: Neck supple.      Right lower leg: No edema.      Left lower leg: No edema.   Skin:     General: Skin is warm and dry.      Capillary Refill: Capillary refill takes less than 2 seconds.   Neurological:      Comments:     E2VTM5  On prop/fent; comfortable appearing. Arousable to pain, moves all extremities.  Localizing x4     EVD patent with good waveform            Medications:  ContinuousdexmedeTOMIDine (Precedex) infusion (titrating), Last Rate: 1 mcg/kg/hr (07/05/25 1201)    ScheduledceFAZolin (Ancef) IV (PEDS and ADULTS), 1 g,  Q8H  dexAMETHasone (Decadron) IV (PEDS and ADULTS), 4 mg, Q6H  famotidine, 20 mg, Daily  folic acid, 1 mg, Daily  heparin (porcine), 5,000 Units, Q8H  multivitamin, 1 tablet, Daily  mupirocin, , BID  OLANZapine, 20 mg, BID  oxyCODONE, 5 mg, Q6H  PHENobarbitaL, 100 mg, Q8H  polyethylene glycol, 17 g, BID  senna-docusate, 2 tablet, BID  thiamine, 100 mg, Daily    PRNacetaminophen, 650 mg, Q6H PRN  bisacodyL, 10 mg, Daily PRN  dextrose 50%, 12.5 g, PRN  glucagon (human recombinant), 1 mg, PRN  hydrALAZINE, 10 mg, Q4H PRN  insulin aspart U-100, 0-5 Units, Q6H PRN  labetalol, 10 mg, Q4H PRN  ondansetron, 4 mg, Q4H PRN  potassium bicarbonate, 35 mEq, PRN  potassium bicarbonate, 50 mEq, PRN  potassium bicarbonate, 60 mEq, PRN  potassium, sodium phosphates, 2 packet, PRN  potassium, sodium phosphates, 2 packet, PRN  potassium, sodium phosphates, 2 packet, PRN  sodium chloride 0.9%, 10 mL, PRN      Today I personally reviewed pertinent medications, lines/drains/airways, imaging, cardiology results, laboratory results, microbiology results, notably:    Diet  Diet NPO  Diet NPO    CTH  CSF cultures  CMP, CBC  Mag, Phos

## 2025-07-05 NOTE — ASSESSMENT & PLAN NOTE
68 y/o M brought to ED from [facility] Detox (hx of heroin and ETOH) for progressive weakness, gait disturbance, confusion, and decreased volume when speaking. CT head without contrast revealed multiple lesions of hypodensity with a hyperdense rim, notably in the left temporal lobe and right cerebellum. There were additional small hyperdensities in the right frontoparietal and left parasagittal parietal lobe. There is mass effect and partial effacement of the 4th ventricle secondary to the cerebellar lesion with developing hydrocephalus without MLS. CT of the chest, abdomen, and pelvis with IV contrast showed RUL mass with mediastinal adenopathy. Labs revealed Hep C and treponema antibodies were positive. Hep C PCR pending. Penicillin G was administered. A sepsis workup was completed and antibiotics were initiated. Intubated to get MRI. S/p EVD. Extubated 7/4/25 without difficulty.    Admit to [facility]  Q1h neuro checks, I&Os, and vitals   Daily CBC, CMP, Mag, Phos  NSGY following; plan for OR Monday  Steroids  CSF studies pending  Received Penicillin G in ED  Zyprexa and phenobarbital for agitation/EtOH withdrawal  Tube feeds  Folate/thiamine/MV  SBP <160   Prn labetalol, hydralazine   PRN Zofran 4mg q4h  SCDs; SQH  PT/OT/SLP

## 2025-07-06 LAB
ABSOLUTE EOSINOPHIL (OHS): 0.03 K/UL
ABSOLUTE MONOCYTE (OHS): 0.75 K/UL (ref 0.3–1)
ABSOLUTE NEUTROPHIL COUNT (OHS): 7.35 K/UL (ref 1.8–7.7)
ALBUMIN SERPL BCP-MCNC: 3.3 G/DL (ref 3.5–5.2)
ALP SERPL-CCNC: 69 UNIT/L (ref 40–150)
ALT SERPL W/O P-5'-P-CCNC: 18 UNIT/L (ref 10–44)
ANION GAP (OHS): 12 MMOL/L (ref 8–16)
APTT PPP: 24.9 SECONDS (ref 21–32)
AST SERPL-CCNC: 35 UNIT/L (ref 11–45)
BASOPHILS # BLD AUTO: 0.03 K/UL
BASOPHILS NFR BLD AUTO: 0.3 %
BILIRUB SERPL-MCNC: 0.3 MG/DL (ref 0.1–1)
BUN SERPL-MCNC: 50 MG/DL (ref 8–23)
CALCIUM SERPL-MCNC: 8.7 MG/DL (ref 8.7–10.5)
CHLORIDE SERPL-SCNC: 111 MMOL/L (ref 95–110)
CO2 SERPL-SCNC: 21 MMOL/L (ref 23–29)
CREAT SERPL-MCNC: 1.3 MG/DL (ref 0.5–1.4)
ERYTHROCYTE [DISTWIDTH] IN BLOOD BY AUTOMATED COUNT: 13.6 % (ref 11.5–14.5)
GFR SERPLBLD CREATININE-BSD FMLA CKD-EPI: 59 ML/MIN/1.73/M2
GLUCOSE SERPL-MCNC: 104 MG/DL (ref 70–110)
HCT VFR BLD AUTO: 38.3 % (ref 40–54)
HGB BLD-MCNC: 13.1 GM/DL (ref 14–18)
IMM GRANULOCYTES # BLD AUTO: 0.03 K/UL (ref 0–0.04)
IMM GRANULOCYTES NFR BLD AUTO: 0.3 % (ref 0–0.5)
INDIRECT COOMBS: NORMAL
INR PPP: 1.1 (ref 0.8–1.2)
LYMPHOCYTES # BLD AUTO: 0.75 K/UL (ref 1–4.8)
MAGNESIUM SERPL-MCNC: 2.5 MG/DL (ref 1.6–2.6)
MCH RBC QN AUTO: 30.8 PG (ref 27–31)
MCHC RBC AUTO-ENTMCNC: 34.2 G/DL (ref 32–36)
MCV RBC AUTO: 90 FL (ref 82–98)
NUCLEATED RBC (/100WBC) (OHS): 0 /100 WBC
OHS QRS DURATION: 102 MS
OHS QTC CALCULATION: 422 MS
PHOSPHATE SERPL-MCNC: 3.7 MG/DL (ref 2.7–4.5)
PLATELET # BLD AUTO: 248 K/UL (ref 150–450)
PMV BLD AUTO: 10 FL (ref 9.2–12.9)
POCT GLUCOSE: 110 MG/DL (ref 70–110)
POCT GLUCOSE: 117 MG/DL (ref 70–110)
POCT GLUCOSE: 128 MG/DL (ref 70–110)
POTASSIUM SERPL-SCNC: 4.1 MMOL/L (ref 3.5–5.1)
PROT SERPL-MCNC: 6.8 GM/DL (ref 6–8.4)
PROTHROMBIN TIME: 11.7 SECONDS (ref 9–12.5)
RBC # BLD AUTO: 4.25 M/UL (ref 4.6–6.2)
RELATIVE EOSINOPHIL (OHS): 0.3 %
RELATIVE LYMPHOCYTE (OHS): 8.4 % (ref 18–48)
RELATIVE MONOCYTE (OHS): 8.4 % (ref 4–15)
RELATIVE NEUTROPHIL (OHS): 82.3 % (ref 38–73)
RH BLD: NORMAL
SODIUM SERPL-SCNC: 144 MMOL/L (ref 136–145)
SPECIMEN OUTDATE: NORMAL
WBC # BLD AUTO: 8.94 K/UL (ref 3.9–12.7)

## 2025-07-06 PROCEDURE — 25000003 PHARM REV CODE 250: Performed by: PSYCHIATRY & NEUROLOGY

## 2025-07-06 PROCEDURE — 85730 THROMBOPLASTIN TIME PARTIAL: CPT

## 2025-07-06 PROCEDURE — 20000000 HC ICU ROOM

## 2025-07-06 PROCEDURE — 25000003 PHARM REV CODE 250

## 2025-07-06 PROCEDURE — 99024 POSTOP FOLLOW-UP VISIT: CPT | Mod: GC,,, | Performed by: NEUROLOGICAL SURGERY

## 2025-07-06 PROCEDURE — 94761 N-INVAS EAR/PLS OXIMETRY MLT: CPT

## 2025-07-06 PROCEDURE — 84100 ASSAY OF PHOSPHORUS: CPT

## 2025-07-06 PROCEDURE — 51701 INSERT BLADDER CATHETER: CPT

## 2025-07-06 PROCEDURE — 85025 COMPLETE CBC W/AUTO DIFF WBC: CPT

## 2025-07-06 PROCEDURE — 51798 US URINE CAPACITY MEASURE: CPT

## 2025-07-06 PROCEDURE — 83735 ASSAY OF MAGNESIUM: CPT

## 2025-07-06 PROCEDURE — 86901 BLOOD TYPING SEROLOGIC RH(D): CPT

## 2025-07-06 PROCEDURE — 63600175 PHARM REV CODE 636 W HCPCS

## 2025-07-06 PROCEDURE — 99291 CRITICAL CARE FIRST HOUR: CPT | Mod: GC,,, | Performed by: PSYCHIATRY & NEUROLOGY

## 2025-07-06 PROCEDURE — 63600175 PHARM REV CODE 636 W HCPCS: Performed by: PSYCHIATRY & NEUROLOGY

## 2025-07-06 PROCEDURE — 85610 PROTHROMBIN TIME: CPT

## 2025-07-06 PROCEDURE — 80053 COMPREHEN METABOLIC PANEL: CPT

## 2025-07-06 RX ORDER — MIDAZOLAM HYDROCHLORIDE 1 MG/ML
1 INJECTION, SOLUTION INTRAMUSCULAR; INTRAVENOUS ONCE
Status: COMPLETED | OUTPATIENT
Start: 2025-07-06 | End: 2025-07-06

## 2025-07-06 RX ORDER — MIDAZOLAM HYDROCHLORIDE 1 MG/ML
2 INJECTION, SOLUTION INTRAMUSCULAR; INTRAVENOUS
Status: DISCONTINUED | OUTPATIENT
Start: 2025-07-06 | End: 2025-07-08

## 2025-07-06 RX ORDER — MIDAZOLAM HYDROCHLORIDE 1 MG/ML
2 INJECTION, SOLUTION INTRAMUSCULAR; INTRAVENOUS ONCE
Status: COMPLETED | OUTPATIENT
Start: 2025-07-06 | End: 2025-07-06

## 2025-07-06 RX ORDER — MIDAZOLAM HYDROCHLORIDE 1 MG/ML
INJECTION, SOLUTION INTRAMUSCULAR; INTRAVENOUS
Status: COMPLETED
Start: 2025-07-06 | End: 2025-07-06

## 2025-07-06 RX ORDER — BISACODYL 10 MG/1
10 SUPPOSITORY RECTAL DAILY
Status: DISCONTINUED | OUTPATIENT
Start: 2025-07-06 | End: 2025-07-08

## 2025-07-06 RX ADMIN — MIDAZOLAM 1 MG: 1 INJECTION INTRAMUSCULAR; INTRAVENOUS at 12:07

## 2025-07-06 RX ADMIN — CEFAZOLIN 1 G: 330 INJECTION, POWDER, FOR SOLUTION INTRAMUSCULAR; INTRAVENOUS at 05:07

## 2025-07-06 RX ADMIN — POLYETHYLENE GLYCOL 3350 17 G: 17 POWDER, FOR SOLUTION ORAL at 09:07

## 2025-07-06 RX ADMIN — SENNOSIDES AND DOCUSATE SODIUM 2 TABLET: 50; 8.6 TABLET ORAL at 09:07

## 2025-07-06 RX ADMIN — THERA TABS 1 TABLET: TAB at 08:07

## 2025-07-06 RX ADMIN — MIDAZOLAM 2 MG: 1 INJECTION INTRAMUSCULAR; INTRAVENOUS at 07:07

## 2025-07-06 RX ADMIN — FAMOTIDINE 20 MG: 20 TABLET, FILM COATED ORAL at 08:07

## 2025-07-06 RX ADMIN — OXYCODONE HYDROCHLORIDE 5 MG: 5 TABLET ORAL at 12:07

## 2025-07-06 RX ADMIN — PHENOBARBITAL 100 MG: 100 TABLET ORAL at 05:07

## 2025-07-06 RX ADMIN — DEXMEDETOMIDINE HYDROCHLORIDE 1 MCG/KG/HR: 4 INJECTION INTRAVENOUS at 01:07

## 2025-07-06 RX ADMIN — HEPARIN SODIUM 5000 UNITS: 5000 INJECTION INTRAVENOUS; SUBCUTANEOUS at 05:07

## 2025-07-06 RX ADMIN — PHENOBARBITAL 130 MG: 30 TABLET ORAL at 09:07

## 2025-07-06 RX ADMIN — DEXMEDETOMIDINE HYDROCHLORIDE 0.8 MCG/KG/HR: 4 INJECTION INTRAVENOUS at 06:07

## 2025-07-06 RX ADMIN — SENNOSIDES AND DOCUSATE SODIUM 2 TABLET: 50; 8.6 TABLET ORAL at 08:07

## 2025-07-06 RX ADMIN — MIDAZOLAM 2 MG: 1 INJECTION INTRAMUSCULAR; INTRAVENOUS at 01:07

## 2025-07-06 RX ADMIN — MUPIROCIN: 20 OINTMENT TOPICAL at 09:07

## 2025-07-06 RX ADMIN — OLANZAPINE 20 MG: 5 TABLET, FILM COATED ORAL at 08:07

## 2025-07-06 RX ADMIN — PHENOBARBITAL 130 MG: 30 TABLET ORAL at 01:07

## 2025-07-06 RX ADMIN — POLYETHYLENE GLYCOL 3350 17 G: 17 POWDER, FOR SOLUTION ORAL at 08:07

## 2025-07-06 RX ADMIN — DEXAMETHASONE SODIUM PHOSPHATE 4 MG: 4 INJECTION INTRA-ARTICULAR; INTRALESIONAL; INTRAMUSCULAR; INTRAVENOUS; SOFT TISSUE at 05:07

## 2025-07-06 RX ADMIN — DEXAMETHASONE SODIUM PHOSPHATE 4 MG: 4 INJECTION INTRA-ARTICULAR; INTRALESIONAL; INTRAMUSCULAR; INTRAVENOUS; SOFT TISSUE at 12:07

## 2025-07-06 RX ADMIN — Medication 100 MG: at 08:07

## 2025-07-06 RX ADMIN — OLANZAPINE 20 MG: 5 TABLET, FILM COATED ORAL at 09:07

## 2025-07-06 RX ADMIN — DEXMEDETOMIDINE HYDROCHLORIDE 1 MCG/KG/HR: 4 INJECTION INTRAVENOUS at 12:07

## 2025-07-06 RX ADMIN — OXYCODONE HYDROCHLORIDE 5 MG: 5 TABLET ORAL at 05:07

## 2025-07-06 RX ADMIN — MUPIROCIN: 20 OINTMENT TOPICAL at 08:07

## 2025-07-06 RX ADMIN — MIDAZOLAM 2 MG: 1 INJECTION INTRAMUSCULAR; INTRAVENOUS at 03:07

## 2025-07-06 RX ADMIN — DEXMEDETOMIDINE HYDROCHLORIDE 1 MCG/KG/HR: 4 INJECTION INTRAVENOUS at 07:07

## 2025-07-06 RX ADMIN — MIDAZOLAM HYDROCHLORIDE 2 MG: 2 INJECTION, SOLUTION INTRAMUSCULAR; INTRAVENOUS at 12:07

## 2025-07-06 RX ADMIN — MIDAZOLAM 2 MG: 1 INJECTION INTRAMUSCULAR; INTRAVENOUS at 12:07

## 2025-07-06 RX ADMIN — CEFAZOLIN 1 G: 330 INJECTION, POWDER, FOR SOLUTION INTRAMUSCULAR; INTRAVENOUS at 12:07

## 2025-07-06 RX ADMIN — FOLIC ACID 1 MG: 1 TABLET ORAL at 08:07

## 2025-07-06 RX ADMIN — CEFAZOLIN 1 G: 330 INJECTION, POWDER, FOR SOLUTION INTRAMUSCULAR; INTRAVENOUS at 08:07

## 2025-07-06 NOTE — ASSESSMENT & PLAN NOTE
Assessment  71yo M with generalized weakness, falls, and paucity of speech presenting with multiple ring enhancing lesions on brain CT.    Imaging:  - CTH 7/1 showed multiple brain masses concerning for mets with surrounding edema, can't rule out abscesses. Mass in cerebellum with effacement of 4th ventricule outflow with concern for developing hydrocephalus  - MRI brain 7/1: non diagnostic due to motion  - CT CAP 7/1: spiculated R lung mass and lymph node adenopathy in chest and neck concerning for metastatic disease  - MRI Brain W/WO 7/2: multifocal enhancing lesions c/f metastatic disease, largest R cerebellum w/mass effect on 4th.    Plan:  - admitted to [facility]  - EVD open at 20, abx while in place; transduce hourly and document hourly output; M/Th CSF while remains in place starting 7/7  - Cont Dex 4q6 with Ppi  - AED per NCC  - recommend oncology consult; would appreciate opinion on life expectancy when able to comment  - infectious workup less concerning for abscesses final Cx pending  - OR tomorrow for SOC for tumor on 7/7 for resection of cerebellar mass; NPO midnight, chemical dvt ppx held, coags reordered  - medical management per NCC    Dispo: ongoing, OR Monday    Discussed with Dr. Larios

## 2025-07-06 NOTE — SUBJECTIVE & OBJECTIVE
Interval History: 7/5: extubated yesterday, oriented to self, agitated in restraints    Medications:  Continuous Infusions:   dexmedeTOMIDine (Precedex) infusion (titrating)  0-1 mcg/kg/hr Intravenous Continuous 13.49 mL/hr at 07/05/25 2046 0.7 mcg/kg/hr at 07/05/25 2046     Scheduled Meds:   ceFAZolin (Ancef) IV (PEDS and ADULTS)  1 g Intravenous Q8H    dexAMETHasone (Decadron) IV (PEDS and ADULTS)  4 mg Intravenous Q6H    famotidine  20 mg Per NG tube Daily    folic acid  1 mg Per NG tube Daily    heparin (porcine)  5,000 Units Subcutaneous Q8H    multivitamin  1 tablet Per NG tube Daily    mupirocin   Nasal BID    OLANZapine  20 mg Per NG tube BID    oxyCODONE  5 mg Per NG tube Q6H    PHENobarbitaL  100 mg Per NG tube Q8H    polyethylene glycol  17 g Per NG tube BID    senna-docusate  2 tablet Per NG tube BID    thiamine  100 mg Per NG tube Daily     PRN Meds:  Current Facility-Administered Medications:     acetaminophen, 650 mg, Per NG tube, Q6H PRN    bisacodyL, 10 mg, Rectal, Daily PRN    dextrose 50%, 12.5 g, Intravenous, PRN    glucagon (human recombinant), 1 mg, Intramuscular, PRN    hydrALAZINE, 10 mg, Intravenous, Q4H PRN    insulin aspart U-100, 0-5 Units, Subcutaneous, Q6H PRN    labetalol, 10 mg, Intravenous, Q4H PRN    ondansetron, 4 mg, Intravenous, Q4H PRN    potassium bicarbonate, 35 mEq, Per NG tube, PRN    potassium bicarbonate, 50 mEq, Per NG tube, PRN    potassium bicarbonate, 60 mEq, Per NG tube, PRN    potassium, sodium phosphates, 2 packet, Per NG tube, PRN    potassium, sodium phosphates, 2 packet, Per NG tube, PRN    potassium, sodium phosphates, 2 packet, Per NG tube, PRN    sodium chloride 0.9%, 10 mL, Intravenous, PRN     Review of Systems  Objective:     Weight: 77.1 kg (169 lb 15.6 oz)  Body mass index is 22.43 kg/m².  Vital Signs (Most Recent):  Temp: 97.6 °F (36.4 °C) (07/05/25 1501)  Pulse: (!) 52 (07/05/25 1912)  Resp: 20 (07/05/25 1912)  BP: (!) 142/88 (07/05/25 1901)  SpO2: 100  % (07/05/25 1912) Vital Signs (24h Range):  Temp:  [96.6 °F (35.9 °C)-97.9 °F (36.6 °C)] 97.6 °F (36.4 °C)  Pulse:  [47-62] 52  Resp:  [9-36] 20  SpO2:  [91 %-100 %] 100 %  BP: (103-158)/(62-90) 142/88     Date 07/05/25 0700 - 07/06/25 0659   Shift 1002-6576 9959-2215 8557-0086 24 Hour Total   INTAKE   I.V.(mL/kg)  230.2(3)  230.2(3)   NG/ 180  420   Shift Total(mL/kg) 240(3.1) 410.2(5.3)  650.2(8.4)   OUTPUT   Urine(mL/kg/hr)  750  750   Drains 30 12  42   Shift Total(mL/kg) 30(0.4) 762(9.9)  792(10.3)   Weight (kg) 77.1 77.1 77.1 77.1                            NG/OG Tube 07/02/25 0522 Left nostril (Active)   Placement Check placement verified by x-ray;placement verified by distal tube length measurement 07/04/25 0505   Tolerance no signs/symptoms of discomfort 07/05/25 1901   Securement secured to nostril center 07/05/25 1901   Clamp Status/Tolerance unclamped;no nausea;no emesis;no abdominal distention;no restlessness 07/05/25 1901   Suction Setting/Drainage Method suction at the bedside 07/05/25 1901   Insertion Site Appearance no redness, warmth, tenderness, skin breakdown, drainage 07/05/25 1901   Drainage None 07/04/25 2101   Flush/Irrigation flushed w/;water;no resistance met 07/04/25 2101   Feeding Type continuous;by pump 07/05/25 1501   Feeding Action feeding continued 07/05/25 1501   Current Rate (mL/hr) 30 mL/hr 07/05/25 0701   Goal Rate (mL/hr) 30 mL/hr 07/05/25 1901   Intake (mL) 30 mL 07/05/25 1901   Water Bolus (mL) 30 mL 07/03/25 1724   Formula Name Pivot 07/05/25 1901   Intake (mL) - Formula Tube Feeding 30 07/05/25 1901       Male External Urinary Catheter 07/02/25 0521 Large (Active)   Collection Container Urimeter 07/05/25 1901   Securement Method secured to top of thigh w/ adhesive device 07/05/25 1901   Skin no redness;no breakdown;skin barrier applied;penis/scrotum cleansed w/ soap and water 07/05/25 1901   Tolerance no signs/symptoms of discomfort 07/05/25 1901   Output (mL) 300 mL  "07/05/25 1901   Catheter Change Date 07/03/25 07/03/25 1701   Catheter Change Time 1018 07/03/25 1701            ICP/Ventriculostomy 07/02/25 1409 Right (Active)   Level of Ventriculostomy (cm above) 20 07/04/25 1901   Status Open to drainage 07/05/25 1901   Site Assessment Dry 07/05/25 1901   Site Drainage No drainage 07/05/25 1901   Waveform normal waveform 07/05/25 0701   Output (mL) 2 mL 07/05/25 1901   CSF Color clear 07/05/25 1901   Dressing Status Dry;Intact;Old drainage 07/05/25 1901   Interventions HOB degrees;bed controls locked;zeroed 07/05/25 1901          Physical Exam         Neurosurgery Physical Exam    E3V3M5  On precedex  PERRL  Dysconjugate gaze  Ox2 WC  Int FC BUE, otherwise spont x4 AG     EVD patent with good waveform    Significant Labs:  Recent Labs   Lab 07/04/25  0208 07/05/25  0025   * 118*   * 138   K 4.2 3.9    107   CO2 21* 21*   BUN 47* 40*   CREATININE 1.7* 1.3   CALCIUM 8.3* 8.4*   MG 2.4 2.4     Recent Labs   Lab 07/04/25  0208 07/04/25  1452 07/05/25  0025   WBC 8.81 9.97 9.71   HGB 10.9* 11.7* 12.8*   HCT 32.6* 36.0* 38.6*    214 226     No results for input(s): "LABPT", "INR", "APTT" in the last 48 hours.  Microbiology Results (last 7 days)       Procedure Component Value Units Date/Time    Culture, Respiratory with Gram Stain [[id number]] Collected: 07/03/25 1130    Order Status: Completed Specimen: Respiratory from Endotracheal Aspirate Updated: 07/05/25 0859     Respiratory Culture No Growth To Date     GRAM STAIN <10 Epithelial Cells/LPF      Rare WBC seen      No organisms seen    CSF culture [[id number]] Collected: 07/02/25 1808    Order Status: Completed Specimen: CSF (Spinal Fluid) from CSF Tap, Tube 3 Updated: 07/05/25 0700     CULTURE, CSF No Growth To Date     GRAM STAIN Cytospin indicates:      No WBCs      No organisms seen    Blood culture #1 **CANNOT BE ORDERED STAT** [[id number]]  (Normal) Collected: 07/01/25 1702    Order Status: " Completed Specimen: Blood from Peripheral, Forearm, Left Updated: 07/05/25 0008     Blood Culture No Growth After 72 Hours    Blood culture #2 **CANNOT BE ORDERED STAT** [[id number]]  (Normal) Collected: 07/01/25 1702    Order Status: Completed Specimen: Blood from Peripheral, Lower Arm, Left Updated: 07/05/25 0008     Blood Culture No Growth After 72 Hours    Cryptococcal antigen, CSF [[id number]]  (Normal) Collected: 07/02/25 1808    Order Status: Completed Specimen: CSF (Spinal Fluid) from CSF Tap, Tube 3 Updated: 07/03/25 1458     Cryptococcal Antigen, CSF Negative    Afb Culture Stain [[id number]] Collected: 07/02/25 1808    Order Status: Completed Specimen: CSF (Spinal Fluid) from CSF Tap, Tube 3 Updated: 07/03/25 1346     ACID FAST STAIN  No acid fast bacilli seen    Gram stain [[id number]] Collected: 07/02/25 1808    Order Status: Canceled Specimen: CSF (Spinal Fluid) from CSF Tap, Tube 3 Updated: 07/03/25 0203    AFB Culture & Smear [[id number]] Collected: 07/02/25 1808    Order Status: Sent Specimen: CSF (Spinal Fluid) from CSF Tap, Tube 3 Updated: 07/03/25 0202    Influenza A & B by Molecular [[id number]]  (Normal) Collected: 07/01/25 1449    Order Status: Completed Specimen: Nasal Swab Updated: 07/01/25 1537     INFLUENZA A MOLECULAR Negative     INFLUENZA B MOLECULAR  Negative          All pertinent labs from the last 24 hours have been reviewed.    Significant Diagnostics:  CT: No results found in the last 24 hours.  MRI: No results found in the last 24 hours.

## 2025-07-06 NOTE — PT/OT/SLP PROGRESS
Speech Language Pathology      Goran Carlos  MRN: [id number]    SLP evaluation orders received and reviewed with RN.  Patient not seen today secondary to Pt sedated secondary to agitation.  RN confirmed Pt with NG for medications/nutrition/hydration. ST to continue to monitor and re-attempt pending medical status and appropriateness for PO trials.    7/6/2025

## 2025-07-06 NOTE — SUBJECTIVE & OBJECTIVE
Interval History: see hospital course    Objective:     Vitals:  Temp: 98.9 °F (37.2 °C)  Pulse: (!) 54  Rhythm: normal sinus rhythm  BP: (!) 137/92  MAP (mmHg): 110  ICP Mean (mmHg): 1 mmHg  Resp: 15  SpO2: 100 %    Temp  Min: 97.6 °F (36.4 °C)  Max: 98.9 °F (37.2 °C)  Pulse  Min: 48  Max: 66  BP  Min: 108/57  Max: 158/88  MAP (mmHg)  Min: 75  Max: 117  ICP Mean (mmHg)  Min: 1 mmHg  Max: 16 mmHg  Resp  Min: 11  Max: 36  SpO2  Min: 97 %  Max: 100 %    07/05 0701 - 07/06 0700  In: 1124.9 [I.V.:434.9]  Out: 1807 [Urine:1750; Drains:57]   Unmeasured Output  Unmeasured Urine Occurrence: 1        Physical Exam  Vitals and nursing note reviewed.   Constitutional:       General: He is not in acute distress.     Comments: Somnolent but arousable to pain. Soft restrains in place   HENT:      Head: Normocephalic and atraumatic.      Nose:      Comments: NGT secured in place     Mouth/Throat:      Mouth: Mucous membranes are moist.      Pharynx: Oropharynx is clear.   Eyes:      Extraocular Movements: Extraocular movements intact.      Pupils: Pupils are equal, round, and reactive to light.   Cardiovascular:      Rate and Rhythm: Normal rate.   Pulmonary:      Effort: Pulmonary effort is normal. No respiratory distress.      Comments: Equal breath sounds bilaterally.  Abdominal:      General: Abdomen is flat. There is no distension.      Palpations: Abdomen is soft.      Tenderness: There is no guarding or rebound.   Musculoskeletal:      Cervical back: Neck supple.      Right lower leg: No edema.      Left lower leg: No edema.   Skin:     General: Skin is warm and dry.      Capillary Refill: Capillary refill takes less than 2 seconds.   Neurological:      Comments:     E2VTM5  On prop/fent; comfortable appearing. Arousable to pain, moves all extremities.  Localizing x4     EVD patent with good waveform            Medications:  ContinuousdexmedeTOMIDine (Precedex) infusion (titrating), Last Rate: 1 mcg/kg/hr (07/06/25  1002)    ScheduledbisacodyL, 10 mg, Daily  ceFAZolin (Ancef) IV (PEDS and ADULTS), 1 g, Q8H  dexAMETHasone (Decadron) IV (PEDS and ADULTS), 4 mg, Q6H  famotidine, 20 mg, Daily  folic acid, 1 mg, Daily  multivitamin, 1 tablet, Daily  mupirocin, , BID  OLANZapine, 20 mg, BID  oxyCODONE, 5 mg, Q6H  PHENobarbitaL, 100 mg, Q8H  polyethylene glycol, 17 g, BID  senna-docusate, 2 tablet, BID  thiamine, 100 mg, Daily    PRNacetaminophen, 650 mg, Q6H PRN  dextrose 50%, 12.5 g, PRN  glucagon (human recombinant), 1 mg, PRN  hydrALAZINE, 10 mg, Q4H PRN  insulin aspart U-100, 0-5 Units, Q6H PRN  labetalol, 10 mg, Q4H PRN  ondansetron, 4 mg, Q4H PRN  potassium bicarbonate, 35 mEq, PRN  potassium bicarbonate, 50 mEq, PRN  potassium bicarbonate, 60 mEq, PRN  potassium, sodium phosphates, 2 packet, PRN  potassium, sodium phosphates, 2 packet, PRN  potassium, sodium phosphates, 2 packet, PRN  sodium chloride 0.9%, 10 mL, PRN      Today I personally reviewed pertinent medications, lines/drains/airways, imaging, cardiology results, laboratory results, microbiology results, notably:    Diet  Diet NPO  Diet NPO

## 2025-07-06 NOTE — SUBJECTIVE & OBJECTIVE
Interval History: 7/6: On pcdx 1 today. OR tomorrow for crani for tumor. EVD functioning well at 20, ICP wnl    Medications:  Continuous Infusions:   dexmedeTOMIDine (Precedex) infusion (titrating)  0-1 mcg/kg/hr Intravenous Continuous 15.42 mL/hr at 07/06/25 0631 0.8 mcg/kg/hr at 07/06/25 0631     Scheduled Meds:   ceFAZolin (Ancef) IV (PEDS and ADULTS)  1 g Intravenous Q8H    dexAMETHasone (Decadron) IV (PEDS and ADULTS)  4 mg Intravenous Q6H    famotidine  20 mg Per NG tube Daily    folic acid  1 mg Per NG tube Daily    multivitamin  1 tablet Per NG tube Daily    mupirocin   Nasal BID    OLANZapine  20 mg Per NG tube BID    oxyCODONE  5 mg Per NG tube Q6H    PHENobarbitaL  100 mg Per NG tube Q8H    polyethylene glycol  17 g Per NG tube BID    senna-docusate  2 tablet Per NG tube BID    thiamine  100 mg Per NG tube Daily     PRN Meds:  Current Facility-Administered Medications:     acetaminophen, 650 mg, Per NG tube, Q6H PRN    bisacodyL, 10 mg, Rectal, Daily PRN    dextrose 50%, 12.5 g, Intravenous, PRN    glucagon (human recombinant), 1 mg, Intramuscular, PRN    hydrALAZINE, 10 mg, Intravenous, Q4H PRN    insulin aspart U-100, 0-5 Units, Subcutaneous, Q6H PRN    labetalol, 10 mg, Intravenous, Q4H PRN    ondansetron, 4 mg, Intravenous, Q4H PRN    potassium bicarbonate, 35 mEq, Per NG tube, PRN    potassium bicarbonate, 50 mEq, Per NG tube, PRN    potassium bicarbonate, 60 mEq, Per NG tube, PRN    potassium, sodium phosphates, 2 packet, Per NG tube, PRN    potassium, sodium phosphates, 2 packet, Per NG tube, PRN    potassium, sodium phosphates, 2 packet, Per NG tube, PRN    sodium chloride 0.9%, 10 mL, Intravenous, PRN     Review of Systems  Objective:     Weight: 81.1 kg (178 lb 14.4 oz)  Body mass index is 23.6 kg/m².  Vital Signs (Most Recent):  Temp: 98.4 °F (36.9 °C) (07/06/25 0301)  Pulse: (!) 51 (07/06/25 0601)  Resp: (!) 30 (07/06/25 0601)  BP: (!) 142/75 (07/06/25 0601)  SpO2: 97 % (07/06/25 0601) Vital  Signs (24h Range):  Temp:  [97.6 °F (36.4 °C)-98.4 °F (36.9 °C)] 98.4 °F (36.9 °C)  Pulse:  [48-66] 51  Resp:  [10-36] 30  SpO2:  [91 %-100 %] 97 %  BP: (103-150)/(57-90) 142/75                                NG/OG Tube 07/02/25 0522 Left nostril (Active)   Placement Check placement verified by x-ray;placement verified by distal tube length measurement 07/04/25 0505   Tolerance no signs/symptoms of discomfort 07/05/25 1901   Securement secured to nostril center 07/05/25 1901   Clamp Status/Tolerance unclamped;no nausea;no emesis;no abdominal distention;no restlessness 07/05/25 1901   Suction Setting/Drainage Method suction at the bedside 07/05/25 1901   Insertion Site Appearance no redness, warmth, tenderness, skin breakdown, drainage 07/05/25 1901   Drainage None 07/04/25 2101   Flush/Irrigation flushed w/;water;no resistance met 07/04/25 2101   Feeding Type continuous;by pump 07/05/25 1501   Feeding Action feeding continued 07/05/25 1501   Current Rate (mL/hr) 30 mL/hr 07/05/25 0701   Goal Rate (mL/hr) 30 mL/hr 07/05/25 1901   Intake (mL) 30 mL 07/05/25 1901   Water Bolus (mL) 30 mL 07/03/25 1724   Formula Name Pivot 07/05/25 1901   Intake (mL) - Formula Tube Feeding 30 07/05/25 1901       Male External Urinary Catheter 07/02/25 0521 Large (Active)   Collection Container Urimeter 07/05/25 1901   Securement Method secured to top of thigh w/ adhesive device 07/05/25 1901   Skin no redness;no breakdown;skin barrier applied;penis/scrotum cleansed w/ soap and water 07/05/25 1901   Tolerance no signs/symptoms of discomfort 07/05/25 1901   Output (mL) 300 mL 07/05/25 1901   Catheter Change Date 07/03/25 07/03/25 1701   Catheter Change Time 1018 07/03/25 1701            ICP/Ventriculostomy 07/02/25 1409 Right (Active)   Level of Ventriculostomy (cm above) 20 07/04/25 1901   Status Open to drainage 07/05/25 1901   Site Assessment Dry 07/05/25 1901   Site Drainage No drainage 07/05/25 1901   Waveform normal waveform  "07/05/25 0701   Output (mL) 2 mL 07/05/25 1901   CSF Color clear 07/05/25 1901   Dressing Status Dry;Intact;Old drainage 07/05/25 1901   Interventions HOB degrees;bed controls locked;zeroed 07/05/25 1901          Physical Exam         Neurosurgery Physical Exam    E3V3M5  On precedex  PERRL  Dysconjugate gaze  Ox1  Int FC BUE, otherwise spont x4 AG     EVD patent with good waveform    Significant Labs:  Recent Labs   Lab 07/05/25  0025 07/06/25  0013   * 104    144   K 3.9 4.1    111*   CO2 21* 21*   BUN 40* 50*   CREATININE 1.3 1.3   CALCIUM 8.4* 8.7   MG 2.4 2.5     Recent Labs   Lab 07/04/25  1452 07/05/25  0025 07/06/25  0013   WBC 9.97 9.71 8.94   HGB 11.7* 12.8* 13.1*   HCT 36.0* 38.6* 38.3*    226 248     No results for input(s): "LABPT", "INR", "APTT" in the last 48 hours.  Microbiology Results (last 7 days)       Procedure Component Value Units Date/Time    Blood culture #1 **CANNOT BE ORDERED STAT** [[id number]]  (Normal) Collected: 07/01/25 1702    Order Status: Completed Specimen: Blood from Peripheral, Forearm, Left Updated: 07/06/25 0002     Blood Culture No Growth After 96 hours    Blood culture #2 **CANNOT BE ORDERED STAT** [[id number]]  (Normal) Collected: 07/01/25 1702    Order Status: Completed Specimen: Blood from Peripheral, Lower Arm, Left Updated: 07/06/25 0002     Blood Culture No Growth After 96 hours    Culture, Respiratory with Gram Stain [[id number]] Collected: 07/03/25 1130    Order Status: Completed Specimen: Respiratory from Endotracheal Aspirate Updated: 07/05/25 0859     Respiratory Culture No Growth To Date     GRAM STAIN <10 Epithelial Cells/LPF      Rare WBC seen      No organisms seen    CSF culture [[id number]] Collected: 07/02/25 1808    Order Status: Completed Specimen: CSF (Spinal Fluid) from CSF Tap, Tube 3 Updated: 07/05/25 0700     CULTURE, CSF No Growth To Date     GRAM STAIN Cytospin indicates:      No WBCs      No organisms seen    Cryptococcal " antigen, CSF [[id number]]  (Normal) Collected: 07/02/25 1808    Order Status: Completed Specimen: CSF (Spinal Fluid) from CSF Tap, Tube 3 Updated: 07/03/25 1458     Cryptococcal Antigen, CSF Negative    Afb Culture Stain [[id number]] Collected: 07/02/25 1808    Order Status: Completed Specimen: CSF (Spinal Fluid) from CSF Tap, Tube 3 Updated: 07/03/25 1346     ACID FAST STAIN  No acid fast bacilli seen    Gram stain [[id number]] Collected: 07/02/25 1808    Order Status: Canceled Specimen: CSF (Spinal Fluid) from CSF Tap, Tube 3 Updated: 07/03/25 0203    AFB Culture & Smear [[id number]] Collected: 07/02/25 1808    Order Status: Sent Specimen: CSF (Spinal Fluid) from CSF Tap, Tube 3 Updated: 07/03/25 0202    Influenza A & B by Molecular [[id number]]  (Normal) Collected: 07/01/25 1449    Order Status: Completed Specimen: Nasal Swab Updated: 07/01/25 1537     INFLUENZA A MOLECULAR Negative     INFLUENZA B MOLECULAR  Negative          All pertinent labs from the last 24 hours have been reviewed.    Significant Diagnostics:  CT: No results found in the last 24 hours.  MRI: No results found in the last 24 hours.

## 2025-07-06 NOTE — PROGRESS NOTES
[facility] - Neuro Critical Care  Neurocritical Care  Progress Note    Admit Date: 7/1/2025  Service Date: 07/06/2025  Length of Stay: 5    Subjective:     Chief Complaint: Ataxia    History of Present Illness: 69 y/o M presenting from [facility] for generalized weakness for about a week now. History obtained from rehab nurse. She reported that he got to their facility about a month ago and, at baseline, walks with a cane and is alert and oriented. His nurse noticed that he has seemed weaker over the past week and over the past 3 days has had increased balance disturbance and gait issues. They also noticed that he has been speaking more softly in his speech is harder to understand. He has not had any infectious symptoms. He fell yesterday, unsure if he hit his head. CT head without contrast revealed multiple lesions of hypodensity with a hyperdense rim, notably in the left temporal lobe and right cerebellum. There were additional small hyperdensities in the right frontoparietal and left parasagittal parietal lobe. There is mass effect and partial effacement of the 4th ventricle secondary to the cerebellar lesion with developing hydrocephalus without MLS. CT of the chest, abdomen, and pelvis with IV contrast showed RUL mass with mediastinal adenopathy. Labs revealed Hep C and treponema antibodies were positive. Hep C PCR pending. Penicillin G was administered. A sepsis workup was completed and antibiotics were initiated. An MRI brain with and without contrast was ordered, but patient was unable to complete due to persistent movement after sedatives were administered. Prior to MRI, he was alert but drowsy. He was able to tell me his name, but told me he was 53 years old, the year was 2003, and he did not know the current place or reason for being here. He had generalized weakness but was slightly more weak on the right side. He followed simple commands, but did not attempt to follow more complex commands. He is admitted  to NCC with NSGY consult.    Hospital Course: 7/2/2025: MRI with significant motion artifact. Required presidex overnight secondary to agitation  7/3/2025: MRI favors neoplastic brain lesions. CSF studies pending. Started on Zyprexa and phenobarbital for agitation, R subclavian CVC placed for central access  7/4/2025: NAEON. Extubated without complications.  07/05/2025 NAEON. EVD @20. Adjusted oxycodone frequency q8. Increased zyprexa 20 BID. Precedex for agitation. Plan for biopsy Monday w/ NSGY.  07/06/2025 NAEON. Added on 100q4 FWF. Continues to require precedex for agitation. OR tomorrow.      Interval History: see hospital course    Objective:     Vitals:  Temp: 98.9 °F (37.2 °C)  Pulse: (!) 54  Rhythm: normal sinus rhythm  BP: (!) 137/92  MAP (mmHg): 110  ICP Mean (mmHg): 1 mmHg  Resp: 15  SpO2: 100 %    Temp  Min: 97.6 °F (36.4 °C)  Max: 98.9 °F (37.2 °C)  Pulse  Min: 48  Max: 66  BP  Min: 108/57  Max: 158/88  MAP (mmHg)  Min: 75  Max: 117  ICP Mean (mmHg)  Min: 1 mmHg  Max: 16 mmHg  Resp  Min: 11  Max: 36  SpO2  Min: 97 %  Max: 100 %    07/05 0701 - 07/06 0700  In: 1124.9 [I.V.:434.9]  Out: 1807 [Urine:1750; Drains:57]   Unmeasured Output  Unmeasured Urine Occurrence: 1        Physical Exam  Vitals and nursing note reviewed.   Constitutional:       General: He is not in acute distress.     Comments: Somnolent but arousable to pain. Soft restrains in place   HENT:      Head: Normocephalic and atraumatic.      Nose:      Comments: NGT secured in place     Mouth/Throat:      Mouth: Mucous membranes are moist.      Pharynx: Oropharynx is clear.   Eyes:      Extraocular Movements: Extraocular movements intact.      Pupils: Pupils are equal, round, and reactive to light.   Cardiovascular:      Rate and Rhythm: Normal rate.   Pulmonary:      Effort: Pulmonary effort is normal. No respiratory distress.      Comments: Equal breath sounds bilaterally.  Abdominal:      General: Abdomen is flat. There is no distension.       Palpations: Abdomen is soft.      Tenderness: There is no guarding or rebound.   Musculoskeletal:      Cervical back: Neck supple.      Right lower leg: No edema.      Left lower leg: No edema.   Skin:     General: Skin is warm and dry.      Capillary Refill: Capillary refill takes less than 2 seconds.   Neurological:      Comments:     [id number]  On prop/fent; comfortable appearing. Arousable to pain, moves all extremities.  Localizing x4     EVD patent with good waveform            Medications:  ContinuousdexmedeTOMIDine (Precedex) infusion (titrating), Last Rate: 1 mcg/kg/hr (07/06/25 1002)    ScheduledbisacodyL, 10 mg, Daily  ceFAZolin (Ancef) IV (PEDS and ADULTS), 1 g, Q8H  dexAMETHasone (Decadron) IV (PEDS and ADULTS), 4 mg, Q6H  famotidine, 20 mg, Daily  folic acid, 1 mg, Daily  multivitamin, 1 tablet, Daily  mupirocin, , BID  OLANZapine, 20 mg, BID  oxyCODONE, 5 mg, Q6H  PHENobarbitaL, 100 mg, Q8H  polyethylene glycol, 17 g, BID  senna-docusate, 2 tablet, BID  thiamine, 100 mg, Daily    PRNacetaminophen, 650 mg, Q6H PRN  dextrose 50%, 12.5 g, PRN  glucagon (human recombinant), 1 mg, PRN  hydrALAZINE, 10 mg, Q4H PRN  insulin aspart U-100, 0-5 Units, Q6H PRN  labetalol, 10 mg, Q4H PRN  ondansetron, 4 mg, Q4H PRN  potassium bicarbonate, 35 mEq, PRN  potassium bicarbonate, 50 mEq, PRN  potassium bicarbonate, 60 mEq, PRN  potassium, sodium phosphates, 2 packet, PRN  potassium, sodium phosphates, 2 packet, PRN  potassium, sodium phosphates, 2 packet, PRN  sodium chloride 0.9%, 10 mL, PRN      Today I personally reviewed pertinent medications, lines/drains/airways, imaging, cardiology results, laboratory results, microbiology results, notably:    Diet  Diet NPO  Diet NPO    Assessment/Plan:     Neuro  Brain lesion  68 y/o M brought to ED from [facility] (hx of heroin and ETOH) for progressive weakness, gait disturbance, confusion, and decreased volume when speaking. CT head without contrast revealed multiple  lesions of hypodensity with a hyperdense rim, notably in the left temporal lobe and right cerebellum. There were additional small hyperdensities in the right frontoparietal and left parasagittal parietal lobe. There is mass effect and partial effacement of the 4th ventricle secondary to the cerebellar lesion with developing hydrocephalus without MLS. CT of the chest, abdomen, and pelvis with IV contrast showed RUL mass with mediastinal adenopathy. Labs revealed Hep C and treponema antibodies were positive. Hep C PCR pending. Penicillin G was administered. A sepsis workup was completed and antibiotics were initiated. Intubated to get MRI. S/p EVD. Extubated 7/4/25 without difficulty.    Admit to [facility]  Q1h neuro checks, I&Os, and vitals   Daily CBC, CMP, Mag, Phos  NSGY following; plan for OR Monday  Steroids  Remainder of CSF studies pending  Received Penicillin G in ED  Zyprexa and phenobarbital for agitation/EtOH withdrawal  Tube feeds + FWF 100cc q4h   Folate/thiamine/MV  SBP <160   Prn labetalol, hydralazine   PRN Zofran 4mg q4h  SCDs; SQH  PT/OT/SLP    Pulmonary  Mass of upper lobe of right lung  Noted on CXR and CT chest with IV contrast  Saturating well on RA; No PTX on CT-chest  No lung consolidations or obstructive process  No known history of cancer    Other  Hepatitis C antibody positive  PCR pending  No known history of Hep C per patient    Gait disturbance  See primary problem          The patient is being Prophylaxed for:  Venous Thromboembolism with: Mechanical or Chemical  Stress Ulcer with: H2B  Ventilator Pneumonia with: not applicable    Activity Orders            Turn patient starting at 07/03 1124    Elevate HOB Elevate HOB 30-45 degrees during feeding unless contraindicated starting at 07/03 0802    Elevate HOB starting at 07/01 2040    Diet NPO: NPO starting at 07/01 2040    Straight Cath starting at 07/01 1925          Full Code    Derick Gomez MD  Neurocritical Care  [facility] -  Neuro Critical Care

## 2025-07-06 NOTE — EICU
Intervention Initiated From:  [facility] / EICU    Alex intervened regarding:  Rounding (Video assessment)    Virtual ICU Quality Rounds    Admit Date: 7/1/2025  Hospital Day: 5    ICU Day: 4d 14h    24H Vital Sign Range:  Temp:  [97.6 °F (36.4 °C)-98.9 °F (37.2 °C)]   Pulse:  [48-66]   Resp:  [11-36]   BP: (108-158)/(57-92)   SpO2:  [97 %-100 %]     VICU Surveillance Screening    Daily review of  line necessity(optional): Central line(s) in place    Central line type (optional): Triple lumen catheter    Central line indications : Poor IV access        LDA reconciliation : Yes                           .

## 2025-07-06 NOTE — PLAN OF CARE
"[facility] Care Plan    POC reviewed with Ambridge Brown and family at 0300. Patient verbalized understanding. Questions and concerns addressed. No acute events today. Pt progressing toward goals. Will continue to monitor. See below and flowsheets for full assessment and VS info.       - OR plan for Monday 7/7  - Precedex gtt @1mcg/kg/hr (max dose) for agitation   - EVD open @ 20   - ICPs 5-15, Output 0-4   - Versed 3mL given for agitation   - Straight cath x1, voids spontaneously but still retaining urine       Is this a stroke patient? no    Neuro:  Goldie Coma Scale  Best Eye Response: 4-->(E4) spontaneous  Best Motor Response: 6-->(M6) obeys commands  Best Verbal Response: 4-->(V4) confused  Goldie Coma Scale Score: 14  Assessment Qualifiers: No eye obstruction present  Pupil PERRLA: yes     24 hr Temp:  [97.6 °F (36.4 °C)-98.4 °F (36.9 °C)]     CV:   Rhythm: sinus bradycardia  BP goals:   SBP < 160  MAP > 65    Resp:      Vent Mode: (S) Spont  Set Rate: 18 BPM  Oxygen Concentration (%): 40  Vt Set: 470 mL  PEEP/CPAP: (S) 5 cmH20  Pressure Support: (S) 10 cmH20    Plan: N/A    GI/:     Diet/Nutrition Received: NPO, tube feeding  Last Bowel Movement: 07/01/25  Voiding Characteristics: external catheter    Intake/Output Summary (Last 24 hours) at 7/6/2025 0655  Last data filed at 7/6/2025 0621  Gross per 24 hour   Intake 1124.92 ml   Output 1807 ml   Net -682.08 ml     Unmeasured Output  Unmeasured Urine Occurrence: 1    Labs/Accuchecks:  Recent Labs   Lab 07/06/25  0013   WBC 8.94   RBC 4.25*   HGB 13.1*   HCT 38.3*         Recent Labs   Lab 07/06/25  0013      K 4.1   CO2 21*   *   BUN 50*   CREATININE 1.3   ALKPHOS 69   ALT 18   AST 35   BILITOT 0.3      Recent Labs   Lab 07/02/25  0324   PROTIME 11.7   INR 1.1   APTT 26.0    No results for input(s): "CPK", "CPKMB", "TROPONINI", "MB" in the last 168 hours.    Electrolytes: N/A - electrolytes WDL  Accuchecks: Q6H    Gtts:   dexmedeTOMIDine " (Precedex) infusion (titrating)  0-1 mcg/kg/hr Intravenous Continuous 15.42 mL/hr at 25 0631 0.8 mcg/kg/hr at 25 0631       LDA/Wounds:    Davi Risk Assessment  Sensory Perception: 3-->slightly limited  Moisture: 4-->rarely moist  Activity: 1-->bedfast  Mobility: 3-->slightly limited  Nutrition: 2-->probably inadequate  Friction and Shear: 2-->potential problem  Davi Score: 15  Is your davi score 12 or less? no          Restraints:   Restraint Order  Length of Order: Order good for next 24 hours or when removed.  Date that the current order will : 25  Time that the current order will :   Order Upon Application: Yes    [facility]

## 2025-07-06 NOTE — PROGRESS NOTES
[facility] - Neuro Critical Care  Neurosurgery  Progress Note    Subjective:     History of Present Illness: Patient is 71yo M with generalized weakness, falls, and paucity of speech. 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 On exam, patient's voice is barely audible but patient appropriately answers questions and follows commands. Patient's brother states that patient had normal speech and gait when they last saw each other about 1 month ago.    Neurosurgery consulted due to multiple ring-enhancing lesions seen on CT brain.    Post-Op Info:  Procedure(s) (LRB):  CRANIOTOMY, SUBOCCIPITAL (Right)       Interval History: 7/6: On pcdx 1 today. OR tomorrow for crani for tumor. EVD functioning well at 20, ICP wnl    Medications:  Continuous Infusions:   dexmedeTOMIDine (Precedex) infusion (titrating)  0-1 mcg/kg/hr Intravenous Continuous 15.42 mL/hr at 07/06/25 0631 0.8 mcg/kg/hr at 07/06/25 0631     Scheduled Meds:   ceFAZolin (Ancef) IV (PEDS and ADULTS)  1 g Intravenous Q8H    dexAMETHasone (Decadron) IV (PEDS and ADULTS)  4 mg Intravenous Q6H    famotidine  20 mg Per NG tube Daily    folic acid  1 mg Per NG tube Daily    multivitamin  1 tablet Per NG tube Daily    mupirocin   Nasal BID    OLANZapine  20 mg Per NG tube BID    oxyCODONE  5 mg Per NG tube Q6H    PHENobarbitaL  100 mg Per NG tube Q8H    polyethylene glycol  17 g Per NG tube BID    senna-docusate  2 tablet Per NG tube BID    thiamine  100 mg Per NG tube Daily     PRN Meds:  Current Facility-Administered Medications:     acetaminophen, 650 mg, Per NG tube, Q6H PRN    bisacodyL, 10 mg, Rectal, Daily PRN    dextrose 50%, 12.5 g, Intravenous, PRN    glucagon (human recombinant), 1 mg, Intramuscular, PRN    hydrALAZINE, 10 mg, Intravenous, Q4H PRN    insulin aspart U-100, 0-5 Units, Subcutaneous, Q6H PRN    labetalol, 10 mg, Intravenous, Q4H PRN    ondansetron, 4 mg, Intravenous, Q4H PRN    potassium bicarbonate, 35 mEq, Per NG  tube, PRN    potassium bicarbonate, 50 mEq, Per NG tube, PRN    potassium bicarbonate, 60 mEq, Per NG tube, PRN    potassium, sodium phosphates, 2 packet, Per NG tube, PRN    potassium, sodium phosphates, 2 packet, Per NG tube, PRN    potassium, sodium phosphates, 2 packet, Per NG tube, PRN    sodium chloride 0.9%, 10 mL, Intravenous, PRN     Review of Systems  Objective:     Weight: 81.1 kg (178 lb 14.4 oz)  Body mass index is 23.6 kg/m².  Vital Signs (Most Recent):  Temp: 98.4 °F (36.9 °C) (07/06/25 0301)  Pulse: (!) 51 (07/06/25 0601)  Resp: (!) 30 (07/06/25 0601)  BP: (!) 142/75 (07/06/25 0601)  SpO2: 97 % (07/06/25 0601) Vital Signs (24h Range):  Temp:  [97.6 °F (36.4 °C)-98.4 °F (36.9 °C)] 98.4 °F (36.9 °C)  Pulse:  [48-66] 51  Resp:  [10-36] 30  SpO2:  [91 %-100 %] 97 %  BP: (103-150)/(57-90) 142/75                                NG/OG Tube 07/02/25 0522 Left nostril (Active)   Placement Check placement verified by x-ray;placement verified by distal tube length measurement 07/04/25 0505   Tolerance no signs/symptoms of discomfort 07/05/25 1901   Securement secured to nostril center 07/05/25 1901   Clamp Status/Tolerance unclamped;no nausea;no emesis;no abdominal distention;no restlessness 07/05/25 1901   Suction Setting/Drainage Method suction at the bedside 07/05/25 1901   Insertion Site Appearance no redness, warmth, tenderness, skin breakdown, drainage 07/05/25 1901   Drainage None 07/04/25 2101   Flush/Irrigation flushed w/;water;no resistance met 07/04/25 2101   Feeding Type continuous;by pump 07/05/25 1501   Feeding Action feeding continued 07/05/25 1501   Current Rate (mL/hr) 30 mL/hr 07/05/25 0701   Goal Rate (mL/hr) 30 mL/hr 07/05/25 1901   Intake (mL) 30 mL 07/05/25 1901   Water Bolus (mL) 30 mL 07/03/25 1724   Formula Name Pivot 07/05/25 1901   Intake (mL) - Formula Tube Feeding 30 07/05/25 1901       Male External Urinary Catheter 07/02/25 0521 Large (Active)   Collection Container Urimeter  "07/05/25 1901   Securement Method secured to top of thigh w/ adhesive device 07/05/25 1901   Skin no redness;no breakdown;skin barrier applied;penis/scrotum cleansed w/ soap and water 07/05/25 1901   Tolerance no signs/symptoms of discomfort 07/05/25 1901   Output (mL) 300 mL 07/05/25 1901   Catheter Change Date 07/03/25 07/03/25 1701   Catheter Change Time 1018 07/03/25 1701            ICP/Ventriculostomy 07/02/25 1409 Right (Active)   Level of Ventriculostomy (cm above) 20 07/04/25 1901   Status Open to drainage 07/05/25 1901   Site Assessment Dry 07/05/25 1901   Site Drainage No drainage 07/05/25 1901   Waveform normal waveform 07/05/25 0701   Output (mL) 2 mL 07/05/25 1901   CSF Color clear 07/05/25 1901   Dressing Status Dry;Intact;Old drainage 07/05/25 1901   Interventions HOB degrees;bed controls locked;zeroed 07/05/25 1901          Physical Exam         Neurosurgery Physical Exam    [id number]  On precedex  PERRL  Dysconjugate gaze  Ox1  Int FC BUE, otherwise spont x4 AG     EVD patent with good waveform    Significant Labs:  Recent Labs   Lab 07/05/25  0025 07/06/25  0013   * 104    144   K 3.9 4.1    111*   CO2 21* 21*   BUN 40* 50*   CREATININE 1.3 1.3   CALCIUM 8.4* 8.7   MG 2.4 2.5     Recent Labs   Lab 07/04/25  1452 07/05/25  0025 07/06/25  0013   WBC 9.97 9.71 8.94   HGB 11.7* 12.8* 13.1*   HCT 36.0* 38.6* 38.3*    226 248     No results for input(s): "LABPT", "INR", "APTT" in the last 48 hours.  Microbiology Results (last 7 days)       Procedure Component Value Units Date/Time    Blood culture #1 **CANNOT BE ORDERED STAT** [[id number]]  (Normal) Collected: 07/01/25 1702    Order Status: Completed Specimen: Blood from Peripheral, Forearm, Left Updated: 07/06/25 0002     Blood Culture No Growth After 96 hours    Blood culture #2 **CANNOT BE ORDERED STAT** [[id number]]  (Normal) Collected: 07/01/25 1702    Order Status: Completed Specimen: Blood from Peripheral, Lower Arm, Left " Updated: 07/06/25 0002     Blood Culture No Growth After 96 hours    Culture, Respiratory with Gram Stain [[id number]] Collected: 07/03/25 1130    Order Status: Completed Specimen: Respiratory from Endotracheal Aspirate Updated: 07/05/25 0859     Respiratory Culture No Growth To Date     GRAM STAIN <10 Epithelial Cells/LPF      Rare WBC seen      No organisms seen    CSF culture [[id number]] Collected: 07/02/25 1808    Order Status: Completed Specimen: CSF (Spinal Fluid) from CSF Tap, Tube 3 Updated: 07/05/25 0700     CULTURE, CSF No Growth To Date     GRAM STAIN Cytospin indicates:      No WBCs      No organisms seen    Cryptococcal antigen, CSF [[id number]]  (Normal) Collected: 07/02/25 1808    Order Status: Completed Specimen: CSF (Spinal Fluid) from CSF Tap, Tube 3 Updated: 07/03/25 1458     Cryptococcal Antigen, CSF Negative    Afb Culture Stain [[id number]] Collected: 07/02/25 1808    Order Status: Completed Specimen: CSF (Spinal Fluid) from CSF Tap, Tube 3 Updated: 07/03/25 1346     ACID FAST STAIN  No acid fast bacilli seen    Gram stain [[id number]] Collected: 07/02/25 1808    Order Status: Canceled Specimen: CSF (Spinal Fluid) from CSF Tap, Tube 3 Updated: 07/03/25 0203    AFB Culture & Smear [[id number]] Collected: 07/02/25 1808    Order Status: Sent Specimen: CSF (Spinal Fluid) from CSF Tap, Tube 3 Updated: 07/03/25 0202    Influenza A & B by Molecular [[id number]]  (Normal) Collected: 07/01/25 1449    Order Status: Completed Specimen: Nasal Swab Updated: 07/01/25 1537     INFLUENZA A MOLECULAR Negative     INFLUENZA B MOLECULAR  Negative          All pertinent labs from the last 24 hours have been reviewed.    Significant Diagnostics:  CT: No results found in the last 24 hours.  MRI: No results found in the last 24 hours.  Assessment/Plan:     * Ataxia  Assessment  71yo M with generalized weakness, falls, and paucity of speech presenting with multiple ring enhancing lesions on brain CT.    Imaging:  -  CTH 7/1 showed multiple brain masses concerning for mets with surrounding edema, can't rule out abscesses. Mass in cerebellum with effacement of 4th ventricule outflow with concern for developing hydrocephalus  - MRI brain 7/1: non diagnostic due to motion  - CT CAP 7/1: spiculated R lung mass and lymph node adenopathy in chest and neck concerning for metastatic disease  - MRI Brain W/WO 7/2: multifocal enhancing lesions c/f metastatic disease, largest R cerebellum w/mass effect on 4th.    Plan:  - admitted to [facility]  - EVD open at 20, abx while in place; transduce hourly and document hourly output; M/Th CSF while remains in place starting 7/7  - Cont Dex 4q6 with Ppi  - AED per NCC  - recommend oncology consult; would appreciate opinion on life expectancy when able to comment  - infectious workup less concerning for abscesses final Cx pending  - OR tomorrow for SOC for tumor on 7/7 for resection of cerebellar mass; NPO midnight, chemical dvt ppx held, coags reordered  - medical management per NCC    Dispo: ongoing, OR Monday    Discussed with Dr. Ric Padilla MD  Neurosurgery  [facility] - Neuro Critical Care

## 2025-07-06 NOTE — ASSESSMENT & PLAN NOTE
68 y/o M brought to ED from [facility] Detox (hx of heroin and ETOH) for progressive weakness, gait disturbance, confusion, and decreased volume when speaking. CT head without contrast revealed multiple lesions of hypodensity with a hyperdense rim, notably in the left temporal lobe and right cerebellum. There were additional small hyperdensities in the right frontoparietal and left parasagittal parietal lobe. There is mass effect and partial effacement of the 4th ventricle secondary to the cerebellar lesion with developing hydrocephalus without MLS. CT of the chest, abdomen, and pelvis with IV contrast showed RUL mass with mediastinal adenopathy. Labs revealed Hep C and treponema antibodies were positive. Hep C PCR pending. Penicillin G was administered. A sepsis workup was completed and antibiotics were initiated. Intubated to get MRI. S/p EVD. Extubated 7/4/25 without difficulty.    Admit to [facility]  Q1h neuro checks, I&Os, and vitals   Daily CBC, CMP, Mag, Phos  NSGY following; plan for OR Monday  Steroids  Remainder of CSF studies pending  Received Penicillin G in ED  Zyprexa and phenobarbital for agitation/EtOH withdrawal  Tube feeds + FWF 100cc q4h   Folate/thiamine/MV  SBP <160   Prn labetalol, hydralazine   PRN Zofran 4mg q4h  SCDs; SQH  PT/OT/SLP

## 2025-07-06 NOTE — ASSESSMENT & PLAN NOTE
Assessment  71yo M with generalized weakness, falls, and paucity of speech presenting with multiple ring enhancing lesions on brain CT.    Imaging:  - CTH 7/1 showed multiple brain masses concerning for mets with surrounding edema, can't rule out abscesses. Mass in cerebellum with effacement of 4th ventricule outflow with concern for developing hydrocephalus  - MRI brain 7/1: non diagnostic due to motion  - CT CAP 7/1: spiculated R lung mass and lymph node adenopathy in chest and neck concerning for metastatic disease  - MRI Brain W/WO 7/2: multifocal enhancing lesions c/f metastatic disease, largest R cerebellum w/mass effect on 4th.    Plan:  - admitted to [facility]CC  - EVD open at 20, abx while in place; transduce hourly and document hourly output; M/Th CSF while remains in place starting 7/7  - Cont Dex 4q6 with Ppi  - AED per NCC  - recommend oncology consult; would appreciate opinion on life expectancy when able to comment  - infectious workup less concerning for abscesses final Cx pending  - tentatively scheduled for SOC for tumor on 7/7 for resection of cerebellar mass  - medical management per NCC    Dispo: ongoing, OR Monday    Discussed with Dr. Larios

## 2025-07-06 NOTE — PROGRESS NOTES
[facility]enson - Neuro Critical Care  Neurosurgery  Progress Note    Subjective:     History of Present Illness: Patient is 71yo M with generalized weakness, falls, and paucity of speech. 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 On exam, patient's voice is barely audible but patient appropriately answers questions and follows commands. Patient's brother states that patient had normal speech and gait when they last saw each other about 1 month ago.    Neurosurgery consulted due to multiple ring-enhancing lesions seen on CT brain.    Post-Op Info:  Procedure(s) (LRB):  CRANIOTOMY, SUBOCCIPITAL (Right)       Interval History: 7/5: extubated yesterday, oriented to self, agitated in restraints    Medications:  Continuous Infusions:   dexmedeTOMIDine (Precedex) infusion (titrating)  0-1 mcg/kg/hr Intravenous Continuous 13.49 mL/hr at 07/05/25 2046 0.7 mcg/kg/hr at 07/05/25 2046     Scheduled Meds:   ceFAZolin (Ancef) IV (PEDS and ADULTS)  1 g Intravenous Q8H    dexAMETHasone (Decadron) IV (PEDS and ADULTS)  4 mg Intravenous Q6H    famotidine  20 mg Per NG tube Daily    folic acid  1 mg Per NG tube Daily    heparin (porcine)  5,000 Units Subcutaneous Q8H    multivitamin  1 tablet Per NG tube Daily    mupirocin   Nasal BID    OLANZapine  20 mg Per NG tube BID    oxyCODONE  5 mg Per NG tube Q6H    PHENobarbitaL  100 mg Per NG tube Q8H    polyethylene glycol  17 g Per NG tube BID    senna-docusate  2 tablet Per NG tube BID    thiamine  100 mg Per NG tube Daily     PRN Meds:  Current Facility-Administered Medications:     acetaminophen, 650 mg, Per NG tube, Q6H PRN    bisacodyL, 10 mg, Rectal, Daily PRN    dextrose 50%, 12.5 g, Intravenous, PRN    glucagon (human recombinant), 1 mg, Intramuscular, PRN    hydrALAZINE, 10 mg, Intravenous, Q4H PRN    insulin aspart U-100, 0-5 Units, Subcutaneous, Q6H PRN    labetalol, 10 mg, Intravenous, Q4H PRN    ondansetron, 4 mg, Intravenous, Q4H PRN    potassium  bicarbonate, 35 mEq, Per NG tube, PRN    potassium bicarbonate, 50 mEq, Per NG tube, PRN    potassium bicarbonate, 60 mEq, Per NG tube, PRN    potassium, sodium phosphates, 2 packet, Per NG tube, PRN    potassium, sodium phosphates, 2 packet, Per NG tube, PRN    potassium, sodium phosphates, 2 packet, Per NG tube, PRN    sodium chloride 0.9%, 10 mL, Intravenous, PRN     Review of Systems  Objective:     Weight: 77.1 kg (169 lb 15.6 oz)  Body mass index is 22.43 kg/m².  Vital Signs (Most Recent):  Temp: 97.6 °F (36.4 °C) (07/05/25 1501)  Pulse: (!) 52 (07/05/25 1912)  Resp: 20 (07/05/25 1912)  BP: (!) 142/88 (07/05/25 1901)  SpO2: 100 % (07/05/25 1912) Vital Signs (24h Range):  Temp:  [96.6 °F (35.9 °C)-97.9 °F (36.6 °C)] 97.6 °F (36.4 °C)  Pulse:  [47-62] 52  Resp:  [9-36] 20  SpO2:  [91 %-100 %] 100 %  BP: (103-158)/(62-90) 142/88     Date 07/05/25 0700 - 07/06/25 0659   Shift 1261-6470 1435-0402 7615-8852 24 Hour Total   INTAKE   I.V.(mL/kg)  230.2(3)  230.2(3)   NG/ 180  420   Shift Total(mL/kg) 240(3.1) 410.2(5.3)  650.2(8.4)   OUTPUT   Urine(mL/kg/hr)  750  750   Drains 30 12  42   Shift Total(mL/kg) 30(0.4) 762(9.9)  792(10.3)   Weight (kg) 77.1 77.1 77.1 77.1                            NG/OG Tube 07/02/25 0522 Left nostril (Active)   Placement Check placement verified by x-ray;placement verified by distal tube length measurement 07/04/25 0505   Tolerance no signs/symptoms of discomfort 07/05/25 1901   Securement secured to nostril center 07/05/25 1901   Clamp Status/Tolerance unclamped;no nausea;no emesis;no abdominal distention;no restlessness 07/05/25 1901   Suction Setting/Drainage Method suction at the bedside 07/05/25 1901   Insertion Site Appearance no redness, warmth, tenderness, skin breakdown, drainage 07/05/25 1901   Drainage None 07/04/25 2101   Flush/Irrigation flushed w/;water;no resistance met 07/04/25 2101   Feeding Type continuous;by pump 07/05/25 1501   Feeding Action feeding continued  "07/05/25 1501   Current Rate (mL/hr) 30 mL/hr 07/05/25 0701   Goal Rate (mL/hr) 30 mL/hr 07/05/25 1901   Intake (mL) 30 mL 07/05/25 1901   Water Bolus (mL) 30 mL 07/03/25 1724   Formula Name Pivot 07/05/25 1901   Intake (mL) - Formula Tube Feeding 30 07/05/25 1901       Male External Urinary Catheter 07/02/25 0521 Large (Active)   Collection Container Urimeter 07/05/25 1901   Securement Method secured to top of thigh w/ adhesive device 07/05/25 1901   Skin no redness;no breakdown;skin barrier applied;penis/scrotum cleansed w/ soap and water 07/05/25 1901   Tolerance no signs/symptoms of discomfort 07/05/25 1901   Output (mL) 300 mL 07/05/25 1901   Catheter Change Date 07/03/25 07/03/25 1701   Catheter Change Time 1018 07/03/25 1701            ICP/Ventriculostomy 07/02/25 1409 Right (Active)   Level of Ventriculostomy (cm above) 20 07/04/25 1901   Status Open to drainage 07/05/25 1901   Site Assessment Dry 07/05/25 1901   Site Drainage No drainage 07/05/25 1901   Waveform normal waveform 07/05/25 0701   Output (mL) 2 mL 07/05/25 1901   CSF Color clear 07/05/25 1901   Dressing Status Dry;Intact;Old drainage 07/05/25 1901   Interventions HOB degrees;bed controls locked;zeroed 07/05/25 1901          Physical Exam         Neurosurgery Physical Exam    E3V3M5  On precedex  PERRL  Dysconjugate gaze  Ox2 WC  Int FC BUE, otherwise spont x4 AG     EVD patent with good waveform    Significant Labs:  Recent Labs   Lab 07/04/25  0208 07/05/25  0025   * 118*   * 138   K 4.2 3.9    107   CO2 21* 21*   BUN 47* 40*   CREATININE 1.7* 1.3   CALCIUM 8.3* 8.4*   MG 2.4 2.4     Recent Labs   Lab 07/04/25  0208 07/04/25  1452 07/05/25  0025   WBC 8.81 9.97 9.71   HGB 10.9* 11.7* 12.8*   HCT 32.6* 36.0* 38.6*    214 226     No results for input(s): "LABPT", "INR", "APTT" in the last 48 hours.  Microbiology Results (last 7 days)       Procedure Component Value Units Date/Time    Culture, Respiratory with Gram Stain " [[id number]] Collected: 07/03/25 1130    Order Status: Completed Specimen: Respiratory from Endotracheal Aspirate Updated: 07/05/25 0859     Respiratory Culture No Growth To Date     GRAM STAIN <10 Epithelial Cells/LPF      Rare WBC seen      No organisms seen    CSF culture [[id number]] Collected: 07/02/25 1808    Order Status: Completed Specimen: CSF (Spinal Fluid) from CSF Tap, Tube 3 Updated: 07/05/25 0700     CULTURE, CSF No Growth To Date     GRAM STAIN Cytospin indicates:      No WBCs      No organisms seen    Blood culture #1 **CANNOT BE ORDERED STAT** [[id number]]  (Normal) Collected: 07/01/25 1702    Order Status: Completed Specimen: Blood from Peripheral, Forearm, Left Updated: 07/05/25 0008     Blood Culture No Growth After 72 Hours    Blood culture #2 **CANNOT BE ORDERED STAT** [[id number]]  (Normal) Collected: 07/01/25 1702    Order Status: Completed Specimen: Blood from Peripheral, Lower Arm, Left Updated: 07/05/25 0008     Blood Culture No Growth After 72 Hours    Cryptococcal antigen, CSF [[id number]]  (Normal) Collected: 07/02/25 1808    Order Status: Completed Specimen: CSF (Spinal Fluid) from CSF Tap, Tube 3 Updated: 07/03/25 1458     Cryptococcal Antigen, CSF Negative    Afb Culture Stain [[id number]] Collected: 07/02/25 1808    Order Status: Completed Specimen: CSF (Spinal Fluid) from CSF Tap, Tube 3 Updated: 07/03/25 1346     ACID FAST STAIN  No acid fast bacilli seen    Gram stain [[id number]] Collected: 07/02/25 1808    Order Status: Canceled Specimen: CSF (Spinal Fluid) from CSF Tap, Tube 3 Updated: 07/03/25 0203    AFB Culture & Smear [[id number]] Collected: 07/02/25 1808    Order Status: Sent Specimen: CSF (Spinal Fluid) from CSF Tap, Tube 3 Updated: 07/03/25 0202    Influenza A & B by Molecular [[id number]]  (Normal) Collected: 07/01/25 1449    Order Status: Completed Specimen: Nasal Swab Updated: 07/01/25 1537     INFLUENZA A MOLECULAR Negative     INFLUENZA B MOLECULAR  Negative           All pertinent labs from the last 24 hours have been reviewed.    Significant Diagnostics:  CT: No results found in the last 24 hours.  MRI: No results found in the last 24 hours.  Assessment/Plan:     * Ataxia  Assessment  69yo M with generalized weakness, falls, and paucity of speech presenting with multiple ring enhancing lesions on brain CT.    Imaging:  - CTH 7/1 showed multiple brain masses concerning for mets with surrounding edema, can't rule out abscesses. Mass in cerebellum with effacement of 4th ventricule outflow with concern for developing hydrocephalus  - MRI brain 7/1: non diagnostic due to motion  - CT CAP 7/1: spiculated R lung mass and lymph node adenopathy in chest and neck concerning for metastatic disease  - MRI Brain W/WO 7/2: multifocal enhancing lesions c/f metastatic disease, largest R cerebellum w/mass effect on 4th.    Plan:  - admitted to [facility]  - EVD open at 20, abx while in place; transduce hourly and document hourly output; M/Th CSF while remains in place starting 7/7  - Cont Dex 4q6 with Ppi  - AED per [facility]  - recommend oncology consult; would appreciate opinion on life expectancy when able to comment  - infectious workup less concerning for abscesses final Cx pending  - tentatively scheduled for SOC for tumor on 7/7 for resection of cerebellar mass  - medical management per [facility]    Dispo: ongoing, OR Monday    Discussed with Dr. Ric Cortez MD  Neurosurgery  Alex kelli - Neuro Critical Care

## 2025-07-06 NOTE — EICU
Intervention Initiated From:  COR / DIAMANTEU    Alex intervened regarding:  Rounding (Video assessment)    VICU Night Rounds Checklist  24H Vital Sign Range:  Temp:  [97.1 °F (36.2 °C)-97.9 °F (36.6 °C)]   Pulse:  [47-62]   Resp:  [9-36]   BP: (103-158)/(62-90)   SpO2:  [91 %-100 %]     Video rounds and LDA reconciliation

## 2025-07-06 NOTE — PLAN OF CARE
"[facility] Care Plan  POC reviewed at 1800. Patient unable to verbalize understanding. Questions and concerns addressed. Will continue to monitor. See below and flowsheets for full assessment and VS info.     - Precedex gtt     Is this a stroke patient? no    Neuro:  Goldie Coma Scale  Best Eye Response: 3-->(E3) to speech  Best Motor Response: 5-->(M5) localizes pain  Best Verbal Response: 4-->(V4) confused  Goldie Coma Scale Score: 12  Assessment Qualifiers: No eye obstruction present  Pupil PERRLA: yes     24 hr Temp:  [98 °F (36.7 °C)-98.9 °F (37.2 °C)]     CV:   Rhythm: normal sinus rhythm  BP goals:   SBP < 160    MAP > 65    Resp:      Vent Mode: (S) Spont  Set Rate: 18 BPM  Oxygen Concentration (%): 40  Vt Set: 470 mL  PEEP/CPAP: (S) 5 cmH20  Pressure Support: (S) 10 cmH20    Plan: N/A    GI/:     Diet/Nutrition Received: NPO, tube feeding  Last Bowel Movement: 07/01/25  Voiding Characteristics: incontinence    Intake/Output Summary (Last 24 hours) at 7/6/2025 1749  Last data filed at 7/6/2025 1702  Gross per 24 hour   Intake 1742.9 ml   Output 1333 ml   Net 409.9 ml     Unmeasured Output  Unmeasured Urine Occurrence: 1    Labs/Accuchecks:  Recent Labs   Lab 07/06/25  0013   WBC 8.94   RBC 4.25*   HGB 13.1*   HCT 38.3*         Recent Labs   Lab 07/06/25  0013      K 4.1   CO2 21*   *   BUN 50*   CREATININE 1.3   ALKPHOS 69   ALT 18   AST 35   BILITOT 0.3      Recent Labs   Lab 07/06/25  0720   PROTIME 11.7   INR 1.1   APTT 24.9    No results for input(s): "CPK", "CPKMB", "TROPONINI", "MB" in the last 168 hours.    Electrolytes: N/A - electrolytes WDL  Accuchecks: Q6H    Gtts:   dexmedeTOMIDine (Precedex) infusion (titrating)  0-1 mcg/kg/hr Intravenous Continuous 19.28 mL/hr at 07/06/25 1702 1 mcg/kg/hr at 07/06/25 1702       LDA/Wounds:    Davi Risk Assessment  Sensory Perception: 3-->slightly limited  Moisture: 4-->rarely moist  Activity: 1-->bedfast  Mobility: 3-->slightly " limited  Nutrition: 2-->probably inadequate  Friction and Shear: 2-->potential problem  Davi Score: 15    Is your davi score 12 or less? no            Restraints: L and R mitten and soft wrist restraint, roll belt, L ankle   Restraint Order  Length of Order: Order good for next 24 hours or when removed.  Date that the current order will : 25  Time that the current order will : 154  Order Upon Application: Yes    [facility]

## 2025-07-07 ENCOUNTER — TELEPHONE (OUTPATIENT)
Dept: HEMATOLOGY/ONCOLOGY | Facility: CLINIC | Age: 70
End: 2025-07-07
Payer: MEDICARE

## 2025-07-07 ENCOUNTER — ANESTHESIA (OUTPATIENT)
Dept: SURGERY | Facility: HOSPITAL | Age: 70
End: 2025-07-07
Payer: MEDICARE

## 2025-07-07 DIAGNOSIS — G93.9 BRAIN LESION: Primary | ICD-10-CM

## 2025-07-07 LAB
ABSOLUTE EOSINOPHIL (OHS): 0 K/UL
ABSOLUTE EOSINOPHIL (OHS): 0.01 K/UL
ABSOLUTE MONOCYTE (OHS): 0.69 K/UL (ref 0.3–1)
ABSOLUTE MONOCYTE (OHS): 0.75 K/UL (ref 0.3–1)
ABSOLUTE NEUTROPHIL COUNT (OHS): 12.26 K/UL (ref 1.8–7.7)
ABSOLUTE NEUTROPHIL COUNT (OHS): 7.82 K/UL (ref 1.8–7.7)
ALBUMIN SERPL BCP-MCNC: 3.3 G/DL (ref 3.5–5.2)
ALP SERPL-CCNC: 70 UNIT/L (ref 40–150)
ALT SERPL W/O P-5'-P-CCNC: 17 UNIT/L (ref 10–44)
ANION GAP (OHS): 15 MMOL/L (ref 8–16)
ANION GAP (OHS): 9 MMOL/L (ref 8–16)
AST SERPL-CCNC: 36 UNIT/L (ref 11–45)
BACTERIA BLD CULT: NORMAL
BACTERIA BLD CULT: NORMAL
BACTERIA SPT CULT: NORMAL
BASOPHILS # BLD AUTO: 0.02 K/UL
BASOPHILS # BLD AUTO: 0.04 K/UL
BASOPHILS NFR BLD AUTO: 0.1 %
BASOPHILS NFR BLD AUTO: 0.4 %
BILIRUB SERPL-MCNC: 0.4 MG/DL (ref 0.1–1)
BUN SERPL-MCNC: 39 MG/DL (ref 8–23)
BUN SERPL-MCNC: 42 MG/DL (ref 8–23)
CALCIUM SERPL-MCNC: 8.4 MG/DL (ref 8.7–10.5)
CALCIUM SERPL-MCNC: 8.7 MG/DL (ref 8.7–10.5)
CHLORIDE SERPL-SCNC: 111 MMOL/L (ref 95–110)
CHLORIDE SERPL-SCNC: 113 MMOL/L (ref 95–110)
CLARITY CSF: ABNORMAL
CO2 SERPL-SCNC: 17 MMOL/L (ref 23–29)
CO2 SERPL-SCNC: 21 MMOL/L (ref 23–29)
COLOR CSF: ABNORMAL
CREAT SERPL-MCNC: 1.2 MG/DL (ref 0.5–1.4)
CREAT SERPL-MCNC: 1.2 MG/DL (ref 0.5–1.4)
CSF TUBE NUMBER (OHS): ABNORMAL
CSF TUBE NUMBER (OHS): ABNORMAL
ERYTHROCYTE [DISTWIDTH] IN BLOOD BY AUTOMATED COUNT: 13.4 % (ref 11.5–14.5)
ERYTHROCYTE [DISTWIDTH] IN BLOOD BY AUTOMATED COUNT: 13.7 % (ref 11.5–14.5)
FREEZE AND HOLD: NORMAL
GFR SERPLBLD CREATININE-BSD FMLA CKD-EPI: >60 ML/MIN/1.73/M2
GFR SERPLBLD CREATININE-BSD FMLA CKD-EPI: >60 ML/MIN/1.73/M2
GLUCOSE CSF-MCNC: 76 MG/DL (ref 40–70)
GLUCOSE SERPL-MCNC: 102 MG/DL (ref 70–110)
GLUCOSE SERPL-MCNC: 145 MG/DL (ref 70–110)
GRAM STN SPEC: NORMAL
HCT VFR BLD AUTO: 36.7 % (ref 40–54)
HCT VFR BLD AUTO: 38.9 % (ref 40–54)
HGB BLD-MCNC: 12 GM/DL (ref 14–18)
HGB BLD-MCNC: 13.2 GM/DL (ref 14–18)
IMM GRANULOCYTES # BLD AUTO: 0.04 K/UL (ref 0–0.04)
IMM GRANULOCYTES # BLD AUTO: 0.09 K/UL (ref 0–0.04)
IMM GRANULOCYTES NFR BLD AUTO: 0.4 % (ref 0–0.5)
IMM GRANULOCYTES NFR BLD AUTO: 0.7 % (ref 0–0.5)
JCPYV DNA CSF QL NAA+PROBE: NEGATIVE
LYMPHOCYTES # BLD AUTO: 0.59 K/UL (ref 1–4.8)
LYMPHOCYTES # BLD AUTO: 1 K/UL (ref 1–4.8)
M TB CMPLX DNA SPEC QL NAA+PROBE: NEGATIVE
MAGNESIUM SERPL-MCNC: 2.2 MG/DL (ref 1.6–2.6)
MCH RBC QN AUTO: 30.3 PG (ref 27–31)
MCH RBC QN AUTO: 31.1 PG (ref 27–31)
MCHC RBC AUTO-ENTMCNC: 32.7 G/DL (ref 32–36)
MCHC RBC AUTO-ENTMCNC: 33.9 G/DL (ref 32–36)
MCV RBC AUTO: 92 FL (ref 82–98)
MCV RBC AUTO: 93 FL (ref 82–98)
NUCLEATED RBC (/100WBC) (OHS): 0 /100 WBC
NUCLEATED RBC (/100WBC) (OHS): 0 /100 WBC
PHOSPHATE SERPL-MCNC: 3.3 MG/DL (ref 2.7–4.5)
PLATELET # BLD AUTO: 215 K/UL (ref 150–450)
PLATELET # BLD AUTO: 235 K/UL (ref 150–450)
PMV BLD AUTO: 9.5 FL (ref 9.2–12.9)
PMV BLD AUTO: 9.8 FL (ref 9.2–12.9)
POCT GLUCOSE: 112 MG/DL (ref 70–110)
POCT GLUCOSE: 124 MG/DL (ref 70–110)
POCT GLUCOSE: 131 MG/DL (ref 70–110)
POTASSIUM SERPL-SCNC: 4.2 MMOL/L (ref 3.5–5.1)
POTASSIUM SERPL-SCNC: 4.2 MMOL/L (ref 3.5–5.1)
PROT CSF-MCNC: 43 MG/DL (ref 15–40)
PROT SERPL-MCNC: 6.4 GM/DL (ref 6–8.4)
RBC # BLD AUTO: 3.96 M/UL (ref 4.6–6.2)
RBC # BLD AUTO: 4.24 M/UL (ref 4.6–6.2)
RBC # CSF: 5 /CU MM
REAGIN AB CSF QL: NON REACTIVE
RELATIVE EOSINOPHIL (OHS): 0 %
RELATIVE EOSINOPHIL (OHS): 0.1 %
RELATIVE LYMPHOCYTE (OHS): 10.4 % (ref 18–48)
RELATIVE LYMPHOCYTE (OHS): 4.3 % (ref 18–48)
RELATIVE MONOCYTE (OHS): 5.1 % (ref 4–15)
RELATIVE MONOCYTE (OHS): 7.8 % (ref 4–15)
RELATIVE NEUTROPHIL (OHS): 80.9 % (ref 38–73)
RELATIVE NEUTROPHIL (OHS): 89.8 % (ref 38–73)
SODIUM SERPL-SCNC: 143 MMOL/L (ref 136–145)
SODIUM SERPL-SCNC: 143 MMOL/L (ref 136–145)
SPECIMEN SOURCE: NORMAL
SPECIMEN VOL CSF: 5 ML
WBC # BLD AUTO: 13.65 K/UL (ref 3.9–12.7)
WBC # BLD AUTO: 9.66 K/UL (ref 3.9–12.7)
WBC # CSF: 3 /CU MM

## 2025-07-07 PROCEDURE — 87205 SMEAR GRAM STAIN: CPT | Performed by: STUDENT IN AN ORGANIZED HEALTH CARE EDUCATION/TRAINING PROGRAM

## 2025-07-07 PROCEDURE — 63600175 PHARM REV CODE 636 W HCPCS: Performed by: PSYCHIATRY & NEUROLOGY

## 2025-07-07 PROCEDURE — 61781 SCAN PROC CRANIAL INTRA: CPT | Mod: ,,, | Performed by: NEUROLOGICAL SURGERY

## 2025-07-07 PROCEDURE — 25000003 PHARM REV CODE 250

## 2025-07-07 PROCEDURE — 27800903 OPTIME MED/SURG SUP & DEVICES OTHER IMPLANTS: Performed by: NEUROLOGICAL SURGERY

## 2025-07-07 PROCEDURE — 37000008 HC ANESTHESIA 1ST 15 MINUTES: Performed by: NEUROLOGICAL SURGERY

## 2025-07-07 PROCEDURE — 94002 VENT MGMT INPAT INIT DAY: CPT

## 2025-07-07 PROCEDURE — 82945 GLUCOSE OTHER FLUID: CPT | Performed by: STUDENT IN AN ORGANIZED HEALTH CARE EDUCATION/TRAINING PROGRAM

## 2025-07-07 PROCEDURE — 88331 PATH CONSLTJ SURG 1 BLK 1SPC: CPT | Mod: 26,,, | Performed by: STUDENT IN AN ORGANIZED HEALTH CARE EDUCATION/TRAINING PROGRAM

## 2025-07-07 PROCEDURE — 89051 BODY FLUID CELL COUNT: CPT | Performed by: STUDENT IN AN ORGANIZED HEALTH CARE EDUCATION/TRAINING PROGRAM

## 2025-07-07 PROCEDURE — 99024 POSTOP FOLLOW-UP VISIT: CPT | Mod: GC,,, | Performed by: NEUROLOGICAL SURGERY

## 2025-07-07 PROCEDURE — 36000710: Performed by: NEUROLOGICAL SURGERY

## 2025-07-07 PROCEDURE — 61518 REMOVAL OF BRAIN LESION: CPT | Mod: ,,, | Performed by: NEUROLOGICAL SURGERY

## 2025-07-07 PROCEDURE — 85025 COMPLETE CBC W/AUTO DIFF WBC: CPT | Performed by: STUDENT IN AN ORGANIZED HEALTH CARE EDUCATION/TRAINING PROGRAM

## 2025-07-07 PROCEDURE — 99000 SPECIMEN HANDLING OFFICE-LAB: CPT | Performed by: STUDENT IN AN ORGANIZED HEALTH CARE EDUCATION/TRAINING PROGRAM

## 2025-07-07 PROCEDURE — 85025 COMPLETE CBC W/AUTO DIFF WBC: CPT

## 2025-07-07 PROCEDURE — 20000000 HC ICU ROOM

## 2025-07-07 PROCEDURE — C1713 ANCHOR/SCREW BN/BN,TIS/BN: HCPCS | Performed by: NEUROLOGICAL SURGERY

## 2025-07-07 PROCEDURE — 63600175 PHARM REV CODE 636 W HCPCS

## 2025-07-07 PROCEDURE — 94761 N-INVAS EAR/PLS OXIMETRY MLT: CPT

## 2025-07-07 PROCEDURE — 88341 IMHCHEM/IMCYTCHM EA ADD ANTB: CPT | Mod: TC | Performed by: NEUROLOGICAL SURGERY

## 2025-07-07 PROCEDURE — 5A1935Z RESPIRATORY VENTILATION, LESS THAN 24 CONSECUTIVE HOURS: ICD-10-PCS | Performed by: PSYCHIATRY & NEUROLOGY

## 2025-07-07 PROCEDURE — 25000003 PHARM REV CODE 250: Performed by: STUDENT IN AN ORGANIZED HEALTH CARE EDUCATION/TRAINING PROGRAM

## 2025-07-07 PROCEDURE — 99900035 HC TECH TIME PER 15 MIN (STAT)

## 2025-07-07 PROCEDURE — 27201423 OPTIME MED/SURG SUP & DEVICES STERILE SUPPLY: Performed by: NEUROLOGICAL SURGERY

## 2025-07-07 PROCEDURE — 63600175 PHARM REV CODE 636 W HCPCS: Performed by: NURSE ANESTHETIST, CERTIFIED REGISTERED

## 2025-07-07 PROCEDURE — 69990 MICROSURGERY ADD-ON: CPT | Mod: XU,,, | Performed by: NEUROLOGICAL SURGERY

## 2025-07-07 PROCEDURE — 99900026 HC AIRWAY MAINTENANCE (STAT)

## 2025-07-07 PROCEDURE — 25000003 PHARM REV CODE 250: Performed by: PSYCHIATRY & NEUROLOGY

## 2025-07-07 PROCEDURE — 88307 TISSUE EXAM BY PATHOLOGIST: CPT | Mod: 26,,, | Performed by: STUDENT IN AN ORGANIZED HEALTH CARE EDUCATION/TRAINING PROGRAM

## 2025-07-07 PROCEDURE — 63600175 PHARM REV CODE 636 W HCPCS: Performed by: NEUROLOGICAL SURGERY

## 2025-07-07 PROCEDURE — 63600175 PHARM REV CODE 636 W HCPCS: Performed by: ANESTHESIOLOGY

## 2025-07-07 PROCEDURE — 88342 IMHCHEM/IMCYTCHM 1ST ANTB: CPT | Mod: 26,,, | Performed by: STUDENT IN AN ORGANIZED HEALTH CARE EDUCATION/TRAINING PROGRAM

## 2025-07-07 PROCEDURE — 84157 ASSAY OF PROTEIN OTHER: CPT | Performed by: STUDENT IN AN ORGANIZED HEALTH CARE EDUCATION/TRAINING PROGRAM

## 2025-07-07 PROCEDURE — 88341 IMHCHEM/IMCYTCHM EA ADD ANTB: CPT | Mod: 26,,, | Performed by: STUDENT IN AN ORGANIZED HEALTH CARE EDUCATION/TRAINING PROGRAM

## 2025-07-07 PROCEDURE — 80053 COMPREHEN METABOLIC PANEL: CPT

## 2025-07-07 PROCEDURE — 88332 PATH CONSLTJ SURG EA ADD BLK: CPT | Mod: 26,,, | Performed by: STUDENT IN AN ORGANIZED HEALTH CARE EDUCATION/TRAINING PROGRAM

## 2025-07-07 PROCEDURE — 99291 CRITICAL CARE FIRST HOUR: CPT | Mod: ,,, | Performed by: PSYCHIATRY & NEUROLOGY

## 2025-07-07 PROCEDURE — 83735 ASSAY OF MAGNESIUM: CPT

## 2025-07-07 PROCEDURE — 86920 COMPATIBILITY TEST SPIN: CPT | Performed by: NEUROLOGICAL SURGERY

## 2025-07-07 PROCEDURE — 27100171 HC OXYGEN HIGH FLOW UP TO 24 HOURS

## 2025-07-07 PROCEDURE — 84100 ASSAY OF PHOSPHORUS: CPT

## 2025-07-07 PROCEDURE — 25000003 PHARM REV CODE 250: Performed by: NEUROLOGICAL SURGERY

## 2025-07-07 PROCEDURE — 36000711: Performed by: NEUROLOGICAL SURGERY

## 2025-07-07 PROCEDURE — 37000009 HC ANESTHESIA EA ADD 15 MINS: Performed by: NEUROLOGICAL SURGERY

## 2025-07-07 DEVICE — COLLAGEN DURA SUBSTITUTE MEMBRANE 2IN X 2IN (5CM X 5CM)
Type: IMPLANTABLE DEVICE | Site: CRANIAL | Status: FUNCTIONAL
Brand: DURAMATRIX

## 2025-07-07 DEVICE — PLATE BONE 6 HOLE DBL Y SHAPE: Type: IMPLANTABLE DEVICE | Site: CRANIAL | Status: FUNCTIONAL

## 2025-07-07 DEVICE — SCREW UN3 AXS SD 1.5X4MM: Type: IMPLANTABLE DEVICE | Site: CRANIAL | Status: FUNCTIONAL

## 2025-07-07 RX ORDER — NICARDIPINE HYDROCHLORIDE 0.2 MG/ML
INJECTION INTRAVENOUS CONTINUOUS PRN
Status: DISCONTINUED | OUTPATIENT
Start: 2025-07-07 | End: 2025-07-07

## 2025-07-07 RX ORDER — CLONIDINE HYDROCHLORIDE 0.1 MG/1
0.1 TABLET ORAL EVERY 8 HOURS
Status: DISCONTINUED | OUTPATIENT
Start: 2025-07-07 | End: 2025-07-09

## 2025-07-07 RX ORDER — OXYCODONE HYDROCHLORIDE 5 MG/1
5 TABLET ORAL EVERY 4 HOURS PRN
Refills: 0 | Status: DISCONTINUED | OUTPATIENT
Start: 2025-07-07 | End: 2025-07-11

## 2025-07-07 RX ORDER — PROPOFOL 10 MG/ML
INJECTION, EMULSION INTRAVENOUS
Status: COMPLETED
Start: 2025-07-07 | End: 2025-07-07

## 2025-07-07 RX ORDER — FENTANYL CITRATE 50 UG/ML
INJECTION, SOLUTION INTRAMUSCULAR; INTRAVENOUS
Status: DISCONTINUED | OUTPATIENT
Start: 2025-07-07 | End: 2025-07-07

## 2025-07-07 RX ORDER — CLONIDINE HYDROCHLORIDE 0.1 MG/1
0.1 TABLET ORAL EVERY 8 HOURS
Status: DISCONTINUED | OUTPATIENT
Start: 2025-07-07 | End: 2025-07-07

## 2025-07-07 RX ORDER — PHENYLEPHRINE HYDROCHLORIDE 10 MG/ML
INJECTION INTRAVENOUS
Status: DISCONTINUED | OUTPATIENT
Start: 2025-07-07 | End: 2025-07-07

## 2025-07-07 RX ORDER — CLONIDINE HYDROCHLORIDE 0.1 MG/1
0.1 TABLET ORAL 3 TIMES DAILY
Status: DISCONTINUED | OUTPATIENT
Start: 2025-07-07 | End: 2025-07-07

## 2025-07-07 RX ORDER — ACETAMINOPHEN 325 MG/1
650 TABLET ORAL EVERY 4 HOURS PRN
Status: DISCONTINUED | OUTPATIENT
Start: 2025-07-07 | End: 2025-07-22

## 2025-07-07 RX ORDER — NICARDIPINE HYDROCHLORIDE 2.5 MG/ML
INJECTION INTRAVENOUS
Status: DISCONTINUED | OUTPATIENT
Start: 2025-07-07 | End: 2025-07-07

## 2025-07-07 RX ORDER — LIDOCAINE HYDROCHLORIDE AND EPINEPHRINE 10; 10 UG/ML; MG/ML
INJECTION, SOLUTION INFILTRATION; PERINEURAL
Status: DISCONTINUED | OUTPATIENT
Start: 2025-07-07 | End: 2025-07-07 | Stop reason: HOSPADM

## 2025-07-07 RX ORDER — ROPIVACAINE HYDROCHLORIDE 5 MG/ML
INJECTION, SOLUTION EPIDURAL; INFILTRATION; PERINEURAL
Status: COMPLETED | OUTPATIENT
Start: 2025-07-07 | End: 2025-07-07

## 2025-07-07 RX ORDER — PROPOFOL 10 MG/ML
0-50 INJECTION, EMULSION INTRAVENOUS CONTINUOUS
Status: DISCONTINUED | OUTPATIENT
Start: 2025-07-07 | End: 2025-07-08

## 2025-07-07 RX ORDER — HEPARIN SODIUM 5000 [USP'U]/ML
5000 INJECTION, SOLUTION INTRAVENOUS; SUBCUTANEOUS EVERY 8 HOURS
Status: DISCONTINUED | OUTPATIENT
Start: 2025-07-07 | End: 2025-07-07

## 2025-07-07 RX ORDER — LIDOCAINE HYDROCHLORIDE 20 MG/ML
INJECTION, SOLUTION EPIDURAL; INFILTRATION; INTRACAUDAL; PERINEURAL
Status: DISCONTINUED | OUTPATIENT
Start: 2025-07-07 | End: 2025-07-07

## 2025-07-07 RX ORDER — PROPOFOL 10 MG/ML
VIAL (ML) INTRAVENOUS
Status: DISCONTINUED | OUTPATIENT
Start: 2025-07-07 | End: 2025-07-07

## 2025-07-07 RX ORDER — FAMOTIDINE 20 MG/1
20 TABLET, FILM COATED ORAL 2 TIMES DAILY
Status: DISCONTINUED | OUTPATIENT
Start: 2025-07-07 | End: 2025-07-22

## 2025-07-07 RX ORDER — LEVETIRACETAM 500 MG/5ML
INJECTION, SOLUTION, CONCENTRATE INTRAVENOUS
Status: DISCONTINUED | OUTPATIENT
Start: 2025-07-07 | End: 2025-07-07

## 2025-07-07 RX ORDER — ROCURONIUM BROMIDE 10 MG/ML
INJECTION, SOLUTION INTRAVENOUS
Status: DISCONTINUED | OUTPATIENT
Start: 2025-07-07 | End: 2025-07-07

## 2025-07-07 RX ORDER — MORPHINE SULFATE 2 MG/ML
2 INJECTION, SOLUTION INTRAMUSCULAR; INTRAVENOUS EVERY 4 HOURS PRN
Status: DISCONTINUED | OUTPATIENT
Start: 2025-07-07 | End: 2025-07-11

## 2025-07-07 RX ORDER — BACITRACIN ZINC 500 UNIT/G
OINTMENT (GRAM) TOPICAL
Status: DISCONTINUED | OUTPATIENT
Start: 2025-07-07 | End: 2025-07-07 | Stop reason: HOSPADM

## 2025-07-07 RX ORDER — MUPIROCIN 20 MG/G
OINTMENT TOPICAL 2 TIMES DAILY
Status: COMPLETED | OUTPATIENT
Start: 2025-07-07 | End: 2025-07-12

## 2025-07-07 RX ORDER — VANCOMYCIN HYDROCHLORIDE 1 G/20ML
INJECTION, POWDER, LYOPHILIZED, FOR SOLUTION INTRAVENOUS
Status: DISCONTINUED | OUTPATIENT
Start: 2025-07-07 | End: 2025-07-07 | Stop reason: HOSPADM

## 2025-07-07 RX ORDER — SODIUM CHLORIDE 9 MG/ML
INJECTION, SOLUTION INTRAVENOUS CONTINUOUS
Status: DISCONTINUED | OUTPATIENT
Start: 2025-07-07 | End: 2025-07-07

## 2025-07-07 RX ORDER — NICARDIPINE HYDROCHLORIDE 0.2 MG/ML
0-15 INJECTION INTRAVENOUS CONTINUOUS
Status: DISCONTINUED | OUTPATIENT
Start: 2025-07-07 | End: 2025-07-10

## 2025-07-07 RX ORDER — OXYCODONE HYDROCHLORIDE 5 MG/1
10 TABLET ORAL EVERY 4 HOURS PRN
Refills: 0 | Status: DISCONTINUED | OUTPATIENT
Start: 2025-07-07 | End: 2025-07-11

## 2025-07-07 RX ORDER — DEXMEDETOMIDINE HYDROCHLORIDE 100 UG/ML
INJECTION, SOLUTION INTRAVENOUS
Status: DISCONTINUED | OUTPATIENT
Start: 2025-07-07 | End: 2025-07-07

## 2025-07-07 RX ADMIN — MIDAZOLAM 2 MG: 1 INJECTION INTRAMUSCULAR; INTRAVENOUS at 02:07

## 2025-07-07 RX ADMIN — MUPIROCIN: 20 OINTMENT TOPICAL at 08:07

## 2025-07-07 RX ADMIN — PHENYLEPHRINE HYDROCHLORIDE 200 MCG: 10 INJECTION INTRAVENOUS at 03:07

## 2025-07-07 RX ADMIN — FOLIC ACID 1 MG: 1 TABLET ORAL at 08:07

## 2025-07-07 RX ADMIN — LEVETIRACETAM 1000 MG: 100 INJECTION, SOLUTION INTRAVENOUS at 12:07

## 2025-07-07 RX ADMIN — PHENYLEPHRINE HYDROCHLORIDE 100 MCG: 10 INJECTION INTRAVENOUS at 12:07

## 2025-07-07 RX ADMIN — ROPIVACAINE HYDROCHLORIDE 15 ML: 5 INJECTION EPIDURAL; INFILTRATION; PERINEURAL at 12:07

## 2025-07-07 RX ADMIN — PHENOBARBITAL 130 MG: 30 TABLET ORAL at 09:07

## 2025-07-07 RX ADMIN — OXYCODONE HYDROCHLORIDE 5 MG: 5 TABLET ORAL at 12:07

## 2025-07-07 RX ADMIN — SODIUM CHLORIDE: 0.9 INJECTION, SOLUTION INTRAVENOUS at 12:07

## 2025-07-07 RX ADMIN — CEFAZOLIN 1 G: 330 INJECTION, POWDER, FOR SOLUTION INTRAMUSCULAR; INTRAVENOUS at 12:07

## 2025-07-07 RX ADMIN — HYDRALAZINE HYDROCHLORIDE 10 MG: 20 INJECTION, SOLUTION INTRAMUSCULAR; INTRAVENOUS at 03:07

## 2025-07-07 RX ADMIN — FAMOTIDINE 20 MG: 20 TABLET, FILM COATED ORAL at 08:07

## 2025-07-07 RX ADMIN — MIDAZOLAM 2 MG: 1 INJECTION INTRAMUSCULAR; INTRAVENOUS at 11:07

## 2025-07-07 RX ADMIN — THERA TABS 1 TABLET: TAB at 08:07

## 2025-07-07 RX ADMIN — PROPOFOL 15 MCG/KG/MIN: 10 INJECTION, EMULSION INTRAVENOUS at 05:07

## 2025-07-07 RX ADMIN — ROCURONIUM BROMIDE 100 MG: 10 INJECTION, SOLUTION INTRAVENOUS at 11:07

## 2025-07-07 RX ADMIN — DEXMEDETOMIDINE 4 MCG: 100 INJECTION, SOLUTION, CONCENTRATE INTRAVENOUS at 02:07

## 2025-07-07 RX ADMIN — OXYCODONE HYDROCHLORIDE 5 MG: 5 TABLET ORAL at 05:07

## 2025-07-07 RX ADMIN — OLANZAPINE 20 MG: 5 TABLET, FILM COATED ORAL at 08:07

## 2025-07-07 RX ADMIN — FENTANYL CITRATE 25 MCG: 50 INJECTION, SOLUTION INTRAMUSCULAR; INTRAVENOUS at 11:07

## 2025-07-07 RX ADMIN — PHENYLEPHRINE HYDROCHLORIDE 0.75 MCG/KG/MIN: 10 INJECTION INTRAVENOUS at 12:07

## 2025-07-07 RX ADMIN — Medication 100 MG: at 08:07

## 2025-07-07 RX ADMIN — NICARDIPINE HYDROCHLORIDE 0.2 MCG: 25 INJECTION INTRAVENOUS at 04:07

## 2025-07-07 RX ADMIN — PROPOFOL 150 MG: 10 INJECTION, EMULSION INTRAVENOUS at 11:07

## 2025-07-07 RX ADMIN — SODIUM CHLORIDE 0.07 MCG/KG/MIN: 9 INJECTION, SOLUTION INTRAVENOUS at 12:07

## 2025-07-07 RX ADMIN — POLYETHYLENE GLYCOL 3350 17 G: 17 POWDER, FOR SOLUTION ORAL at 08:07

## 2025-07-07 RX ADMIN — SODIUM CHLORIDE: 9 INJECTION, SOLUTION INTRAVENOUS at 05:07

## 2025-07-07 RX ADMIN — LIDOCAINE HYDROCHLORIDE 100 MG: 20 INJECTION, SOLUTION EPIDURAL; INFILTRATION; INTRACAUDAL; PERINEURAL at 11:07

## 2025-07-07 RX ADMIN — CEFAZOLIN 1 G: 330 INJECTION, POWDER, FOR SOLUTION INTRAMUSCULAR; INTRAVENOUS at 08:07

## 2025-07-07 RX ADMIN — DEXAMETHASONE SODIUM PHOSPHATE 8 MG: 4 INJECTION INTRA-ARTICULAR; INTRALESIONAL; INTRAMUSCULAR; INTRAVENOUS; SOFT TISSUE at 11:07

## 2025-07-07 RX ADMIN — DEXMEDETOMIDINE HYDROCHLORIDE 1.3 MCG/KG/HR: 4 INJECTION INTRAVENOUS at 05:07

## 2025-07-07 RX ADMIN — DEXAMETHASONE SODIUM PHOSPHATE 4 MG: 4 INJECTION INTRA-ARTICULAR; INTRALESIONAL; INTRAMUSCULAR; INTRAVENOUS; SOFT TISSUE at 05:07

## 2025-07-07 RX ADMIN — ROCURONIUM BROMIDE 20 MG: 10 INJECTION, SOLUTION INTRAVENOUS at 12:07

## 2025-07-07 RX ADMIN — DEXAMETHASONE SODIUM PHOSPHATE 4 MG: 4 INJECTION INTRA-ARTICULAR; INTRALESIONAL; INTRAMUSCULAR; INTRAVENOUS; SOFT TISSUE at 11:07

## 2025-07-07 RX ADMIN — NICARDIPINE HYDROCHLORIDE 2.5 MG/HR: 0.2 INJECTION, SOLUTION INTRAVENOUS at 03:07

## 2025-07-07 RX ADMIN — PHENOBARBITAL 130 MG: 30 TABLET ORAL at 05:07

## 2025-07-07 RX ADMIN — ROCURONIUM BROMIDE 30 MG: 10 INJECTION, SOLUTION INTRAVENOUS at 01:07

## 2025-07-07 RX ADMIN — DEXMEDETOMIDINE HYDROCHLORIDE 1.2 MCG/KG/HR: 4 INJECTION INTRAVENOUS at 12:07

## 2025-07-07 RX ADMIN — PROPOFOL 50 MG: 10 INJECTION, EMULSION INTRAVENOUS at 11:07

## 2025-07-07 RX ADMIN — CEFAZOLIN 1 G: 330 INJECTION, POWDER, FOR SOLUTION INTRAMUSCULAR; INTRAVENOUS at 05:07

## 2025-07-07 RX ADMIN — CEFAZOLIN 2 G: 330 INJECTION, POWDER, FOR SOLUTION INTRAMUSCULAR; INTRAVENOUS at 12:07

## 2025-07-07 RX ADMIN — MIDAZOLAM 2 MG: 1 INJECTION INTRAMUSCULAR; INTRAVENOUS at 04:07

## 2025-07-07 RX ADMIN — ROCURONIUM BROMIDE 20 MG: 10 INJECTION, SOLUTION INTRAVENOUS at 02:07

## 2025-07-07 RX ADMIN — ROCURONIUM BROMIDE 20 MG: 10 INJECTION, SOLUTION INTRAVENOUS at 03:07

## 2025-07-07 RX ADMIN — SODIUM CHLORIDE, SODIUM GLUCONATE, SODIUM ACETATE, POTASSIUM CHLORIDE, MAGNESIUM CHLORIDE, SODIUM PHOSPHATE, DIBASIC, AND POTASSIUM PHOSPHATE: .53; .5; .37; .037; .03; .012; .00082 INJECTION, SOLUTION INTRAVENOUS at 11:07

## 2025-07-07 RX ADMIN — SENNOSIDES AND DOCUSATE SODIUM 2 TABLET: 50; 8.6 TABLET ORAL at 08:07

## 2025-07-07 RX ADMIN — DEXAMETHASONE SODIUM PHOSPHATE 4 MG: 4 INJECTION INTRA-ARTICULAR; INTRALESIONAL; INTRAMUSCULAR; INTRAVENOUS; SOFT TISSUE at 12:07

## 2025-07-07 RX ADMIN — CLONIDINE HYDROCHLORIDE 0.1 MG: 0.1 TABLET ORAL at 09:07

## 2025-07-07 RX ADMIN — KETAMINE HYDROCHLORIDE 2.5 MCG/KG/MIN: 100 INJECTION INTRAMUSCULAR; INTRAVENOUS at 06:07

## 2025-07-07 NOTE — ANESTHESIA PROCEDURE NOTES
Peripheral Block    Patient location during procedure: OR   Block not for primary anesthetic.  Reason for block: at surgeon's request and post-op pain management   Post-op Pain Location: bilateral scalp   Start time: 7/7/2025 12:18 PM  Timeout: 7/7/2025 12:17 PM   End time: 7/7/2025 12:25 PM    Staffing  Authorizing Provider: Theo Spear MD  Performing Provider: Theo Spear MD    Staffing  Other anesthesia staff: Lidia Merino MD  Performed by: Theo Spear MD  Authorized by: Theo Spear MD    Preanesthetic Checklist  Completed: patient identified, IV checked, site marked, risks and benefits discussed, surgical consent, monitors and equipment checked, pre-op evaluation and timeout performed  Peripheral Block  Patient position: supine  Prep: ChloraPrep  Patient monitoring: heart rate, cardiac monitor, continuous pulse ox, continuous capnometry and frequent blood pressure checks  Block type: auriculotemporal, lesser occipital, greater occipital, zygomaticotemporal, supratrochlear and supraorbital  Laterality: bilateral  Injection technique: single shot  Needle  Needle type: Quincke   Needle gauge: 25 G  Needle localization: anatomical landmarks     Assessment  Injection assessment: negative aspiration  Heart rate change: yes  Slow fractionated injection: yes    Medications:    Medications: ropivacaine (NAROPIN) injection 0.5% - Perineural   15 mL - 7/7/2025 12:20:00 PM    Additional Notes  40 ml 0.5% ropi with epi 1:300K and 40 mcg dexmedetomidine

## 2025-07-07 NOTE — EICU
Intervention Initiated From:  COR / DIAMANTEU    Alex intervened regarding:  Rounding (Video assessment)    VICU Night Rounds Checklist  24H Vital Sign Range:  Temp:  [98 °F (36.7 °C)-98.9 °F (37.2 °C)]   Pulse:  [48-79]   Resp:  [11-42]   BP: (108-177)/()   SpO2:  [85 %-100 %]     Video rounds and LDA reconciliation

## 2025-07-07 NOTE — SUBJECTIVE & OBJECTIVE
Interval History: NAEON. OR today for crani for tumor. ICP 5-13.    Medications:  Continuous Infusions:   dexmedeTOMIDine (Precedex) infusion (titrating)  0-1.4 mcg/kg/hr Intravenous Continuous 25.06 mL/hr at 07/07/25 0537 1.3 mcg/kg/hr at 07/07/25 0537     Scheduled Meds:   bisacodyL  10 mg Rectal Daily    ceFAZolin (Ancef) IV (PEDS and ADULTS)  1 g Intravenous Q8H    dexAMETHasone (Decadron) IV (PEDS and ADULTS)  4 mg Intravenous Q6H    famotidine  20 mg Per NG tube Daily    folic acid  1 mg Per NG tube Daily    multivitamin  1 tablet Per NG tube Daily    OLANZapine  20 mg Per NG tube BID    oxyCODONE  5 mg Per NG tube Q6H    PHENobarbitaL  130 mg Per NG tube Q8H    polyethylene glycol  17 g Per NG tube BID    senna-docusate  2 tablet Per NG tube BID    thiamine  100 mg Per NG tube Daily     PRN Meds:  Current Facility-Administered Medications:     acetaminophen, 650 mg, Per NG tube, Q6H PRN    dextrose 50%, 12.5 g, Intravenous, PRN    glucagon (human recombinant), 1 mg, Intramuscular, PRN    hydrALAZINE, 10 mg, Intravenous, Q4H PRN    insulin aspart U-100, 0-5 Units, Subcutaneous, Q6H PRN    labetalol, 10 mg, Intravenous, Q4H PRN    midazolam, 2 mg, Intravenous, Q2H PRN    ondansetron, 4 mg, Intravenous, Q4H PRN    potassium bicarbonate, 35 mEq, Per NG tube, PRN    potassium bicarbonate, 50 mEq, Per NG tube, PRN    potassium bicarbonate, 60 mEq, Per NG tube, PRN    potassium, sodium phosphates, 2 packet, Per NG tube, PRN    potassium, sodium phosphates, 2 packet, Per NG tube, PRN    potassium, sodium phosphates, 2 packet, Per NG tube, PRN    sodium chloride 0.9%, 10 mL, Intravenous, PRN     Review of Systems  Objective:     Weight: 81.1 kg (178 lb 14.4 oz)  Body mass index is 23.6 kg/m².  Vital Signs (Most Recent):  Temp: 98.7 °F (37.1 °C) (07/07/25 0302)  Pulse: 70 (07/07/25 0502)  Resp: 15 (07/07/25 0520)  BP: (!) 165/104 (07/07/25 0502)  SpO2: 99 % (07/07/25 0502) Vital Signs (24h Range):  Temp:  [98 °F (36.7  °C)-99 °F (37.2 °C)] 98.7 °F (37.1 °C)  Pulse:  [50-79] 70  Resp:  [11-42] 15  SpO2:  [85 %-100 %] 99 %  BP: (118-179)/() 165/104                              NG/OG Tube 07/02/25 0522 Left nostril (Active)   Placement Check placement verified by distal tube length measurement;placement verified by x-ray 07/07/25 0502   Tolerance no signs/symptoms of discomfort 07/07/25 0502   Securement secured to nostril center w/ adhesive device 07/07/25 0502   Clamp Status/Tolerance unclamped 07/07/25 0502   Suction Setting/Drainage Method suction at the bedside 07/07/25 0502   Insertion Site Appearance no redness, warmth, tenderness, skin breakdown, drainage 07/07/25 0502   Drainage None 07/07/25 0502   Flush/Irrigation flushed w/;water;no resistance met 07/07/25 0502   Feeding Type continuous;by pump 07/07/25 0502   Feeding Action feeding held 07/07/25 0502   Current Rate (mL/hr) 30 mL/hr 07/06/25 1902   Goal Rate (mL/hr) 30 mL/hr 07/06/25 1902   Intake (mL) 45 mL 07/06/25 1702   Water Bolus (mL) 100 mL 07/06/25 1702   Formula Name Pivot 07/07/25 0502   Intake (mL) - Formula Tube Feeding 30 07/06/25 1802       Male External Urinary Catheter 07/02/25 0521 Large (Active)   Collection Container Urimeter 07/07/25 0502   Securement Method secured to top of thigh w/ adhesive device 07/07/25 0502   Skin no redness;no breakdown 07/07/25 0502   Tolerance no signs/symptoms of discomfort 07/07/25 0502   Output (mL) 500 mL 07/06/25 0501   Catheter Change Date 07/06/25 07/07/25 0502   Catheter Change Time 0738 07/06/25 1502            ICP/Ventriculostomy 07/02/25 1409 Right (Active)   Level of Ventriculostomy (cm above) 20 07/06/25 1902   Status Open to drainage 07/07/25 0502   Site Assessment Clean;Dry 07/07/25 0502   Site Drainage No drainage 07/07/25 0502   Waveform normal waveform 07/06/25 2002   Output (mL) 9 mL 07/07/25 0502   CSF Color clear 07/07/25 0502   Dressing Status Clean;Dry;Intact 07/07/25 0502   Interventions bed  controls locked;HOB degrees;zeroed 07/07/25 0502          Physical Exam         Neurosurgery Physical Exam  E3V4M5  On precedex  PERRL  Dysconjugate gaze  Ox1  HARRISON spon AG  Followed commands BLE>BUE  BUE restraints     EVD patent with good waveform    Significant Labs:  Recent Labs   Lab 07/06/25  0013 07/07/25  0036    102    143   K 4.1 4.2   * 113*   CO2 21* 21*   BUN 50* 42*   CREATININE 1.3 1.2   CALCIUM 8.7 8.7   MG 2.5 2.2     Recent Labs   Lab 07/06/25  0013 07/07/25  0036   WBC 8.94 9.66   HGB 13.1* 12.0*   HCT 38.3* 36.7*    215     Recent Labs   Lab 07/06/25  0720   INR 1.1   APTT 24.9     Microbiology Results (last 7 days)       Procedure Component Value Units Date/Time    Gram stain [[id number]]     Order Status: Sent Specimen: CSF (Spinal Fluid) from CSF Tap, Tube 3     CSF culture [[id number]]     Order Status: Sent Specimen: CSF (Spinal Fluid) from CSF Tap, Tube 3     Blood culture #1 **CANNOT BE ORDERED STAT** [[id number]]  (Normal) Collected: 07/01/25 1702    Order Status: Completed Specimen: Blood from Peripheral, Forearm, Left Updated: 07/07/25 0000     Blood Culture No Growth After 5 Days    Blood culture #2 **CANNOT BE ORDERED STAT** [[id number]]  (Normal) Collected: 07/01/25 1702    Order Status: Completed Specimen: Blood from Peripheral, Lower Arm, Left Updated: 07/07/25 0000     Blood Culture No Growth After 5 Days    CSF culture [[id number]] Collected: 07/02/25 1808    Order Status: Completed Specimen: CSF (Spinal Fluid) from CSF Tap, Tube 3 Updated: 07/06/25 0655     CULTURE, CSF No Growth To Date     GRAM STAIN Cytospin indicates:      No WBCs      No organisms seen    Culture, Respiratory with Gram Stain [[id number]] Collected: 07/03/25 1130    Order Status: Completed Specimen: Respiratory from Endotracheal Aspirate Updated: 07/05/25 0859     Respiratory Culture No Growth To Date     GRAM STAIN <10 Epithelial Cells/LPF      Rare WBC seen      No organisms seen     Cryptococcal antigen, CSF [[id number]]  (Normal) Collected: 07/02/25 1808    Order Status: Completed Specimen: CSF (Spinal Fluid) from CSF Tap, Tube 3 Updated: 07/03/25 1458     Cryptococcal Antigen, CSF Negative    Afb Culture Stain [[id number]] Collected: 07/02/25 1808    Order Status: Completed Specimen: CSF (Spinal Fluid) from CSF Tap, Tube 3 Updated: 07/03/25 1346     ACID FAST STAIN  No acid fast bacilli seen    Gram stain [[id number]] Collected: 07/02/25 1808    Order Status: Canceled Specimen: CSF (Spinal Fluid) from CSF Tap, Tube 3 Updated: 07/03/25 0203    AFB Culture & Smear [[id number]] Collected: 07/02/25 1808    Order Status: Sent Specimen: CSF (Spinal Fluid) from CSF Tap, Tube 3 Updated: 07/03/25 0202    Influenza A & B by Molecular [[id number]]  (Normal) Collected: 07/01/25 1449    Order Status: Completed Specimen: Nasal Swab Updated: 07/01/25 1537     INFLUENZA A MOLECULAR Negative     INFLUENZA B MOLECULAR  Negative          All pertinent labs from the last 24 hours have been reviewed.    Significant Diagnostics:  I have reviewed all pertinent imaging results/findings within the past 24 hours.

## 2025-07-07 NOTE — PROGRESS NOTES
[facility]  Neurocritical Care  Progress Note    Admit Date: 7/1/2025  Service Date: 07/07/2025  Length of Stay: 6    Subjective:     Chief Complaint: Ataxia    History of Present Illness: 71 y/o M presenting from [facility] for generalized weakness for about a week now. History obtained from rehab nurse. She reported that he got to their facility about a month ago and, at baseline, walks with a cane and is alert and oriented. His nurse noticed that he has seemed weaker over the past week and over the past 3 days has had increased balance disturbance and gait issues. They also noticed that he has been speaking more softly in his speech is harder to understand. He has not had any infectious symptoms. He fell yesterday, unsure if he hit his head. CT head without contrast revealed multiple lesions of hypodensity with a hyperdense rim, notably in the left temporal lobe and right cerebellum. There were additional small hyperdensities in the right frontoparietal and left parasagittal parietal lobe. There is mass effect and partial effacement of the 4th ventricle secondary to the cerebellar lesion with developing hydrocephalus without MLS. CT of the chest, abdomen, and pelvis with IV contrast showed RUL mass with mediastinal adenopathy. Labs revealed Hep C and treponema antibodies were positive. Hep C PCR pending. Penicillin G was administered. A sepsis workup was completed and antibiotics were initiated. An MRI brain with and without contrast was ordered, but patient was unable to complete due to persistent movement after sedatives were administered. Prior to MRI, he was alert but drowsy. He was able to tell me his name, but told me he was 53 years old, the year was 2003, and he did not know the current place or reason for being here. He had generalized weakness but was slightly more weak on the right side. He followed simple commands, but did not attempt to follow more complex commands. He is admitted  to NCC with NSGY consult.    Hospital Course: 7/2/2025: MRI with significant motion artifact. Required presidex overnight secondary to agitation  7/3/2025: MRI favors neoplastic brain lesions. CSF studies pending. Started on Zyprexa and phenobarbital for agitation, R subclavian CVC placed for central access  7/4/2025: NAEON. Extubated without complications.  07/05/2025 NAEON. EVD @20. Adjusted oxycodone frequency q8. Increased zyprexa 20 BID. Precedex for agitation. Plan for biopsy Monday w/ NSGY.  07/06/2025 NAEON. Added on 100q4 FWF. Continues to require precedex for agitation. OR tomorrow.  07/07/2025: AF. NAEON. Exam stable. OR today for SOC crani for tumor resection. ICPs 0-14, 44cc output. PRN versed given for agitation. Precedex at 1.2. Hydral prn x1 for SBP >160. Remains in restraints.      Interval History:  see hospital course    Review of Systems   Unable to perform ROS: Acuity of condition       Objective:     Vitals:  Temp: 97.6 °F (36.4 °C)  Pulse: (!) 51  Rhythm: sinus bradycardia  BP: (!) 170/101  MAP (mmHg): 130  ICP Mean (mmHg): 14 mmHg  Resp: (!) 22  SpO2: 100 %    Temp  Min: 97.3 °F (36.3 °C)  Max: 99 °F (37.2 °C)  Pulse  Min: 51  Max: 79  BP  Min: 118/86  Max: 179/97  MAP (mmHg)  Min: 96  Max: 131  ICP Mean (mmHg)  Min: 0 mmHg  Max: 14 mmHg  Resp  Min: 12  Max: 42  SpO2  Min: 85 %  Max: 100 %    07/06 0701 - 07/07 0700  In: 1257.7 [I.V.:472.7]  Out: 44 [Drains:44]   Unmeasured Output  Unmeasured Urine Occurrence: 1        Physical Exam  Vitals and nursing note reviewed.   Constitutional:       General: He is not in acute distress.     Comments: Somnolent but arousable to pain. Soft restrains in place   HENT:      Head: Normocephalic.      Nose:      Comments: NGT secured in place     Mouth/Throat:      Mouth: Mucous membranes are moist.      Pharynx: Oropharynx is clear.   Eyes:      Extraocular Movements: Extraocular movements intact.      Pupils: Pupils are equal, round, and reactive to light.    Cardiovascular:      Rate and Rhythm: Bradycardia present.   Pulmonary:      Effort: Pulmonary effort is normal. No respiratory distress.      Comments: Equal breath sounds bilaterally.  Abdominal:      General: Abdomen is flat. There is no distension.      Palpations: Abdomen is soft.      Tenderness: There is no guarding or rebound.   Musculoskeletal:      Cervical back: Neck supple.      Right lower leg: No edema.      Left lower leg: No edema.   Skin:     General: Skin is warm and dry.      Capillary Refill: Capillary refill takes less than 2 seconds.   Neurological:      Comments:     E3V3M6  PERRL  Ox1-2  On precedex; comfortable appearing. Arousable to voice, moves all extremities to command.  Intermittently FC x4  BUE/LLE restraints, lap restraint   EVD patent with good waveform              Medications:  Continuous  dexmedeTOMIDine (Precedex) infusion (titrating), Last Rate: 1.3 mcg/kg/hr (07/07/25 1105)    Scheduled  bisacodyL, 10 mg, Daily  ceFAZolin (Ancef) IV (PEDS and ADULTS), 1 g, Q8H  cloNIDine, 0.1 mg, Q8H  dexAMETHasone (Decadron) IV (PEDS and ADULTS), 4 mg, Q6H  famotidine, 20 mg, BID  folic acid, 1 mg, Daily  multivitamin, 1 tablet, Daily  OLANZapine, 20 mg, BID  oxyCODONE, 5 mg, Q6H  PHENobarbitaL, 130 mg, Q8H  polyethylene glycol, 17 g, BID  senna-docusate, 2 tablet, BID  thiamine, 100 mg, Daily    PRN  acetaminophen, 650 mg, Q6H PRN  dextrose 50%, 12.5 g, PRN  glucagon (human recombinant), 1 mg, PRN  hydrALAZINE, 10 mg, Q4H PRN  insulin aspart U-100, 0-5 Units, Q6H PRN  labetalol, 10 mg, Q4H PRN  midazolam, 2 mg, Q2H PRN  ondansetron, 4 mg, Q4H PRN  potassium bicarbonate, 35 mEq, PRN  potassium bicarbonate, 50 mEq, PRN  potassium bicarbonate, 60 mEq, PRN  potassium, sodium phosphates, 2 packet, PRN  potassium, sodium phosphates, 2 packet, PRN  potassium, sodium phosphates, 2 packet, PRN  sodium chloride 0.9%, 10 mL, PRN      Today I personally reviewed pertinent medications,  lines/drains/airways, imaging, cardiology results, laboratory results, microbiology results,     Diet  Diet NPO    Assessment/Plan:     Neuro  Brain lesion  70 y/o M brought to ED from [facility] (hx of heroin and ETOH) for progressive weakness, gait disturbance, confusion, and decreased volume when speaking. CT head without contrast revealed multiple lesions of hypodensity with a hyperdense rim, notably in the left temporal lobe and right cerebellum. There were additional small hyperdensities in the right frontoparietal and left parasagittal parietal lobe. There is mass effect and partial effacement of the 4th ventricle secondary to the cerebellar lesion with developing hydrocephalus without MLS. CT of the chest, abdomen, and pelvis with IV contrast showed RUL mass with mediastinal adenopathy. Labs revealed Hep C and treponema antibodies were positive. Hep C PCR pending. Penicillin G was administered. A sepsis workup was completed and antibiotics were initiated. Intubated to get MRI. S/p EVD. Extubated 7/4/25 without difficulty.    Admit to [facility]  Q1h neuro checks, I&Os, and vitals   Daily CBC, CMP, Mag, Phos  NSGY following; plan for OR today for SOC craniotomy for tumor biopsy/resection  Dex 4q6, taper once able  EVD open at 20 per NSGY. Antibiotics while drain in place.   Remainder of CSF studies pending  Received Penicillin G in ED  Zyprexa and phenobarbital for agitation/EtOH withdrawal  Start clonidine 0.1 mg TID for agitation/opioid dependence  Tube feeds + FWF 100cc q4h - held for OR  Folate/thiamine/MV  SBP <160   Prn labetalol, hydralazine   PRN Zofran 4mg q4h  SCDs; SQH held for OR  PT/OT/SLP    Other  Hepatitis C antibody positive  PCR negative  No known history of Hep C per patient          The patient is being Prophylaxed for:  Venous Thromboembolism with: Mechanical  Stress Ulcer with: H2B  Ventilator Pneumonia with: not applicable    Activity Orders            Diet NPO: NPO starting at 07/07 0001     Turn patient starting at 07/03 1124    Elevate HOB Elevate HOB 30-45 degrees during feeding unless contraindicated starting at 07/03 0802    Elevate HOB starting at 07/01 2040    Straight Cath starting at 07/01 1925          Full Code    Vern Mobley MD  Neurocritical Care  [facility]

## 2025-07-07 NOTE — PROGRESS NOTES
[facility] Neuro Critical Care  Neurosurgery  Progress Note    Subjective:     History of Present Illness: Patient is 71yo M with generalized weakness, falls, and paucity of speech. 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 On exam, patient's voice is barely audible but patient appropriately answers questions and follows commands. Patient's brother states that patient had normal speech and gait when they last saw each other about 1 month ago.    Neurosurgery consulted due to multiple ring-enhancing lesions seen on CT brain.    Post-Op Info:  Procedure(s) (LRB):  CRANIOTOMY, SUBOCCIPITAL (Right)       Interval History: NAEON. OR today for crani for tumor. ICP 5-13.    Medications:  Continuous Infusions:   dexmedeTOMIDine (Precedex) infusion (titrating)  0-1.4 mcg/kg/hr Intravenous Continuous 25.06 mL/hr at 07/07/25 0537 1.3 mcg/kg/hr at 07/07/25 0537     Scheduled Meds:   bisacodyL  10 mg Rectal Daily    ceFAZolin (Ancef) IV (PEDS and ADULTS)  1 g Intravenous Q8H    dexAMETHasone (Decadron) IV (PEDS and ADULTS)  4 mg Intravenous Q6H    famotidine  20 mg Per NG tube Daily    folic acid  1 mg Per NG tube Daily    multivitamin  1 tablet Per NG tube Daily    OLANZapine  20 mg Per NG tube BID    oxyCODONE  5 mg Per NG tube Q6H    PHENobarbitaL  130 mg Per NG tube Q8H    polyethylene glycol  17 g Per NG tube BID    senna-docusate  2 tablet Per NG tube BID    thiamine  100 mg Per NG tube Daily     PRN Meds:  Current Facility-Administered Medications:     acetaminophen, 650 mg, Per NG tube, Q6H PRN    dextrose 50%, 12.5 g, Intravenous, PRN    glucagon (human recombinant), 1 mg, Intramuscular, PRN    hydrALAZINE, 10 mg, Intravenous, Q4H PRN    insulin aspart U-100, 0-5 Units, Subcutaneous, Q6H PRN    labetalol, 10 mg, Intravenous, Q4H PRN    midazolam, 2 mg, Intravenous, Q2H PRN    ondansetron, 4 mg, Intravenous, Q4H PRN    potassium bicarbonate, 35 mEq, Per NG tube, PRN    potassium bicarbonate,  50 mEq, Per NG tube, PRN    potassium bicarbonate, 60 mEq, Per NG tube, PRN    potassium, sodium phosphates, 2 packet, Per NG tube, PRN    potassium, sodium phosphates, 2 packet, Per NG tube, PRN    potassium, sodium phosphates, 2 packet, Per NG tube, PRN    sodium chloride 0.9%, 10 mL, Intravenous, PRN     Review of Systems  Objective:     Weight: 81.1 kg (178 lb 14.4 oz)  Body mass index is 23.6 kg/m².  Vital Signs (Most Recent):  Temp: 98.7 °F (37.1 °C) (07/07/25 0302)  Pulse: 70 (07/07/25 0502)  Resp: 15 (07/07/25 0520)  BP: (!) 165/104 (07/07/25 0502)  SpO2: 99 % (07/07/25 0502) Vital Signs (24h Range):  Temp:  [98 °F (36.7 °C)-99 °F (37.2 °C)] 98.7 °F (37.1 °C)  Pulse:  [50-79] 70  Resp:  [11-42] 15  SpO2:  [85 %-100 %] 99 %  BP: (118-179)/() 165/104                              NG/OG Tube 07/02/25 0522 Left nostril (Active)   Placement Check placement verified by distal tube length measurement;placement verified by x-ray 07/07/25 0502   Tolerance no signs/symptoms of discomfort 07/07/25 0502   Securement secured to nostril center w/ adhesive device 07/07/25 0502   Clamp Status/Tolerance unclamped 07/07/25 0502   Suction Setting/Drainage Method suction at the bedside 07/07/25 0502   Insertion Site Appearance no redness, warmth, tenderness, skin breakdown, drainage 07/07/25 0502   Drainage None 07/07/25 0502   Flush/Irrigation flushed w/;water;no resistance met 07/07/25 0502   Feeding Type continuous;by pump 07/07/25 0502   Feeding Action feeding held 07/07/25 0502   Current Rate (mL/hr) 30 mL/hr 07/06/25 1902   Goal Rate (mL/hr) 30 mL/hr 07/06/25 1902   Intake (mL) 45 mL 07/06/25 1702   Water Bolus (mL) 100 mL 07/06/25 1702   Formula Name Pivot 07/07/25 0502   Intake (mL) - Formula Tube Feeding 30 07/06/25 1802       Male External Urinary Catheter 07/02/25 0521 Large (Active)   Collection Container Urimeter 07/07/25 0502   Securement Method secured to top of thigh w/ adhesive device 07/07/25 0502   Skin  no redness;no breakdown 07/07/25 0502   Tolerance no signs/symptoms of discomfort 07/07/25 0502   Output (mL) 500 mL 07/06/25 0501   Catheter Change Date 07/06/25 07/07/25 0502   Catheter Change Time 0738 07/06/25 1502            ICP/Ventriculostomy 07/02/25 1409 Right (Active)   Level of Ventriculostomy (cm above) 20 07/06/25 1902   Status Open to drainage 07/07/25 0502   Site Assessment Clean;Dry 07/07/25 0502   Site Drainage No drainage 07/07/25 0502   Waveform normal waveform 07/06/25 2002   Output (mL) 9 mL 07/07/25 0502   CSF Color clear 07/07/25 0502   Dressing Status Clean;Dry;Intact 07/07/25 0502   Interventions bed controls locked;HOB degrees;zeroed 07/07/25 0502          Physical Exam         Neurosurgery Physical Exam  E3V4M5  On precedex  PERRL  Dysconjugate gaze  Ox1  HARRISON spon AG  Followed commands BLE>BUE  BUE restraints     EVD patent with good waveform    Significant Labs:  Recent Labs   Lab 07/06/25  0013 07/07/25  0036    102    143   K 4.1 4.2   * 113*   CO2 21* 21*   BUN 50* 42*   CREATININE 1.3 1.2   CALCIUM 8.7 8.7   MG 2.5 2.2     Recent Labs   Lab 07/06/25  0013 07/07/25  0036   WBC 8.94 9.66   HGB 13.1* 12.0*   HCT 38.3* 36.7*    215     Recent Labs   Lab 07/06/25  0720   INR 1.1   APTT 24.9     Microbiology Results (last 7 days)       Procedure Component Value Units Date/Time    Gram stain [[id number]]     Order Status: Sent Specimen: CSF (Spinal Fluid) from CSF Tap, Tube 3     CSF culture [[id number]]     Order Status: Sent Specimen: CSF (Spinal Fluid) from CSF Tap, Tube 3     Blood culture #1 **CANNOT BE ORDERED STAT** [[id number]]  (Normal) Collected: 07/01/25 1702    Order Status: Completed Specimen: Blood from Peripheral, Forearm, Left Updated: 07/07/25 0000     Blood Culture No Growth After 5 Days    Blood culture #2 **CANNOT BE ORDERED STAT** [[id number]]  (Normal) Collected: 07/01/25 1702    Order Status: Completed Specimen: Blood from Peripheral, Lower  Arm, Left Updated: 07/07/25 0000     Blood Culture No Growth After 5 Days    CSF culture [[id number]] Collected: 07/02/25 1808    Order Status: Completed Specimen: CSF (Spinal Fluid) from CSF Tap, Tube 3 Updated: 07/06/25 0655     CULTURE, CSF No Growth To Date     GRAM STAIN Cytospin indicates:      No WBCs      No organisms seen    Culture, Respiratory with Gram Stain [[id number]] Collected: 07/03/25 1130    Order Status: Completed Specimen: Respiratory from Endotracheal Aspirate Updated: 07/05/25 0859     Respiratory Culture No Growth To Date     GRAM STAIN <10 Epithelial Cells/LPF      Rare WBC seen      No organisms seen    Cryptococcal antigen, CSF [[id number]]  (Normal) Collected: 07/02/25 1808    Order Status: Completed Specimen: CSF (Spinal Fluid) from CSF Tap, Tube 3 Updated: 07/03/25 1458     Cryptococcal Antigen, CSF Negative    Afb Culture Stain [[id number]] Collected: 07/02/25 1808    Order Status: Completed Specimen: CSF (Spinal Fluid) from CSF Tap, Tube 3 Updated: 07/03/25 1346     ACID FAST STAIN  No acid fast bacilli seen    Gram stain [[id number]] Collected: 07/02/25 1808    Order Status: Canceled Specimen: CSF (Spinal Fluid) from CSF Tap, Tube 3 Updated: 07/03/25 0203    AFB Culture & Smear [[id number]] Collected: 07/02/25 1808    Order Status: Sent Specimen: CSF (Spinal Fluid) from CSF Tap, Tube 3 Updated: 07/03/25 0202    Influenza A & B by Molecular [[id number]]  (Normal) Collected: 07/01/25 1449    Order Status: Completed Specimen: Nasal Swab Updated: 07/01/25 1537     INFLUENZA A MOLECULAR Negative     INFLUENZA B MOLECULAR  Negative          All pertinent labs from the last 24 hours have been reviewed.    Significant Diagnostics:  I have reviewed all pertinent imaging results/findings within the past 24 hours.  Assessment/Plan:     * Ataxia  Assessment  71yo M with generalized weakness, falls, and paucity of speech presenting with multiple ring enhancing lesions on brain CT.    Imaging:  -  CTH 7/1 showed multiple brain masses concerning for mets with surrounding edema, can't rule out abscesses. Mass in cerebellum with effacement of 4th ventricule outflow with concern for developing hydrocephalus  - MRI brain 7/1: non diagnostic due to motion  - CT CAP 7/1: spiculated R lung mass and lymph node adenopathy in chest and neck concerning for metastatic disease  - MRI Brain W/WO 7/2: multifocal enhancing lesions c/f metastatic disease, largest R cerebellum w/mass effect on 4th.    Plan:  - admitted to [facility]  - EVD open at 20, abx while in place; transduce hourly and document hourly output; M/Th CSF while remains in place starting 7/7, sent this AM  - Cont Dex 4q6 with Ppi  - AED per NCC  - recommend oncology consult; would appreciate opinion on life expectancy when able to comment, will send path from OR today  - infectious workup less concerning for abscesses final Cx NGTD  - OR today for SOC for tumor on 7/7 for resection of cerebellar mass; NPO midnight, chemical dvt ppx held, coags WNL  - medical management per NCC    Dispo: ongoing, OR today    Discussed with Dr. Ric Rodriguez MD  Neurosurgery  [facility]

## 2025-07-07 NOTE — PT/OT/SLP PROGRESS
Speech Language Pathology  Discharge    Goran Carlos  MRN: [id number]    Patient not seen today secondary to pt to OR for crani. New orders needed post-op when pt medically appropriate.   7/7/2025

## 2025-07-07 NOTE — PLAN OF CARE
"[facility] Care Plan  POC reviewed with Lapwai Brown and family at 1700. Patient unable to verbalize understanding. Questions and concerns addressed. See below and flowsheets for full assessment and VS info.     -OR today  -Mojica placed in surgery  -Arterial line placed  -CTH completed  -MRI @ 0000   -EVD now open @ 15  -SBP goal now < 140  -Precedex D/C'd  -Propofol and ketamine initiated   -NS @ 100  -Family at bedside and updated        Is this a stroke patient? no    Neuro:  Goldie Coma Scale  Best Eye Response: 4-->(E4) spontaneous  Best Motor Response: 5-->(M5) localizes pain  Best Verbal Response: 1-->(V1) none  Hunter Coma Scale Score: 10  Assessment Qualifiers: Patient chemically sedated or paralyzed, Patient intubated  Pupil PERRLA: no     24 hr Temp:  [97.3 °F (36.3 °C)-99 °F (37.2 °C)]     CV:   Rhythm: sinus bradycardia  BP goals:   SBP < 140  MAP > 65    Resp:      Vent Mode: A/C  Set Rate: 18 BPM  Oxygen Concentration (%): 50  Vt Set: 470 mL  PEEP/CPAP: 5 cmH20  Pressure Support: (S) 10 cmH20    Plan: N/A    GI/:     Diet/Nutrition Received: NPO  Last Bowel Movement: 07/07/25  Voiding Characteristics: urethral catheter (bladder)    Intake/Output Summary (Last 24 hours) at 7/7/2025 1823  Last data filed at 7/7/2025 1805  Gross per 24 hour   Intake 848.37 ml   Output 476 ml   Net 372.37 ml     Unmeasured Output  Unmeasured Urine Occurrence: 1    Labs/Accuchecks:  Recent Labs   Lab 07/07/25  0036   WBC 9.66   RBC 3.96*   HGB 12.0*   HCT 36.7*         Recent Labs   Lab 07/07/25  0036      K 4.2   CO2 21*   *   BUN 42*   CREATININE 1.2   ALKPHOS 70   ALT 17   AST 36   BILITOT 0.4      Recent Labs   Lab 07/06/25  0720   PROTIME 11.7   INR 1.1   APTT 24.9    No results for input(s): "CPK", "CPKMB", "TROPONINI", "MB" in the last 168 hours.    Electrolytes: N/A - electrolytes WDL  Accuchecks: Q6H    Gtts:   0.9% NaCl   Intravenous Continuous 100 mL/hr at 07/07/25 1805 Rate Verify at 07/07/25 "     ketamine 5 mg/mL infusion (titrating)  0-20 mcg/kg/min Intravenous Continuous        propofoL  0-50 mcg/kg/min Intravenous Continuous 24.3 mL/hr at 25 50 mcg/kg/min at 25       LDA/Wounds:    Davi Risk Assessment  Sensory Perception: 3-->slightly limited  Moisture: 3-->occasionally moist  Activity: 1-->bedfast  Mobility: 3-->slightly limited  Nutrition: 2-->probably inadequate  Friction and Shear: 3-->no apparent problem  Davi Score: 15    Is your davi score 12 or less? no        Restraints:   Restraint Order  Length of Order: Order good for next 24 hours or when removed.  Date that the current order will : 25  Time that the current order will :   Order Upon Application: Yes

## 2025-07-07 NOTE — PLAN OF CARE
07/07/25 1500   Rounds   Attendance Provider;;Physical therapist   Discharge Plan A Rehab   Why the patient remains in the hospital Requires continued medical care   Transition of Care Barriers None     Ele Richmond MSW, LCSW  [facility]  Case Management Dept.

## 2025-07-07 NOTE — PLAN OF CARE
[facility]  Discharge Reassessment    Primary Care Provider: No primary care provider on file.    Expected Discharge Date: 7/8/2025    Reassessment (most recent)       Discharge Reassessment - 07/07/25 1500          Discharge Reassessment    Assessment Type Discharge Planning Reassessment     Did the patient's condition or plan change since previous assessment? Yes     Discharge Plan discussed with: Sibling     Name(s) and Number(s) Phillip Ramirez   (P)      Communicated CARA with patient/caregiver Yes (P)      Discharge Plan A Rehab (P)      Discharge Plan B Home with family (P)      DME Needed Upon Discharge  none (P)      Transition of Care Barriers None (P)      Why the patient remains in the hospital Requires continued medical care (P)         Post-Acute Status    Discharge Delays None known at this time (P)                    Discharge Plan A and Plan B have been determined by review of patient's clinical status, future medical and therapeutic needs, and coverage/benefits for post-acute care in coordination with multidisciplinary team members.    Ele Richmond MSW, LAURENW  [facility]  Case Management Dept.

## 2025-07-07 NOTE — PLAN OF CARE
Post Op Check    The patient was seen at bedside following the procedure. The patient was kept intubated per anesthesia, sedated. All questions and/or complaints were directly addressed at bedside by the NSGY resident. All post-operative management orders have been placed and the patient was signed out to bedside RN and NCC team.    Family updated via phone, spoke to daughter    EVD lowered to 15, will lower further to 10 if is low output.  CT now, MRI tonight  SBP <140  Continue Dex    Shruthi Willis MD  Neurosurgery  [facility]

## 2025-07-07 NOTE — ASSESSMENT & PLAN NOTE
68 y/o M brought to ED from [facility] Detox (hx of heroin and ETOH) for progressive weakness, gait disturbance, confusion, and decreased volume when speaking. CT head without contrast revealed multiple lesions of hypodensity with a hyperdense rim, notably in the left temporal lobe and right cerebellum. There were additional small hyperdensities in the right frontoparietal and left parasagittal parietal lobe. There is mass effect and partial effacement of the 4th ventricle secondary to the cerebellar lesion with developing hydrocephalus without MLS. CT of the chest, abdomen, and pelvis with IV contrast showed RUL mass with mediastinal adenopathy. Labs revealed Hep C and treponema antibodies were positive. Hep C PCR pending. Penicillin G was administered. A sepsis workup was completed and antibiotics were initiated. Intubated to get MRI. S/p EVD. Extubated 7/4/25 without difficulty.    Admit to [facility]  Q1h neuro checks, I&Os, and vitals   Daily CBC, CMP, Mag, Phos  NSGY following; plan for OR today for SOC craniotomy for tumor biopsy/resection  Dex 4q6, taper once able  EVD open at 20 per NSGY. Antibiotics while drain in place.   Remainder of CSF studies pending  Received Penicillin G in ED  Zyprexa and phenobarbital for agitation/EtOH withdrawal  Start clonidine 0.1 mg TID for agitation/opioid dependence  Tube feeds + FWF 100cc q4h - held for OR  Folate/thiamine/MV  SBP <160   Prn labetalol, hydralazine   PRN Zofran 4mg q4h  SCDs; SQH held for OR  PT/OT/SLP

## 2025-07-07 NOTE — ANESTHESIA PROCEDURE NOTES
Intubation    Date/Time: 7/7/2025 11:55 AM    Performed by: Lidia Merino MD  Authorized by: Theo Spear MD    Intubation:     Induction:  Intravenous    Intubated:  Postinduction    Mask Ventilation:  Easy with oral airway    Attempts:  1    Attempted By:  Resident anesthesiologist    Method of Intubation:  Video laryngoscopy    Blade:  Rooney 3    Laryngeal View Grade: Grade I - full view of cords      Difficult Airway Encountered?: No      Complications:  None    Airway Device:  Oral endotracheal tube    Airway Device Size:  7.5    Style/Cuff Inflation:  Cuffed (inflated to minimal occlusive pressure)    Tube secured:  24    Secured at:  The lips    Placement Verified By:  Capnometry    Complicating Factors:  None    Findings Post-Intubation:  Atraumatic/condition of teeth unchanged and BS equal bilateral

## 2025-07-07 NOTE — TRANSFER OF CARE
"Anesthesia Transfer of Care Note    Patient: Goran Carlos    Procedure(s) Performed: Procedure(s) (LRB):  CRANIOTOMY, SUBOCCIPITAL (Right)    Patient location: ICU    Anesthesia Type: general    Transport from OR: Transported from OR intubated on 100% O2 by AMBU with adequate controlled ventilation. Upon arrival to PACU/ICU, patient attached to ventilator and auscultated to confirm bilateral breath sounds and adequate TV. Continuous SpO2 monitoring in transport. Continuous ECG monitoring in transport. Continuos invasive BP monitoring in transport    Post pain: adequate analgesia    Post assessment: no apparent anesthetic complications    Post vital signs: stable    Level of consciousness: sedated    Nausea/Vomiting: no nausea/vomiting    Complications: none    Transfer of care protocol was followed      Last vitals: Visit Vitals  BP (!) 170/101   Pulse (!) 114   Temp 36.4 °C (97.6 °F) (Axillary)   Resp 18   Ht 6' 1" (1.854 m)   Wt 81.1 kg (178 lb 14.4 oz)   SpO2 100%   BMI 23.60 kg/m²     "

## 2025-07-07 NOTE — BRIEF OP NOTE
[facility]  Surgery Department  Operative Note    SUMMARY     Date of Procedure: 7/7/2025     Procedure: Procedure(s) (LRB):  CRANIOTOMY, SUBOCCIPITAL (Right)     Surgeons and Role:     * Blane Larios DO - Primary    Assisting Surgeon: None    Pre-Operative Diagnosis: Brain mass [G93.89]    Post-Operative Diagnosis: Post-Op Diagnosis Codes:     * Brain mass [G93.89]    Anesthesia: General    Operative Findings (including complications, if any): R suboccipital craniotomy for tumor resection, frozen/permanent c/w metastatic adenocarcinoma, dural closure w/dural onlay/inlay, autologous cranioplasty. Closed w/monocryl    Description of Technical Procedures: see full op note    Significant Surgical Tasks Conducted by the Assistant(s), if Applicable: N/A    Estimated Blood Loss (EBL): * No values recorded between 7/7/2025 11:28 AM and 7/7/2025  4:05 PM *           Implants:   Implant Name Type Inv. Item Serial No.  Lot No. LRB No. Used Action   PINS SKULL ADULT [place] - [id number]  PINS SKULL ADULT [place]  [employer] [id number]  1 Implanted and Explanted   Dura Matrix 2x2    COLLAGEN [employer] [id number]  1 Implanted   PLATE BONE 6 HOLE DBL Y SHAPE - [id number]  PLATE BONE 6 HOLE DBL Y SHAPE  [employer] MARY.  Right 3 Implanted   SCREW UN3 AXS SD 1.5X4MM - [id number]  SCREW UN3 AXS SD 1.5X4MM  [employer] MARY.  Right 12 Implanted       Specimens:   Specimen (24h ago, onward)       Start     Ordered    07/07/25 4667  Specimen to Pathology Neurosurgery  RELEASE UPON ORDERING        References:    Click here for ordering Quick Tip   Question:  Release to patient  Answer:  Immediate    07/07/25 4649                   * No specimens in log *           Condition: Stable    Disposition: ICU - extubated and stable.    Attestation: Op Note Attestation: The attending physician was present for the entire procedure.

## 2025-07-07 NOTE — PLAN OF CARE
PLAN OF CARE      69 y.o. male with a PMH of alcohol abuse presented from [facility] detox and admitted 07/01 to [facility] for generalized weakness, balance and gait issues. CT head showing multiple brain masses concerning for mets with surrounding edema. Mass in cerebellum with effacement of 4th ventricule outflow with concern for developing hydrocephalus. CT CAP 7/1: spiculated R lung mass and lymph node adenopathy in chest and neck concerning for metastatic disease. Pt had a EVD placed 07/02 and is currently in the OR for craniotomy and tumor resection. We will follow up on biopsy results. Recommend consult to radiation oncology.    We will continue to follow    Discussed with Dr Francia Jara M.D  Hematology and Oncology Fellow

## 2025-07-07 NOTE — PT/OT/SLP DISCHARGE
Occupational Therapy Discharge Summary    Goran Carlos  MRN: [id number]   Principal Problem: Ataxia      Patient Discharged from acute Occupational Therapy on 7/7/25.  Please refer to prior OT note dated 7/3/25 for functional status.    Assessment:      Patient LEIGHA for crani for tumor resection. OT orders discharged d/t surgery. Please re-consult for continued OT services once medically appropriate.     Objective:     GOALS:   Multidisciplinary Problems       Occupational Therapy Goals          Problem: Occupational Therapy    Goal Priority Disciplines Outcome Interventions   Occupational Therapy Goal     OT, PT/OT Progressing    Description: Goals set 7/3 with an expiration date, 7/30:  Patient will increase functional independence with ADLs by performing:    Patient will demonstrate rolling to the right with min assist.  Not met   Patient will demonstrate rolling to the left with min assist.   Not met  Patient will demonstrate supine -sit with min  assist.   Not met  Patient will demonstrate stand pivot transfers with mod assist.   Not met  Patient will demonstrate grooming while seated with min assist.   Not met  Patient will demonstrate upper body dressing with min assist while seated EOB.   Not met  Patient will demonstrate lower body dressing with mod assist while seated EOB.   Not met  Patient will demonstrate toileting with mod assist.   Not met  Patient will demonstrate bathing while seated EOB with mod assist.   Not met  Patient's family / caregiver will demonstrate independence and safety with assisting patient with self-care skills and functional mobility.     Not met  Patient's family / caregiver will demonstrate independence with providing ROM and changes in bed positioning.   Not met  Patient and/or patient's family will verbalize understanding of stroke prevention guidelines, personal risk factors and stroke warning signs via teachback method.  Not met                                Reasons for  Discontinuation of Therapy Services  Surgery       Plan:     Patient Discharged to: OR    7/7/2025

## 2025-07-07 NOTE — ASSESSMENT & PLAN NOTE
Assessment  71yo M with generalized weakness, falls, and paucity of speech presenting with multiple ring enhancing lesions on brain CT.    Imaging:  - CTH 7/1 showed multiple brain masses concerning for mets with surrounding edema, can't rule out abscesses. Mass in cerebellum with effacement of 4th ventricule outflow with concern for developing hydrocephalus  - MRI brain 7/1: non diagnostic due to motion  - CT CAP 7/1: spiculated R lung mass and lymph node adenopathy in chest and neck concerning for metastatic disease  - MRI Brain W/WO 7/2: multifocal enhancing lesions c/f metastatic disease, largest R cerebellum w/mass effect on 4th.    Plan:  - admitted to [facility]  - EVD open at 20, abx while in place; transduce hourly and document hourly output; M/Th CSF while remains in place starting 7/7, sent this AM  - Cont Dex 4q6 with Ppi  - AED per NCC  - recommend oncology consult; would appreciate opinion on life expectancy when able to comment, will send path from OR today  - infectious workup less concerning for abscesses final Cx NGTD  - OR today for SOC for tumor on 7/7 for resection of cerebellar mass; NPO midnight, chemical dvt ppx held, coags WNL  - medical management per NCC    Dispo: ongoing, OR today    Discussed with Dr. Larios

## 2025-07-07 NOTE — EICU
Virtual ICU Quality Rounds    Admit Date: 7/1/2025  Hospital Day: 6    ICU Day: 5d 13h    24H Vital Sign Range:  Temp:  [97.3 °F (36.3 °C)-99 °F (37.2 °C)]   Pulse:  [52-79]   Resp:  [12-42]   BP: (118-179)/()   SpO2:  [85 %-100 %]     VICU Surveillance Screening    Daily review of  line necessity(optional): Central line(s) in place    Central line type (optional): Triple lumen catheter    Central line indications : Poor IV access        LDA reconciliation : Yes

## 2025-07-07 NOTE — PT/OT/SLP DISCHARGE
Physical Therapy Discharge Summary    Name: Goran Carlos  MRN: [id number]   Principal Problem: Ataxia     Patient Discharged from acute Physical Therapy on 25.  Please refer to prior PT noted date on 25 for functional status.     Assessment:     Pt currently in OR with NSGY, will require new orders when medically stable to resume participation in PT tx.    Objective:     GOALS:   Multidisciplinary Problems       Physical Therapy Goals          Problem: Physical Therapy    Goal Priority Disciplines Outcome Interventions   Physical Therapy Goal     PT, PT/OT Progressing    Description: Goals to be met by: 2025     Patient will increase functional independence with mobility by performin. Supine to sit with Stand-by Assistance  2. Sit to supine with Stand-by Assistance  3. Sit to stand transfer with Stand-by Assistance  4. Bed to chair transfer with Stand-by Assistance using LRAD  5. Gait  x 100 feet with Stand-by Assistance using LRAD.   6. Lower extremity exercise program x15 reps per handout, with supervision                         Reasons for Discontinuation of Therapy Services  surgery      Plan:     Patient Discharged to: OR.      2025

## 2025-07-07 NOTE — PLAN OF CARE
"[facility] Care Plan    POC reviewed with Goran Carlos at 0300. Patient unable to verbalize understanding. Questions and concerns addressed. No acute events today. Pt progressing toward goals. Will continue to monitor. See below and flowsheets for full assessment and VS info.     - EVD open @20, ICP, output  - PRN versed given overnight. See MAR for details.  - Precedex gtt infusing @ 1.3mcg/kg/hr  - NPO @ MN for biopsy and resection today  - PRN hydralazine given x 1 for SBP >160. See MAR for details.  - CHG bath given, linens changed      Is this a stroke patient? no    Neuro:  [place] Coma Scale  Best Eye Response: 4-->(E4) spontaneous  Best Motor Response: 6-->(M6) obeys commands  Best Verbal Response: 4-->(V4) confused  Goldie Coma Scale Score: 14  Assessment Qualifiers: Patient not sedated/intubated  Pupil PERRLA: yes     24 hr Temp:  [98 °F (36.7 °C)-99 °F (37.2 °C)]     CV:   Rhythm: normal sinus rhythm  BP goals:   SBP < 160  MAP > 65    Resp:      Vent Mode: (S) Spont  Set Rate: 18 BPM  Oxygen Concentration (%): 40  Vt Set: 470 mL  PEEP/CPAP: (S) 5 cmH20  Pressure Support: (S) 10 cmH20    Plan: N/A    GI/:     Diet/Nutrition Received: tube feeding  Last Bowel Movement: 07/01/25  Voiding Characteristics: external catheter    Intake/Output Summary (Last 24 hours) at 7/7/2025 0547  Last data filed at 7/7/2025 0502  Gross per 24 hour   Intake 1259.51 ml   Output 544 ml   Net 715.51 ml     Unmeasured Output  Unmeasured Urine Occurrence: 1    Labs/Accuchecks:  Recent Labs   Lab 07/07/25  0036   WBC 9.66   RBC 3.96*   HGB 12.0*   HCT 36.7*         Recent Labs   Lab 07/07/25  0036      K 4.2   CO2 21*   *   BUN 42*   CREATININE 1.2   ALKPHOS 70   ALT 17   AST 36   BILITOT 0.4      Recent Labs   Lab 07/06/25  0720   PROTIME 11.7   INR 1.1   APTT 24.9    No results for input(s): "CPK", "CPKMB", "TROPONINI", "MB" in the last 168 hours.    Electrolytes: N/A - electrolytes WDL  Accuchecks: " Q6H    Gtts:   dexmedeTOMIDine (Precedex) infusion (titrating)  0-1.4 mcg/kg/hr Intravenous Continuous 25.06 mL/hr at 25 0537 1.3 mcg/kg/hr at 25 0537       LDA/Wounds:    Davi Risk Assessment  Sensory Perception: 3-->slightly limited  Moisture: 3-->occasionally moist  Activity: 1-->bedfast  Mobility: 3-->slightly limited  Nutrition: 3-->adequate  Friction and Shear: 2-->potential problem  Davi Score: 15  Is your davi score 12 or less? no          Restraints:   Restraint Order  Length of Order: Order good for next 24 hours or when removed.  Date that the current order will : 25  Time that the current order will :   Order Upon Application: Yes    [facility]

## 2025-07-07 NOTE — ANESTHESIA PROCEDURE NOTES
Arterial    Diagnosis: Brain mass    Patient location during procedure: done in OR    Staffing  Authorizing Provider: Theo Spear MD  Performing Provider: Lidia Merino MD    Staffing  Performed by: Lidia Merino MD  Authorized by: Theo Spear MD    Anesthesiologist was present at the time of the procedure.    Preanesthetic Checklist  Completed: patient identified, IV checked, site marked, risks and benefits discussed, surgical consent, monitors and equipment checked, pre-op evaluation, timeout performed and anesthesia consent givenArterial  Skin Prep: chlorhexidine gluconate and isopropyl alcohol  Orientation: left  Location: radial    Catheter Size: 20 G Insertion Attempts: 1  Assessment  Dressing: secured with tape and tegaderm  Patient: Tolerated well

## 2025-07-07 NOTE — TELEPHONE ENCOUNTER
Message routed to [facility] to schedule patient asap for Brain lesion Aniket FERREIRA

## 2025-07-07 NOTE — SUBJECTIVE & OBJECTIVE
Interval History:  see hospital course    Review of Systems   Unable to perform ROS: Acuity of condition       Objective:     Vitals:  Temp: 97.6 °F (36.4 °C)  Pulse: (!) 51  Rhythm: sinus bradycardia  BP: (!) 170/101  MAP (mmHg): 130  ICP Mean (mmHg): 14 mmHg  Resp: (!) 22  SpO2: 100 %    Temp  Min: 97.3 °F (36.3 °C)  Max: 99 °F (37.2 °C)  Pulse  Min: 51  Max: 79  BP  Min: 118/86  Max: 179/97  MAP (mmHg)  Min: 96  Max: 131  ICP Mean (mmHg)  Min: 0 mmHg  Max: 14 mmHg  Resp  Min: 12  Max: 42  SpO2  Min: 85 %  Max: 100 %    07/06 0701 - 07/07 0700  In: 1257.7 [I.V.:472.7]  Out: 44 [Drains:44]   Unmeasured Output  Unmeasured Urine Occurrence: 1        Physical Exam  Vitals and nursing note reviewed.   Constitutional:       General: He is not in acute distress.     Comments: Somnolent but arousable to pain. Soft restrains in place   HENT:      Head: Normocephalic.      Nose:      Comments: NGT secured in place     Mouth/Throat:      Mouth: Mucous membranes are moist.      Pharynx: Oropharynx is clear.   Eyes:      Extraocular Movements: Extraocular movements intact.      Pupils: Pupils are equal, round, and reactive to light.   Cardiovascular:      Rate and Rhythm: Bradycardia present.   Pulmonary:      Effort: Pulmonary effort is normal. No respiratory distress.      Comments: Equal breath sounds bilaterally.  Abdominal:      General: Abdomen is flat. There is no distension.      Palpations: Abdomen is soft.      Tenderness: There is no guarding or rebound.   Musculoskeletal:      Cervical back: Neck supple.      Right lower leg: No edema.      Left lower leg: No edema.   Skin:     General: Skin is warm and dry.      Capillary Refill: Capillary refill takes less than 2 seconds.   Neurological:      Comments:     [id number]  PERRL  Ox1-2  On precedex; comfortable appearing. Arousable to voice, moves all extremities to command.  Intermittently FC x4  BUE/LLE restraints, lap restraint   EVD patent with good waveform               Medications:  Continuous  dexmedeTOMIDine (Precedex) infusion (titrating), Last Rate: 1.3 mcg/kg/hr (07/07/25 1105)    Scheduled  bisacodyL, 10 mg, Daily  ceFAZolin (Ancef) IV (PEDS and ADULTS), 1 g, Q8H  cloNIDine, 0.1 mg, Q8H  dexAMETHasone (Decadron) IV (PEDS and ADULTS), 4 mg, Q6H  famotidine, 20 mg, BID  folic acid, 1 mg, Daily  multivitamin, 1 tablet, Daily  OLANZapine, 20 mg, BID  oxyCODONE, 5 mg, Q6H  PHENobarbitaL, 130 mg, Q8H  polyethylene glycol, 17 g, BID  senna-docusate, 2 tablet, BID  thiamine, 100 mg, Daily    PRN  acetaminophen, 650 mg, Q6H PRN  dextrose 50%, 12.5 g, PRN  glucagon (human recombinant), 1 mg, PRN  hydrALAZINE, 10 mg, Q4H PRN  insulin aspart U-100, 0-5 Units, Q6H PRN  labetalol, 10 mg, Q4H PRN  midazolam, 2 mg, Q2H PRN  ondansetron, 4 mg, Q4H PRN  potassium bicarbonate, 35 mEq, PRN  potassium bicarbonate, 50 mEq, PRN  potassium bicarbonate, 60 mEq, PRN  potassium, sodium phosphates, 2 packet, PRN  potassium, sodium phosphates, 2 packet, PRN  potassium, sodium phosphates, 2 packet, PRN  sodium chloride 0.9%, 10 mL, PRN      Today I personally reviewed pertinent medications, lines/drains/airways, imaging, cardiology results, laboratory results, microbiology results,     Diet  Diet NPO

## 2025-07-08 LAB
ABSOLUTE EOSINOPHIL (OHS): 0 K/UL
ABSOLUTE MONOCYTE (OHS): 0.78 K/UL (ref 0.3–1)
ABSOLUTE NEUTROPHIL COUNT (OHS): 12.05 K/UL (ref 1.8–7.7)
ALBUMIN SERPL BCP-MCNC: 3.5 G/DL (ref 3.5–5.2)
ALLENS TEST: ABNORMAL
ALP SERPL-CCNC: 71 UNIT/L (ref 40–150)
ALT SERPL W/O P-5'-P-CCNC: 16 UNIT/L (ref 10–44)
ANION GAP (OHS): 11 MMOL/L (ref 8–16)
AST SERPL-CCNC: 36 UNIT/L (ref 11–45)
BACTERIA CSF CULT: NO GROWTH
BASOPHILS # BLD AUTO: 0.01 K/UL
BASOPHILS NFR BLD AUTO: 0.1 %
BILIRUB SERPL-MCNC: 0.3 MG/DL (ref 0.1–1)
BUN SERPL-MCNC: 36 MG/DL (ref 8–23)
CA-I BLD-SCNC: 1.11 MMOL/L (ref 1.06–1.42)
CALCIUM SERPL-MCNC: 8.2 MG/DL (ref 8.7–10.5)
CHLORIDE SERPL-SCNC: 113 MMOL/L (ref 95–110)
CO2 SERPL-SCNC: 18 MMOL/L (ref 23–29)
CREAT SERPL-MCNC: 1.2 MG/DL (ref 0.5–1.4)
DELSYS: ABNORMAL
ERYTHROCYTE [DISTWIDTH] IN BLOOD BY AUTOMATED COUNT: 13.7 % (ref 11.5–14.5)
ERYTHROCYTE [SEDIMENTATION RATE] IN BLOOD BY WESTERGREN METHOD: 18 MM/H
FIO2: 100
GFR SERPLBLD CREATININE-BSD FMLA CKD-EPI: >60 ML/MIN/1.73/M2
GLUCOSE SERPL-MCNC: 128 MG/DL (ref 70–110)
GRAM STN SPEC: NORMAL
HCO3 UR-SCNC: 21.5 MMOL/L (ref 24–28)
HCT VFR BLD AUTO: 34.6 % (ref 40–54)
HGB BLD-MCNC: 11.4 GM/DL (ref 14–18)
IMM GRANULOCYTES # BLD AUTO: 0.04 K/UL (ref 0–0.04)
IMM GRANULOCYTES NFR BLD AUTO: 0.3 % (ref 0–0.5)
LYMPHOCYTES # BLD AUTO: 0.46 K/UL (ref 1–4.8)
MAGNESIUM SERPL-MCNC: 2.2 MG/DL (ref 1.6–2.6)
MCH RBC QN AUTO: 30.5 PG (ref 27–31)
MCHC RBC AUTO-ENTMCNC: 32.9 G/DL (ref 32–36)
MCV RBC AUTO: 93 FL (ref 82–98)
MIN VOL: 11.8
MODE: ABNORMAL
NUCLEATED RBC (/100WBC) (OHS): 0 /100 WBC
PCO2 BLDA: 31.1 MMHG (ref 35–45)
PEEP: 5
PH SMN: 7.45 [PH] (ref 7.35–7.45)
PHOSPHATE SERPL-MCNC: 3 MG/DL (ref 2.7–4.5)
PIP: 18
PLATELET # BLD AUTO: 195 K/UL (ref 150–450)
PMV BLD AUTO: 9.6 FL (ref 9.2–12.9)
PO2 BLDA: 458 MMHG (ref 80–100)
POC BE: -3 MMOL/L (ref -2–2)
POC SATURATED O2: 100 % (ref 95–100)
POC TCO2: 22 MMOL/L (ref 23–27)
POCT GLUCOSE: 126 MG/DL (ref 70–110)
POCT GLUCOSE: 162 MG/DL (ref 70–110)
POTASSIUM SERPL-SCNC: 4.1 MMOL/L (ref 3.5–5.1)
PROT SERPL-MCNC: 6.6 GM/DL (ref 6–8.4)
RBC # BLD AUTO: 3.74 M/UL (ref 4.6–6.2)
RELATIVE EOSINOPHIL (OHS): 0 %
RELATIVE LYMPHOCYTE (OHS): 3.4 % (ref 18–48)
RELATIVE MONOCYTE (OHS): 5.8 % (ref 4–15)
RELATIVE NEUTROPHIL (OHS): 90.4 % (ref 38–73)
SAMPLE: ABNORMAL
SITE: ABNORMAL
SODIUM SERPL-SCNC: 142 MMOL/L (ref 136–145)
SP02: 100
VT: 470
W VITAMIN B1: 123 UG/L
WBC # BLD AUTO: 13.34 K/UL (ref 3.9–12.7)

## 2025-07-08 PROCEDURE — 94003 VENT MGMT INPAT SUBQ DAY: CPT

## 2025-07-08 PROCEDURE — 63600175 PHARM REV CODE 636 W HCPCS

## 2025-07-08 PROCEDURE — 84100 ASSAY OF PHOSPHORUS: CPT

## 2025-07-08 PROCEDURE — 94761 N-INVAS EAR/PLS OXIMETRY MLT: CPT

## 2025-07-08 PROCEDURE — 27100171 HC OXYGEN HIGH FLOW UP TO 24 HOURS

## 2025-07-08 PROCEDURE — 99291 CRITICAL CARE FIRST HOUR: CPT | Mod: ,,, | Performed by: PSYCHIATRY & NEUROLOGY

## 2025-07-08 PROCEDURE — 27201037 HC PRESSURE MONITORING SET UP

## 2025-07-08 PROCEDURE — 00BC0ZZ EXCISION OF CEREBELLUM, OPEN APPROACH: ICD-10-PCS | Performed by: NEUROLOGICAL SURGERY

## 2025-07-08 PROCEDURE — 85025 COMPLETE CBC W/AUTO DIFF WBC: CPT | Performed by: STUDENT IN AN ORGANIZED HEALTH CARE EDUCATION/TRAINING PROGRAM

## 2025-07-08 PROCEDURE — 82330 ASSAY OF CALCIUM: CPT

## 2025-07-08 PROCEDURE — 25000003 PHARM REV CODE 250

## 2025-07-08 PROCEDURE — 8E09XBZ COMPUTER ASSISTED PROCEDURE OF HEAD AND NECK REGION: ICD-10-PCS | Performed by: NEUROLOGICAL SURGERY

## 2025-07-08 PROCEDURE — 25500020 PHARM REV CODE 255: Performed by: PSYCHIATRY & NEUROLOGY

## 2025-07-08 PROCEDURE — 82803 BLOOD GASES ANY COMBINATION: CPT

## 2025-07-08 PROCEDURE — 99900035 HC TECH TIME PER 15 MIN (STAT)

## 2025-07-08 PROCEDURE — 25000003 PHARM REV CODE 250: Performed by: PSYCHIATRY & NEUROLOGY

## 2025-07-08 PROCEDURE — 94010 BREATHING CAPACITY TEST: CPT

## 2025-07-08 PROCEDURE — 97112 NEUROMUSCULAR REEDUCATION: CPT

## 2025-07-08 PROCEDURE — 94150 VITAL CAPACITY TEST: CPT

## 2025-07-08 PROCEDURE — 99900026 HC AIRWAY MAINTENANCE (STAT)

## 2025-07-08 PROCEDURE — 37799 UNLISTED PX VASCULAR SURGERY: CPT

## 2025-07-08 PROCEDURE — 82040 ASSAY OF SERUM ALBUMIN: CPT

## 2025-07-08 PROCEDURE — 20000000 HC ICU ROOM

## 2025-07-08 PROCEDURE — 99900017 HC EXTUBATION W/PARAMETERS (STAT)

## 2025-07-08 PROCEDURE — A9585 GADOBUTROL INJECTION: HCPCS | Performed by: PSYCHIATRY & NEUROLOGY

## 2025-07-08 PROCEDURE — 63600175 PHARM REV CODE 636 W HCPCS: Performed by: PSYCHIATRY & NEUROLOGY

## 2025-07-08 PROCEDURE — 25000003 PHARM REV CODE 250: Performed by: STUDENT IN AN ORGANIZED HEALTH CARE EDUCATION/TRAINING PROGRAM

## 2025-07-08 PROCEDURE — 97168 OT RE-EVAL EST PLAN CARE: CPT

## 2025-07-08 PROCEDURE — 83735 ASSAY OF MAGNESIUM: CPT

## 2025-07-08 RX ORDER — AMOXICILLIN 250 MG
2 CAPSULE ORAL 2 TIMES DAILY PRN
Status: DISCONTINUED | OUTPATIENT
Start: 2025-07-08 | End: 2025-07-11

## 2025-07-08 RX ORDER — POLYETHYLENE GLYCOL 3350 17 G/17G
17 POWDER, FOR SOLUTION ORAL 2 TIMES DAILY PRN
Status: DISCONTINUED | OUTPATIENT
Start: 2025-07-08 | End: 2025-07-09

## 2025-07-08 RX ORDER — BISACODYL 10 MG/1
10 SUPPOSITORY RECTAL DAILY PRN
Status: DISCONTINUED | OUTPATIENT
Start: 2025-07-08 | End: 2025-07-25 | Stop reason: HOSPADM

## 2025-07-08 RX ORDER — FENTANYL CITRATE 50 UG/ML
INJECTION, SOLUTION INTRAMUSCULAR; INTRAVENOUS
Status: COMPLETED
Start: 2025-07-08 | End: 2025-07-08

## 2025-07-08 RX ORDER — FENTANYL CITRATE 50 UG/ML
50 INJECTION, SOLUTION INTRAMUSCULAR; INTRAVENOUS ONCE
Refills: 0 | Status: COMPLETED | OUTPATIENT
Start: 2025-07-08 | End: 2025-07-08

## 2025-07-08 RX ORDER — LIDOCAINE HYDROCHLORIDE 10 MG/ML
10 INJECTION, SOLUTION INFILTRATION; PERINEURAL ONCE
Status: COMPLETED | OUTPATIENT
Start: 2025-07-08 | End: 2025-07-08

## 2025-07-08 RX ORDER — GADOBUTROL 604.72 MG/ML
8 INJECTION INTRAVENOUS
Status: COMPLETED | OUTPATIENT
Start: 2025-07-08 | End: 2025-07-08

## 2025-07-08 RX ORDER — MIDAZOLAM HYDROCHLORIDE 1 MG/ML
1 INJECTION, SOLUTION INTRAMUSCULAR; INTRAVENOUS EVERY 5 MIN PRN
Status: DISCONTINUED | OUTPATIENT
Start: 2025-07-08 | End: 2025-07-09

## 2025-07-08 RX ORDER — MIDAZOLAM HYDROCHLORIDE 1 MG/ML
2 INJECTION, SOLUTION INTRAMUSCULAR; INTRAVENOUS ONCE
Status: COMPLETED | OUTPATIENT
Start: 2025-07-08 | End: 2025-07-08

## 2025-07-08 RX ADMIN — MIDAZOLAM 2 MG: 1 INJECTION INTRAMUSCULAR; INTRAVENOUS at 12:07

## 2025-07-08 RX ADMIN — NICARDIPINE HYDROCHLORIDE 15 MG/HR: 0.2 INJECTION, SOLUTION INTRAVENOUS at 09:07

## 2025-07-08 RX ADMIN — MUPIROCIN: 20 OINTMENT TOPICAL at 08:07

## 2025-07-08 RX ADMIN — FENTANYL CITRATE 50 MCG: 50 INJECTION, SOLUTION INTRAMUSCULAR; INTRAVENOUS at 01:07

## 2025-07-08 RX ADMIN — CEFAZOLIN 1 G: 330 INJECTION, POWDER, FOR SOLUTION INTRAMUSCULAR; INTRAVENOUS at 05:07

## 2025-07-08 RX ADMIN — THERA TABS 1 TABLET: TAB at 08:07

## 2025-07-08 RX ADMIN — FOLIC ACID 1 MG: 1 TABLET ORAL at 08:07

## 2025-07-08 RX ADMIN — CLONIDINE HYDROCHLORIDE 0.1 MG: 0.1 TABLET ORAL at 06:07

## 2025-07-08 RX ADMIN — LIDOCAINE HYDROCHLORIDE 10 ML: 10 INJECTION, SOLUTION INFILTRATION; PERINEURAL at 09:07

## 2025-07-08 RX ADMIN — CEFAZOLIN 1 G: 330 INJECTION, POWDER, FOR SOLUTION INTRAMUSCULAR; INTRAVENOUS at 08:07

## 2025-07-08 RX ADMIN — PHENOBARBITAL 130 MG: 30 TABLET ORAL at 06:07

## 2025-07-08 RX ADMIN — FAMOTIDINE 20 MG: 20 TABLET, FILM COATED ORAL at 08:07

## 2025-07-08 RX ADMIN — FAMOTIDINE 20 MG: 20 TABLET, FILM COATED ORAL at 09:07

## 2025-07-08 RX ADMIN — MIDAZOLAM 1 MG: 1 INJECTION INTRAMUSCULAR; INTRAVENOUS at 09:07

## 2025-07-08 RX ADMIN — Medication 100 MG: at 08:07

## 2025-07-08 RX ADMIN — FENTANYL CITRATE 50 MCG: 50 INJECTION INTRAMUSCULAR; INTRAVENOUS at 01:07

## 2025-07-08 RX ADMIN — KETAMINE HYDROCHLORIDE 15 MCG/KG/MIN: 100 INJECTION INTRAMUSCULAR; INTRAVENOUS at 08:07

## 2025-07-08 RX ADMIN — OLANZAPINE 20 MG: 5 TABLET, FILM COATED ORAL at 09:07

## 2025-07-08 RX ADMIN — KETAMINE HYDROCHLORIDE 17.5 MCG/KG/MIN: 100 INJECTION INTRAMUSCULAR; INTRAVENOUS at 03:07

## 2025-07-08 RX ADMIN — LABETALOL HYDROCHLORIDE 10 MG: 5 INJECTION, SOLUTION INTRAVENOUS at 10:07

## 2025-07-08 RX ADMIN — CLONIDINE HYDROCHLORIDE 0.1 MG: 0.1 TABLET ORAL at 01:07

## 2025-07-08 RX ADMIN — NICARDIPINE HYDROCHLORIDE 15 MG/HR: 0.2 INJECTION, SOLUTION INTRAVENOUS at 12:07

## 2025-07-08 RX ADMIN — CLONIDINE HYDROCHLORIDE 0.1 MG: 0.1 TABLET ORAL at 09:07

## 2025-07-08 RX ADMIN — DEXAMETHASONE SODIUM PHOSPHATE 4 MG: 4 INJECTION INTRA-ARTICULAR; INTRALESIONAL; INTRAMUSCULAR; INTRAVENOUS; SOFT TISSUE at 12:07

## 2025-07-08 RX ADMIN — PHENOBARBITAL 130 MG: 30 TABLET ORAL at 01:07

## 2025-07-08 RX ADMIN — OLANZAPINE 20 MG: 5 TABLET, FILM COATED ORAL at 08:07

## 2025-07-08 RX ADMIN — DEXAMETHASONE SODIUM PHOSPHATE 4 MG: 4 INJECTION INTRA-ARTICULAR; INTRALESIONAL; INTRAMUSCULAR; INTRAVENOUS; SOFT TISSUE at 06:07

## 2025-07-08 RX ADMIN — PROPOFOL 50 MCG/KG/MIN: 10 INJECTION, EMULSION INTRAVENOUS at 09:07

## 2025-07-08 RX ADMIN — PHENOBARBITAL 130 MG: 30 TABLET ORAL at 09:07

## 2025-07-08 RX ADMIN — CEFAZOLIN 1 G: 330 INJECTION, POWDER, FOR SOLUTION INTRAMUSCULAR; INTRAVENOUS at 02:07

## 2025-07-08 RX ADMIN — MUPIROCIN: 20 OINTMENT TOPICAL at 09:07

## 2025-07-08 RX ADMIN — GADOBUTROL 8 ML: 604.72 INJECTION INTRAVENOUS at 01:07

## 2025-07-08 RX ADMIN — NICARDIPINE HYDROCHLORIDE 7.5 MG/HR: 0.2 INJECTION, SOLUTION INTRAVENOUS at 06:07

## 2025-07-08 NOTE — PROGRESS NOTES
[facility] - [facility] Critical Care  Neurosurgery  Progress Note    Subjective:     History of Present Illness: Patient is 71yo M with generalized weakness, falls, and paucity of speech. 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 On exam, patient's voice is barely audible but patient appropriately answers questions and follows commands. Patient's brother states that patient had normal speech and gait when they last saw each other about 1 month ago.    Neurosurgery consulted due to multiple ring-enhancing lesions seen on CT brain.    Post-Op Info:  Procedure(s) (LRB):  CRANIOTOMY, SUBOCCIPITAL (Right)   1 Day Post-Op   Interval History: NAEON. Remained intubated postoperatively given concern for suctioning TF. Post op CT/MRI satisfactory, hemostatic agents left at superior medial aspect of resection cavity. ICP 5-14 with minimal hourly output, EVD lowered to 10.     Medications:  Continuous Infusions:   ketamine 5 mg/mL infusion (titrating)  0-20 mcg/kg/min Intravenous Continuous 14.6 mL/hr at 07/08/25 0705 15 mcg/kg/min at 07/08/25 0705    nicardipine  0-15 mg/hr Intravenous Continuous 62.5 mL/hr at 07/08/25 0705 12.5 mg/hr at 07/08/25 0705    propofoL  0-50 mcg/kg/min Intravenous Continuous 24.3 mL/hr at 07/08/25 0705 50 mcg/kg/min at 07/08/25 0705     Scheduled Meds:   bisacodyL  10 mg Rectal Daily    ceFAZolin (Ancef) IV (PEDS and ADULTS)  1 g Intravenous Q8H    cloNIDine  0.1 mg Per NG tube Q8H    dexAMETHasone (Decadron) IV (PEDS and ADULTS)  4 mg Intravenous Q6H    famotidine  20 mg Per NG tube BID    folic acid  1 mg Per NG tube Daily    multivitamin  1 tablet Per NG tube Daily    mupirocin   Nasal BID    OLANZapine  20 mg Per NG tube BID    PHENobarbitaL  130 mg Per NG tube Q8H    polyethylene glycol  17 g Per NG tube BID    senna-docusate  2 tablet Per NG tube BID    thiamine  100 mg Per NG tube Daily     PRN Meds:  Current Facility-Administered Medications:     acetaminophen, 650 mg,  Per NG tube, Q4H PRN    dextrose 50%, 12.5 g, Intravenous, PRN    glucagon (human recombinant), 1 mg, Intramuscular, PRN    hydrALAZINE, 10 mg, Intravenous, Q4H PRN    insulin aspart U-100, 0-5 Units, Subcutaneous, Q6H PRN    labetalol, 10 mg, Intravenous, Q4H PRN    midazolam, 2 mg, Intravenous, Q2H PRN    morphine, 2 mg, Intravenous, Q4H PRN    ondansetron, 4 mg, Intravenous, Q4H PRN    oxyCODONE, 10 mg, Per NG tube, Q4H PRN    oxyCODONE, 5 mg, Per NG tube, Q4H PRN    potassium bicarbonate, 35 mEq, Per NG tube, PRN    potassium bicarbonate, 50 mEq, Per NG tube, PRN    potassium bicarbonate, 60 mEq, Per NG tube, PRN    potassium, sodium phosphates, 2 packet, Per NG tube, PRN    potassium, sodium phosphates, 2 packet, Per NG tube, PRN    potassium, sodium phosphates, 2 packet, Per NG tube, PRN    sodium chloride 0.9%, 10 mL, Intravenous, PRN     Review of Systems  Objective:     Weight: 81.1 kg (178 lb 14.4 oz)  Body mass index is 23.6 kg/m².  Vital Signs (Most Recent):  Temp: 98.3 °F (36.8 °C) (07/08/25 0705)  Pulse: 88 (07/08/25 0705)  Resp: (!) 21 (07/08/25 0705)  BP: 122/62 (07/08/25 0705)  SpO2: 100 % (07/08/25 0705) Vital Signs (24h Range):  Temp:  [97 °F (36.1 °C)-99.6 °F (37.6 °C)] 98.3 °F (36.8 °C)  Pulse:  [] 88  Resp:  [12-27] 21  SpO2:  [100 %] 100 %  BP: (107-199)/() 122/62  Arterial Line BP: (110-162)/(55-92) 136/63     Date 07/08/25 0700 - 07/09/25 0659   Shift 8692-3120 9798-5228 0013-6376 24 Hour Total   INTAKE   I.V.(mL/kg) 105.6(1.3)   105.6(1.3)   NG/GT 30   30   Shift Total(mL/kg) 135.6(1.7)   135.6(1.7)   OUTPUT   Drains 7   7   Shift Total(mL/kg) 7(0.1)   7(0.1)   Weight (kg) 81.1 81.1 81.1 81.1              Vent Mode: A/C  Oxygen Concentration (%):  [] 100  Resp Rate Total:  [18 br/min-35 br/min] 22 br/min  Vt Set:  [470 mL] 470 mL  PEEP/CPAP:  [5 cmH20] 5 cmH20  Mean Airway Pressure:  [8.3 siW55-53 cmH20] 11 cmH20             NG/OG Tube 07/02/25 0522 Left nostril (Active)    Placement Check placement verified by aspirate characteristics;placement verified by x-ray 07/08/25 0705   Tolerance no signs/symptoms of discomfort 07/08/25 0705   Securement secured to nostril center w/ adhesive device 07/08/25 0705   Clamp Status/Tolerance unclamped 07/08/25 0705   Suction Setting/Drainage Method suction at the bedside 07/08/25 0705   Insertion Site Appearance no redness, warmth, tenderness, skin breakdown, drainage 07/08/25 0705   Drainage None 07/08/25 0705   Flush/Irrigation flushed w/;water;no resistance met 07/08/25 0705   Feeding Type continuous;by pump 07/08/25 0705   Feeding Action feeding continued 07/08/25 0705   Current Rate (mL/hr) 30 mL/hr 07/08/25 0705   Goal Rate (mL/hr) 30 mL/hr 07/08/25 0705   Intake (mL) 60 mL 07/07/25 0847   Water Bolus (mL) 100 mL 07/08/25 0401   Rate Formula Tube Feeding (mL/hr) 30 mL/hr 07/08/25 0301   Formula Name Pivot 07/08/25 0705   Intake (mL) - Formula Tube Feeding 30 07/08/25 0705       Male External Urinary Catheter 07/08/25 0705 Medium (Active)   Collection Container Urimeter 07/08/25 0705   Securement Method secured to top of thigh w/ adhesive device 07/08/25 0705   Skin no redness;no breakdown 07/08/25 0705   Tolerance no signs/symptoms of discomfort 07/08/25 0705   Catheter Change Date 07/08/25 07/08/25 0705   Catheter Change Time 0700 07/08/25 0705            ICP/Ventriculostomy 07/02/25 1409 Right (Active)   Level of Ventriculostomy (cm above) 10 07/08/25 0705   Status Open to drainage 07/08/25 0705   Site Assessment Clean;Dry 07/08/25 0705   Site Drainage No drainage 07/08/25 0705   Waveform normal waveform 07/08/25 0705   Output (mL) 7 mL 07/08/25 0705   CSF Color clear 07/08/25 0705   Dressing Status Clean;Dry;Intact 07/08/25 0705   Interventions HOB degrees;bed controls locked 07/08/25 0705          Physical Exam         Neurosurgery Physical Exam  [id number]  On prop/ketamine  PERRL  HARRISON spon AG  BUE restraints  Does not follow commands    "  EVD patent with good waveform    Significant Labs:  Recent Labs   Lab 07/07/25  0036 07/07/25  1752 07/08/25  0246    145* 128*    143 142   K 4.2 4.2 4.1   * 111* 113*   CO2 21* 17* 18*   BUN 42* 39* 36*   CREATININE 1.2 1.2 1.2   CALCIUM 8.7 8.4* 8.2*   MG 2.2  --  2.2     Recent Labs   Lab 07/07/25  0036 07/07/25  1752 07/08/25  0246   WBC 9.66 13.65* 13.34*   HGB 12.0* 13.2* 11.4*   HCT 36.7* 38.9* 34.6*    235 195     No results for input(s): "LABPT", "INR", "APTT" in the last 48 hours.  Microbiology Results (last 7 days)       Procedure Component Value Units Date/Time    CSF culture [[id number]] Collected: 07/07/25 0715    Order Status: Completed Specimen: CSF (Spinal Fluid) from CSF Tap, Tube 3 Updated: 07/07/25 1651     GRAM STAIN Cytospin indicates:      No WBCs      No organisms seen    Gram stain [[id number]] Collected: 07/07/25 0715    Order Status: Canceled Specimen: CSF (Spinal Fluid) from CSF Tap, Tube 3 Updated: 07/07/25 1620    Culture, Respiratory with Gram Stain [[id number]] Collected: 07/03/25 1130    Order Status: Completed Specimen: Respiratory from Endotracheal Aspirate Updated: 07/07/25 0844     Respiratory Culture Normal respiratory carmen, no Staph aureus or Pseudomonas isolated     GRAM STAIN <10 Epithelial Cells/LPF      Rare WBC seen      No organisms seen    CSF culture [[id number]]     Order Status: Canceled Specimen: CSF (Spinal Fluid)     CSF culture [[id number]] Collected: 07/02/25 1808    Order Status: Completed Specimen: CSF (Spinal Fluid) from CSF Tap, Tube 3 Updated: 07/07/25 0658     CULTURE, CSF No Growth To Date     GRAM STAIN Cytospin indicates:      No WBCs      No organisms seen    Blood culture #1 **CANNOT BE ORDERED STAT** [[id number]]  (Normal) Collected: 07/01/25 1702    Order Status: Completed Specimen: Blood from Peripheral, Forearm, Left Updated: 07/07/25 0000     Blood Culture No Growth After 5 Days    Blood culture #2 **CANNOT BE ORDERED " STAT** [[id number]]  (Normal) Collected: 07/01/25 1702    Order Status: Completed Specimen: Blood from Peripheral, Lower Arm, Left Updated: 07/07/25 0000     Blood Culture No Growth After 5 Days    Cryptococcal antigen, CSF [[id number]]  (Normal) Collected: 07/02/25 1808    Order Status: Completed Specimen: CSF (Spinal Fluid) from CSF Tap, Tube 3 Updated: 07/03/25 1458     Cryptococcal Antigen, CSF Negative    Afb Culture Stain [[id number]] Collected: 07/02/25 1808    Order Status: Completed Specimen: CSF (Spinal Fluid) from CSF Tap, Tube 3 Updated: 07/03/25 1346     ACID FAST STAIN  No acid fast bacilli seen    Gram stain [[id number]] Collected: 07/02/25 1808    Order Status: Canceled Specimen: CSF (Spinal Fluid) from CSF Tap, Tube 3 Updated: 07/03/25 0203    AFB Culture & Smear [[id number]] Collected: 07/02/25 1808    Order Status: Sent Specimen: CSF (Spinal Fluid) from CSF Tap, Tube 3 Updated: 07/03/25 0202    Influenza A & B by Molecular [[id number]]  (Normal) Collected: 07/01/25 1449    Order Status: Completed Specimen: Nasal Swab Updated: 07/01/25 1537     INFLUENZA A MOLECULAR Negative     INFLUENZA B MOLECULAR  Negative          All pertinent labs from the last 24 hours have been reviewed.    Significant Diagnostics:  I have reviewed all pertinent imaging results/findings within the past 24 hours.  Assessment/Plan:     * Ataxia  Assessment  71yo M with generalized weakness, falls, and paucity of speech presenting with multiple ring enhancing lesions on brain CT now s/p R SOC for tumor resection on 7/7:    Imaging:  - CTH 7/1 showed multiple brain masses concerning for mets with surrounding edema, can't rule out abscesses. Mass in cerebellum with effacement of 4th ventricule outflow with concern for developing hydrocephalus  - MRI brain 7/1: non diagnostic due to motion  - CT CAP 7/1: spiculated R lung mass and lymph node adenopathy in chest and neck concerning for metastatic disease  - MRI Brain W/WO 7/2:  multifocal enhancing lesions c/f metastatic disease, largest R cerebellum w/mass effect on 4th.  - Post op CTH/MRI 7/7: anticipate post op changes, hemostatic products left at superior/medial border of resection cavity    Plan:  - admitted to [facility]  - EVD open at 10, abx while in place; transduce hourly and document hourly output; M/Th CSF while remains in place  - Cont Dex 4q6 with Ppi  - AED per NCC  - recommend oncology consult; will need outpatient rad onc consult. 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 Ric Rodriguez MD  Neurosurgery  [facility] - Neuro Critical Care

## 2025-07-08 NOTE — PT/OT/SLP RE-EVAL
Occupational Therapy   Re-evaluation    Name: Goran Carlos  MRN: [id number]  Admitting Diagnosis:  Ataxia  Recent Surgery: Procedure(s) (LRB):  CRANIOTOMY, SUBOCCIPITAL (Right) 1 Day Post-Op    Recommendations:     Discharge Recommendations: High Intensity Therapy  Discharge Equipment Recommendations: bath bench, bedside commode  Barriers to discharge:  None    Assessment:     Goran Carlos is a 69 y.o. male with a medical diagnosis of Ataxia.  He presents with performance deficits affecting function are impaired endurance, impaired balance, impaired self care skills, impaired functional mobility.      Rehab Prognosis:  Good; patient would benefit from acute skilled OT services to address these deficits and reach maximum level of function.       Plan:     Patient to be seen 3 x/week to address the above listed problems via cognitive retraining, neuromuscular re-education, therapeutic exercises, therapeutic activities, self-care/home management, sensory integration  Plan of Care Expires: 07/30/25  Plan of Care Reviewed with: patient    Subjective     Patient:  Nonverbal; intubated  Communicated with: Nurse prior to session.  Pain/Comfort:  Pain Rating 1: 0/10  Pain Rating Post-Intervention 1: 0/10    Objective:     Communicated with: Nurse prior to session.  Patient found supine with: bed alarm, scott catheter, pulse ox (continuous), restraints, SCD, telemetry, peripheral IV, ventilator, blood pressure cuff, NG tube, central line, external ventricular drain upon OT entry to room.    General Precautions: Standard, aspiration, fall, NPO  Orthopedic Precautions: N/A  Braces: N/A  Respiratory Status: Ventilator    Occupational Performance:    Bed Mobility:    Patient completed Rolling/Turning to Left with  total assistance  Patient completed Rolling/Turning to Right with total assistance    Functional Mobility/Transfers:  Dependent drawsheet transfers to lift up to the [facility]    Activities of Daily Living:  Feeding:  NPO     Grooming: total assistance with hand over hand assist  Toileting: dependence bed level    Cognitive/Visual Perceptual:  Cognitive/Psychosocial Skills:     -       Oriented to: Daily orientation provided   -       Follows Commands/attention:unable to follow commands during re-eval  -       Communication: nonverbal; intubated    Physical Exam:  Postural examination/scapula alignment:    -       Rounded shoulders  Upper Extremity Range of Motion:     -       Right Upper Extremity: AAROM WNL  -       Left Upper Extremity: AAROM WNL    AMPA 6 Click:  AMPA Total Score: 6    Treatment & Education:  Daily orientation provided. AAROM performed bilateral UE/LEs one set x 10 rep in all planes of motion with stretches provided at end range.  Patient kept eyes open 100% of the session.  Patient unable to follow commands.  Provided multi-sensory stimulation to prevent sensory deprivation and improve clinical outcomes.  Positioning provided for midline orientation with bilateral UEs elevated and heels lifted off mattress.   Gentle cervical rotation provided.   Family not present during the session.    Patient left right sidelying with all lines intact, call button in reach, bed alarm on, and restraints reapplied at end of session    Intensive Care Unit Mobility Scale (ICUMS):   1/10    GOALS:   Multidisciplinary Problems       Occupational Therapy Goals          Problem: Occupational Therapy    Goal Priority Disciplines Outcome Interventions   Occupational Therapy Goal     OT, PT/OT Progressing    Description: Goals set 7/8 with an expiration date, 8/5:  Patient will increase functional independence with ADLs by performing:    Patient will demonstrate rolling to the right with min assist.  Not met   Patient will demonstrate rolling to the left with min assist.   Not met  Patient will demonstrate supine -sit with min  assist.   Not met  Patient will demonstrate stand pivot transfers with mod assist.   Not met  Patient will  demonstrate grooming while seated with min assist.   Not met  Patient will demonstrate upper body dressing with min assist while seated EOB.   Not met  Patient will demonstrate lower body dressing with mod assist while seated EOB.   Not met  Patient will demonstrate toileting with mod assist.   Not met  Patient will demonstrate bathing while seated EOB with mod assist.   Not met  Patient's family / caregiver will demonstrate independence and safety with assisting patient with self-care skills and functional mobility.     Not met  Patient's family / caregiver will demonstrate independence with providing ROM and changes in bed positioning.   Not met  Patient and/or patient's family will verbalize understanding of stroke prevention guidelines, personal risk factors and stroke warning signs via teachback method.  Not met                                History:     Past Medical History:   Diagnosis Date    ETOH abuse     Gait disturbance     Heroin abuse        History reviewed. No pertinent surgical history.    Time Tracking:     OT Date of Treatment: 07/08/25  OT Start Time: 0400  OT Stop Time: 0420  OT Total Time (min): 20 min    Billable Minutes:Re-eval 8  Neuromuscular Re-education 12    7/8/2025

## 2025-07-08 NOTE — NURSING
Sedation stopped for rounds. Pt visibly moving the left side less than right side, team at bedside and aware. WCTM.

## 2025-07-08 NOTE — ANESTHESIA POSTPROCEDURE EVALUATION
Anesthesia Post Evaluation    Patient: Goran Carlos    Procedure(s) Performed: Procedure(s) (LRB):  CRANIOTOMY, SUBOCCIPITAL (Right)    Final Anesthesia Type: general      Patient location during evaluation: ICU  Patient participation: No - Unable to Participate, Intubation  Level of consciousness: awake and alert and sedated  Post-procedure vital signs: reviewed and stable  Pain management: adequate  Airway patency: patent    PONV status at discharge: No PONV  Anesthetic complications: no      Cardiovascular status: blood pressure returned to baseline  Respiratory status: ETT and ventilator  Hydration status: euvolemic  Follow-up not needed.          Vitals Value Taken Time   /62 07/08/25 07:05   Temp 36.8 °C (98.3 °F) 07/08/25 07:05   Pulse 84 07/08/25 07:59   Resp 18 07/08/25 07:59   SpO2 100 % 07/08/25 07:59   Vitals shown include unfiled device data.      No case tracking events are documented in the log.      Pain/Lissette Score: Pain Rating Prior to Med Admin: 0 (7/8/2025  1:30 AM)  Pain Rating Post Med Admin: 0 (7/8/2025  2:00 AM)

## 2025-07-08 NOTE — ASSESSMENT & PLAN NOTE
68 y/o M brought to ED from [facility] Detox (hx of heroin and ETOH) for progressive weakness, gait disturbance, confusion, and decreased volume when speaking. CT head without contrast revealed multiple lesions of hypodensity with a hyperdense rim, notably in the left temporal lobe and right cerebellum. There were additional small hyperdensities in the right frontoparietal and left parasagittal parietal lobe. There is mass effect and partial effacement of the 4th ventricle secondary to the cerebellar lesion with developing hydrocephalus without MLS. CT of the chest, abdomen, and pelvis with IV contrast showed RUL mass with mediastinal adenopathy. Labs revealed Hep C and treponema antibodies were positive. Hep C PCR pending. Penicillin G was administered. A sepsis workup was completed and antibiotics were initiated. Intubated to get MRI. S/p EVD. Extubated 7/4/25 without difficulty.    Admit to [facility]  Q1h neuro checks, I&Os, and vitals   Daily CBC, CMP, Mag, Phos  NSGY following; s/p SOC craniotomy for tumor resection on 7/7  Dex 4q6, taper once able  EVD open at 10 per NSGY. Antibiotics while drain in place.   Remainder of CSF studies pending  Received Penicillin G in ED  Zyprexa and phenobarbital for agitation/EtOH withdrawal  clonidine 0.1 mg TID for agitation/opioid dependence  Avoid versed for agitation  Tube feeds + FWF 160cc q4h  Folate/thiamine/MV  SBP <160   Prn labetalol, hydralazine, cardene   PRN Zofran 4mg q4h  SCDs; SQH held  PT/OT/SLP

## 2025-07-08 NOTE — PLAN OF CARE
Recommendations  --Initiate continuous TF at trickle rate and slowly advance to goal rate: Impact Peptide @ 55 mL/hr to provide 1320 mL total volume, 1980 kcal, 185 g CHO, 124 g protein, 0 g fiber, 1019 mL free water, 132% DRI         --160 mL flush q4 hrs to provide additional 960 mL free water (Total 1979 mL)    --Once TF has been at continuous goal rate > 48 hours, may transition to bolus feeds: Impact Peptide 1.5 at 220 mL q4 hrs to provide 1320 mL total volume    --Nursing: please continue to document rate and formula on flowsheet    --RD to monitor weight, rate, tolerance    --When medically able, advance to Regular diet as tolerated and clinically indicated, texture per SLP    Goals: Meet % EEN/EPN by next RD follow-up  Nutrition Goal Status: new  Communication of RD Recs:  (POC)

## 2025-07-08 NOTE — PROGRESS NOTES
"[facility] - Neuro Critical Care  Adult Nutrition  Progress Note    SUMMARY       Recommendations  --Initiate continuous TF at trickle rate and slowly advance to goal rate: Impact Peptide @ 55 mL/hr to provide 1320 mL total volume, 1980 kcal, 185 g CHO, 124 g protein, 0 g fiber, 1019 mL free water, 132% DRI         --160 mL flush q4 hrs to provide additional 960 mL free water (Total 1979 mL)    --Once TF has been at continuous goal rate > 48 hours, may transition to bolus feeds: Impact Peptide 1.5 at 220 mL q4 hrs to provide 1320 mL total volume    --Nursing: please continue to document rate and formula on flowsheet    --RD to monitor weight, rate, tolerance    --When medically able, advance to Regular diet as tolerated and clinically indicated, texture per SLP    Goals: Meet % EEN/EPN by next RD follow-up  Nutrition Goal Status: new  Communication of RD Recs:  (POC)    Nutrition Discharge Planning    Nutrition Discharge Planning: Too early to determine, pending clinical course    Reason for Assessment    Reason For Assessment: RD follow-up  Diagnosis:  (Ataxia)  General Information Comments: 69 y/o M presenting from [facility] for generalized weakness for about a week now. RD follow-up. Pt extubated this morning. Tube feed currently running at 30 mL/hr via NG - not meeting EEN/EPN. See above for new recommendations. Limited weight history. NFPE at follow-up if warranted.     Nutrition Related Social Determinants of Health: SDOH: Unable to assess at this time.      Food Insecurity: Patient Unable To Answer (7/2/2025)    Hunger Vital Sign     Worried About Running Out of Food in the Last Year: Patient unable to answer     Ran Out of Food in the Last Year: Patient unable to answer       Nutrition/Diet History    Spiritual, Cultural Beliefs, Protestant Practices, Values that Affect Care: no  Food Allergies: NKFA  Factors Affecting Nutritional Intake: NPO, on mechanical ventilation    Anthropometrics    Height: 6' 1" " (185.4 cm)  Height (inches): 73 in  Height Method: Estimated  Weight: 81.1 kg (178 lb 12.7 oz)  Weight (lb): 178.79 lb  Weight Method: Bed Scale  Ideal Body Weight (IBW), Male: 184 lb  % Ideal Body Weight, Male (lb): 97.17 %  BMI (Calculated): 23.6  BMI Grade: 18.5-24.9 - normal       Lab/Procedures/Meds    Pertinent Labs Reviewed: reviewed - BUN 36, glucose 128, A1c 5.8    Pertinent Medications Reviewed: reviewed - folic acid, MVI, thiamine    Estimated/Assessed Needs    Weight Used For Calorie Calculations: 81.1 kg (178 lb 12.7 oz)  Energy Calorie Requirements (kcal): 2037 kcal/day (x 1.25 AF)  Energy Need Method: [place]  Protein Requirements:  g/day (1.2-2.0 g/kg)  Weight Used For Protein Calculations: 81.1 kg (178 lb 12.7 oz)     Estimated Fluid Requirement Method: RDA Method  RDA Method (mL): 2037         Nutrition Prescription Ordered    Current Diet Order: NPO  Current Nutrition Support Formula Ordered: Impact Peptide 1.5  Current Nutrition Support Rate Ordered: 30 (ml)  Current Nutrition Support Frequency Ordered: x 24 hours    Evaluation of Received Nutrient/Fluid Intake    Enteral Calories (kcal): 1080  Enteral Protein (gm): 68  Enteral (Free Water) Fluid (mL): 556  % Kcal Needs: 53  % Protein Needs: 73  Energy Calories Required: not meeting needs  Protein Required: not meeting needs  Fluid Required:  (Per MD)  Comments: LBM 7/7  Tolerance: tolerating  % Intake of Estimated Energy Needs: 50 - 75 %  % Meal Intake: NPO    PES Statement  Inadequate enteral nutrition infusion related to  (Infusion volume not reached) as evidenced by  (Inadequate EN volume compared to estimated requirements)  Status: Continues    Nutrition Risk    Level of Risk/Frequency of Follow-up: high     Monitor and Evaluation    Monitor and Evaluation: Enteral and parenteral nutrition administration, Weight, Electrolyte and renal panel, Gastrointestinal profile, Glucose/endocrine profile, Nutrition focused physical  findings, Lipid profile, Inflammatory profile, Skin     Nutrition Follow-Up    RD Follow-up?: Yes

## 2025-07-08 NOTE — SUBJECTIVE & OBJECTIVE
Interval History: NAEON. Remained intubated postoperatively given concern for suctioning TF. Post op CT/MRI satisfactory, hemostatic agents left at superior medial aspect of resection cavity. ICP 5-14 with minimal hourly output, EVD lowered to 10.     Medications:  Continuous Infusions:   ketamine 5 mg/mL infusion (titrating)  0-20 mcg/kg/min Intravenous Continuous 14.6 mL/hr at 07/08/25 0705 15 mcg/kg/min at 07/08/25 0705    nicardipine  0-15 mg/hr Intravenous Continuous 62.5 mL/hr at 07/08/25 0705 12.5 mg/hr at 07/08/25 0705    propofoL  0-50 mcg/kg/min Intravenous Continuous 24.3 mL/hr at 07/08/25 0705 50 mcg/kg/min at 07/08/25 0705     Scheduled Meds:   bisacodyL  10 mg Rectal Daily    ceFAZolin (Ancef) IV (PEDS and ADULTS)  1 g Intravenous Q8H    cloNIDine  0.1 mg Per NG tube Q8H    dexAMETHasone (Decadron) IV (PEDS and ADULTS)  4 mg Intravenous Q6H    famotidine  20 mg Per NG tube BID    folic acid  1 mg Per NG tube Daily    multivitamin  1 tablet Per NG tube Daily    mupirocin   Nasal BID    OLANZapine  20 mg Per NG tube BID    PHENobarbitaL  130 mg Per NG tube Q8H    polyethylene glycol  17 g Per NG tube BID    senna-docusate  2 tablet Per NG tube BID    thiamine  100 mg Per NG tube Daily     PRN Meds:  Current Facility-Administered Medications:     acetaminophen, 650 mg, Per NG tube, Q4H PRN    dextrose 50%, 12.5 g, Intravenous, PRN    glucagon (human recombinant), 1 mg, Intramuscular, PRN    hydrALAZINE, 10 mg, Intravenous, Q4H PRN    insulin aspart U-100, 0-5 Units, Subcutaneous, Q6H PRN    labetalol, 10 mg, Intravenous, Q4H PRN    midazolam, 2 mg, Intravenous, Q2H PRN    morphine, 2 mg, Intravenous, Q4H PRN    ondansetron, 4 mg, Intravenous, Q4H PRN    oxyCODONE, 10 mg, Per NG tube, Q4H PRN    oxyCODONE, 5 mg, Per NG tube, Q4H PRN    potassium bicarbonate, 35 mEq, Per NG tube, PRN    potassium bicarbonate, 50 mEq, Per NG tube, PRN    potassium bicarbonate, 60 mEq, Per NG tube, PRN    potassium, sodium  phosphates, 2 packet, Per NG tube, PRN    potassium, sodium phosphates, 2 packet, Per NG tube, PRN    potassium, sodium phosphates, 2 packet, Per NG tube, PRN    sodium chloride 0.9%, 10 mL, Intravenous, PRN     Review of Systems  Objective:     Weight: 81.1 kg (178 lb 14.4 oz)  Body mass index is 23.6 kg/m².  Vital Signs (Most Recent):  Temp: 98.3 °F (36.8 °C) (07/08/25 0705)  Pulse: 88 (07/08/25 0705)  Resp: (!) 21 (07/08/25 0705)  BP: 122/62 (07/08/25 0705)  SpO2: 100 % (07/08/25 0705) Vital Signs (24h Range):  Temp:  [97 °F (36.1 °C)-99.6 °F (37.6 °C)] 98.3 °F (36.8 °C)  Pulse:  [] 88  Resp:  [12-27] 21  SpO2:  [100 %] 100 %  BP: (107-199)/() 122/62  Arterial Line BP: (110-162)/(55-92) 136/63     Date 07/08/25 0700 - 07/09/25 0659   Shift 2912-9828 7597-4377 4823-7485 24 Hour Total   INTAKE   I.V.(mL/kg) 105.6(1.3)   105.6(1.3)   NG/GT 30   30   Shift Total(mL/kg) 135.6(1.7)   135.6(1.7)   OUTPUT   Drains 7   7   Shift Total(mL/kg) 7(0.1)   7(0.1)   Weight (kg) 81.1 81.1 81.1 81.1              Vent Mode: A/C  Oxygen Concentration (%):  [] 100  Resp Rate Total:  [18 br/min-35 br/min] 22 br/min  Vt Set:  [470 mL] 470 mL  PEEP/CPAP:  [5 cmH20] 5 cmH20  Mean Airway Pressure:  [8.3 juZ36-88 cmH20] 11 cmH20             NG/OG Tube 07/02/25 0522 Left nostril (Active)   Placement Check placement verified by aspirate characteristics;placement verified by x-ray 07/08/25 0705   Tolerance no signs/symptoms of discomfort 07/08/25 0705   Securement secured to nostril center w/ adhesive device 07/08/25 0705   Clamp Status/Tolerance unclamped 07/08/25 0705   Suction Setting/Drainage Method suction at the bedside 07/08/25 0705   Insertion Site Appearance no redness, warmth, tenderness, skin breakdown, drainage 07/08/25 0705   Drainage None 07/08/25 0705   Flush/Irrigation flushed w/;water;no resistance met 07/08/25 0705   Feeding Type continuous;by pump 07/08/25 0705   Feeding Action feeding continued 07/08/25  "0705   Current Rate (mL/hr) 30 mL/hr 07/08/25 0705   Goal Rate (mL/hr) 30 mL/hr 07/08/25 0705   Intake (mL) 60 mL 07/07/25 0847   Water Bolus (mL) 100 mL 07/08/25 0401   Rate Formula Tube Feeding (mL/hr) 30 mL/hr 07/08/25 0301   Formula Name Pivot 07/08/25 0705   Intake (mL) - Formula Tube Feeding 30 07/08/25 0705       Male External Urinary Catheter 07/08/25 0705 Medium (Active)   Collection Container Urimeter 07/08/25 0705   Securement Method secured to top of thigh w/ adhesive device 07/08/25 0705   Skin no redness;no breakdown 07/08/25 0705   Tolerance no signs/symptoms of discomfort 07/08/25 0705   Catheter Change Date 07/08/25 07/08/25 0705   Catheter Change Time 0700 07/08/25 0705            ICP/Ventriculostomy 07/02/25 1409 Right (Active)   Level of Ventriculostomy (cm above) 10 07/08/25 0705   Status Open to drainage 07/08/25 0705   Site Assessment Clean;Dry 07/08/25 0705   Site Drainage No drainage 07/08/25 0705   Waveform normal waveform 07/08/25 0705   Output (mL) 7 mL 07/08/25 0705   CSF Color clear 07/08/25 0705   Dressing Status Clean;Dry;Intact 07/08/25 0705   Interventions HOB degrees;bed controls locked 07/08/25 0705          Physical Exam         Neurosurgery Physical Exam  [id number]  On prop/ketamine  PERRL  HARRISON spon AG  BUE restraints  Does not follow commands     EVD patent with good waveform    Significant Labs:  Recent Labs   Lab 07/07/25  0036 07/07/25 1752 07/08/25  0246    145* 128*    143 142   K 4.2 4.2 4.1   * 111* 113*   CO2 21* 17* 18*   BUN 42* 39* 36*   CREATININE 1.2 1.2 1.2   CALCIUM 8.7 8.4* 8.2*   MG 2.2  --  2.2     Recent Labs   Lab 07/07/25  0036 07/07/25  1752 07/08/25  0246   WBC 9.66 13.65* 13.34*   HGB 12.0* 13.2* 11.4*   HCT 36.7* 38.9* 34.6*    235 195     No results for input(s): "LABPT", "INR", "APTT" in the last 48 hours.  Microbiology Results (last 7 days)       Procedure Component Value Units Date/Time    CSF culture [[id number]] Collected: " 07/07/25 0715    Order Status: Completed Specimen: CSF (Spinal Fluid) from CSF Tap, Tube 3 Updated: 07/07/25 1651     GRAM STAIN Cytospin indicates:      No WBCs      No organisms seen    Gram stain [[id number]] Collected: 07/07/25 0715    Order Status: Canceled Specimen: CSF (Spinal Fluid) from CSF Tap, Tube 3 Updated: 07/07/25 1620    Culture, Respiratory with Gram Stain [[id number]] Collected: 07/03/25 1130    Order Status: Completed Specimen: Respiratory from Endotracheal Aspirate Updated: 07/07/25 0844     Respiratory Culture Normal respiratory carmen, no Staph aureus or Pseudomonas isolated     GRAM STAIN <10 Epithelial Cells/LPF      Rare WBC seen      No organisms seen    CSF culture [[id number]]     Order Status: Canceled Specimen: CSF (Spinal Fluid)     CSF culture [[id number]] Collected: 07/02/25 1808    Order Status: Completed Specimen: CSF (Spinal Fluid) from CSF Tap, Tube 3 Updated: 07/07/25 0658     CULTURE, CSF No Growth To Date     GRAM STAIN Cytospin indicates:      No WBCs      No organisms seen    Blood culture #1 **CANNOT BE ORDERED STAT** [[id number]]  (Normal) Collected: 07/01/25 1702    Order Status: Completed Specimen: Blood from Peripheral, Forearm, Left Updated: 07/07/25 0000     Blood Culture No Growth After 5 Days    Blood culture #2 **CANNOT BE ORDERED STAT** [[id number]]  (Normal) Collected: 07/01/25 1702    Order Status: Completed Specimen: Blood from Peripheral, Lower Arm, Left Updated: 07/07/25 0000     Blood Culture No Growth After 5 Days    Cryptococcal antigen, CSF [[id number]]  (Normal) Collected: 07/02/25 1808    Order Status: Completed Specimen: CSF (Spinal Fluid) from CSF Tap, Tube 3 Updated: 07/03/25 1458     Cryptococcal Antigen, CSF Negative    Afb Culture Stain [[id number]] Collected: 07/02/25 1808    Order Status: Completed Specimen: CSF (Spinal Fluid) from CSF Tap, Tube 3 Updated: 07/03/25 1346     ACID FAST STAIN  No acid fast bacilli seen    Gram stain [[id number]]  Collected: 07/02/25 1808    Order Status: Canceled Specimen: CSF (Spinal Fluid) from CSF Tap, Tube 3 Updated: 07/03/25 0203    AFB Culture & Smear [[id number]] Collected: 07/02/25 1808    Order Status: Sent Specimen: CSF (Spinal Fluid) from CSF Tap, Tube 3 Updated: 07/03/25 0202    Influenza A & B by Molecular [[id number]]  (Normal) Collected: 07/01/25 1449    Order Status: Completed Specimen: Nasal Swab Updated: 07/01/25 1537     INFLUENZA A MOLECULAR Negative     INFLUENZA B MOLECULAR  Negative          All pertinent labs from the last 24 hours have been reviewed.    Significant Diagnostics:  I have reviewed all pertinent imaging results/findings within the past 24 hours.

## 2025-07-08 NOTE — OP NOTE
DATE: 7/8/25    PREOP DIAGNOSIS:  Brain metastases  Lung mass  Cerebral edema and brain compression    POSTOP DIAGNOSIS:  Same as above    OPERATION PERFORMED:  Suboccipital craniotomy for resection of cerebellar tumor  Use of minimally invasive tubular retractor and microscope  Use of neuro navigation    SURGEON:  Blane Larios D.O.    ASSISTANT:    ODETTE Nielson M.D.    LEVEL OF INVOLVEMENT OF ATTENDING:  Full    INDICATION:  70 y/o M brought to ED from [facility] (hx of heroin and ETOH) for progressive weakness, gait disturbance, confusion, and decreased volume when speaking. CT head without contrast revealed multiple lesions of hypodensity with a hyperdense rim, notably in the left temporal lobe and right cerebellum. There were additional small hyperdensities in the right frontoparietal and left parasagittal parietal lobe. There is mass effect and partial effacement of the 4th ventricle secondary to the cerebellar lesion with developing hydrocephalus without MLS. CT of the chest, abdomen, and pelvis with IV contrast showed RUL mass with mediastinal adenopathy. Labs revealed Hep C and treponema antibodies were positive. Hep C PCR pending. Penicillin G was administered. A sepsis workup was completed and antibiotics were initiated. Intubated to get MRI. S/p EVD.     Patient was taken for suboccipital craniotomy for resection of midline cerebellar mass.  The other smaller lesions will likely be treated with radiotherapy.    Consents were obtained and risks, benefits, and alternatives to surgery were discussed.    PROCEDURE IN DETAIL:  The patient was correctly identified and taken to the operating room where the anesthesia team administered general endotracheal anesthesia.  The patient had previously undergone right frontal ventriculostomy.  The patient was carefully log-rolled into the prone position onto gel rolls and the head was fixed in a Crabtree head frame.  The head was registered using  [employer] neuro navigation and a midline suboccipital incision was planned.  The head was clipped free of hair and prepped and draped in typical sterile fashion.  A time-out was performed prophylactic IV antibiotics were given as well as Decadron.  The incision was infiltrated local anesthetic and incised with a 10 blade scalpel followed by dissection with Bovie cautery along the midline avascular plane until the suboccipital skull was encountered.  Self-retaining retractors were placed to maintain exposure.  Using neuro navigation the tumor boundaries were identified as well as a perpendicular trajectory down the long axis of the tumor.  The bone flap was planned accordingly below the transverse sinus.  The bone flap was turned with a cutting bur.  The dura was then opened in U shaped fashion and flapped superiorly toward the transverse sinus.  This revealed underlying edematous cerebellum.  A corticotomy was performed transversely along the cerebellar surface and dissection was carried deeper with suction and bipolar cautery until tumor capsule was identified.  A Tovar retractor was assembled and a minimally invasive tubular retractor was inserted down to the capsule of the tumor and the microscope was brought into the field for microscopic dissection.  Under microscopic guidance the tumor was removed in piecemeal fashion.  The capsule was cauterized and a plane was established circumferentially around the tumor using bipolar cautery and gentle suction.  Again tumor was removed in piecemeal fashion until it was felt a gross total resection was achieved.  Hemostasis was obtained with placement of Surgicel stamps.  The tubular retractor was removed and the brain sunken back within the confines of the craniotomy defect.  The dura was then closed in near watertight fashion and dura matrix onlay was placed along with adherence dural sealant.  The wound was closed in layered fashion 1st with 0 Vicryl in the muscle  fascial layer followed by 2-0 Vicryl in the deep dermal layer followed by staples on the skin.    COMPLICATIONS:  None    INCISION:  Midline suboccipital    WOUND CLASS:  Clean    FINDINGS:   Frozen returned as metastatic tumor  Gross total resection felt to be achieved    DRAIN:  None    CONDITION:  None

## 2025-07-08 NOTE — PROGRESS NOTES
[facility] - Neuro Critical Care  Neurocritical Care  Progress Note    Admit Date: 7/1/2025  Service Date: 07/08/2025  Length of Stay: 7    Subjective:     Chief Complaint: Ataxia    History of Present Illness: 69 y/o M presenting from [facility] for generalized weakness for about a week now. History obtained from rehab nurse. She reported that he got to their facility about a month ago and, at baseline, walks with a cane and is alert and oriented. His nurse noticed that he has seemed weaker over the past week and over the past 3 days has had increased balance disturbance and gait issues. They also noticed that he has been speaking more softly in his speech is harder to understand. He has not had any infectious symptoms. He fell yesterday, unsure if he hit his head. CT head without contrast revealed multiple lesions of hypodensity with a hyperdense rim, notably in the left temporal lobe and right cerebellum. There were additional small hyperdensities in the right frontoparietal and left parasagittal parietal lobe. There is mass effect and partial effacement of the 4th ventricle secondary to the cerebellar lesion with developing hydrocephalus without MLS. CT of the chest, abdomen, and pelvis with IV contrast showed RUL mass with mediastinal adenopathy. Labs revealed Hep C and treponema antibodies were positive. Hep C PCR pending. Penicillin G was administered. A sepsis workup was completed and antibiotics were initiated. An MRI brain with and without contrast was ordered, but patient was unable to complete due to persistent movement after sedatives were administered. Prior to MRI, he was alert but drowsy. He was able to tell me his name, but told me he was 53 years old, the year was 2003, and he did not know the current place or reason for being here. He had generalized weakness but was slightly more weak on the right side. He followed simple commands, but did not attempt to follow more complex commands. He is admitted  to [facility] with NSGY consult.    Hospital Course: 7/2/2025: MRI with significant motion artifact. Required presidex overnight secondary to agitation  7/3/2025: MRI favors neoplastic brain lesions. CSF studies pending. Started on Zyprexa and phenobarbital for agitation, R subclavian CVC placed for central access  7/4/2025: NAEON. Extubated without complications.  07/05/2025 NAEON. EVD @20. Adjusted oxycodone frequency q8. Increased zyprexa 20 BID. Precedex for agitation. Plan for biopsy Monday w/ NSGY.  07/06/2025 NAEON. Added on 100q4 FWF. Continues to require precedex for agitation. OR tomorrow.  07/07/2025: AF. SHARRON. Exam stable. OR today for SOC crani for tumor resection. ICPs 0-14, 44cc output. PRN versed given for agitation. Precedex at 1.2. Hydral prn x1 for SBP >160. Remains in restraints.  07/08/2025: AFJared MCDERMOTT. POD1 s/p SOC. CT/MRI from overnight reviewed. EVD open at 10, ICPs -2-14, output 33cc. Versed PRN x1 given for agitation. Agitation improved with Clonidine. Cardene at 12.5 for SBP <160. Hydralazine prn x1 given. Multiple loose BMs. Off propofol and ketamine. Extubated this morning.    Interval History:  see hospital course    Review of Systems   Unable to perform ROS: Acuity of condition       Objective:     Vitals:  Temp: 98.7 °F (37.1 °C)  Pulse: 104  Rhythm: normal sinus rhythm  BP: (!) 146/59  MAP (mmHg): 85  ICP Mean (mmHg): 10 mmHg  Resp: 14  SpO2: 100 %  Oxygen Concentration (%): 50  Vent Mode: Spont  Set Rate: 18 BPM  Vt Set: 470 mL  Pressure Support: 8 cmH20  PEEP/CPAP: 5 cmH20  Peak Airway Pressure: 15 cmH20  Mean Airway Pressure: 8.4 cmH20  Plateau Pressure: 0 cmH20    Temp  Min: 97 °F (36.1 °C)  Max: 99.6 °F (37.6 °C)  Pulse  Min: 78  Max: 132  BP  Min: 107/70  Max: 199/141  MAP (mmHg)  Min: 74  Max: 166  ICP Mean (mmHg)  Min: -2 mmHg  Max: 10 mmHg  Resp  Min: 12  Max: 27  SpO2  Min: 100 %  Max: 100 %  Oxygen Concentration (%)  Min: 50  Max: 100    07/07 0701 - 07/08 0700  In: 2519.2  [I.V.:1509.2]  Out: 1553 [Urine:1520; Drains:33]   Unmeasured Output  Unmeasured Urine Occurrence: 1  Unmeasured Stool Occurrence: 0        Physical Exam  Vitals and nursing note reviewed.   Constitutional:       General: He is not in acute distress.  HENT:      Head: Normocephalic.      Nose:      Comments: NGT secured in place     Mouth/Throat:      Mouth: Mucous membranes are moist.      Pharynx: Oropharynx is clear.   Eyes:      Pupils: Pupils are equal, round, and reactive to light.   Cardiovascular:      Rate and Rhythm: Normal rate.   Pulmonary:      Effort: Pulmonary effort is normal. No respiratory distress.   Abdominal:      General: Abdomen is flat. There is no distension.      Palpations: Abdomen is soft.      Tenderness: There is no guarding or rebound.   Musculoskeletal:      Cervical back: Neck supple.      Right lower leg: No edema.      Left lower leg: No edema.   Skin:     General: Skin is warm and dry.      Capillary Refill: Capillary refill takes less than 2 seconds.   Neurological:      Mental Status: He is alert.      Comments:   [id number]  Aox1, dysarthric  PERRL  Dysconjugate gaze  FC x4, HARRISON AG spont    BUE restraints  EVD patent with good waveform  Incision c/d/i            Medications:  Continuous  nicardipine, Last Rate: 15 mg/hr (07/08/25 1305)    Scheduled  ceFAZolin (Ancef) IV (PEDS and ADULTS), 1 g, Q8H  cloNIDine, 0.1 mg, Q8H  dexAMETHasone (Decadron) IV (PEDS and ADULTS), 4 mg, Q6H  famotidine, 20 mg, BID  folic acid, 1 mg, Daily  multivitamin, 1 tablet, Daily  mupirocin, , BID  OLANZapine, 20 mg, BID  PHENobarbitaL, 130 mg, Q8H  thiamine, 100 mg, Daily    PRN  acetaminophen, 650 mg, Q4H PRN  bisacodyL, 10 mg, Daily PRN  hydrALAZINE, 10 mg, Q4H PRN  labetalol, 10 mg, Q4H PRN  morphine, 2 mg, Q4H PRN  ondansetron, 4 mg, Q4H PRN  oxyCODONE, 10 mg, Q4H PRN  oxyCODONE, 5 mg, Q4H PRN  polyethylene glycol, 17 g, BID PRN  potassium bicarbonate, 35 mEq, PRN  potassium bicarbonate, 50 mEq,  PRN  potassium bicarbonate, 60 mEq, PRN  potassium, sodium phosphates, 2 packet, PRN  potassium, sodium phosphates, 2 packet, PRN  potassium, sodium phosphates, 2 packet, PRN  senna-docusate, 2 tablet, BID PRN  sodium chloride 0.9%, 10 mL, PRN      Today I personally reviewed pertinent medications, lines/drains/airways, imaging, cardiology results, laboratory results, microbiology results,     Diet  Diet NPO    Assessment/Plan:     Neuro  Brain lesion  68 y/o M brought to ED from [facility] (hx of heroin and ETOH) for progressive weakness, gait disturbance, confusion, and decreased volume when speaking. CT head without contrast revealed multiple lesions of hypodensity with a hyperdense rim, notably in the left temporal lobe and right cerebellum. There were additional small hyperdensities in the right frontoparietal and left parasagittal parietal lobe. There is mass effect and partial effacement of the 4th ventricle secondary to the cerebellar lesion with developing hydrocephalus without MLS. CT of the chest, abdomen, and pelvis with IV contrast showed RUL mass with mediastinal adenopathy. Labs revealed Hep C and treponema antibodies were positive. Hep C PCR pending. Penicillin G was administered. A sepsis workup was completed and antibiotics were initiated. Intubated to get MRI. S/p EVD. Extubated 7/4/25 without difficulty.    Admit to [facility]  Q1h neuro checks, I&Os, and vitals   Daily CBC, CMP, Mag, Phos  NSGY following; s/p SOC craniotomy for tumor resection on 7/7  Dex 4q6, taper once able  EVD open at 10 per NSGY. Antibiotics while drain in place.   Remainder of CSF studies pending  Received Penicillin G in ED  Zyprexa and phenobarbital for agitation/EtOH withdrawal  clonidine 0.1 mg TID for agitation/opioid dependence  Avoid versed for agitation  Tube feeds + FWF 160cc q4h  Folate/thiamine/MV  SBP <160   Prn labetalol, hydralazine, cardene   PRN Zofran 4mg q4h  SCDs; SQH held  PT/OT/SLP    Pulmonary  Mass of upper  lobe of right lung  Noted on CXR and CT chest with IV contrast  Saturating well on RA; No PTX on CT-chest  No lung consolidations or obstructive process  No known history of cancer  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          The patient is being Prophylaxed for:  Venous Thromboembolism with: Mechanical  Stress Ulcer with: H2B  Ventilator Pneumonia with: not applicable    Activity Orders            Bed rest starting at 07/07 1606    Diet NPO: NPO starting at 07/07 0001    Turn patient starting at 07/03 1124    Elevate HOB Elevate HOB 30-45 degrees during feeding unless contraindicated starting at 07/03 0802    Elevate HOB starting at 07/01 2040    Straight Cath starting at 07/01 1925          Full Code    Vern Mobley MD  Neurocritical Care  [facility] - Neuro Critical Care

## 2025-07-08 NOTE — NURSING
Pt arrived back to room 6078 following CT, MRI        Pt was escorted by RN, second RN, and RT on cardiac monitoring, O2, ambu bag, portable vent, and IV pole.        Patient placed back on bedside monitor with alarms audible, bed in low position with bed alarm on, call light within reach. IVONNE.

## 2025-07-08 NOTE — ASSESSMENT & PLAN NOTE
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

## 2025-07-08 NOTE — ASSESSMENT & PLAN NOTE
Assessment  71yo M with generalized weakness, falls, and paucity of speech presenting with multiple ring enhancing lesions on brain CT now s/p R SOC for tumor resection on 7/7:    Imaging:  - CTH 7/1 showed multiple brain masses concerning for mets with surrounding edema, can't rule out abscesses. Mass in cerebellum with effacement of 4th ventricule outflow with concern for developing hydrocephalus  - MRI brain 7/1: non diagnostic due to motion  - CT CAP 7/1: spiculated R lung mass and lymph node adenopathy in chest and neck concerning for metastatic disease  - MRI Brain W/WO 7/2: multifocal enhancing lesions c/f metastatic disease, largest R cerebellum w/mass effect on 4th.  - Post op CTH/MRI 7/7: anticipate post op changes, hemostatic products left at superior/medial border of resection cavity    Plan:  - admitted to NCC  - EVD open at 10, abx while in place; transduce hourly and document hourly output; M/Th CSF while remains in place  - Cont Dex 4q6 with Ppi  - AED per NCC  - recommend oncology consult; will need outpatient rad onc consult. 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 Larios

## 2025-07-08 NOTE — EICU
Intervention Initiated From:  COR / DIAMANTEU    Alex intervened regarding:  Rounding (Video assessment)    VICU Night Rounds Checklist  24H Vital Sign Range:  Temp:  [97 °F (36.1 °C)-99 °F (37.2 °C)]   Pulse:  []   Resp:  [12-34]   BP: (118-179)/()   SpO2:  [98 %-100 %]   Arterial Line BP: (126-131)/(60-64)     Video rounds and LDA reconciliation

## 2025-07-08 NOTE — PLAN OF CARE
07/08/25 1526   Rounds   Attendance Provider;   Discharge Plan A Rehab   Why the patient remains in the hospital Requires continued medical care   Transition of Care Barriers None     Ele Richmond MSW, LCSW  [facility]  Case Management Dept.

## 2025-07-08 NOTE — PLAN OF CARE
"[facility] Care Plan    POC reviewed with Norge Brown and family at 0300. Patient unable to verbalize understanding. Questions and concerns addressed. No acute events today. Pt progressing toward goals. Will continue to monitor. See below and flowsheets for full assessment and VS info.     - MRI complete  - CT complete  - Prop gtt @50mcg/kg/hr  - Cardene gtt titrated to maintain SBP<160  - Ketamine gtt titrated for sedation  - NS gtt DC  - Versed 2mg x2 given for sedation in MRI  - Fent 50mcg given for sedation in MRI/CT  - Linens changed, Bath given      Is this a stroke patient? no    Neuro:  [place] Coma Scale  Best Eye Response: 4-->(E4) spontaneous  Best Motor Response: 5-->(M5) localizes pain  Best Verbal Response: 1-->(V1) none  [place] Coma Scale Score: 10  Assessment Qualifiers: Patient chemically sedated or paralyzed  Pupil PERRLA: yes     24 hr Temp:  [97 °F (36.1 °C)-99.6 °F (37.6 °C)]     CV:   Rhythm: normal sinus rhythm  BP goals:   SBP < 160  MAP > 65    Resp:      Vent Mode: A/C  Set Rate: 18 BPM  Oxygen Concentration (%): 100  Vt Set: 470 mL  PEEP/CPAP: 5 cmH20  Pressure Support: (S) 10 cmH20    Plan: wean to extubate    GI/:     Diet/Nutrition Received: NPO  Last Bowel Movement: 07/07/25  Voiding Characteristics: urethral catheter (bladder)    Intake/Output Summary (Last 24 hours) at 7/8/2025 0645  Last data filed at 7/8/2025 0601  Gross per 24 hour   Intake 2519.19 ml   Output 1553 ml   Net 966.19 ml     Unmeasured Output  Unmeasured Urine Occurrence: 1  Unmeasured Stool Occurrence: 0    Labs/Accuchecks:  Recent Labs   Lab 07/08/25  0246   WBC 13.34*   RBC 3.74*   HGB 11.4*   HCT 34.6*         Recent Labs   Lab 07/08/25  0246      K 4.1   CO2 18*   *   BUN 36*   CREATININE 1.2   ALKPHOS 71   ALT 16   AST 36   BILITOT 0.3      Recent Labs   Lab 07/06/25  0720   PROTIME 11.7   INR 1.1   APTT 24.9    No results for input(s): "CPK", "CPKMB", "TROPONINI", "MB" in the last 168 " hours.    Electrolytes: N/A - electrolytes WDL  Accuchecks: Q4H    Gtts:   ketamine 5 mg/mL infusion (titrating)  0-20 mcg/kg/min Intravenous Continuous 12.2 mL/hr at 25 12.5 mcg/kg/min at 25    nicardipine  0-15 mg/hr Intravenous Continuous 62.5 mL/hr at 25 12.5 mg/hr at 25    propofoL  0-50 mcg/kg/min Intravenous Continuous 24.3 mL/hr at 25 50 mcg/kg/min at 25       LDA/Wounds:    Davi Risk Assessment  Sensory Perception: 3-->slightly limited  Moisture: 3-->occasionally moist  Activity: 1-->bedfast  Mobility: 3-->slightly limited  Nutrition: 2-->probably inadequate  Friction and Shear: 2-->potential problem  Davi Score: 14  Is your davi score 12 or less? no          Restraints:   Restraint Order  Length of Order: Order good for next 24 hours or when removed.  Date that the current order will : 25  Time that the current order will :   Order Upon Application: Yes    [facility]

## 2025-07-08 NOTE — PLAN OF CARE
Goals remain appropriate.  Problem: Occupational Therapy  Goal: Occupational Therapy Goal  Description: Goals set 7/8 with an expiration date, 8/5:  Patient will increase functional independence with ADLs by performing:    Patient will demonstrate rolling to the right with min assist.  Not met   Patient will demonstrate rolling to the left with min assist.   Not met  Patient will demonstrate supine -sit with min  assist.   Not met  Patient will demonstrate stand pivot transfers with mod assist.   Not met  Patient will demonstrate grooming while seated with min assist.   Not met  Patient will demonstrate upper body dressing with min assist while seated EOB.   Not met  Patient will demonstrate lower body dressing with mod assist while seated EOB.   Not met  Patient will demonstrate toileting with mod assist.   Not met  Patient will demonstrate bathing while seated EOB with mod assist.   Not met  Patient's family / caregiver will demonstrate independence and safety with assisting patient with self-care skills and functional mobility.     Not met  Patient's family / caregiver will demonstrate independence with providing ROM and changes in bed positioning.   Not met  Patient and/or patient's family will verbalize understanding of stroke prevention guidelines, personal risk factors and stroke warning signs via teachback method.  Not met           7/8/2025 0435 by Tammy Hunt LOTR  Outcome: Progressing  7/8/2025 0434 by Tammy Hunt LOTR  Outcome: Progressing

## 2025-07-08 NOTE — PLAN OF CARE
"[facility] Care Plan  POC reviewed with Goran Carlos and family at 1400. Family verbalized understanding. Questions and concerns addressed. See below and flowsheets for full assessment and VS info.     -Extubated   -SBP goal <160  -Propofol and ketamine D/C'd  -Tube feed goal increased to 55ml/hr   -Free water flushes increased to 160 ml Q4  -Cardene titrated throughout shift  -Family updated at bedside        Is this a stroke patient? no    Neuro:  [place] Coma Scale  Best Eye Response: 4-->(E4) spontaneous  Best Motor Response: 6-->(M6) obeys commands  Best Verbal Response: 4-->(V4) confused  [place] Coma Scale Score: 14  Assessment Qualifiers: Patient intubated  Pupil PERRLA: yes     24 hr Temp:  [98.1 °F (36.7 °C)-99.6 °F (37.6 °C)]     CV:   Rhythm: normal sinus rhythm  BP goals:   SBP < 160  MAP > 65    Resp:      Vent Mode: Spont  Set Rate: 18 BPM  Oxygen Concentration (%): 50  Vt Set: 470 mL  PEEP/CPAP: 5 cmH20  Pressure Support: 8 cmH20    Plan: N/A    GI/:     Diet/Nutrition Received: NPO, tube feeding  Last Bowel Movement: 07/07/25  Voiding Characteristics: external catheter    Intake/Output Summary (Last 24 hours) at 7/8/2025 1742  Last data filed at 7/8/2025 1705  Gross per 24 hour   Intake 3270.42 ml   Output 1263 ml   Net 2007.42 ml     Unmeasured Output  Unmeasured Urine Occurrence: 1  Unmeasured Stool Occurrence: 0    Labs/Accuchecks:  Recent Labs   Lab 07/08/25  0246   WBC 13.34*   RBC 3.74*   HGB 11.4*   HCT 34.6*         Recent Labs   Lab 07/08/25  0246      K 4.1   CO2 18*   *   BUN 36*   CREATININE 1.2   ALKPHOS 71   ALT 16   AST 36   BILITOT 0.3      Recent Labs   Lab 07/06/25  0720   PROTIME 11.7   INR 1.1   APTT 24.9    No results for input(s): "CPK", "CPKMB", "TROPONINI", "MB" in the last 168 hours.    Electrolytes: N/A - electrolytes WDL  Accuchecks: none    Gtts:   nicardipine  0-15 mg/hr Intravenous Continuous 37.5 mL/hr at 07/08/25 1705 7.5 mg/hr at 07/08/25 1705 "       LDA/Wounds:    Davi Risk Assessment  Sensory Perception: 3-->slightly limited  Moisture: 3-->occasionally moist  Activity: 1-->bedfast  Mobility: 3-->slightly limited  Nutrition: 3-->adequate  Friction and Shear: 3-->no apparent problem  Davi Score: 16    Is your davi score 12 or less? no      Restraints:   Restraint Order  Length of Order: Order good for next 24 hours or when removed.  Date that the current order will : 25  Time that the current order will : 1137  Order Upon Application: Yes

## 2025-07-08 NOTE — RESPIRATORY THERAPY
Patient extubated per MD order.  Placed patient on 3L NC.  Patient tolerated well.  Will continue to monitor.

## 2025-07-08 NOTE — EICU
Virtual ICU Quality Rounds    Admit Date: 7/1/2025  Hospital Day: 7    ICU Day: 6d 12h    24H Vital Sign Range:  Temp:  [97 °F (36.1 °C)-99.6 °F (37.6 °C)]   Pulse:  []   Resp:  [12-27]   BP: (107-199)/()   SpO2:  [100 %]   Arterial Line BP: (110-162)/(55-92)     VICU Surveillance Screening    LDA reconciliation : Yes

## 2025-07-08 NOTE — SUBJECTIVE & OBJECTIVE
Interval History:  see hospital course    Review of Systems   Unable to perform ROS: Acuity of condition       Objective:     Vitals:  Temp: 98.7 °F (37.1 °C)  Pulse: 104  Rhythm: normal sinus rhythm  BP: (!) 146/59  MAP (mmHg): 85  ICP Mean (mmHg): 10 mmHg  Resp: 14  SpO2: 100 %  Oxygen Concentration (%): 50  Vent Mode: Spont  Set Rate: 18 BPM  Vt Set: 470 mL  Pressure Support: 8 cmH20  PEEP/CPAP: 5 cmH20  Peak Airway Pressure: 15 cmH20  Mean Airway Pressure: 8.4 cmH20  Plateau Pressure: 0 cmH20    Temp  Min: 97 °F (36.1 °C)  Max: 99.6 °F (37.6 °C)  Pulse  Min: 78  Max: 132  BP  Min: 107/70  Max: 199/141  MAP (mmHg)  Min: 74  Max: 166  ICP Mean (mmHg)  Min: -2 mmHg  Max: 10 mmHg  Resp  Min: 12  Max: 27  SpO2  Min: 100 %  Max: 100 %  Oxygen Concentration (%)  Min: 50  Max: 100    07/07 0701 - 07/08 0700  In: 2519.2 [I.V.:1509.2]  Out: 1553 [Urine:1520; Drains:33]   Unmeasured Output  Unmeasured Urine Occurrence: 1  Unmeasured Stool Occurrence: 0        Physical Exam  Vitals and nursing note reviewed.   Constitutional:       General: He is not in acute distress.  HENT:      Head: Normocephalic.      Nose:      Comments: NGT secured in place     Mouth/Throat:      Mouth: Mucous membranes are moist.      Pharynx: Oropharynx is clear.   Eyes:      Pupils: Pupils are equal, round, and reactive to light.   Cardiovascular:      Rate and Rhythm: Normal rate.   Pulmonary:      Effort: Pulmonary effort is normal. No respiratory distress.   Abdominal:      General: Abdomen is flat. There is no distension.      Palpations: Abdomen is soft.      Tenderness: There is no guarding or rebound.   Musculoskeletal:      Cervical back: Neck supple.      Right lower leg: No edema.      Left lower leg: No edema.   Skin:     General: Skin is warm and dry.      Capillary Refill: Capillary refill takes less than 2 seconds.   Neurological:      Mental Status: He is alert.      Comments:   [id number]  Aox1, dysarthric  PERRL  Dysconjugate gaze  FC  x4, HARRISON AG spont    BUE restraints  EVD patent with good waveform  Incision c/d/i            Medications:  Continuous  nicardipine, Last Rate: 15 mg/hr (07/08/25 1305)    Scheduled  ceFAZolin (Ancef) IV (PEDS and ADULTS), 1 g, Q8H  cloNIDine, 0.1 mg, Q8H  dexAMETHasone (Decadron) IV (PEDS and ADULTS), 4 mg, Q6H  famotidine, 20 mg, BID  folic acid, 1 mg, Daily  multivitamin, 1 tablet, Daily  mupirocin, , BID  OLANZapine, 20 mg, BID  PHENobarbitaL, 130 mg, Q8H  thiamine, 100 mg, Daily    PRN  acetaminophen, 650 mg, Q4H PRN  bisacodyL, 10 mg, Daily PRN  hydrALAZINE, 10 mg, Q4H PRN  labetalol, 10 mg, Q4H PRN  morphine, 2 mg, Q4H PRN  ondansetron, 4 mg, Q4H PRN  oxyCODONE, 10 mg, Q4H PRN  oxyCODONE, 5 mg, Q4H PRN  polyethylene glycol, 17 g, BID PRN  potassium bicarbonate, 35 mEq, PRN  potassium bicarbonate, 50 mEq, PRN  potassium bicarbonate, 60 mEq, PRN  potassium, sodium phosphates, 2 packet, PRN  potassium, sodium phosphates, 2 packet, PRN  potassium, sodium phosphates, 2 packet, PRN  senna-docusate, 2 tablet, BID PRN  sodium chloride 0.9%, 10 mL, PRN      Today I personally reviewed pertinent medications, lines/drains/airways, imaging, cardiology results, laboratory results, microbiology results,     Diet  Diet NPO

## 2025-07-09 PROBLEM — I10 ESSENTIAL HYPERTENSION: Status: ACTIVE | Noted: 2025-07-09

## 2025-07-09 LAB
ABSOLUTE EOSINOPHIL (OHS): 0 K/UL
ABSOLUTE MONOCYTE (OHS): 1.18 K/UL (ref 0.3–1)
ABSOLUTE NEUTROPHIL COUNT (OHS): 12.43 K/UL (ref 1.8–7.7)
ALBUMIN SERPL BCP-MCNC: 3.3 G/DL (ref 3.5–5.2)
ALP SERPL-CCNC: 64 UNIT/L (ref 40–150)
ALT SERPL W/O P-5'-P-CCNC: 14 UNIT/L (ref 10–44)
ANION GAP (OHS): 6 MMOL/L (ref 8–16)
AST SERPL-CCNC: 40 UNIT/L (ref 11–45)
BASOPHILS # BLD AUTO: 0.01 K/UL
BASOPHILS NFR BLD AUTO: 0.1 %
BILIRUB SERPL-MCNC: 0.3 MG/DL (ref 0.1–1)
BUN SERPL-MCNC: 37 MG/DL (ref 8–23)
CALCIUM SERPL-MCNC: 8.8 MG/DL (ref 8.7–10.5)
CHLORIDE SERPL-SCNC: 117 MMOL/L (ref 95–110)
CO2 SERPL-SCNC: 23 MMOL/L (ref 23–29)
CREAT SERPL-MCNC: 1.2 MG/DL (ref 0.5–1.4)
ERYTHROCYTE [DISTWIDTH] IN BLOOD BY AUTOMATED COUNT: 14.1 % (ref 11.5–14.5)
GFR SERPLBLD CREATININE-BSD FMLA CKD-EPI: >60 ML/MIN/1.73/M2
GLUCOSE SERPL-MCNC: 139 MG/DL (ref 70–110)
HCT VFR BLD AUTO: 33.3 % (ref 40–54)
HGB BLD-MCNC: 11.1 GM/DL (ref 14–18)
IMM GRANULOCYTES # BLD AUTO: 0.08 K/UL (ref 0–0.04)
IMM GRANULOCYTES NFR BLD AUTO: 0.6 % (ref 0–0.5)
LYMPHOCYTES # BLD AUTO: 0.46 K/UL (ref 1–4.8)
MAGNESIUM SERPL-MCNC: 2.4 MG/DL (ref 1.6–2.6)
MCH RBC QN AUTO: 31 PG (ref 27–31)
MCHC RBC AUTO-ENTMCNC: 33.3 G/DL (ref 32–36)
MCV RBC AUTO: 93 FL (ref 82–98)
NUCLEATED RBC (/100WBC) (OHS): 0 /100 WBC
PHOSPHATE SERPL-MCNC: 2.1 MG/DL (ref 2.7–4.5)
PLATELET # BLD AUTO: 205 K/UL (ref 150–450)
PMV BLD AUTO: 10.1 FL (ref 9.2–12.9)
POTASSIUM SERPL-SCNC: 4 MMOL/L (ref 3.5–5.1)
PROT SERPL-MCNC: 6.5 GM/DL (ref 6–8.4)
RBC # BLD AUTO: 3.58 M/UL (ref 4.6–6.2)
RELATIVE EOSINOPHIL (OHS): 0 %
RELATIVE LYMPHOCYTE (OHS): 3.2 % (ref 18–48)
RELATIVE MONOCYTE (OHS): 8.3 % (ref 4–15)
RELATIVE NEUTROPHIL (OHS): 87.8 % (ref 38–73)
SODIUM SERPL-SCNC: 146 MMOL/L (ref 136–145)
WBC # BLD AUTO: 14.16 K/UL (ref 3.9–12.7)

## 2025-07-09 PROCEDURE — 84100 ASSAY OF PHOSPHORUS: CPT

## 2025-07-09 PROCEDURE — 25000003 PHARM REV CODE 250: Performed by: PSYCHIATRY & NEUROLOGY

## 2025-07-09 PROCEDURE — 63600175 PHARM REV CODE 636 W HCPCS

## 2025-07-09 PROCEDURE — 63600175 PHARM REV CODE 636 W HCPCS: Performed by: STUDENT IN AN ORGANIZED HEALTH CARE EDUCATION/TRAINING PROGRAM

## 2025-07-09 PROCEDURE — 80053 COMPREHEN METABOLIC PANEL: CPT

## 2025-07-09 PROCEDURE — 97112 NEUROMUSCULAR REEDUCATION: CPT

## 2025-07-09 PROCEDURE — 99291 CRITICAL CARE FIRST HOUR: CPT | Mod: ,,, | Performed by: PSYCHIATRY & NEUROLOGY

## 2025-07-09 PROCEDURE — 92610 EVALUATE SWALLOWING FUNCTION: CPT

## 2025-07-09 PROCEDURE — 97168 OT RE-EVAL EST PLAN CARE: CPT

## 2025-07-09 PROCEDURE — 94761 N-INVAS EAR/PLS OXIMETRY MLT: CPT

## 2025-07-09 PROCEDURE — 25000003 PHARM REV CODE 250

## 2025-07-09 PROCEDURE — 83735 ASSAY OF MAGNESIUM: CPT

## 2025-07-09 PROCEDURE — 85025 COMPLETE CBC W/AUTO DIFF WBC: CPT

## 2025-07-09 PROCEDURE — 20000000 HC ICU ROOM

## 2025-07-09 PROCEDURE — 63600175 PHARM REV CODE 636 W HCPCS: Performed by: PSYCHIATRY & NEUROLOGY

## 2025-07-09 RX ORDER — LOSARTAN POTASSIUM 50 MG/1
50 TABLET ORAL DAILY
Status: DISCONTINUED | OUTPATIENT
Start: 2025-07-09 | End: 2025-07-10

## 2025-07-09 RX ORDER — POLYETHYLENE GLYCOL 3350 17 G/17G
17 POWDER, FOR SOLUTION ORAL DAILY
Status: DISCONTINUED | OUTPATIENT
Start: 2025-07-09 | End: 2025-07-15

## 2025-07-09 RX ORDER — HEPARIN SODIUM 5000 [USP'U]/ML
5000 INJECTION, SOLUTION INTRAVENOUS; SUBCUTANEOUS EVERY 8 HOURS
Status: DISCONTINUED | OUTPATIENT
Start: 2025-07-09 | End: 2025-07-15

## 2025-07-09 RX ORDER — CLONIDINE HYDROCHLORIDE 0.2 MG/1
0.2 TABLET ORAL EVERY 8 HOURS
Status: DISCONTINUED | OUTPATIENT
Start: 2025-07-09 | End: 2025-07-11

## 2025-07-09 RX ADMIN — LOSARTAN POTASSIUM 50 MG: 50 TABLET, FILM COATED ORAL at 10:07

## 2025-07-09 RX ADMIN — LABETALOL HYDROCHLORIDE 10 MG: 5 INJECTION, SOLUTION INTRAVENOUS at 09:07

## 2025-07-09 RX ADMIN — POLYETHYLENE GLYCOL 3350 17 G: 17 POWDER, FOR SOLUTION ORAL at 10:07

## 2025-07-09 RX ADMIN — POTASSIUM & SODIUM PHOSPHATES POWDER PACK 280-160-250 MG 2 PACKET: 280-160-250 PACK at 04:07

## 2025-07-09 RX ADMIN — CEFAZOLIN 1 G: 330 INJECTION, POWDER, FOR SOLUTION INTRAMUSCULAR; INTRAVENOUS at 01:07

## 2025-07-09 RX ADMIN — FAMOTIDINE 20 MG: 20 TABLET, FILM COATED ORAL at 09:07

## 2025-07-09 RX ADMIN — DEXAMETHASONE SODIUM PHOSPHATE 4 MG: 4 INJECTION INTRA-ARTICULAR; INTRALESIONAL; INTRAMUSCULAR; INTRAVENOUS; SOFT TISSUE at 12:07

## 2025-07-09 RX ADMIN — NICARDIPINE HYDROCHLORIDE 10 MG/HR: 0.2 INJECTION, SOLUTION INTRAVENOUS at 10:07

## 2025-07-09 RX ADMIN — DEXAMETHASONE SODIUM PHOSPHATE 4 MG: 4 INJECTION INTRA-ARTICULAR; INTRALESIONAL; INTRAMUSCULAR; INTRAVENOUS; SOFT TISSUE at 01:07

## 2025-07-09 RX ADMIN — OLANZAPINE 20 MG: 5 TABLET, FILM COATED ORAL at 09:07

## 2025-07-09 RX ADMIN — FAMOTIDINE 20 MG: 20 TABLET, FILM COATED ORAL at 10:07

## 2025-07-09 RX ADMIN — OLANZAPINE 20 MG: 5 TABLET, FILM COATED ORAL at 10:07

## 2025-07-09 RX ADMIN — CLONIDINE HYDROCHLORIDE 0.2 MG: 0.2 TABLET ORAL at 09:07

## 2025-07-09 RX ADMIN — CLONIDINE HYDROCHLORIDE 0.2 MG: 0.2 TABLET ORAL at 01:07

## 2025-07-09 RX ADMIN — PHENOBARBITAL 130 MG: 30 TABLET ORAL at 10:07

## 2025-07-09 RX ADMIN — DEXAMETHASONE SODIUM PHOSPHATE 4 MG: 4 INJECTION INTRA-ARTICULAR; INTRALESIONAL; INTRAMUSCULAR; INTRAVENOUS; SOFT TISSUE at 07:07

## 2025-07-09 RX ADMIN — DEXAMETHASONE SODIUM PHOSPHATE 4 MG: 4 INJECTION INTRA-ARTICULAR; INTRALESIONAL; INTRAMUSCULAR; INTRAVENOUS; SOFT TISSUE at 06:07

## 2025-07-09 RX ADMIN — MUPIROCIN: 20 OINTMENT TOPICAL at 09:07

## 2025-07-09 RX ADMIN — HEPARIN SODIUM 5000 UNITS: 5000 INJECTION INTRAVENOUS; SUBCUTANEOUS at 01:07

## 2025-07-09 RX ADMIN — PHENOBARBITAL 130 MG: 30 TABLET ORAL at 02:07

## 2025-07-09 RX ADMIN — NICARDIPINE HYDROCHLORIDE 15 MG/HR: 0.2 INJECTION, SOLUTION INTRAVENOUS at 07:07

## 2025-07-09 RX ADMIN — POTASSIUM & SODIUM PHOSPHATES POWDER PACK 280-160-250 MG 2 PACKET: 280-160-250 PACK at 09:07

## 2025-07-09 RX ADMIN — HEPARIN SODIUM 5000 UNITS: 5000 INJECTION INTRAVENOUS; SUBCUTANEOUS at 09:07

## 2025-07-09 RX ADMIN — THERA TABS 1 TABLET: TAB at 10:07

## 2025-07-09 RX ADMIN — PHENOBARBITAL 130 MG: 30 TABLET ORAL at 09:07

## 2025-07-09 RX ADMIN — CEFAZOLIN 1 G: 330 INJECTION, POWDER, FOR SOLUTION INTRAMUSCULAR; INTRAVENOUS at 09:07

## 2025-07-09 RX ADMIN — CEFAZOLIN 1 G: 330 INJECTION, POWDER, FOR SOLUTION INTRAMUSCULAR; INTRAVENOUS at 04:07

## 2025-07-09 RX ADMIN — POTASSIUM & SODIUM PHOSPHATES POWDER PACK 280-160-250 MG 2 PACKET: 280-160-250 PACK at 10:07

## 2025-07-09 RX ADMIN — NICARDIPINE HYDROCHLORIDE 12.5 MG/HR: 0.2 INJECTION, SOLUTION INTRAVENOUS at 02:07

## 2025-07-09 RX ADMIN — FOLIC ACID 1 MG: 1 TABLET ORAL at 10:07

## 2025-07-09 RX ADMIN — Medication 100 MG: at 10:07

## 2025-07-09 NOTE — ASSESSMENT & PLAN NOTE
Assessment  71yo M with generalized weakness, falls, and paucity of speech presenting with multiple ring enhancing lesions on brain CT now s/p R SOC for tumor resection on 7/7:    Imaging:  - CTH 7/1 showed multiple brain masses concerning for mets with surrounding edema, can't rule out abscesses. Mass in cerebellum with effacement of 4th ventricule outflow with concern for developing hydrocephalus  - MRI brain 7/1: non diagnostic due to motion  - CT CAP 7/1: spiculated R lung mass and lymph node adenopathy in chest and neck concerning for metastatic disease  - MRI Brain W/WO 7/2: multifocal enhancing lesions c/f metastatic disease, largest R cerebellum w/mass effect on 4th.  - Post op CTH/MRI 7/7: anticipate post op changes, hemostatic products left at superior/medial border of resection cavity    Plan:  - admitted to [facility]  - EVD open at 10, abx while in place; transduce hourly and document hourly output; M/Th CSF while remains in place. CT Friday ordered for wean  - Cont Dex 4q6 with Ppi  - AED per NCC  - Onc following; will need outpatient rad onc consult. 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 Larios

## 2025-07-09 NOTE — EICU
Intervention Initiated From:  COR / DIAMANTEU    Alex intervened regarding:  Rounding (Video assessment)  VICU Night Rounds Checklist  24H Vital Sign Range:  Temp:  [98.3 °F (36.8 °C)-99.6 °F (37.6 °C)]   Pulse:  []   Resp:  [12-24]   BP: (107-199)/()   SpO2:  [99 %-100 %]   Arterial Line BP: (110-162)/(55-99)     Video rounds

## 2025-07-09 NOTE — EICU
Virtual ICU Quality Rounds    Admit Date: 7/1/2025  Hospital Day: 8    ICU Day: 7d 13h    24H Vital Sign Range:  Temp:  [98.2 °F (36.8 °C)-98.7 °F (37.1 °C)]   Pulse:  []   Resp:  [12-22]   BP: (132-188)/(59-92)   SpO2:  [98 %-100 %]   Arterial Line BP: (100-191)/()     VICU Surveillance Screening    Daily review of  line necessity(optional): Central line(s) in place    Central line type (optional): Triple lumen catheter    Central line indications : Poor IV access, Multiple infusions, and Incompatible infusions        LDA reconciliation : Yes

## 2025-07-09 NOTE — PLAN OF CARE
07/09/25 1410   Rounds   Attendance Provider;   Discharge Plan A Rehab   Why the patient remains in the hospital Requires continued medical care   Transition of Care Barriers None     Ele Richmond MSW, LCSW  [facility]  Case Management Dept.

## 2025-07-09 NOTE — PLAN OF CARE
Recommend that pt. Be npo with aspiration precautions and consider alternate means of nutrition  Problem: SLP  Goal: SLP Goal  Description: Goals due 7/16  1.  Pt. Will participate in ongoing assessment of swallow to initiate least restrictive diet.  Outcome: Progressing

## 2025-07-09 NOTE — NURSING
New belén placed and KUB completed. Pt tolerated well. Will wait for order that tube is ok to use prior to use and will catch pt up on this PO meds when have order ok to use. Pt is maxed on Cardene at this time and PRNs have been given to help with BP less than 160. Pt is resting at this time. Male external catheter replaced and pad changed at shift change. All lines remain in place and in good working order.

## 2025-07-09 NOTE — PLAN OF CARE
Goals remain appropriate.  Problem: Occupational Therapy  Goal: Occupational Therapy Goal  Description: Goals set 7/8 with an expiration date, 8/5:  Patient will increase functional independence with ADLs by performing:    Patient will demonstrate rolling to the right with min assist.  Not met   Patient will demonstrate rolling to the left with min assist.   Not met  Patient will demonstrate supine -sit with min  assist.   Not met  Patient will demonstrate stand pivot transfers with mod assist.   Not met  Patient will demonstrate grooming while seated with min assist.   Not met  Patient will demonstrate upper body dressing with min assist while seated EOB.   Not met  Patient will demonstrate lower body dressing with mod assist while seated EOB.   Not met  Patient will demonstrate toileting with mod assist.   Not met  Patient will demonstrate bathing while seated EOB with mod assist.   Not met  Patient's family / caregiver will demonstrate independence and safety with assisting patient with self-care skills and functional mobility.     Not met  Patient's family / caregiver will demonstrate independence with providing ROM and changes in bed positioning.   Not met  Patient and/or patient's family will verbalize understanding of stroke prevention guidelines, personal risk factors and stroke warning signs via teachback method.  Not met           Outcome: Progressing

## 2025-07-09 NOTE — PROGRESS NOTES
[facility] - Neuro Critical Care  Neurosurgery  Progress Note    Subjective:     History of Present Illness: Patient is 71yo M with generalized weakness, falls, and paucity of speech. 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 On exam, patient's voice is barely audible but patient appropriately answers questions and follows commands. Patient's brother states that patient had normal speech and gait when they last saw each other about 1 month ago.    Neurosurgery consulted due to multiple ring-enhancing lesions seen on CT brain.    Post-Op Info:  Procedure(s) (LRB):  CRANIOTOMY, SUBOCCIPITAL (Right)   2 Days Post-Op   Interval History: NAEON. Remains agitated, Ox1, believes he is at detox rehab and 2075. ICP WNL, 5-13cc/hr since lowering to 10. Will continue to drain at 10 for several days for wound healing with plan for interval CT on Friday. Incision flat, c/d/I w/o fluctuance.     Medications:  Continuous Infusions:   nicardipine  0-15 mg/hr Intravenous Continuous 62.5 mL/hr at 07/09/25 0505 12.5 mg/hr at 07/09/25 0505     Scheduled Meds:   ceFAZolin (Ancef) IV (PEDS and ADULTS)  1 g Intravenous Q8H    cloNIDine  0.1 mg Per NG tube Q8H    dexAMETHasone (Decadron) IV (PEDS and ADULTS)  4 mg Intravenous Q6H    famotidine  20 mg Per NG tube BID    folic acid  1 mg Per NG tube Daily    multivitamin  1 tablet Per NG tube Daily    mupirocin   Nasal BID    OLANZapine  20 mg Per NG tube BID    PHENobarbitaL  130 mg Per NG tube Q8H    thiamine  100 mg Per NG tube Daily     PRN Meds:  Current Facility-Administered Medications:     acetaminophen, 650 mg, Per NG tube, Q4H PRN    bisacodyL, 10 mg, Rectal, Daily PRN    hydrALAZINE, 10 mg, Intravenous, Q4H PRN    labetalol, 10 mg, Intravenous, Q4H PRN    midazolam, 1 mg, Intravenous, Q5 Min PRN    morphine, 2 mg, Intravenous, Q4H PRN    ondansetron, 4 mg, Intravenous, Q4H PRN    oxyCODONE, 10 mg, Per NG tube, Q4H PRN    oxyCODONE, 5 mg, Per NG tube, Q4H  PRN    polyethylene glycol, 17 g, Per NG tube, BID PRN    potassium bicarbonate, 35 mEq, Per NG tube, PRN    potassium bicarbonate, 50 mEq, Per NG tube, PRN    potassium bicarbonate, 60 mEq, Per NG tube, PRN    potassium, sodium phosphates, 2 packet, Per NG tube, PRN    potassium, sodium phosphates, 2 packet, Per NG tube, PRN    potassium, sodium phosphates, 2 packet, Per NG tube, PRN    senna-docusate, 2 tablet, Per NG tube, BID PRN    sodium chloride 0.9%, 10 mL, Intravenous, PRN     Review of Systems  Objective:     Weight: 81.1 kg (178 lb 12.7 oz)  Body mass index is 23.59 kg/m².  Vital Signs (Most Recent):  Temp: 98.6 °F (37 °C) (07/09/25 0305)  Pulse: (!) 111 (07/09/25 0505)  Resp: 16 (07/09/25 0505)  BP: (!) 148/74 (07/09/25 0505)  SpO2: 99 % (07/09/25 0505) Vital Signs (24h Range):  Temp:  [98.2 °F (36.8 °C)-98.7 °F (37.1 °C)] 98.6 °F (37 °C)  Pulse:  [] 111  Resp:  [12-22] 16  SpO2:  [98 %-100 %] 99 %  BP: (115-172)/(59-92) 148/74  Arterial Line BP: (100-157)/() 143/143                Vent Mode: Spont  Oxygen Concentration (%):  [] 50  Resp Rate Total:  [12 br/min-23 br/min] 23 br/min  Vt Set:  [470 mL] 470 mL  PEEP/CPAP:  [5 cmH20] 5 cmH20  Pressure Support:  [8 cmH20] 8 cmH20  Mean Airway Pressure:  [8.4 cmH20-8.5 cmH20] 8.4 cmH20             NG/OG Tube 07/02/25 0522 Left nostril (Active)   Placement Check placement verified by x-ray 07/09/25 0505   Tolerance no signs/symptoms of discomfort 07/09/25 0505   Securement secured to nostril center 07/09/25 0505   Clamp Status/Tolerance unclamped 07/09/25 0505   Suction Setting/Drainage Method suction at the bedside 07/09/25 0505   Insertion Site Appearance no redness, warmth, tenderness, skin breakdown, drainage 07/09/25 0505   Drainage None 07/09/25 0505   Flush/Irrigation flushed w/;water 07/09/25 0505   Feeding Type continuous;by pump 07/09/25 0505   Feeding Action feeding restarted 07/09/25 0505   Current Rate (mL/hr) 40 mL/hr 07/08/25  "1905   Goal Rate (mL/hr) 40 mL/hr 07/08/25 1905   Intake (mL) 60 mL 07/08/25 0805   Water Bolus (mL) 160 mL 07/09/25 0405   Rate Formula Tube Feeding (mL/hr) 30 mL/hr 07/08/25 0301   Formula Name Pivot 07/08/25 0905   Intake (mL) - Formula Tube Feeding 40 07/09/25 0505       Male External Urinary Catheter 07/08/25 0705 Medium (Active)   Collection Container Urimeter 07/09/25 0505   Securement Method secured to top of thigh w/ adhesive device 07/09/25 0505   Skin no redness;no breakdown 07/09/25 0505   Tolerance no signs/symptoms of discomfort 07/09/25 0505   Output (mL) 400 mL 07/08/25 1805   Catheter Change Date 07/08/25 07/08/25 1705   Catheter Change Time 0700 07/08/25 1705            ICP/Ventriculostomy 07/02/25 1409 Right (Active)   Level of Ventriculostomy (cm above) 10 07/08/25 1505   Status Open to drainage 07/09/25 0505   Site Assessment Clean;Dry 07/09/25 0505   Site Drainage No drainage 07/09/25 0505   Waveform normal waveform 07/09/25 0505   Output (mL) 2 mL 07/09/25 0505   CSF Color clear 07/09/25 0505   Dressing Status Clean;Dry;Intact 07/09/25 0505   Interventions HOB degrees;bed controls locked 07/09/25 0505          Physical Exam         Neurosurgery Physical Exam  [id number]  AOX1, agitated, confused  PERRL  HARRISON spon AG  BUE restraints  Followed commands intermittently BLE     EVD patent with good waveform  Incision flat, c/d/I without fluctuance    Significant Labs:  Recent Labs   Lab 07/07/25 1752 07/08/25 0246 07/09/25 0132   * 128* 139*    142 146*   K 4.2 4.1 4.0   * 113* 117*   CO2 17* 18* 23   BUN 39* 36* 37*   CREATININE 1.2 1.2 1.2   CALCIUM 8.4* 8.2* 8.8   MG  --  2.2 2.4     Recent Labs   Lab 07/07/25 1752 07/08/25 0246 07/09/25  0132   WBC 13.65* 13.34* 14.16*   HGB 13.2* 11.4* 11.1*   HCT 38.9* 34.6* 33.3*    195 205     No results for input(s): "LABPT", "INR", "APTT" in the last 48 hours.  Microbiology Results (last 7 days)       Procedure Component Value " Units Date/Time    CSF culture [[id number]] Collected: 07/07/25 0715    Order Status: Completed Specimen: CSF (Spinal Fluid) from CSF Tap, Tube 3 Updated: 07/09/25 0701     CULTURE, CSF No Growth To Date     GRAM STAIN Cytospin indicates:      No WBCs      No organisms seen    CSF culture [[id number]] Collected: 07/02/25 1808    Order Status: Completed Specimen: CSF (Spinal Fluid) from CSF Tap, Tube 3 Updated: 07/08/25 0747     CULTURE, CSF No Growth     GRAM STAIN Cytospin indicates:      No WBCs      No organisms seen    Gram stain [[id number]] Collected: 07/07/25 0715    Order Status: Canceled Specimen: CSF (Spinal Fluid) from CSF Tap, Tube 3 Updated: 07/07/25 1620    Culture, Respiratory with Gram Stain [[id number]] Collected: 07/03/25 1130    Order Status: Completed Specimen: Respiratory from Endotracheal Aspirate Updated: 07/07/25 0844     Respiratory Culture Normal respiratory carmen, no Staph aureus or Pseudomonas isolated     GRAM STAIN <10 Epithelial Cells/LPF      Rare WBC seen      No organisms seen    CSF culture [[id number]]     Order Status: Canceled Specimen: CSF (Spinal Fluid)     Blood culture #1 **CANNOT BE ORDERED STAT** [[id number]]  (Normal) Collected: 07/01/25 1702    Order Status: Completed Specimen: Blood from Peripheral, Forearm, Left Updated: 07/07/25 0000     Blood Culture No Growth After 5 Days    Blood culture #2 **CANNOT BE ORDERED STAT** [[id number]]  (Normal) Collected: 07/01/25 1702    Order Status: Completed Specimen: Blood from Peripheral, Lower Arm, Left Updated: 07/07/25 0000     Blood Culture No Growth After 5 Days    Cryptococcal antigen, CSF [[id number]]  (Normal) Collected: 07/02/25 1808    Order Status: Completed Specimen: CSF (Spinal Fluid) from CSF Tap, Tube 3 Updated: 07/03/25 1458     Cryptococcal Antigen, CSF Negative    Afb Culture Stain [[id number]] Collected: 07/02/25 1808    Order Status: Completed Specimen: CSF (Spinal Fluid) from CSF Tap, Tube 3 Updated: 07/03/25  1346     ACID FAST STAIN  No acid fast bacilli seen    Gram stain [[id number]] Collected: 07/02/25 1808    Order Status: Canceled Specimen: CSF (Spinal Fluid) from CSF Tap, Tube 3 Updated: 07/03/25 0203    AFB Culture & Smear [[id number]] Collected: 07/02/25 1808    Order Status: Sent Specimen: CSF (Spinal Fluid) from CSF Tap, Tube 3 Updated: 07/03/25 0202          All pertinent labs from the last 24 hours have been reviewed.    Significant Diagnostics:  I have reviewed all pertinent imaging results/findings within the past 24 hours.  Assessment/Plan:     * Ataxia  Assessment  69yo M with generalized weakness, falls, and paucity of speech presenting with multiple ring enhancing lesions on brain CT now s/p R SOC for tumor resection on 7/7:    Imaging:  - CTH 7/1 showed multiple brain masses concerning for mets with surrounding edema, can't rule out abscesses. Mass in cerebellum with effacement of 4th ventricule outflow with concern for developing hydrocephalus  - MRI brain 7/1: non diagnostic due to motion  - CT CAP 7/1: spiculated R lung mass and lymph node adenopathy in chest and neck concerning for metastatic disease  - MRI Brain W/WO 7/2: multifocal enhancing lesions c/f metastatic disease, largest R cerebellum w/mass effect on 4th.  - Post op CTH/MRI 7/7: anticipate post op changes, hemostatic products left at superior/medial border of resection cavity    Plan:  - admitted to [facility]  - EVD open at 10, abx while in place; transduce hourly and document hourly output; M/Th CSF while remains in place. CT Friday ordered for wean  - Cont Dex 4q6 with Ppi  - AED per [facility]  - Onc following; will need outpatient rad onc consult. Frozen in OR c/w metastatic adenocarcinoma  - infectious workup less concerning for abscesses final Cx NGTD  - PT/OT/SLP now that extubated  - EM/SCD, ok for SQH today given clinical stability, CBC WNL  - medical management per [facility]    Dispo: ongoing, pending therapies and EVD wean    Discussed with  Dr. Ric Rodriguez MD  Neurosurgery  [facility] - Neuro Critical Care

## 2025-07-09 NOTE — EICU
VICU Night Rounds Checklist  24H Vital Sign Range:  Temp:  [98.3 °F (36.8 °C)-99.6 °F (37.6 °C)]   Pulse:  []   Resp:  [12-24]   BP: (115-199)/()   SpO2:  [99 %-100 %]   Arterial Line BP: (117-162)/(55-99)             Nursing orders placed : IP [facility]

## 2025-07-09 NOTE — PT/OT/SLP EVAL
Speech Language Pathology Evaluation  Bedside Swallow    Patient Name:  Goran Carlos   MRN:  [id number]  Admitting Diagnosis: Ataxia    Recommendations:                 General Recommendations:  Dysphagia therapy  Diet recommendations:  NPO, NPO   Aspiration Precautions: Alternate means of nutrition/hydration, Frequent oral care, Ice chips sparingly, and Strict aspiration precautions   General Precautions: Standard, aspiration, fall  Communication strategies:  none  Discharge recommendations:   (tbd)   Barriers to Discharge:  Decreased Care Giver Support    Assessment:     Goran Carlos is a 69 y.o. male with an SLP diagnosis of Dysphagia and Dysphonia.   History:     Past Medical History:   Diagnosis Date    ETOH abuse     Gait disturbance     Heroin abuse        Past Surgical History:   Procedure Laterality Date    SUBOCCIPITAL CRANIOTOMY Right 7/7/2025    Procedure: CRANIOTOMY, SUBOCCIPITAL;  Surgeon: Blane Larios DO;  Location: [address];  Service: Neurosurgery;  Laterality: Right;       Social History: Patient lives with [facility].    Prior Intubation HX:  7/2-7/4      Prior diet: amara.    Occupation/hobbies/homemaking: amara.    Subjective     No family present  Patient goals: amara     Pain/Comfort:  Pain Rating 1: 0/10  Pain Rating Post-Intervention 1: 0/10    Respiratory Status: Room air    Objective:     Oral Musculature Evaluation  Oral Musculature: unable to assess due to poor participation/comprehension  Secretion Management: adequate  Mucosal Quality: dry  Volitional Cough: weak  Volitional Swallow: not elicited  Voice Prior to PO Intake: tense; decreased vocal intensity    Bedside Swallow Eval:   Consistencies Assessed:  Thin liquids 1/2 teaspoon x2; 1 teaspoon water x1  Puree 1/2 teaspoon     Oral Phase:   Decreased closure around utensil  Poor oral acceptance  Slow oral transit time    Pharyngeal Phase:   coughing/choking  wet vocal quality after swallow    Compensatory Strategies  None    Treatment:  HOb was raised to 90 degree angle.  Pt. Was alert and did model most simple commands.  Vocal intensity was reduced with slightly wet vocal quality prior to po trials.  With verbal cues, pt. Did remove bolus' from spoon with delayed oral transit. NO Coughing noted on 1/2 teaspoon trials of water x2 however delayed wet cough noted following teaspoon trial of water.  When given one half teaspoon bolus of puree , verbal cues needed for pt. To initiate oral transit with delayed initiation of pharyngeal swallow.  Recommend that pt. Remain npo with strict aspiration precautions.  If pt. Requesting, okay for pt. To have ice chips sparingly fed to him one at a time for comfort when hob at 90 degrees and pt. Fully alert.       Goals:   Multidisciplinary Problems       SLP Goals          Problem: SLP    Goal Priority Disciplines Outcome   SLP Goal     SLP Progressing   Description: Goals due 7/16  1.  Pt. Will participate in ongoing assessment of swallow to initiate least restrictive diet.                       Plan:     Patient to be seen:  4 x/week   Plan of Care expires:  08/06/25  Plan of Care reviewed with:  patient   SLP Follow-Up:  Yes       Time Tracking:     SLP Treatment Date:   07/09/25  Speech Start Time:  1150  Speech Stop Time:  1202     Speech Total Time (min):  12 min    Billable Minutes: Eval Swallow and Oral Function 12    07/09/2025

## 2025-07-09 NOTE — SUBJECTIVE & OBJECTIVE
Interval History: SHARRON. Remains agitated, Ox1, believes he is at detox rehab and 2075. ICP WNL, 5-13cc/hr since lowering to 10. Will continue to drain at 10 for several days for wound healing with plan for interval CT on Friday. Incision flat, c/d/I w/o fluctuance.     Medications:  Continuous Infusions:   nicardipine  0-15 mg/hr Intravenous Continuous 62.5 mL/hr at 07/09/25 0505 12.5 mg/hr at 07/09/25 0505     Scheduled Meds:   ceFAZolin (Ancef) IV (PEDS and ADULTS)  1 g Intravenous Q8H    cloNIDine  0.1 mg Per NG tube Q8H    dexAMETHasone (Decadron) IV (PEDS and ADULTS)  4 mg Intravenous Q6H    famotidine  20 mg Per NG tube BID    folic acid  1 mg Per NG tube Daily    multivitamin  1 tablet Per NG tube Daily    mupirocin   Nasal BID    OLANZapine  20 mg Per NG tube BID    PHENobarbitaL  130 mg Per NG tube Q8H    thiamine  100 mg Per NG tube Daily     PRN Meds:  Current Facility-Administered Medications:     acetaminophen, 650 mg, Per NG tube, Q4H PRN    bisacodyL, 10 mg, Rectal, Daily PRN    hydrALAZINE, 10 mg, Intravenous, Q4H PRN    labetalol, 10 mg, Intravenous, Q4H PRN    midazolam, 1 mg, Intravenous, Q5 Min PRN    morphine, 2 mg, Intravenous, Q4H PRN    ondansetron, 4 mg, Intravenous, Q4H PRN    oxyCODONE, 10 mg, Per NG tube, Q4H PRN    oxyCODONE, 5 mg, Per NG tube, Q4H PRN    polyethylene glycol, 17 g, Per NG tube, BID PRN    potassium bicarbonate, 35 mEq, Per NG tube, PRN    potassium bicarbonate, 50 mEq, Per NG tube, PRN    potassium bicarbonate, 60 mEq, Per NG tube, PRN    potassium, sodium phosphates, 2 packet, Per NG tube, PRN    potassium, sodium phosphates, 2 packet, Per NG tube, PRN    potassium, sodium phosphates, 2 packet, Per NG tube, PRN    senna-docusate, 2 tablet, Per NG tube, BID PRN    sodium chloride 0.9%, 10 mL, Intravenous, PRN     Review of Systems  Objective:     Weight: 81.1 kg (178 lb 12.7 oz)  Body mass index is 23.59 kg/m².  Vital Signs (Most Recent):  Temp: 98.6 °F (37 °C)  (07/09/25 0305)  Pulse: (!) 111 (07/09/25 0505)  Resp: 16 (07/09/25 0505)  BP: (!) 148/74 (07/09/25 0505)  SpO2: 99 % (07/09/25 0505) Vital Signs (24h Range):  Temp:  [98.2 °F (36.8 °C)-98.7 °F (37.1 °C)] 98.6 °F (37 °C)  Pulse:  [] 111  Resp:  [12-22] 16  SpO2:  [98 %-100 %] 99 %  BP: (115-172)/(59-92) 148/74  Arterial Line BP: (100-157)/() 143/143                Vent Mode: Spont  Oxygen Concentration (%):  [] 50  Resp Rate Total:  [12 br/min-23 br/min] 23 br/min  Vt Set:  [470 mL] 470 mL  PEEP/CPAP:  [5 cmH20] 5 cmH20  Pressure Support:  [8 cmH20] 8 cmH20  Mean Airway Pressure:  [8.4 cmH20-8.5 cmH20] 8.4 cmH20             NG/OG Tube 07/02/25 0522 Left nostril (Active)   Placement Check placement verified by x-ray 07/09/25 0505   Tolerance no signs/symptoms of discomfort 07/09/25 0505   Securement secured to nostril center 07/09/25 0505   Clamp Status/Tolerance unclamped 07/09/25 0505   Suction Setting/Drainage Method suction at the bedside 07/09/25 0505   Insertion Site Appearance no redness, warmth, tenderness, skin breakdown, drainage 07/09/25 0505   Drainage None 07/09/25 0505   Flush/Irrigation flushed w/;water 07/09/25 0505   Feeding Type continuous;by pump 07/09/25 0505   Feeding Action feeding restarted 07/09/25 0505   Current Rate (mL/hr) 40 mL/hr 07/08/25 1905   Goal Rate (mL/hr) 40 mL/hr 07/08/25 1905   Intake (mL) 60 mL 07/08/25 0805   Water Bolus (mL) 160 mL 07/09/25 0405   Rate Formula Tube Feeding (mL/hr) 30 mL/hr 07/08/25 0301   Formula Name Pivot 07/08/25 0905   Intake (mL) - Formula Tube Feeding 40 07/09/25 0505       Male External Urinary Catheter 07/08/25 0705 Medium (Active)   Collection Container Urimeter 07/09/25 0505   Securement Method secured to top of thigh w/ adhesive device 07/09/25 0505   Skin no redness;no breakdown 07/09/25 0505   Tolerance no signs/symptoms of discomfort 07/09/25 0505   Output (mL) 400 mL 07/08/25 1805   Catheter Change Date 07/08/25 07/08/25 1705  "  Catheter Change Time 0700 07/08/25 1705            ICP/Ventriculostomy 07/02/25 1409 Right (Active)   Level of Ventriculostomy (cm above) 10 07/08/25 1505   Status Open to drainage 07/09/25 0505   Site Assessment Clean;Dry 07/09/25 0505   Site Drainage No drainage 07/09/25 0505   Waveform normal waveform 07/09/25 0505   Output (mL) 2 mL 07/09/25 0505   CSF Color clear 07/09/25 0505   Dressing Status Clean;Dry;Intact 07/09/25 0505   Interventions HOB degrees;bed controls locked 07/09/25 0505          Physical Exam         Neurosurgery Physical Exam  E4V4M5  AOX1, agitated, confused  PERRL  HARRISON spon AG  BUE restraints  Followed commands intermittently BLE     EVD patent with good waveform  Incision flat, c/d/I without fluctuance    Significant Labs:  Recent Labs   Lab 07/07/25 1752 07/08/25 0246 07/09/25  0132   * 128* 139*    142 146*   K 4.2 4.1 4.0   * 113* 117*   CO2 17* 18* 23   BUN 39* 36* 37*   CREATININE 1.2 1.2 1.2   CALCIUM 8.4* 8.2* 8.8   MG  --  2.2 2.4     Recent Labs   Lab 07/07/25 1752 07/08/25 0246 07/09/25  0132   WBC 13.65* 13.34* 14.16*   HGB 13.2* 11.4* 11.1*   HCT 38.9* 34.6* 33.3*    195 205     No results for input(s): "LABPT", "INR", "APTT" in the last 48 hours.  Microbiology Results (last 7 days)       Procedure Component Value Units Date/Time    CSF culture [[id number]] Collected: 07/07/25 0715    Order Status: Completed Specimen: CSF (Spinal Fluid) from CSF Tap, Tube 3 Updated: 07/09/25 0701     CULTURE, CSF No Growth To Date     GRAM STAIN Cytospin indicates:      No WBCs      No organisms seen    CSF culture [[id number]] Collected: 07/02/25 1808    Order Status: Completed Specimen: CSF (Spinal Fluid) from CSF Tap, Tube 3 Updated: 07/08/25 0747     CULTURE, CSF No Growth     GRAM STAIN Cytospin indicates:      No WBCs      No organisms seen    Gram stain [[id number]] Collected: 07/07/25 0715    Order Status: Canceled Specimen: CSF (Spinal Fluid) from CSF Tap, " Tube 3 Updated: 07/07/25 1620    Culture, Respiratory with Gram Stain [[id number]] Collected: 07/03/25 1130    Order Status: Completed Specimen: Respiratory from Endotracheal Aspirate Updated: 07/07/25 0844     Respiratory Culture Normal respiratory carmen, no Staph aureus or Pseudomonas isolated     GRAM STAIN <10 Epithelial Cells/LPF      Rare WBC seen      No organisms seen    CSF culture [[id number]]     Order Status: Canceled Specimen: CSF (Spinal Fluid)     Blood culture #1 **CANNOT BE ORDERED STAT** [[id number]]  (Normal) Collected: 07/01/25 1702    Order Status: Completed Specimen: Blood from Peripheral, Forearm, Left Updated: 07/07/25 0000     Blood Culture No Growth After 5 Days    Blood culture #2 **CANNOT BE ORDERED STAT** [[id number]]  (Normal) Collected: 07/01/25 1702    Order Status: Completed Specimen: Blood from Peripheral, Lower Arm, Left Updated: 07/07/25 0000     Blood Culture No Growth After 5 Days    Cryptococcal antigen, CSF [[id number]]  (Normal) Collected: 07/02/25 1808    Order Status: Completed Specimen: CSF (Spinal Fluid) from CSF Tap, Tube 3 Updated: 07/03/25 1458     Cryptococcal Antigen, CSF Negative    Afb Culture Stain [[id number]] Collected: 07/02/25 1808    Order Status: Completed Specimen: CSF (Spinal Fluid) from CSF Tap, Tube 3 Updated: 07/03/25 1346     ACID FAST STAIN  No acid fast bacilli seen    Gram stain [[id number]] Collected: 07/02/25 1808    Order Status: Canceled Specimen: CSF (Spinal Fluid) from CSF Tap, Tube 3 Updated: 07/03/25 0203    AFB Culture & Smear [[id number]] Collected: 07/02/25 1808    Order Status: Sent Specimen: CSF (Spinal Fluid) from CSF Tap, Tube 3 Updated: 07/03/25 0202          All pertinent labs from the last 24 hours have been reviewed.    Significant Diagnostics:  I have reviewed all pertinent imaging results/findings within the past 24 hours.

## 2025-07-09 NOTE — PLAN OF CARE
[facility] Care Plan  POC reviewed with Goran Carlos and family at 1400. Patient and Family verbalized understanding. Questions and concerns addressed. No acute events today. Pt progressing toward goals. Will continue to monitor. See below and flowsheets for full assessment and VS info.   - Bc tube replaced and KUB completed. Have new order ok to use.   - tube feed restarted at goal of 55 and pt tolerating well  - Free water flushes increased to 250 Q 4 hours.   - increased PO BP medications  - Able to wean Cardene gtt to off this evening  - Phos replacement started and will continue until 1030pm tonight  - external male catheter seems to be working well with only one full linen change needed this late afternoon including abdominal binder. Pt got a full bath as well.   - Working on more PIV access so that we can DC the central line   - EVD open at 10. ICPs between 1-12, CSF out put 0-10 but mostly around 3. Good waveforms. Collection bag changed today.  - speech worked with pt today and did not pass him but will continue to work with him through his stay.   - plan for CT head on Friday to start wean EVD if possible.   - Pt family came to bedside today and was updated by neurosx          Is this a stroke patient? no    Neuro:  Goldie Coma Scale  Best Eye Response: 4-->(E4) spontaneous  Best Motor Response: 6-->(M6) obeys commands  Best Verbal Response: 4-->(V4) confused  [place] Coma Scale Score: 14  Assessment Qualifiers: Patient not sedated/intubated  Pupil PERRLA: yes     24 hr Temp:  [98.1 °F (36.7 °C)-98.7 °F (37.1 °C)]     CV:   Rhythm: sinus tachycardia  BP goals:   SBP < 160  MAP > 65    Resp:      Vent Mode: Spont  Set Rate: 18 BPM  Oxygen Concentration (%): 50  Vt Set: 470 mL  PEEP/CPAP: 5 cmH20  Pressure Support: 8 cmH20    Plan: N/A    GI/:     Diet/Nutrition Received: NPO, tube feeding  Last Bowel Movement: 07/07/25  Voiding Characteristics: external catheter    Intake/Output Summary (Last 24 hours) at  "2025 1740  Last data filed at 2025 1728  Gross per 24 hour   Intake 3414.61 ml   Output 2599 ml   Net 815.61 ml     Unmeasured Output  Unmeasured Urine Occurrence: 1  Unmeasured Stool Occurrence: 0    Labs/Accuchecks:  Recent Labs   Lab 25  0132   WBC 14.16*   RBC 3.58*   HGB 11.1*   HCT 33.3*         Recent Labs   Lab 25  0132   *   K 4.0   CO2 23   *   BUN 37*   CREATININE 1.2   ALKPHOS 64   ALT 14   AST 40   BILITOT 0.3      Recent Labs   Lab 25  0720   PROTIME 11.7   INR 1.1   APTT 24.9    No results for input(s): "CPK", "CPKMB", "TROPONINI", "MB" in the last 168 hours.    Electrolytes: Electrolytes replaced  Accuchecks: none    Gtts:   nicardipine  0-15 mg/hr Intravenous Continuous   Stopped at 25 1620       LDA/Wounds:    Davi Risk Assessment  Sensory Perception: 4-->no impairment  Moisture: 3-->occasionally moist  Activity: 1-->bedfast  Mobility: 3-->slightly limited  Nutrition: 3-->adequate  Friction and Shear: 3-->no apparent problem  Davi Score: 17    Is your davi score 12 or less? no            Restraints:   Restraint Order  Length of Order: Order good for next 24 hours or when removed.  Date that the current order will : 07/10/25  Time that the current order will : 0900  Order Upon Application: Yes    [facility]   Problem: Adult Inpatient Plan of Care  Goal: Plan of Care Review  Outcome: Progressing  Goal: Patient-Specific Goal (Individualized)  Outcome: Progressing  Goal: Absence of Hospital-Acquired Illness or Injury  Outcome: Progressing  Goal: Optimal Comfort and Wellbeing  Outcome: Progressing     Problem: Infection  Goal: Absence of Infection Signs and Symptoms  Outcome: Progressing     Problem: Skin Injury Risk Increased  Goal: Skin Health and Integrity  Outcome: Progressing     Problem: Delirium  Goal: Optimal Coping  Outcome: Progressing  Goal: Improved Behavioral Control  Outcome: Progressing  Goal: Improved Sleep  Outcome: " Progressing     Problem: Fall Injury Risk  Goal: Absence of Fall and Fall-Related Injury  Outcome: Progressing     Problem: Restraint, Nonviolent  Goal: Absence of Harm or Injury  Outcome: Progressing     Problem: Mechanical Ventilation Invasive  Goal: Effective Communication  Outcome: Met  Goal: Optimal Device Function  Outcome: Met  Goal: Mechanical Ventilation Liberation  Outcome: Met  Goal: Optimal Nutrition Delivery  Outcome: Progressing  Goal: Absence of Device-Related Skin and Tissue Injury  Outcome: Met  Goal: Absence of Ventilator-Induced Lung Injury  Outcome: Met     Problem: Artificial Airway  Goal: Effective Communication  Outcome: Met  Goal: Optimal Device Function  Outcome: Met  Goal: Absence of Device-Related Skin or Tissue Injury  Outcome: Met     Problem: Wound  Goal: Optimal Coping  Outcome: Progressing  Goal: Optimal Functional Ability  Outcome: Progressing  Goal: Absence of Infection Signs and Symptoms  Outcome: Progressing  Goal: Improved Oral Intake  Outcome: Progressing  Goal: Optimal Pain Control and Function  Outcome: Progressing  Goal: Skin Health and Integrity  Outcome: Progressing  Goal: Optimal Wound Healing  Outcome: Progressing

## 2025-07-09 NOTE — SUBJECTIVE & OBJECTIVE
Interval History:  Please see hospital course above for full details    Review of Systems   Unable to perform ROS: Mental status change       Objective:     Vitals:  Temp: 98.1 °F (36.7 °C)  Pulse: 88  Rhythm: sinus tachycardia  BP: 135/74  MAP (mmHg): 98  ICP Mean (mmHg): 7 mmHg  Resp: 12  SpO2: 100 %    Temp  Min: 98.1 °F (36.7 °C)  Max: 98.7 °F (37.1 °C)  Pulse  Min: 85  Max: 125  BP  Min: 116/61  Max: 188/87  MAP (mmHg)  Min: 81  Max: 126  ICP Mean (mmHg)  Min: 1 mmHg  Max: 12 mmHg  Resp  Min: 11  Max: 26  SpO2  Min: 98 %  Max: 100 %    07/08 0701 - 07/09 0700  In: 3197.9 [I.V.:1522.9]  Out: 1741 [Urine:1550; Drains:191]   Unmeasured Output  Unmeasured Urine Occurrence: 1  Unmeasured Stool Occurrence: 0        Physical Exam  General Appearance: Not in acute hemodynamic distress  Mental Status Exam: awake, alert,oriented to self only; is able to select hospital from list of options.Much calmer compared to prior. Following simple commands x4 extremities   Cranial Nerves: Gaze midline, PERRL, no clear gaze preference. Dysarthric. +cough, spontaneous  Motor: Moving all 4 extremities AG to command, slightly less brisk on R  Sensory: Grossly symmetric to noxious x4  Coordination: Unable to assess  Vascular: S1/S2 of normal intensity, no S3/S4 appreciated, no murmurs appreciated  Lungs: CTA bilaterally without wheezing  Abdomen: Soft, non-distended, non-tender, BS +         Medications:  Continuousnicardipine, Last Rate: Stopped (07/09/25 1620)    ScheduledceFAZolin (Ancef) IV (PEDS and ADULTS), 1 g, Q8H  cloNIDine, 0.2 mg, Q8H  dexAMETHasone (Decadron) IV (PEDS and ADULTS), 4 mg, Q6H  famotidine, 20 mg, BID  folic acid, 1 mg, Daily  heparin (porcine), 5,000 Units, Q8H  losartan, 50 mg, Daily  multivitamin, 1 tablet, Daily  mupirocin, , BID  OLANZapine, 20 mg, BID  PHENobarbitaL, 130 mg, Q8H  polyethylene glycol, 17 g, Daily  thiamine, 100 mg, Daily    PRNacetaminophen, 650 mg, Q4H PRN  bisacodyL, 10 mg, Daily  PRN  hydrALAZINE, 10 mg, Q4H PRN  labetalol, 10 mg, Q4H PRN  morphine, 2 mg, Q4H PRN  ondansetron, 4 mg, Q4H PRN  oxyCODONE, 10 mg, Q4H PRN  oxyCODONE, 5 mg, Q4H PRN  potassium bicarbonate, 35 mEq, PRN  potassium bicarbonate, 50 mEq, PRN  potassium bicarbonate, 60 mEq, PRN  potassium, sodium phosphates, 2 packet, PRN  potassium, sodium phosphates, 2 packet, PRN  potassium, sodium phosphates, 2 packet, PRN  senna-docusate, 2 tablet, BID PRN  sodium chloride 0.9%, 10 mL, PRN      Today I personally reviewed pertinent imaging, laboratory results, notably: No new imaging to review. CBC w/ stable mild leukocytosis at 14.16, Hb/platelets stable. Serum Na uptrending to 146, BUN/creatinine stable at 37/1.2.    Diet  No diet orders on file  No diet orders on file  TF at goal

## 2025-07-09 NOTE — PLAN OF CARE
"[facility] Care Plan    POC reviewed with [place] Brown and family at 0300. Patient verbalized understanding. Questions and concerns addressed. No acute events today. Pt progressing toward goals. Will continue to monitor. See below and flowsheets for full assessment and VS info.     - ART line replaced   - NAEON        Is this a stroke patient? no    Neuro:  Goldie Coma Scale  Best Eye Response: 4-->(E4) spontaneous  Best Motor Response: 6-->(M6) obeys commands  Best Verbal Response: 4-->(V4) confused  Goldie Coma Scale Score: 14  Assessment Qualifiers: Patient intubated  Pupil PERRLA: yes     24 hr Temp:  [98.2 °F (36.8 °C)-98.7 °F (37.1 °C)]     CV:   Rhythm: sinus tachycardia  BP goals:   SBP < 160  MAP > 65    Resp:      Vent Mode: Spont  Set Rate: 18 BPM  Oxygen Concentration (%): 50  Vt Set: 470 mL  PEEP/CPAP: 5 cmH20  Pressure Support: 8 cmH20    Plan: N/A    GI/:     Diet/Nutrition Received: NPO, tube feeding  Last Bowel Movement: 07/07/25  Voiding Characteristics: external catheter    Intake/Output Summary (Last 24 hours) at 7/9/2025 0539  Last data filed at 7/9/2025 0505  Gross per 24 hour   Intake 3206.01 ml   Output 1558 ml   Net 1648.01 ml     Unmeasured Output  Unmeasured Urine Occurrence: 1  Unmeasured Stool Occurrence: 0    Labs/Accuchecks:  Recent Labs   Lab 07/08/25  0246   WBC 13.34*   RBC 3.74*   HGB 11.4*   HCT 34.6*         Recent Labs   Lab 07/09/25  0132   *   K 4.0   CO2 23   *   BUN 37*   CREATININE 1.2   ALKPHOS 64   ALT 14   AST 40   BILITOT 0.3      Recent Labs   Lab 07/06/25  0720   PROTIME 11.7   INR 1.1   APTT 24.9    No results for input(s): "CPK", "CPKMB", "TROPONINI", "MB" in the last 168 hours.    Electrolytes:   Accuchecks: none    Gtts:   nicardipine  0-15 mg/hr Intravenous Continuous 62.5 mL/hr at 07/09/25 0505 12.5 mg/hr at 07/09/25 0505       LDA/Wounds:    Davi Risk Assessment  Sensory Perception: 3-->slightly limited  Moisture: 3-->occasionally " moist  Activity: 1-->bedfast  Mobility: 3-->slightly limited  Nutrition: 3-->adequate  Friction and Shear: 3-->no apparent problem  Davi Score: 16  Is your davi score 12 or less? no          Restraints:   Restraint Order  Length of Order: Order good for next 24 hours or when removed.  Date that the current order will : 25  Time that the current order will : 1137  Order Upon Application: Yes    [facility]

## 2025-07-09 NOTE — PT/OT/SLP RE-EVAL
"Occupational Therapy   Re-evaluation s/p extubation    Name: Goran Carlos  MRN: [id number]  Admitting Diagnosis:  Ataxia  Recent Surgery: Procedure(s) (LRB):  CRANIOTOMY, SUBOCCIPITAL (Right) 2 Days Post-Op    Recommendations:     Discharge Recommendations: High Intensity Therapy  Discharge Equipment Recommendations: bath bench, bedside commode  Barriers to discharge:  None    Assessment:     Goran Carlos is a 69 y.o. male with a medical diagnosis of Ataxia.  He presents with performance deficits affecting function are weakness, impaired balance, impaired endurance, impaired self care skills, impaired functional mobility.      Rehab Prognosis:  Good; patient would benefit from acute skilled OT services to address these deficits and reach maximum level of function.       Plan:     Patient to be seen 4 x/week to address the above listed problems via cognitive retraining, neuromuscular re-education, therapeutic exercises, therapeutic activities, self-care/home management, sensory integration  Plan of Care Expires: 07/30/25  Plan of Care Reviewed with: patient    Subjective     Patient:  "I'm hungry.  When are they going to bring me a sandwich?"    Communicated with: Nurse prior to session.  Pain/Comfort:  Pain Rating 1: 0/10  Pain Rating Post-Intervention 1: 0/10    Objective:     Communicated with: Nurse prior to session.  Patient found supine with: bed alarm, pulse ox (continuous), restraints, telemetry, peripheral IV, blood pressure cuff, NG tube, central line, external ventricular drain upon OT entry to room.    General Precautions: Standard, aspiration, fall, NPO; EVD clamped by RN prior to session  Orthopedic Precautions: N/A  Braces: N/A  Respiratory Status: Room air    Occupational Performance:    Bed Mobility:    Patient completed Rolling/Turning to Right with minimum assistance  Patient completed Supine to Sit with moderate assistance  Patient completed Sit to Supine with moderate assistance    Functional " Mobility/Transfers:  Moderate assist with sit-stand from EOB    Activities of Daily Living:  Upper Body Dressing: maximal assistance while seated EOB  Lower Body Dressing: maximal assistance while seated EOB  Increased assistance required throughout ADLs to prevent patient from pulling lines.    Cognitive/Visual Perceptual:  Cognitive/Psychosocial Skills:     -       Oriented to: Person   -       Follows Commands/attention:Follows one-step commands    [facility] 6 Click:  AMPA Total Score: 11    Treatment & Education:  Patient alert and oriented x person only.  Able to follow 3/3 one step commands.  Patient attentive and interactive throughout the session.  SBA required with postural control while seated EOB.    Patient left supine with all lines intact, call button in reach, and bed alarm on    GOALS:   Multidisciplinary Problems       Occupational Therapy Goals          Problem: Occupational Therapy    Goal Priority Disciplines Outcome Interventions   Occupational Therapy Goal     OT, PT/OT Progressing    Description: Goals set 7/8 with an expiration date, 8/5:  Patient will increase functional independence with ADLs by performing:    Patient will demonstrate rolling to the right with min assist.  Not met   Patient will demonstrate rolling to the left with min assist.   Not met  Patient will demonstrate supine -sit with min  assist.   Not met  Patient will demonstrate stand pivot transfers with mod assist.   Not met  Patient will demonstrate grooming while seated with min assist.   Not met  Patient will demonstrate upper body dressing with min assist while seated EOB.   Not met  Patient will demonstrate lower body dressing with mod assist while seated EOB.   Not met  Patient will demonstrate toileting with mod assist.   Not met  Patient will demonstrate bathing while seated EOB with mod assist.   Not met  Patient's family / caregiver will demonstrate independence and safety with assisting patient with self-care skills  and functional mobility.     Not met  Patient's family / caregiver will demonstrate independence with providing ROM and changes in bed positioning.   Not met  Patient and/or patient's family will verbalize understanding of stroke prevention guidelines, personal risk factors and stroke warning signs via teachback method.  Not met                                History:     Past Medical History:   Diagnosis Date    ETOH abuse     Gait disturbance     Heroin abuse          Past Surgical History:   Procedure Laterality Date    SUBOCCIPITAL CRANIOTOMY Right 7/7/2025    Procedure: CRANIOTOMY, SUBOCCIPITAL;  Surgeon: Blane Larios DO;  Location: [address];  Service: Neurosurgery;  Laterality: Right;       Time Tracking:     OT Date of Treatment: 07/09/25  OT Start Time: 0559  OT Stop Time: 0617  OT Total Time (min): 18 min    Billable Minutes:Re-eval 8  Neuromuscular Re-education 10    7/9/2025

## 2025-07-09 NOTE — PROCEDURES
"Goran Carlos is a 69 y.o. male patient.    Temp: 98.6 °F (37 °C) (07/08/25 1505)  Pulse: (!) 118 (07/08/25 1805)  Resp: (!) 22 (07/08/25 1805)  BP: (!) 146/79 (07/08/25 1805)  SpO2: 99 % (07/08/25 1805)  Weight: 81.1 kg (178 lb 12.7 oz) (07/08/25 1224)  Height: 6' 1" (185.4 cm) (07/08/25 1229)       Arterial Line    Date/Time: 7/8/2025 9:56 PM  Location procedure was performed: [facility]    Performed by: Shruthi Luciano PA-C  Authorized by: Shruthi Luciano PA-C  Consent Done: Not Needed  Preparation: Patient was prepped and draped in the usual sterile fashion.  Indications: hemodynamic monitoring  Location: right radial    Anesthesia:  Local Anesthetic: lidocaine 1% without epinephrine    Patient sedated: no  Isaías's test normal: yes  Needle gauge: 20  Seldinger technique: Seldinger technique used  Number of attempts: 1  Complications: No  Post-procedure: line sutured and dressing applied  Post-procedure CMS: normal and unchanged  Patient tolerance: Patient tolerated the procedure well with no immediate complications          7/8/2025    "

## 2025-07-09 NOTE — NURSING
After assessing the patient it was found that his NG tube had slipped out of the cuenca. KIRIT Estevez was notified of the findings and informed that the tube feeds were actively going at the time it was found. Informed PA that the patient's respiratory status was unchanged despite event. She informed me that another chest x-ray will be ordered once a new NG tube is placed. TC

## 2025-07-10 LAB
ABO + RH BLD: NORMAL
ABO + RH BLD: NORMAL
ABSOLUTE EOSINOPHIL (OHS): 0 K/UL
ABSOLUTE EOSINOPHIL (OHS): 0 K/UL
ABSOLUTE MONOCYTE (OHS): 1.21 K/UL (ref 0.3–1)
ABSOLUTE MONOCYTE (OHS): 1.28 K/UL (ref 0.3–1)
ABSOLUTE NEUTROPHIL COUNT (OHS): 10.52 K/UL (ref 1.8–7.7)
ABSOLUTE NEUTROPHIL COUNT (OHS): 12.88 K/UL (ref 1.8–7.7)
ALBUMIN SERPL BCP-MCNC: 3 G/DL (ref 3.5–5.2)
ALBUMIN SERPL BCP-MCNC: 3.3 G/DL (ref 3.5–5.2)
ALP SERPL-CCNC: 59 UNIT/L (ref 40–150)
ALP SERPL-CCNC: 63 UNIT/L (ref 40–150)
ALT SERPL W/O P-5'-P-CCNC: 11 UNIT/L (ref 10–44)
ALT SERPL W/O P-5'-P-CCNC: 13 UNIT/L (ref 10–44)
ANION GAP (OHS): 7 MMOL/L (ref 8–16)
ANION GAP (OHS): 9 MMOL/L (ref 8–16)
AST SERPL-CCNC: 39 UNIT/L (ref 11–45)
AST SERPL-CCNC: 42 UNIT/L (ref 11–45)
BASOPHILS # BLD AUTO: 0.01 K/UL
BASOPHILS # BLD AUTO: 0.01 K/UL
BASOPHILS NFR BLD AUTO: 0.1 %
BASOPHILS NFR BLD AUTO: 0.1 %
BILIRUB SERPL-MCNC: 0.3 MG/DL (ref 0.1–1)
BILIRUB SERPL-MCNC: 0.3 MG/DL (ref 0.1–1)
BLD PROD TYP BPU: NORMAL
BLD PROD TYP BPU: NORMAL
BLOOD UNIT EXPIRATION DATE: NORMAL
BLOOD UNIT EXPIRATION DATE: NORMAL
BLOOD UNIT TYPE CODE: 7300
BLOOD UNIT TYPE CODE: 7300
BUN SERPL-MCNC: 40 MG/DL (ref 8–23)
BUN SERPL-MCNC: 43 MG/DL (ref 8–23)
CALCIUM SERPL-MCNC: 8.4 MG/DL (ref 8.7–10.5)
CALCIUM SERPL-MCNC: 8.9 MG/DL (ref 8.7–10.5)
CHLORIDE SERPL-SCNC: 116 MMOL/L (ref 95–110)
CHLORIDE SERPL-SCNC: 118 MMOL/L (ref 95–110)
CLARITY CSF: CLEAR
CO2 SERPL-SCNC: 22 MMOL/L (ref 23–29)
CO2 SERPL-SCNC: 22 MMOL/L (ref 23–29)
COLOR CSF: ABNORMAL
CREAT SERPL-MCNC: 1.2 MG/DL (ref 0.5–1.4)
CREAT SERPL-MCNC: 1.2 MG/DL (ref 0.5–1.4)
CROSSMATCH INTERPRETATION: NORMAL
CROSSMATCH INTERPRETATION: NORMAL
CSF TUBE NUMBER (OHS): ABNORMAL
CSF TUBE NUMBER (OHS): ABNORMAL
DHEA SERPL-MCNC: NORMAL
DISPENSE STATUS: NORMAL
DISPENSE STATUS: NORMAL
ERYTHROCYTE [DISTWIDTH] IN BLOOD BY AUTOMATED COUNT: 14.6 % (ref 11.5–14.5)
ERYTHROCYTE [DISTWIDTH] IN BLOOD BY AUTOMATED COUNT: 14.6 % (ref 11.5–14.5)
ESTROGEN SERPL-MCNC: NORMAL PG/ML
FREEZE AND HOLD: NORMAL
GFR SERPLBLD CREATININE-BSD FMLA CKD-EPI: >60 ML/MIN/1.73/M2
GFR SERPLBLD CREATININE-BSD FMLA CKD-EPI: >60 ML/MIN/1.73/M2
GLUCOSE CSF-MCNC: 92 MG/DL (ref 40–70)
GLUCOSE SERPL-MCNC: 122 MG/DL (ref 70–110)
GLUCOSE SERPL-MCNC: 133 MG/DL (ref 70–110)
HCT VFR BLD AUTO: 31.9 % (ref 40–54)
HCT VFR BLD AUTO: 33.8 % (ref 40–54)
HGB BLD-MCNC: 10.3 GM/DL (ref 14–18)
HGB BLD-MCNC: 11.4 GM/DL (ref 14–18)
IMM GRANULOCYTES # BLD AUTO: 0.08 K/UL (ref 0–0.04)
IMM GRANULOCYTES # BLD AUTO: 0.17 K/UL (ref 0–0.04)
IMM GRANULOCYTES NFR BLD AUTO: 0.6 % (ref 0–0.5)
IMM GRANULOCYTES NFR BLD AUTO: 1.1 % (ref 0–0.5)
INSULIN SERPL-ACNC: NORMAL U[IU]/ML
LAB AP CLINICAL INFORMATION: NORMAL
LAB AP GROSS DESCRIPTION: NORMAL
LAB AP INTRA OP: NORMAL
LAB AP PERFORMING LOCATION(S): NORMAL
LAB AP REPORT FOOTNOTES: NORMAL
LYMPHOCYTES # BLD AUTO: 0.74 K/UL (ref 1–4.8)
LYMPHOCYTES # BLD AUTO: 0.76 K/UL (ref 1–4.8)
LYMPHOCYTES NFR CSF MANUAL: 8 % (ref 40–80)
MAGNESIUM SERPL-MCNC: 2.2 MG/DL (ref 1.6–2.6)
MCH RBC QN AUTO: 30.6 PG (ref 27–31)
MCH RBC QN AUTO: 31 PG (ref 27–31)
MCHC RBC AUTO-ENTMCNC: 32.3 G/DL (ref 32–36)
MCHC RBC AUTO-ENTMCNC: 33.7 G/DL (ref 32–36)
MCV RBC AUTO: 92 FL (ref 82–98)
MCV RBC AUTO: 95 FL (ref 82–98)
MONOS+MACROS NFR CSF MANUAL: 3 % (ref 15–45)
NEUTROPHILS NFR CSF MANUAL: 89 %
NUCLEATED RBC (/100WBC) (OHS): 0 /100 WBC
NUCLEATED RBC (/100WBC) (OHS): 0 /100 WBC
PHENOBARB SERPL-MCNC: 35.2 UG/ML (ref 15–40)
PHOSPHATE SERPL-MCNC: 3.5 MG/DL (ref 2.7–4.5)
PLATELET # BLD AUTO: 165 K/UL (ref 150–450)
PLATELET # BLD AUTO: 192 K/UL (ref 150–450)
PMV BLD AUTO: 9.7 FL (ref 9.2–12.9)
PMV BLD AUTO: 9.8 FL (ref 9.2–12.9)
POTASSIUM SERPL-SCNC: 4.3 MMOL/L (ref 3.5–5.1)
POTASSIUM SERPL-SCNC: 4.4 MMOL/L (ref 3.5–5.1)
PROT CSF-MCNC: 56 MG/DL (ref 15–40)
PROT SERPL-MCNC: 6.2 GM/DL (ref 6–8.4)
PROT SERPL-MCNC: 6.7 GM/DL (ref 6–8.4)
RBC # BLD AUTO: 3.37 M/UL (ref 4.6–6.2)
RBC # BLD AUTO: 3.68 M/UL (ref 4.6–6.2)
RBC # CSF: 177 /CU MM
RELATIVE EOSINOPHIL (OHS): 0 %
RELATIVE EOSINOPHIL (OHS): 0 %
RELATIVE LYMPHOCYTE (OHS): 5 % (ref 18–48)
RELATIVE LYMPHOCYTE (OHS): 5.9 % (ref 18–48)
RELATIVE MONOCYTE (OHS): 8.5 % (ref 4–15)
RELATIVE MONOCYTE (OHS): 9.6 % (ref 4–15)
RELATIVE NEUTROPHIL (OHS): 83.8 % (ref 38–73)
RELATIVE NEUTROPHIL (OHS): 85.3 % (ref 38–73)
SODIUM SERPL-SCNC: 145 MMOL/L (ref 136–145)
SODIUM SERPL-SCNC: 149 MMOL/L (ref 136–145)
SPECIMEN VOL CSF: 4.5 ML
T3RU NFR SERPL: NORMAL %
UNIT NUMBER: NORMAL
UNIT NUMBER: NORMAL
WBC # BLD AUTO: 12.56 K/UL (ref 3.9–12.7)
WBC # BLD AUTO: 15.1 K/UL (ref 3.9–12.7)
WBC # CSF: 5 /CU MM

## 2025-07-10 PROCEDURE — 63600175 PHARM REV CODE 636 W HCPCS

## 2025-07-10 PROCEDURE — 99291 CRITICAL CARE FIRST HOUR: CPT | Mod: ,,, | Performed by: PSYCHIATRY & NEUROLOGY

## 2025-07-10 PROCEDURE — 84157 ASSAY OF PROTEIN OTHER: CPT

## 2025-07-10 PROCEDURE — 85025 COMPLETE CBC W/AUTO DIFF WBC: CPT

## 2025-07-10 PROCEDURE — 80053 COMPREHEN METABOLIC PANEL: CPT

## 2025-07-10 PROCEDURE — 80184 ASSAY OF PHENOBARBITAL: CPT | Performed by: PSYCHIATRY & NEUROLOGY

## 2025-07-10 PROCEDURE — 25000003 PHARM REV CODE 250: Performed by: PSYCHIATRY & NEUROLOGY

## 2025-07-10 PROCEDURE — 63600175 PHARM REV CODE 636 W HCPCS: Performed by: PSYCHIATRY & NEUROLOGY

## 2025-07-10 PROCEDURE — 83735 ASSAY OF MAGNESIUM: CPT

## 2025-07-10 PROCEDURE — 97530 THERAPEUTIC ACTIVITIES: CPT

## 2025-07-10 PROCEDURE — 63600175 PHARM REV CODE 636 W HCPCS: Performed by: STUDENT IN AN ORGANIZED HEALTH CARE EDUCATION/TRAINING PROGRAM

## 2025-07-10 PROCEDURE — 20000000 HC ICU ROOM

## 2025-07-10 PROCEDURE — 82945 GLUCOSE OTHER FLUID: CPT

## 2025-07-10 PROCEDURE — 92526 ORAL FUNCTION THERAPY: CPT

## 2025-07-10 PROCEDURE — 80053 COMPREHEN METABOLIC PANEL: CPT | Performed by: PSYCHIATRY & NEUROLOGY

## 2025-07-10 PROCEDURE — 89051 BODY FLUID CELL COUNT: CPT

## 2025-07-10 PROCEDURE — 25000003 PHARM REV CODE 250

## 2025-07-10 PROCEDURE — 99000 SPECIMEN HANDLING OFFICE-LAB: CPT

## 2025-07-10 PROCEDURE — 94761 N-INVAS EAR/PLS OXIMETRY MLT: CPT

## 2025-07-10 PROCEDURE — 87205 SMEAR GRAM STAIN: CPT

## 2025-07-10 PROCEDURE — 97551 CAREGIVER TRAING EA ADDL 15: CPT

## 2025-07-10 PROCEDURE — 97164 PT RE-EVAL EST PLAN CARE: CPT

## 2025-07-10 PROCEDURE — 85025 COMPLETE CBC W/AUTO DIFF WBC: CPT | Performed by: PSYCHIATRY & NEUROLOGY

## 2025-07-10 PROCEDURE — 84100 ASSAY OF PHOSPHORUS: CPT

## 2025-07-10 PROCEDURE — 25000003 PHARM REV CODE 250: Performed by: PHYSICIAN ASSISTANT

## 2025-07-10 PROCEDURE — 25000003 PHARM REV CODE 250: Performed by: STUDENT IN AN ORGANIZED HEALTH CARE EDUCATION/TRAINING PROGRAM

## 2025-07-10 PROCEDURE — 25000003 PHARM REV CODE 250: Performed by: NURSE PRACTITIONER

## 2025-07-10 RX ORDER — MIDAZOLAM HYDROCHLORIDE 1 MG/ML
2 INJECTION, SOLUTION INTRAMUSCULAR; INTRAVENOUS ONCE AS NEEDED
Status: COMPLETED | OUTPATIENT
Start: 2025-07-11 | End: 2025-07-11

## 2025-07-10 RX ORDER — LOSARTAN POTASSIUM 50 MG/1
100 TABLET ORAL DAILY
Status: DISCONTINUED | OUTPATIENT
Start: 2025-07-10 | End: 2025-07-10

## 2025-07-10 RX ORDER — PHENOBARBITAL 100 MG/1
100 TABLET ORAL EVERY 8 HOURS
Status: DISCONTINUED | OUTPATIENT
Start: 2025-07-10 | End: 2025-07-11

## 2025-07-10 RX ORDER — AMOXICILLIN 250 MG
2 CAPSULE ORAL 2 TIMES DAILY
Status: DISCONTINUED | OUTPATIENT
Start: 2025-07-10 | End: 2025-07-15

## 2025-07-10 RX ORDER — DEXAMETHASONE SODIUM PHOSPHATE 4 MG/ML
4 INJECTION, SOLUTION INTRA-ARTICULAR; INTRALESIONAL; INTRAMUSCULAR; INTRAVENOUS; SOFT TISSUE EVERY 8 HOURS
Status: DISCONTINUED | OUTPATIENT
Start: 2025-07-10 | End: 2025-07-11

## 2025-07-10 RX ORDER — LOSARTAN POTASSIUM 50 MG/1
100 TABLET ORAL DAILY
Status: DISCONTINUED | OUTPATIENT
Start: 2025-07-10 | End: 2025-07-22

## 2025-07-10 RX ORDER — SULFAMETHOXAZOLE AND TRIMETHOPRIM 800; 160 MG/1; MG/1
1 TABLET ORAL
Status: DISCONTINUED | OUTPATIENT
Start: 2025-07-11 | End: 2025-07-22

## 2025-07-10 RX ADMIN — SENNOSIDES AND DOCUSATE SODIUM 2 TABLET: 50; 8.6 TABLET ORAL at 09:07

## 2025-07-10 RX ADMIN — POLYETHYLENE GLYCOL 3350 17 G: 17 POWDER, FOR SOLUTION ORAL at 08:07

## 2025-07-10 RX ADMIN — FAMOTIDINE 20 MG: 20 TABLET, FILM COATED ORAL at 09:07

## 2025-07-10 RX ADMIN — CLONIDINE HYDROCHLORIDE 0.2 MG: 0.2 TABLET ORAL at 01:07

## 2025-07-10 RX ADMIN — DEXAMETHASONE SODIUM PHOSPHATE 4 MG: 4 INJECTION INTRA-ARTICULAR; INTRALESIONAL; INTRAMUSCULAR; INTRAVENOUS; SOFT TISSUE at 09:07

## 2025-07-10 RX ADMIN — SENNOSIDES AND DOCUSATE SODIUM 2 TABLET: 50; 8.6 TABLET ORAL at 11:07

## 2025-07-10 RX ADMIN — LABETALOL HYDROCHLORIDE 10 MG: 5 INJECTION, SOLUTION INTRAVENOUS at 12:07

## 2025-07-10 RX ADMIN — PHENOBARBITAL 100 MG: 100 TABLET ORAL at 01:07

## 2025-07-10 RX ADMIN — THERA TABS 1 TABLET: TAB at 08:07

## 2025-07-10 RX ADMIN — HEPARIN SODIUM 5000 UNITS: 5000 INJECTION INTRAVENOUS; SUBCUTANEOUS at 01:07

## 2025-07-10 RX ADMIN — DEXAMETHASONE SODIUM PHOSPHATE 4 MG: 4 INJECTION INTRA-ARTICULAR; INTRALESIONAL; INTRAMUSCULAR; INTRAVENOUS; SOFT TISSUE at 01:07

## 2025-07-10 RX ADMIN — CEFAZOLIN 1 G: 330 INJECTION, POWDER, FOR SOLUTION INTRAMUSCULAR; INTRAVENOUS at 08:07

## 2025-07-10 RX ADMIN — HYDRALAZINE HYDROCHLORIDE 10 MG: 20 INJECTION, SOLUTION INTRAMUSCULAR; INTRAVENOUS at 12:07

## 2025-07-10 RX ADMIN — MUPIROCIN: 20 OINTMENT TOPICAL at 09:07

## 2025-07-10 RX ADMIN — CLONIDINE HYDROCHLORIDE 0.2 MG: 0.2 TABLET ORAL at 06:07

## 2025-07-10 RX ADMIN — LABETALOL HYDROCHLORIDE 10 MG: 5 INJECTION, SOLUTION INTRAVENOUS at 06:07

## 2025-07-10 RX ADMIN — CEFAZOLIN 1 G: 330 INJECTION, POWDER, FOR SOLUTION INTRAMUSCULAR; INTRAVENOUS at 01:07

## 2025-07-10 RX ADMIN — HYDRALAZINE HYDROCHLORIDE 10 MG: 20 INJECTION, SOLUTION INTRAMUSCULAR; INTRAVENOUS at 04:07

## 2025-07-10 RX ADMIN — LABETALOL HYDROCHLORIDE 10 MG: 5 INJECTION, SOLUTION INTRAVENOUS at 02:07

## 2025-07-10 RX ADMIN — MUPIROCIN: 20 OINTMENT TOPICAL at 08:07

## 2025-07-10 RX ADMIN — PHENOBARBITAL 100 MG: 100 TABLET ORAL at 09:07

## 2025-07-10 RX ADMIN — HEPARIN SODIUM 5000 UNITS: 5000 INJECTION INTRAVENOUS; SUBCUTANEOUS at 09:07

## 2025-07-10 RX ADMIN — OLANZAPINE 20 MG: 5 TABLET, FILM COATED ORAL at 09:07

## 2025-07-10 RX ADMIN — FAMOTIDINE 20 MG: 20 TABLET, FILM COATED ORAL at 08:07

## 2025-07-10 RX ADMIN — DEXAMETHASONE SODIUM PHOSPHATE 4 MG: 4 INJECTION INTRA-ARTICULAR; INTRALESIONAL; INTRAMUSCULAR; INTRAVENOUS; SOFT TISSUE at 06:07

## 2025-07-10 RX ADMIN — CLONIDINE HYDROCHLORIDE 0.2 MG: 0.2 TABLET ORAL at 09:07

## 2025-07-10 RX ADMIN — CEFAZOLIN 1 G: 330 INJECTION, POWDER, FOR SOLUTION INTRAMUSCULAR; INTRAVENOUS at 05:07

## 2025-07-10 RX ADMIN — LOSARTAN POTASSIUM 100 MG: 50 TABLET, FILM COATED ORAL at 06:07

## 2025-07-10 RX ADMIN — HEPARIN SODIUM 5000 UNITS: 5000 INJECTION INTRAVENOUS; SUBCUTANEOUS at 06:07

## 2025-07-10 RX ADMIN — Medication 100 MG: at 08:07

## 2025-07-10 RX ADMIN — PHENOBARBITAL 130 MG: 30 TABLET ORAL at 06:07

## 2025-07-10 RX ADMIN — FOLIC ACID 1 MG: 1 TABLET ORAL at 08:07

## 2025-07-10 NOTE — SUBJECTIVE & OBJECTIVE
Interval History:  see hospital course    Review of Systems   Unable to perform ROS: Acuity of condition       Objective:     Vitals:  Temp: 98.3 °F (36.8 °C)  Pulse: 85  Rhythm: sinus tachycardia  BP: (!) 151/82  MAP (mmHg): 110  ICP Mean (mmHg): 5 mmHg  Resp: 19  SpO2: 100 %    Temp  Min: 98.1 °F (36.7 °C)  Max: 98.9 °F (37.2 °C)  Pulse  Min: 82  Max: 109  BP  Min: 116/61  Max: 185/109  MAP (mmHg)  Min: 81  Max: 140  ICP Mean (mmHg)  Min: 1 mmHg  Max: 10 mmHg  Resp  Min: 11  Max: 24  SpO2  Min: 93 %  Max: 100 %    07/09 0701 - 07/10 0700  In: 2819.5 [I.V.:534.5]  Out: 1832 [Urine:1775; Drains:57]   Unmeasured Output  Unmeasured Urine Occurrence: 1  Unmeasured Stool Occurrence: 0        Physical Exam  Vitals and nursing note reviewed.   Constitutional:       General: He is not in acute distress.  HENT:      Head: Normocephalic.      Nose:      Comments: NGT secured in place     Mouth/Throat:      Mouth: Mucous membranes are moist.      Pharynx: Oropharynx is clear.   Eyes:      Pupils: Pupils are equal, round, and reactive to light.   Cardiovascular:      Rate and Rhythm: Normal rate.   Pulmonary:      Effort: Pulmonary effort is normal. No respiratory distress.   Abdominal:      General: Abdomen is flat. There is no distension.      Palpations: Abdomen is soft.      Tenderness: There is no guarding or rebound.   Musculoskeletal:      Cervical back: Neck supple.      Right lower leg: No edema.      Left lower leg: No edema.   Skin:     General: Skin is warm and dry.      Capillary Refill: Capillary refill takes less than 2 seconds.   Neurological:      Comments:   E2V4M6  Aox1, dysarthric  PERRL  Dysconjugate gaze  FC x4, HARRISON AG spont L>R    BUE restraints  EVD patent with good waveform  Incision c/d/i            Medications:  Continuous  nicardipine, Last Rate: Stopped (07/09/25 1620)    Scheduled  ceFAZolin (Ancef) IV (PEDS and ADULTS), 1 g, Q8H  cloNIDine, 0.2 mg, Q8H  dexAMETHasone (Decadron) IV (PEDS and  ADULTS), 4 mg, Q8H  famotidine, 20 mg, BID  folic acid, 1 mg, Daily  heparin (porcine), 5,000 Units, Q8H  losartan, 100 mg, Daily  multivitamin, 1 tablet, Daily  mupirocin, , BID  OLANZapine, 20 mg, BID  PHENobarbitaL, 100 mg, Q8H  polyethylene glycol, 17 g, Daily  senna-docusate, 2 tablet, BID  thiamine, 100 mg, Daily    PRN  acetaminophen, 650 mg, Q4H PRN  bisacodyL, 10 mg, Daily PRN  hydrALAZINE, 10 mg, Q4H PRN  labetalol, 10 mg, Q4H PRN  morphine, 2 mg, Q4H PRN  ondansetron, 4 mg, Q4H PRN  oxyCODONE, 10 mg, Q4H PRN  oxyCODONE, 5 mg, Q4H PRN  potassium bicarbonate, 35 mEq, PRN  potassium bicarbonate, 50 mEq, PRN  potassium bicarbonate, 60 mEq, PRN  potassium, sodium phosphates, 2 packet, PRN  potassium, sodium phosphates, 2 packet, PRN  potassium, sodium phosphates, 2 packet, PRN  senna-docusate, 2 tablet, BID PRN  sodium chloride 0.9%, 10 mL, PRN      Today I personally reviewed pertinent medications, lines/drains/airways, imaging, cardiology results, laboratory results, microbiology results,     Diet  No diet orders on file

## 2025-07-10 NOTE — PLAN OF CARE
"[facility] Care Plan    POC reviewed with Goran Brown and family at 0300. Patient verbalized understanding. Questions and concerns addressed. No acute events today. Pt progressing toward goals. Will continue to monitor. See below and flowsheets for full assessment and VS info.     - NG tube replaced   - Art line D/C  - TLC D/C  - PRN BP medications given- see  MAR for details       Is this a stroke patient? no    Neuro:  [place] Coma Scale  Best Eye Response: 4-->(E4) spontaneous  Best Motor Response: 6-->(M6) obeys commands  Best Verbal Response: 4-->(V4) confused  Goldie Coma Scale Score: 14  Assessment Qualifiers: Patient not sedated/intubated  Pupil PERRLA: yes     24 hr Temp:  [98.1 °F (36.7 °C)-98.9 °F (37.2 °C)]     CV:   Rhythm: sinus tachycardia  BP goals:   SBP < 160  MAP > 65    Resp:      Vent Mode: Spont  Set Rate: 18 BPM  Oxygen Concentration (%): 50  Vt Set: 470 mL  PEEP/CPAP: 5 cmH20  Pressure Support: 8 cmH20    Plan: N/A    GI/:     Diet/Nutrition Received: NPO, tube feeding  Last Bowel Movement: 07/07/25  Voiding Characteristics: external catheter    Intake/Output Summary (Last 24 hours) at 7/10/2025 0514  Last data filed at 7/10/2025 0405  Gross per 24 hour   Intake 2884.06 ml   Output 2087 ml   Net 797.06 ml     Unmeasured Output  Unmeasured Urine Occurrence: 1  Unmeasured Stool Occurrence: 0    Labs/Accuchecks:  Recent Labs   Lab 07/10/25  0427   WBC 15.10*   RBC 3.68*   HGB 11.4*   HCT 33.8*         Recent Labs   Lab 07/10/25  0424      K 4.4   CO2 22*   *   BUN 40*   CREATININE 1.2   ALKPHOS 63   ALT 13   AST 42   BILITOT 0.3      Recent Labs   Lab 07/06/25  0720   PROTIME 11.7   INR 1.1   APTT 24.9    No results for input(s): "CPK", "CPKMB", "TROPONINI", "MB" in the last 168 hours.    Electrolytes: N/A - electrolytes WDL  Accuchecks: none    Gtts:   nicardipine  0-15 mg/hr Intravenous Continuous   Stopped at 07/09/25 1620       LDA/Wounds:    Davi Risk " Assessment  Sensory Perception: 2-->very limited  Moisture: 3-->occasionally moist  Activity: 1-->bedfast  Mobility: 2-->very limited  Nutrition: 3-->adequate  Friction and Shear: 2-->potential problem  Davi Score: 13  Is your davi score 12 or less? no          Restraints:   Restraint Order  Length of Order: Order good for next 24 hours or when removed.  Date that the current order will : 07/10/25  Time that the current order will : 09  Order Upon Application: Yes    [facility]

## 2025-07-10 NOTE — ASSESSMENT & PLAN NOTE
Hx of    - no longer on cardene gtt  - Start losartan 50 mg qday, increased to 100mg daily on 7/10  - Increase clonidine to 0.2 mg TID

## 2025-07-10 NOTE — ASSESSMENT & PLAN NOTE
70 y/o M brought to ED from [facility] (hx of heroin and ETOH) for progressive weakness, gait disturbance, confusion, and decreased volume when speaking. CT head without contrast revealed multiple lesions of hypodensity with a hyperdense rim, notably in the left temporal lobe and right cerebellum. There were additional small hyperdensities in the right frontoparietal and left parasagittal parietal lobe. There is mass effect and partial effacement of the 4th ventricle secondary to the cerebellar lesion with developing hydrocephalus without MLS. CT of the chest, abdomen, and pelvis with IV contrast showed RUL mass with mediastinal adenopathy. Labs revealed Hep C and treponema antibodies were positive. Hep C PCR pending. Penicillin G was administered. A sepsis workup was completed and antibiotics were initiated. Intubated to get MRI. S/p EVD. Extubated 7/4/25 without difficulty.    Admit to [facility]  Q1h neuro checks, I&Os, and vitals   Daily CBC, CMP, Mag, Phos  NSGY following; s/p SOC craniotomy for tumor resection on 7/7  Dex 4q8, begin taper today per NSGY  EVD open at 10 per NSGY. Antibiotics while drain in place -> repeat head CT on Friday w/ EVD wean if imaging stable  Zyprexa and phenobarbital for agitation/EtOH withdrawal. Decrease phenobarbital to 100 q8h today  clonidine 0.2 mg TID for agitation/opioid dependence  Avoid versed for agitation  Tube feeds + FWF 250cc q4h  Folate/thiamine/MV  SBP <160   Prn labetalol, hydralazine  PRN Zofran 4mg q4h  SCDs, SQH  PT/OT/SLP as able

## 2025-07-10 NOTE — PROGRESS NOTES
[facility] - Neuro Critical Care  Neurocritical Care  Progress Note    Admit Date: 7/1/2025  Service Date: 07/10/2025  Length of Stay: 9    Subjective:     Chief Complaint: Ataxia    History of Present Illness: 69 y/o M presenting from [facility] for generalized weakness for about a week now. History obtained from rehab nurse. She reported that he got to their facility about a month ago and, at baseline, walks with a cane and is alert and oriented. His nurse noticed that he has seemed weaker over the past week and over the past 3 days has had increased balance disturbance and gait issues. They also noticed that he has been speaking more softly in his speech is harder to understand. He has not had any infectious symptoms. He fell yesterday, unsure if he hit his head. CT head without contrast revealed multiple lesions of hypodensity with a hyperdense rim, notably in the left temporal lobe and right cerebellum. There were additional small hyperdensities in the right frontoparietal and left parasagittal parietal lobe. There is mass effect and partial effacement of the 4th ventricle secondary to the cerebellar lesion with developing hydrocephalus without MLS. CT of the chest, abdomen, and pelvis with IV contrast showed RUL mass with mediastinal adenopathy. Labs revealed Hep C and treponema antibodies were positive. Hep C PCR pending. Penicillin G was administered. A sepsis workup was completed and antibiotics were initiated. An MRI brain with and without contrast was ordered, but patient was unable to complete due to persistent movement after sedatives were administered. Prior to MRI, he was alert but drowsy. He was able to tell me his name, but told me he was 53 years old, the year was 2003, and he did not know the current place or reason for being here. He had generalized weakness but was slightly more weak on the right side. He followed simple commands, but did not attempt to follow more complex commands. He is admitted  to [facility] with NSGY consult.    Hospital Course: 7/2/2025: MRI with significant motion artifact. Required presidex overnight secondary to agitation  7/3/2025: MRI favors neoplastic brain lesions. CSF studies pending. Started on Zyprexa and phenobarbital for agitation, R subclavian CVC placed for central access  7/4/2025: NAEON. Extubated without complications.  07/05/2025 NAEON. EVD @20. Adjusted oxycodone frequency q8. Increased zyprexa 20 BID. Precedex for agitation. Plan for biopsy Monday w/ NSGY.  07/06/2025 NAEON. Added on 100q4 FWF. Continues to require precedex for agitation. OR tomorrow.  07/07/2025: AF. SHARRON. Exam stable. OR today for SOC crani for tumor resection. ICPs 0-14, 44cc output. PRN versed given for agitation. Precedex at 1.2. Hydral prn x1 for SBP >160. Remains in restraints.  07/08/2025: AFJared MCDERMOTT. POD1 s/p SOC. CT/MRI from overnight reviewed. EVD open at 10, ICPs -2-14, output 33cc. Versed PRN x1 given for agitation. Agitation improved with Clonidine. Cardene at 12.5 for SBP <160. Hydralazine prn x1 given. Multiple loose BMs. Off propofol and ketamine. Extubated this morning.  07/09/2025: Tolerated extubation overnight, more awake and cooperative on exam this AM, off precedex gtt overnight w/ versed PRN x1 only. Maxed on cardene gtt, add losartan 50 mg qday, increase clonidine to 0.2 mg TID for BP control. EVD @ 10, plan for repeat head CT Friday w/ EVD wean over weekend per discussion w/ NSGY  07/10/2025: NAEON. Afebrile. Pt removed NGT overnight, replaced. No longer on cardene, required hydralazine x1 and labetalol x2 PRNs. Losartan increased to 100mg. EVD open at 10, will plan to keep and wean over weekend per NSGY. Steroid taper per NSGY beginning today.      Interval History:  see hospital course    Review of Systems   Unable to perform ROS: Acuity of condition       Objective:     Vitals:  Temp: 98.3 °F (36.8 °C)  Pulse: 85  Rhythm: sinus tachycardia  BP: (!) 151/82  MAP (mmHg): 110  ICP  Mean (mmHg): 5 mmHg  Resp: 19  SpO2: 100 %    Temp  Min: 98.1 °F (36.7 °C)  Max: 98.9 °F (37.2 °C)  Pulse  Min: 82  Max: 109  BP  Min: 116/61  Max: 185/109  MAP (mmHg)  Min: 81  Max: 140  ICP Mean (mmHg)  Min: 1 mmHg  Max: 10 mmHg  Resp  Min: 11  Max: 24  SpO2  Min: 93 %  Max: 100 %    07/09 0701 - 07/10 0700  In: 2819.5 [I.V.:534.5]  Out: 1832 [Urine:1775; Drains:57]   Unmeasured Output  Unmeasured Urine Occurrence: 1  Unmeasured Stool Occurrence: 0        Physical Exam  Vitals and nursing note reviewed.   Constitutional:       General: He is not in acute distress.  HENT:      Head: Normocephalic.      Nose:      Comments: NGT secured in place     Mouth/Throat:      Mouth: Mucous membranes are moist.      Pharynx: Oropharynx is clear.   Eyes:      Pupils: Pupils are equal, round, and reactive to light.   Cardiovascular:      Rate and Rhythm: Normal rate.   Pulmonary:      Effort: Pulmonary effort is normal. No respiratory distress.   Abdominal:      General: Abdomen is flat. There is no distension.      Palpations: Abdomen is soft.      Tenderness: There is no guarding or rebound.   Musculoskeletal:      Cervical back: Neck supple.      Right lower leg: No edema.      Left lower leg: No edema.   Skin:     General: Skin is warm and dry.      Capillary Refill: Capillary refill takes less than 2 seconds.   Neurological:      Comments:   E2V4M6  Aox1, dysarthric  PERRL  Dysconjugate gaze  FC x4, HARRISON AG spont L>R    BUE restraints  EVD patent with good waveform  Incision c/d/i            Medications:  Continuous  nicardipine, Last Rate: Stopped (07/09/25 1620)    Scheduled  ceFAZolin (Ancef) IV (PEDS and ADULTS), 1 g, Q8H  cloNIDine, 0.2 mg, Q8H  dexAMETHasone (Decadron) IV (PEDS and ADULTS), 4 mg, Q8H  famotidine, 20 mg, BID  folic acid, 1 mg, Daily  heparin (porcine), 5,000 Units, Q8H  losartan, 100 mg, Daily  multivitamin, 1 tablet, Daily  mupirocin, , BID  OLANZapine, 20 mg, BID  PHENobarbitaL, 100 mg,  Q8H  polyethylene glycol, 17 g, Daily  senna-docusate, 2 tablet, BID  thiamine, 100 mg, Daily    PRN  acetaminophen, 650 mg, Q4H PRN  bisacodyL, 10 mg, Daily PRN  hydrALAZINE, 10 mg, Q4H PRN  labetalol, 10 mg, Q4H PRN  morphine, 2 mg, Q4H PRN  ondansetron, 4 mg, Q4H PRN  oxyCODONE, 10 mg, Q4H PRN  oxyCODONE, 5 mg, Q4H PRN  potassium bicarbonate, 35 mEq, PRN  potassium bicarbonate, 50 mEq, PRN  potassium bicarbonate, 60 mEq, PRN  potassium, sodium phosphates, 2 packet, PRN  potassium, sodium phosphates, 2 packet, PRN  potassium, sodium phosphates, 2 packet, PRN  senna-docusate, 2 tablet, BID PRN  sodium chloride 0.9%, 10 mL, PRN      Today I personally reviewed pertinent medications, lines/drains/airways, imaging, cardiology results, laboratory results, microbiology results,     Diet  No diet orders on file    Assessment/Plan:     Neuro  Brain lesion  68 y/o M brought to ED from [facility] (hx of heroin and ETOH) for progressive weakness, gait disturbance, confusion, and decreased volume when speaking. CT head without contrast revealed multiple lesions of hypodensity with a hyperdense rim, notably in the left temporal lobe and right cerebellum. There were additional small hyperdensities in the right frontoparietal and left parasagittal parietal lobe. There is mass effect and partial effacement of the 4th ventricle secondary to the cerebellar lesion with developing hydrocephalus without MLS. CT of the chest, abdomen, and pelvis with IV contrast showed RUL mass with mediastinal adenopathy. Labs revealed Hep C and treponema antibodies were positive. Hep C PCR pending. Penicillin G was administered. A sepsis workup was completed and antibiotics were initiated. Intubated to get MRI. S/p EVD. Extubated 7/4/25 without difficulty.    Admit to [facility]  Q1h neuro checks, I&Os, and vitals   Daily CBC, CMP, Mag, Phos  NSGY following; s/p SOC craniotomy for tumor resection on 7/7  Dex 4q8, begin taper today per NSGY  EVD open at 10  per NSGY. Antibiotics while drain in place -> repeat head CT on Friday w/ EVD wean if imaging stable  Zyprexa and phenobarbital for agitation/EtOH withdrawal. Decrease phenobarbital to 100 q8h today  clonidine 0.2 mg TID for agitation/opioid dependence  Avoid versed for agitation  Tube feeds + FWF 250cc q4h  Folate/thiamine/MV  SBP <160   Prn labetalol, hydralazine  PRN Zofran 4mg q4h  SCDs, SQH  PT/OT/SLP as able    Cardiac/Vascular  Essential hypertension  Hx of    - no longer on cardene gtt  - Start losartan 50 mg qday, increased to 100mg daily on 7/10  - Increase clonidine to 0.2 mg TID          The patient is being Prophylaxed for:  Venous Thromboembolism with: Mechanical or Chemical  Stress Ulcer with: H2B  Ventilator Pneumonia with: not applicable    Activity Orders            Turn patient starting at 07/03 1124    Elevate HOB Elevate HOB 30-45 degrees during feeding unless contraindicated starting at 07/03 0802    Elevate HOB starting at 07/01 2040    Straight Cath starting at 07/01 1925          Full Code    Vern Mobley MD  Neurocritical Care  [facility] - Neuro Critical Care

## 2025-07-10 NOTE — SUBJECTIVE & OBJECTIVE
Interval History: NAEON. Incision c/d/I. ICP WNL. ICP <10.    Medications:  Continuous Infusions:   nicardipine  0-15 mg/hr Intravenous Continuous   Stopped at 07/09/25 1620     Scheduled Meds:   ceFAZolin (Ancef) IV (PEDS and ADULTS)  1 g Intravenous Q8H    cloNIDine  0.2 mg Per NG tube Q8H    dexAMETHasone (Decadron) IV (PEDS and ADULTS)  4 mg Intravenous Q8H    famotidine  20 mg Per NG tube BID    folic acid  1 mg Per NG tube Daily    heparin (porcine)  5,000 Units Subcutaneous Q8H    losartan  100 mg Per NG tube Daily    multivitamin  1 tablet Per NG tube Daily    mupirocin   Nasal BID    OLANZapine  20 mg Per NG tube BID    PHENobarbitaL  130 mg Per NG tube Q8H    polyethylene glycol  17 g Per NG tube Daily    thiamine  100 mg Per NG tube Daily     PRN Meds:  Current Facility-Administered Medications:     acetaminophen, 650 mg, Per NG tube, Q4H PRN    bisacodyL, 10 mg, Rectal, Daily PRN    hydrALAZINE, 10 mg, Intravenous, Q4H PRN    labetalol, 10 mg, Intravenous, Q4H PRN    morphine, 2 mg, Intravenous, Q4H PRN    ondansetron, 4 mg, Intravenous, Q4H PRN    oxyCODONE, 10 mg, Per NG tube, Q4H PRN    oxyCODONE, 5 mg, Per NG tube, Q4H PRN    potassium bicarbonate, 35 mEq, Per NG tube, PRN    potassium bicarbonate, 50 mEq, Per NG tube, PRN    potassium bicarbonate, 60 mEq, Per NG tube, PRN    potassium, sodium phosphates, 2 packet, Per NG tube, PRN    potassium, sodium phosphates, 2 packet, Per NG tube, PRN    potassium, sodium phosphates, 2 packet, Per NG tube, PRN    senna-docusate, 2 tablet, Per NG tube, BID PRN    sodium chloride 0.9%, 10 mL, Intravenous, PRN     Review of Systems  Objective:     Weight: 81.1 kg (178 lb 12.7 oz)  Body mass index is 23.59 kg/m².  Vital Signs (Most Recent):  Temp: 98.3 °F (36.8 °C) (07/10/25 0700)  Pulse: 89 (07/10/25 0700)  Resp: (!) 23 (07/10/25 0700)  BP: (!) 162/87 (07/10/25 0700)  SpO2: (!) 93 % (07/10/25 0700) Vital Signs (24h Range):  Temp:  [98.1 °F (36.7 °C)-98.9 °F (37.2  "°C)] 98.3 °F (36.8 °C)  Pulse:  [] 89  Resp:  [11-26] 23  SpO2:  [93 %-100 %] 93 %  BP: (116-188)/() 162/87  Arterial Line BP: (127-193)/(44-81) 193/81                         Male External Urinary Catheter 07/08/25 0705 Medium (Active)   Collection Container Urimeter 07/10/25 0505   Securement Method secured to top of thigh w/ adhesive device 07/10/25 0505   Skin no redness;no breakdown 07/10/25 0505   Tolerance no signs/symptoms of discomfort 07/10/25 0505   Output (mL) 750 mL 07/10/25 0405   Catheter Change Date 07/09/25 07/09/25 1700   Catheter Change Time 1400 07/09/25 1700            ICP/Ventriculostomy 07/02/25 1409 Right (Active)   Level of Ventriculostomy (cm above) 10 07/09/25 1800   Status Open to drainage 07/10/25 0605   Site Assessment Clean;Dry 07/10/25 0605   Site Drainage No drainage 07/10/25 0605   Waveform normal waveform 07/09/25 1800   Output (mL) 3 mL 07/10/25 0605   CSF Color clear 07/10/25 0605   Dressing Status Clean;Dry;Intact 07/10/25 0605   Interventions HOB degrees;bed controls locked 07/10/25 0605          Physical Exam         Neurosurgery Physical Exam  E4V4M5  AOX1  PERRL  HARRISON spon AG  BUE restraints  Followed commands intermittently BLE     EVD patent with good waveform  Incision flat, c/d/I without fluctuance    Significant Labs:  Recent Labs   Lab 07/09/25  0132 07/10/25  0107 07/10/25  0424   * 133* 122*   * 149* 145   K 4.0 4.3 4.4   * 118* 116*   CO2 23 22* 22*   BUN 37* 43* 40*   CREATININE 1.2 1.2 1.2   CALCIUM 8.8 8.4* 8.9   MG 2.4 2.2  --      Recent Labs   Lab 07/09/25  0132 07/10/25  0107 07/10/25  0427   WBC 14.16* 12.56 15.10*   HGB 11.1* 10.3* 11.4*   HCT 33.3* 31.9* 33.8*    165 192     No results for input(s): "LABPT", "INR", "APTT" in the last 48 hours.  Microbiology Results (last 7 days)       Procedure Component Value Units Date/Time    CSF culture [[id number]] Collected: 07/07/25 0715    Order Status: Completed Specimen: CSF " (Spinal Fluid) from CSF Tap, Tube 3 Updated: 07/10/25 0700     CULTURE, CSF No Growth To Date     GRAM STAIN Cytospin indicates:      No WBCs      No organisms seen    AFB Culture & Smear [[id number]]  (Normal) Collected: 07/02/25 1808    Order Status: Completed Specimen: CSF (Spinal Fluid) from CSF Tap, Tube 3 Updated: 07/10/25 0205     CULTURE, AFB  Culture Negative For Mycobacteria At 1 Week    CSF culture [[id number]] Collected: 07/02/25 1808    Order Status: Completed Specimen: CSF (Spinal Fluid) from CSF Tap, Tube 3 Updated: 07/08/25 0747     CULTURE, CSF No Growth     GRAM STAIN Cytospin indicates:      No WBCs      No organisms seen    Gram stain [[id number]] Collected: 07/07/25 0715    Order Status: Canceled Specimen: CSF (Spinal Fluid) from CSF Tap, Tube 3 Updated: 07/07/25 1620    Culture, Respiratory with Gram Stain [[id number]] Collected: 07/03/25 1130    Order Status: Completed Specimen: Respiratory from Endotracheal Aspirate Updated: 07/07/25 0844     Respiratory Culture Normal respiratory carmen, no Staph aureus or Pseudomonas isolated     GRAM STAIN <10 Epithelial Cells/LPF      Rare WBC seen      No organisms seen    CSF culture [[id number]]     Order Status: Canceled Specimen: CSF (Spinal Fluid)     Blood culture #1 **CANNOT BE ORDERED STAT** [[id number]]  (Normal) Collected: 07/01/25 1702    Order Status: Completed Specimen: Blood from Peripheral, Forearm, Left Updated: 07/07/25 0000     Blood Culture No Growth After 5 Days    Blood culture #2 **CANNOT BE ORDERED STAT** [[id number]]  (Normal) Collected: 07/01/25 1702    Order Status: Completed Specimen: Blood from Peripheral, Lower Arm, Left Updated: 07/07/25 0000     Blood Culture No Growth After 5 Days    Cryptococcal antigen, CSF [[id number]]  (Normal) Collected: 07/02/25 1808    Order Status: Completed Specimen: CSF (Spinal Fluid) from CSF Tap, Tube 3 Updated: 07/03/25 1458     Cryptococcal Antigen, CSF Negative    Afb Culture Stain  [[id number]] Collected: 07/02/25 1808    Order Status: Completed Specimen: CSF (Spinal Fluid) from CSF Tap, Tube 3 Updated: 07/03/25 1346     ACID FAST STAIN  No acid fast bacilli seen          All pertinent labs from the last 24 hours have been reviewed.    Significant Diagnostics:  I have reviewed all pertinent imaging results/findings within the past 24 hours.

## 2025-07-10 NOTE — PT/OT/SLP PROGRESS
"Speech Language Pathology Treatment    Patient Name:  Goran Carlos   MRN:  [id number]  Admitting Diagnosis: Ataxia    Recommendations:                 General Recommendations:  Dysphagia therapy, Speech language evaluation, and Cognitive-linguistic evaluation  Diet recommendations:  NPO, Liquid Diet Level: NPO   Aspiration Precautions: Alternate means of nutrition/hydration, Frequent oral care, and Strict aspiration precautions   General Precautions: Standard, aspiration, fall, NPO  Communication strategies:  provide increased time to answer and go to room if call light pushed  Discharge recommendations:   (pending ongoing assessment)   Barriers to Discharge:  Level of Skilled Assistance Needed      Assessment:     Goran Carlos is a 69 y.o. male with an SLP diagnosis of Dysphagia, Dysarthria, and Dysphonia.  He presents with poor sustained alertness.    Subjective     "What?!"     Pain/Comfort:  Pain Rating 1: 0/10  Pain Rating Post-Intervention 1: 0/10    Respiratory Status: Room air    Objective:     Has the patient been evaluated by SLP for swallowing?   Yes  Keep patient NPO? Yes     Pt seen bedside, alert given consistent mod stim. Nurse at bedside to manage EVD. HOB raised upright and oral care provided.  Wet vocal quality evident with max cues to utilize volitional cough to clear.  Minimal improvement. Fluctuating command following with significant dysarthria evident. Some frustration with fast rate of speech observed. Education provided t/o session re: role for SLP, rationale for ongoing swallow assessment, aspiration risk, speech strategies, and POC. Pt accepted po trials including 1/2 tsp of thin x2, ice chip x1, and tsp of nectar thick liquid x1.  Multiple swallows (up to 7+) noted across trials with consistent throat clear/cough. Increased verbal cues with increasingly delayed swallow over course of trials. Pt visibly fatiguing with increased c ues to maintain alertness.  Additional trials deferred. " Education provided re: cont npo, aspiration risk, and POC. Education to be ongoing.     Goals:   Multidisciplinary Problems       SLP Goals          Problem: SLP    Goal Priority Disciplines Outcome   SLP Goal     SLP Progressing   Description: Goals due 7/16  1.  Pt. Will participate in ongoing assessment of swallow to initiate least restrictive diet.                       Plan:     Patient to be seen:  4 x/week   Plan of Care expires:  08/06/25  Plan of Care reviewed with:  patient   SLP Follow-Up:  Yes       Time Tracking:     SLP Treatment Date:   07/10/25  Speech Start Time:  0650  Speech Stop Time:  0707     Speech Total Time (min):  17 min    Billable Minutes: Treatment Swallowing Dysfunction 9 and Self Care/Home Management Training 8    07/10/2025

## 2025-07-10 NOTE — ASSESSMENT & PLAN NOTE
Assessment  69yo M with generalized weakness, falls, and paucity of speech presenting with multiple ring enhancing lesions on brain CT now s/p R SOC for tumor resection on 7/7:    Imaging:  - CTH 7/1 showed multiple brain masses concerning for mets with surrounding edema, can't rule out abscesses. Mass in cerebellum with effacement of 4th ventricule outflow with concern for developing hydrocephalus  - MRI brain 7/1: non diagnostic due to motion  - CT CAP 7/1: spiculated R lung mass and lymph node adenopathy in chest and neck concerning for metastatic disease  - MRI Brain W/WO 7/2: multifocal enhancing lesions c/f metastatic disease, largest R cerebellum w/mass effect on 4th.  - Post op CTH/MRI 7/7: anticipate post op changes, hemostatic products left at superior/medial border of resection cavity    Plan:  - admitted to [facility]  - EVD open at 10, abx while in place; transduce hourly and document hourly output; M/Th CSF while remains in place. CT Friday ordered for wean  - Cont Dex 4q8 with Ppi, starting wean today  - AED per NCC  - Onc following; will need outpatient rad onc consult. 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 Larios

## 2025-07-10 NOTE — EICU
[facility] Night Rounds Checklist  24H Vital Sign Range:  Temp:  [98.1 °F (36.7 °C)-98.6 °F (37 °C)]   Pulse:  []   Resp:  [11-26]   BP: (116-188)/(56-93)   SpO2:  [98 %-100 %]   Arterial Line BP: (127-199)/()     Video rounds

## 2025-07-10 NOTE — PROGRESS NOTES
[facility] - Neuro Critical Care  Neurosurgery  Progress Note    Subjective:     History of Present Illness: Patient is 69yo M with generalized weakness, falls, and paucity of speech. 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 On exam, patient's voice is barely audible but patient appropriately answers questions and follows commands. Patient's brother states that patient had normal speech and gait when they last saw each other about 1 month ago.    Neurosurgery consulted due to multiple ring-enhancing lesions seen on CT brain.    Post-Op Info:  Procedure(s) (LRB):  CRANIOTOMY, SUBOCCIPITAL (Right)   3 Days Post-Op   Interval History: NAEON. Incision c/d/I. ICP WNL. ICP <10.    Medications:  Continuous Infusions:   nicardipine  0-15 mg/hr Intravenous Continuous   Stopped at 07/09/25 1620     Scheduled Meds:   ceFAZolin (Ancef) IV (PEDS and ADULTS)  1 g Intravenous Q8H    cloNIDine  0.2 mg Per NG tube Q8H    dexAMETHasone (Decadron) IV (PEDS and ADULTS)  4 mg Intravenous Q8H    famotidine  20 mg Per NG tube BID    folic acid  1 mg Per NG tube Daily    heparin (porcine)  5,000 Units Subcutaneous Q8H    losartan  100 mg Per NG tube Daily    multivitamin  1 tablet Per NG tube Daily    mupirocin   Nasal BID    OLANZapine  20 mg Per NG tube BID    PHENobarbitaL  130 mg Per NG tube Q8H    polyethylene glycol  17 g Per NG tube Daily    thiamine  100 mg Per NG tube Daily     PRN Meds:  Current Facility-Administered Medications:     acetaminophen, 650 mg, Per NG tube, Q4H PRN    bisacodyL, 10 mg, Rectal, Daily PRN    hydrALAZINE, 10 mg, Intravenous, Q4H PRN    labetalol, 10 mg, Intravenous, Q4H PRN    morphine, 2 mg, Intravenous, Q4H PRN    ondansetron, 4 mg, Intravenous, Q4H PRN    oxyCODONE, 10 mg, Per NG tube, Q4H PRN    oxyCODONE, 5 mg, Per NG tube, Q4H PRN    potassium bicarbonate, 35 mEq, Per NG tube, PRN    potassium bicarbonate, 50 mEq, Per NG tube, PRN    potassium bicarbonate, 60 mEq, Per NG  tube, PRN    potassium, sodium phosphates, 2 packet, Per NG tube, PRN    potassium, sodium phosphates, 2 packet, Per NG tube, PRN    potassium, sodium phosphates, 2 packet, Per NG tube, PRN    senna-docusate, 2 tablet, Per NG tube, BID PRN    sodium chloride 0.9%, 10 mL, Intravenous, PRN     Review of Systems  Objective:     Weight: 81.1 kg (178 lb 12.7 oz)  Body mass index is 23.59 kg/m².  Vital Signs (Most Recent):  Temp: 98.3 °F (36.8 °C) (07/10/25 0700)  Pulse: 89 (07/10/25 0700)  Resp: (!) 23 (07/10/25 0700)  BP: (!) 162/87 (07/10/25 0700)  SpO2: (!) 93 % (07/10/25 0700) Vital Signs (24h Range):  Temp:  [98.1 °F (36.7 °C)-98.9 °F (37.2 °C)] 98.3 °F (36.8 °C)  Pulse:  [] 89  Resp:  [11-26] 23  SpO2:  [93 %-100 %] 93 %  BP: (116-188)/() 162/87  Arterial Line BP: (127-193)/(44-81) 193/81                         Male External Urinary Catheter 07/08/25 0705 Medium (Active)   Collection Container Urimeter 07/10/25 0505   Securement Method secured to top of thigh w/ adhesive device 07/10/25 0505   Skin no redness;no breakdown 07/10/25 0505   Tolerance no signs/symptoms of discomfort 07/10/25 0505   Output (mL) 750 mL 07/10/25 0405   Catheter Change Date 07/09/25 07/09/25 1700   Catheter Change Time 1400 07/09/25 1700            ICP/Ventriculostomy 07/02/25 1409 Right (Active)   Level of Ventriculostomy (cm above) 10 07/09/25 1800   Status Open to drainage 07/10/25 0605   Site Assessment Clean;Dry 07/10/25 0605   Site Drainage No drainage 07/10/25 0605   Waveform normal waveform 07/09/25 1800   Output (mL) 3 mL 07/10/25 0605   CSF Color clear 07/10/25 0605   Dressing Status Clean;Dry;Intact 07/10/25 0605   Interventions HOB degrees;bed controls locked 07/10/25 0605          Physical Exam         Neurosurgery Physical Exam  [id number]  AOX1  PERRL  HARRISON spon AG  BUE restraints  Followed commands intermittently BLE     EVD patent with good waveform  Incision flat, c/d/I without fluctuance    Significant  "Labs:  Recent Labs   Lab 07/09/25  0132 07/10/25  0107 07/10/25  0424   * 133* 122*   * 149* 145   K 4.0 4.3 4.4   * 118* 116*   CO2 23 22* 22*   BUN 37* 43* 40*   CREATININE 1.2 1.2 1.2   CALCIUM 8.8 8.4* 8.9   MG 2.4 2.2  --      Recent Labs   Lab 07/09/25  0132 07/10/25  0107 07/10/25  0427   WBC 14.16* 12.56 15.10*   HGB 11.1* 10.3* 11.4*   HCT 33.3* 31.9* 33.8*    165 192     No results for input(s): "LABPT", "INR", "APTT" in the last 48 hours.  Microbiology Results (last 7 days)       Procedure Component Value Units Date/Time    CSF culture [[id number]] Collected: 07/07/25 0715    Order Status: Completed Specimen: CSF (Spinal Fluid) from CSF Tap, Tube 3 Updated: 07/10/25 0700     CULTURE, CSF No Growth To Date     GRAM STAIN Cytospin indicates:      No WBCs      No organisms seen    AFB Culture & Smear [[id number]]  (Normal) Collected: 07/02/25 1808    Order Status: Completed Specimen: CSF (Spinal Fluid) from CSF Tap, Tube 3 Updated: 07/10/25 0205     CULTURE, AFB  Culture Negative For Mycobacteria At 1 Week    CSF culture [[id number]] Collected: 07/02/25 1808    Order Status: Completed Specimen: CSF (Spinal Fluid) from CSF Tap, Tube 3 Updated: 07/08/25 0747     CULTURE, CSF No Growth     GRAM STAIN Cytospin indicates:      No WBCs      No organisms seen    Gram stain [[id number]] Collected: 07/07/25 0715    Order Status: Canceled Specimen: CSF (Spinal Fluid) from CSF Tap, Tube 3 Updated: 07/07/25 1620    Culture, Respiratory with Gram Stain [[id number]] Collected: 07/03/25 1130    Order Status: Completed Specimen: Respiratory from Endotracheal Aspirate Updated: 07/07/25 0844     Respiratory Culture Normal respiratory carmen, no Staph aureus or Pseudomonas isolated     GRAM STAIN <10 Epithelial Cells/LPF      Rare WBC seen      No organisms seen    CSF culture [[id number]]     Order Status: Canceled Specimen: CSF (Spinal Fluid)     Blood culture #1 **CANNOT BE ORDERED STAT** " [[id number]]  (Normal) Collected: 07/01/25 1702    Order Status: Completed Specimen: Blood from Peripheral, Forearm, Left Updated: 07/07/25 0000     Blood Culture No Growth After 5 Days    Blood culture #2 **CANNOT BE ORDERED STAT** [[id number]]  (Normal) Collected: 07/01/25 1702    Order Status: Completed Specimen: Blood from Peripheral, Lower Arm, Left Updated: 07/07/25 0000     Blood Culture No Growth After 5 Days    Cryptococcal antigen, CSF [[id number]]  (Normal) Collected: 07/02/25 1808    Order Status: Completed Specimen: CSF (Spinal Fluid) from CSF Tap, Tube 3 Updated: 07/03/25 1458     Cryptococcal Antigen, CSF Negative    Afb Culture Stain [[id number]] Collected: 07/02/25 1808    Order Status: Completed Specimen: CSF (Spinal Fluid) from CSF Tap, Tube 3 Updated: 07/03/25 1346     ACID FAST STAIN  No acid fast bacilli seen          All pertinent labs from the last 24 hours have been reviewed.    Significant Diagnostics:  I have reviewed all pertinent imaging results/findings within the past 24 hours.  Assessment/Plan:     * Ataxia  Assessment  71yo M with generalized weakness, falls, and paucity of speech presenting with multiple ring enhancing lesions on brain CT now s/p R SOC for tumor resection on 7/7:    Imaging:  - CTH 7/1 showed multiple brain masses concerning for mets with surrounding edema, can't rule out abscesses. Mass in cerebellum with effacement of 4th ventricule outflow with concern for developing hydrocephalus  - MRI brain 7/1: non diagnostic due to motion  - CT CAP 7/1: spiculated R lung mass and lymph node adenopathy in chest and neck concerning for metastatic disease  - MRI Brain W/WO 7/2: multifocal enhancing lesions c/f metastatic disease, largest R cerebellum w/mass effect on 4th.  - Post op CTH/MRI 7/7: anticipate post op changes, hemostatic products left at superior/medial border of resection cavity    Plan:  - admitted to [facility]  - EVD open at 10, abx while in place; transduce hourly  and document hourly output; M/Th CSF while remains in place. CT Friday ordered for wean  - Cont Dex 4q8 with Ppi, starting wean today  - AED per NCC  - Onc following; will need outpatient rad onc consult. 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 Ric Rodriguez MD  Neurosurgery  [facility] - Neuro Critical Care

## 2025-07-10 NOTE — ASSESSMENT & PLAN NOTE
Was brought in from [facility], where he has been for the past month  Educate on cessation when appropriate  On phenobarbital for agitation, no signs of EtOH withdrawl

## 2025-07-10 NOTE — PLAN OF CARE
[facility] - Neuro Critical Care  Discharge Reassessment    Primary Care Provider: No primary care provider on file.    Expected Discharge Date: 7/15/2025    Reassessment (most recent)       Discharge Reassessment - 07/10/25 1248          Discharge Reassessment    Assessment Type Discharge Planning Reassessment     Did the patient's condition or plan change since previous assessment? Yes     Discharge Plan discussed with: Adult children     Name(s) and Number(s) Ron Carlos  [phone] (P)      Communicated CARA with patient/caregiver Yes (P)      Discharge Plan A Rehab (P)      Discharge Plan B Home with family (P)      DME Needed Upon Discharge  none (P)      Transition of Care Barriers None (P)      Why the patient remains in the hospital Requires continued medical care (P)         Post-Acute Status    Discharge Delays None known at this time (P)                    Discharge Plan A and Plan B have been determined by review of patient's clinical status, future medical and therapeutic needs, and coverage/benefits for post-acute care in coordination with multidisciplinary team members.    Ele Richmond MSW, LAURENW  [facility]  Case Management Dept.

## 2025-07-10 NOTE — PLAN OF CARE
[facility] Care Plan  POC reviewed with Goran Lozano.  PT not able to verbalized understanding. Questions and concerns addressed. No acute events today. Pt progressing toward goals. Will continue to monitor. See below and flowsheets for full assessment and VS info.     - Pt worked with speech again today, no real change from yesterday but they will continue to work with him   - taper down on phenobarb to cut down on pt lethargy that has been noted more today  - increased pt BP meds early this morning prior to AM shift to help with BP control. PRN were given today to help with SBP > 180 but was only need x1 each.   - Pt did great working with PT/OT today and stood edge of bed and sat edge of bed  - CT tomorrow for eval to see if can start to wean EVD  - EVD remains open at 10. ICPs between 4-9, CSF between 0-14 clear in color and great waveform  - TF remain at goal with 250 free water flushes Q4 pt tolerating well. Pt did have loose BM today.   - partial bath and linen change  - Daughter updated over the phone this afternoon      Is this a stroke patient? no    Neuro:  Goldie Coma Scale  Best Eye Response: 4-->(E4) spontaneous  Best Motor Response: 6-->(M6) obeys commands  Best Verbal Response: 4-->(V4) confused  Goldie Coma Scale Score: 14  Assessment Qualifiers: Patient not sedated/intubated  Pupil PERRLA: yes     24 hr Temp:  [98.3 °F (36.8 °C)-98.9 °F (37.2 °C)]     CV:   Rhythm: sinus tachycardia  BP goals:   SBP < 160  MAP > 65    Resp:      Vent Mode: Spont  Set Rate: 18 BPM  Oxygen Concentration (%): 50  Vt Set: 470 mL  PEEP/CPAP: 5 cmH20  Pressure Support: 8 cmH20    Plan: N/A    GI/:     Diet/Nutrition Received: NPO, tube feeding  Last Bowel Movement: 07/10/25  Voiding Characteristics: external catheter    Intake/Output Summary (Last 24 hours) at 7/10/2025 9135  Last data filed at 7/10/2025 1700  Gross per 24 hour   Intake 2615 ml   Output 1885 ml   Net 730 ml     Unmeasured Output  Unmeasured Urine  "Occurrence: 1  Unmeasured Stool Occurrence: 0    Labs/Accuchecks:  Recent Labs   Lab 07/10/25  042   WBC 15.10*   RBC 3.68*   HGB 11.4*   HCT 33.8*         Recent Labs   Lab 07/10/25  0424      K 4.4   CO2 22*   *   BUN 40*   CREATININE 1.2   ALKPHOS 63   ALT 13   AST 42   BILITOT 0.3      Recent Labs   Lab 25  0720   PROTIME 11.7   INR 1.1   APTT 24.9    No results for input(s): "CPK", "CPKMB", "TROPONINI", "MB" in the last 168 hours.    Electrolytes: N/A - electrolytes WDL  Accuchecks: none    Gtts:   nicardipine  0-15 mg/hr Intravenous Continuous   Stopped at 25 1620       LDA/Wounds:    Davi Risk Assessment  Sensory Perception: 4-->no impairment  Moisture: 3-->occasionally moist  Activity: 1-->bedfast  Mobility: 3-->slightly limited  Nutrition: 3-->adequate  Friction and Shear: 3-->no apparent problem  Davi Score: 17    Is your davi score 12 or less? no            Restraints:   Restraint Order  Length of Order: Order good for next 24 hours or when removed.  Date that the current order will : 25  Time that the current order will : 0500  Order Upon Application: Yes    [facility]   Problem: Adult Inpatient Plan of Care  Goal: Plan of Care Review  Outcome: Progressing  Goal: Patient-Specific Goal (Individualized)  Outcome: Progressing  Goal: Absence of Hospital-Acquired Illness or Injury  Outcome: Progressing  Goal: Optimal Comfort and Wellbeing  Outcome: Progressing     Problem: Infection  Goal: Absence of Infection Signs and Symptoms  Outcome: Progressing     Problem: Skin Injury Risk Increased  Goal: Skin Health and Integrity  Outcome: Progressing     Problem: Delirium  Goal: Optimal Coping  Outcome: Progressing  Goal: Improved Behavioral Control  Outcome: Progressing  Goal: Improved Attention and Thought Clarity  Outcome: Progressing  Goal: Improved Sleep  Outcome: Progressing     Problem: Fall Injury Risk  Goal: Absence of Fall and Fall-Related Injury  Outcome: " Progressing     Problem: Restraint, Nonviolent  Goal: Absence of Harm or Injury  Outcome: Progressing     Problem: Mechanical Ventilation Invasive  Goal: Optimal Nutrition Delivery  Outcome: Met     Problem: Wound  Goal: Optimal Coping  Outcome: Progressing  Goal: Optimal Functional Ability  Outcome: Progressing  Goal: Absence of Infection Signs and Symptoms  Outcome: Progressing  Goal: Optimal Pain Control and Function  Outcome: Progressing  Goal: Skin Health and Integrity  Outcome: Progressing  Goal: Optimal Wound Healing  Outcome: Progressing

## 2025-07-10 NOTE — PLAN OF CARE
Problem: Physical Therapy  Goal: Physical Therapy Goal  Description: Goals to be met by: 2025     Patient will increase functional independence with mobility by performin. Supine to sit with Stand-by Assistance  2. Sit to supine with Stand-by Assistance  3. Sit to stand transfer with Contact Guard Assistance  4. Bed to chair transfer with Minimal Assistance using Rolling Walker  5. Gait  x 10 feet with Minimal Assistance using Rolling Walker.   6. Sitting at edge of bed x5 minutes with Supervision      Outcome: Progressing     Pt re-evaluated and appropriate goals established.

## 2025-07-10 NOTE — ASSESSMENT & PLAN NOTE
70 y/o M brought to ED from [facility] (hx of heroin and ETOH) for progressive weakness, gait disturbance, confusion, and decreased volume when speaking. CT head without contrast revealed multiple lesions of hypodensity with a hyperdense rim, notably in the left temporal lobe and right cerebellum. There were additional small hyperdensities in the right frontoparietal and left parasagittal parietal lobe. There is mass effect and partial effacement of the 4th ventricle secondary to the cerebellar lesion with developing hydrocephalus without MLS. CT of the chest, abdomen, and pelvis with IV contrast showed RUL mass with mediastinal adenopathy. Labs revealed Hep C and treponema antibodies were positive. Hep C PCR pending. Penicillin G was administered. A sepsis workup was completed and antibiotics were initiated. Intubated to get MRI. S/p EVD. Extubated 7/4/25 without difficulty.    Admit to [facility]  Q1h neuro checks, I&Os, and vitals   Daily CBC, CMP, Mag, Phos  NSGY following; s/p SOC craniotomy for tumor resection on 7/7  Dex 4q6, taper once able  EVD open at 10 per NSGY. Antibiotics while drain in place -> repeat head CT on Friday w/ EVD wean if imaging stable  Zyprexa and phenobarbital for agitation/EtOH withdrawal  clonidine 0.2 mg TID for agitation/opioid dependence  Avoid versed for agitation  Tube feeds + FWF 250cc q4h  Folate/thiamine/MV  SBP <160   Prn labetalol, hydralazine, cardene   PRN Zofran 4mg q4h  SCDs, SQH  PT/OT/SLP as able

## 2025-07-10 NOTE — PLAN OF CARE
07/10/25 1248   Rounds   Attendance Provider;   Discharge Plan A Rehab   Why the patient remains in the hospital Requires continued medical care   Transition of Care Barriers None     Ele Richmond MSW, LCSW  [facility]  Case Management Dept.

## 2025-07-10 NOTE — PROGRESS NOTES
[facility] - Neuro Critical Care  Neurocritical Care  Progress Note    Admit Date: 7/1/2025  Service Date: 07/09/2025  Length of Stay: 8    Subjective:     Chief Complaint: Ataxia    History of Present Illness: 69 y/o M presenting from [facility] for generalized weakness for about a week now. History obtained from rehab nurse. She reported that he got to their facility about a month ago and, at baseline, walks with a cane and is alert and oriented. His nurse noticed that he has seemed weaker over the past week and over the past 3 days has had increased balance disturbance and gait issues. They also noticed that he has been speaking more softly in his speech is harder to understand. He has not had any infectious symptoms. He fell yesterday, unsure if he hit his head. CT head without contrast revealed multiple lesions of hypodensity with a hyperdense rim, notably in the left temporal lobe and right cerebellum. There were additional small hyperdensities in the right frontoparietal and left parasagittal parietal lobe. There is mass effect and partial effacement of the 4th ventricle secondary to the cerebellar lesion with developing hydrocephalus without MLS. CT of the chest, abdomen, and pelvis with IV contrast showed RUL mass with mediastinal adenopathy. Labs revealed Hep C and treponema antibodies were positive. Hep C PCR pending. Penicillin G was administered. A sepsis workup was completed and antibiotics were initiated. An MRI brain with and without contrast was ordered, but patient was unable to complete due to persistent movement after sedatives were administered. Prior to MRI, he was alert but drowsy. He was able to tell me his name, but told me he was 53 years old, the year was 2003, and he did not know the current place or reason for being here. He had generalized weakness but was slightly more weak on the right side. He followed simple commands, but did not attempt to follow more complex commands. He is admitted  to [facility] with NSGY consult.    Hospital Course: 7/2/2025: MRI with significant motion artifact. Required presidex overnight secondary to agitation  7/3/2025: MRI favors neoplastic brain lesions. CSF studies pending. Started on Zyprexa and phenobarbital for agitation, R subclavian CVC placed for central access  7/4/2025: NAEON. Extubated without complications.  07/05/2025 NAEON. EVD @20. Adjusted oxycodone frequency q8. Increased zyprexa 20 BID. Precedex for agitation. Plan for biopsy Monday w/ NSGY.  07/06/2025 NAEON. Added on 100q4 FWF. Continues to require precedex for agitation. OR tomorrow.  07/07/2025: AF. SHARRON. Exam stable. OR today for SOC crani for tumor resection. ICPs 0-14, 44cc output. PRN versed given for agitation. Precedex at 1.2. Hydral prn x1 for SBP >160. Remains in restraints.  07/08/2025: AFJared MCDERMOTT. POD1 s/p SOC. CT/MRI from overnight reviewed. EVD open at 10, ICPs -2-14, output 33cc. Versed PRN x1 given for agitation. Agitation improved with Clonidine. Cardene at 12.5 for SBP <160. Hydralazine prn x1 given. Multiple loose BMs. Off propofol and ketamine. Extubated this morning.  07/09/2025 Tolerated extubation overnight, more awake and cooperative on exam this AM, off precedex gtt overnight w/ versed PRN x1 only. Maxed on cardene gtt, add losartan 50 mg qday, increase clonidine to 0.2 mg TID for BP control. EVD @ 10, plan for repeat head CT Friday w/ EVD wean over weekend per discussion w/ NSGY    Interval History:  Please see hospital course above for full details    Review of Systems   Unable to perform ROS: Mental status change       Objective:     Vitals:  Temp: 98.1 °F (36.7 °C)  Pulse: 88  Rhythm: sinus tachycardia  BP: 135/74  MAP (mmHg): 98  ICP Mean (mmHg): 7 mmHg  Resp: 12  SpO2: 100 %    Temp  Min: 98.1 °F (36.7 °C)  Max: 98.7 °F (37.1 °C)  Pulse  Min: 85  Max: 125  BP  Min: 116/61  Max: 188/87  MAP (mmHg)  Min: 81  Max: 126  ICP Mean (mmHg)  Min: 1 mmHg  Max: 12 mmHg  Resp  Min: 11   Max: 26  SpO2  Min: 98 %  Max: 100 %    07/08 0701 - 07/09 0700  In: 3197.9 [I.V.:1522.9]  Out: 1741 [Urine:1550; Drains:191]   Unmeasured Output  Unmeasured Urine Occurrence: 1  Unmeasured Stool Occurrence: 0        Physical Exam  General Appearance: Not in acute hemodynamic distress  Mental Status Exam: awake, alert,oriented to self only; is able to select hospital from list of options.Much calmer compared to prior. Following simple commands x4 extremities   Cranial Nerves: Gaze midline, PERRL, no clear gaze preference. Dysarthric. +cough, spontaneous  Motor: Moving all 4 extremities AG to command, slightly less brisk on R  Sensory: Grossly symmetric to noxious x4  Coordination: Unable to assess  Vascular: S1/S2 of normal intensity, no S3/S4 appreciated, no murmurs appreciated  Lungs: CTA bilaterally without wheezing  Abdomen: Soft, non-distended, non-tender, BS +         Medications:  Continuousnicardipine, Last Rate: Stopped (07/09/25 1620)    ScheduledceFAZolin (Ancef) IV (PEDS and ADULTS), 1 g, Q8H  cloNIDine, 0.2 mg, Q8H  dexAMETHasone (Decadron) IV (PEDS and ADULTS), 4 mg, Q6H  famotidine, 20 mg, BID  folic acid, 1 mg, Daily  heparin (porcine), 5,000 Units, Q8H  losartan, 50 mg, Daily  multivitamin, 1 tablet, Daily  mupirocin, , BID  OLANZapine, 20 mg, BID  PHENobarbitaL, 130 mg, Q8H  polyethylene glycol, 17 g, Daily  thiamine, 100 mg, Daily    PRNacetaminophen, 650 mg, Q4H PRN  bisacodyL, 10 mg, Daily PRN  hydrALAZINE, 10 mg, Q4H PRN  labetalol, 10 mg, Q4H PRN  morphine, 2 mg, Q4H PRN  ondansetron, 4 mg, Q4H PRN  oxyCODONE, 10 mg, Q4H PRN  oxyCODONE, 5 mg, Q4H PRN  potassium bicarbonate, 35 mEq, PRN  potassium bicarbonate, 50 mEq, PRN  potassium bicarbonate, 60 mEq, PRN  potassium, sodium phosphates, 2 packet, PRN  potassium, sodium phosphates, 2 packet, PRN  potassium, sodium phosphates, 2 packet, PRN  senna-docusate, 2 tablet, BID PRN  sodium chloride 0.9%, 10 mL, PRN      Today I personally reviewed  pertinent imaging, laboratory results, notably: No new imaging to review. CBC w/ stable mild leukocytosis at 14.16, Hb/platelets stable. Serum Na uptrending to 146, BUN/creatinine stable at 37/1.2.    Diet  No diet orders on file  No diet orders on file  TF at goal    Assessment/Plan:     Neuro  Brain lesion  70 y/o M brought to ED from [facility] (hx of heroin and ETOH) for progressive weakness, gait disturbance, confusion, and decreased volume when speaking. CT head without contrast revealed multiple lesions of hypodensity with a hyperdense rim, notably in the left temporal lobe and right cerebellum. There were additional small hyperdensities in the right frontoparietal and left parasagittal parietal lobe. There is mass effect and partial effacement of the 4th ventricle secondary to the cerebellar lesion with developing hydrocephalus without MLS. CT of the chest, abdomen, and pelvis with IV contrast showed RUL mass with mediastinal adenopathy. Labs revealed Hep C and treponema antibodies were positive. Hep C PCR pending. Penicillin G was administered. A sepsis workup was completed and antibiotics were initiated. Intubated to get MRI. S/p EVD. Extubated 7/4/25 without difficulty.    Admit to [facility]  Q1h neuro checks, I&Os, and vitals   Daily CBC, CMP, Mag, Phos  NSGY following; s/p SOC craniotomy for tumor resection on 7/7  Dex 4q6, taper once able  EVD open at 10 per NSGY. Antibiotics while drain in place -> repeat head CT on Friday w/ EVD wean if imaging stable  Zyprexa and phenobarbital for agitation/EtOH withdrawal  clonidine 0.2 mg TID for agitation/opioid dependence  Avoid versed for agitation  Tube feeds + FWF 250cc q4h  Folate/thiamine/MV  SBP <160   Prn labetalol, hydralazine, cardene   PRN Zofran 4mg q4h  SCDs, SQH  PT/OT/SLP as able    AMS (altered mental status)  See primary problem    Psychiatric  Polysubstance abuse  Was brought in from [facility], where he has been for the past month  Educate on  cessation when appropriate  On phenobarbital for agitation, no signs of EtOH withdrawl    Pulmonary  Mass of upper lobe of right lung  Noted on CXR and CT chest with IV contrast  Saturating well on RA; No PTX on CT-chest  No lung consolidations or obstructive process  No known history of cancer  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    Other  Hepatitis C antibody positive  PCR negative  No known history of Hep C per patient    Gait disturbance  See primary problem          The patient is being Prophylaxed for:  Venous Thromboembolism with: Mechanical or Chemical  Stress Ulcer with: H2B  Ventilator Pneumonia with: not applicable    Activity Orders            Turn patient starting at 07/03 1124    Elevate HOB Elevate HOB 30-45 degrees during feeding unless contraindicated starting at 07/03 0802    Elevate HOB starting at 07/01 2040    Straight Cath starting at 07/01 1925          Full Code    Uninterrupted Critical Care/Counseling Time (not including procedures): 40 minutes  There is high probability for acute neurological change leading to clinical and possibly life-threatening deterioration requiring highest level of physician preparedness for urgent intervention. Critical care time was spent personally by me on the following activities: development of treatment plan with patient or surrogate and bedside caregivers, discussions with consultants, evaluation of patient's response to treatment, examination of patient, ordering and performing treatments and interventions, ordering and review of laboratory studies, ordering and review of radiographic studies, pulse oximetry, antibiotic titration if applicable, vasopressor titration if applicable, re-evaluation of patient's condition. I spent greater than 50% of the documented critical care time assessing and making medical decisions for  this patient.       Melba Farnsworth MD  Neurocritical Care  [facility] - Neuro Critical Care

## 2025-07-10 NOTE — PT/OT/SLP RE-EVAL
Physical Therapy Re-evaluation and Treatment     Patient Name:  Goran Carlos   MRN:  [id number]    Recommendations:     Discharge Recommendations: High Intensity Therapy  Discharge Equipment Recommendations: bedside commode, bath bench, wheelchair   Barriers to discharge: None    Assessment:     Goran Carlos is a 69 y.o. male admitted with a medical diagnosis of Ataxia.  He presents with the following impairments/functional limitations: weakness, impaired endurance, impaired self care skills, impaired functional mobility, gait instability, impaired balance, decreased upper extremity function, decreased lower extremity function, decreased safety awareness, impaired cardiopulmonary response to activity. Pt demo's evidence of weakness and deconditioning, requires assistance for sitting and standing, unable to advance to steps despite efforts. Pt continues to present below reported functional baseline. Patient presents with good participation and motivation to return to prior level of function with high intensity therapy.  The patient demonstrates appropriate endurance to participate in up to 3 hours or 15hrs of combined therapy post acute. . Pt would continue to benefit from acute skilled therapy intervention to address deficits and progress toward prior level of function.       Rehab Prognosis:  good; patient would benefit from acute skilled PT services to address these deficits and reach maximum level of function.      Recent Surgery: Procedure(s) (LRB):  CRANIOTOMY, SUBOCCIPITAL (Right) 3 Days Post-Op    Plan:     During this hospitalization, patient to be seen 4 x/week to address the above listed problems via gait training, therapeutic activities, therapeutic exercises, neuromuscular re-education  Plan of Care Expires:  08/04/25  Plan of Care Reviewed with: patient    Subjective     Communicated with RN prior to session.  Patient found supine with blood pressure cuff, pulse ox (continuous), telemetry, external  ventricular drain, PureWick, restraints, NG tube upon PT entry to room, agreeable to evaluation.      Chief Complaint: discomfort related to NG tube   Patient comments/goals: to get better   Pain/Comfort:  Pain Rating 1: 0/10  Pain Rating Post-Intervention 1: 0/10    Patients cultural, spiritual, Latter day conflicts given the current situation: no      Objective:     Patient found with: blood pressure cuff, pulse ox (continuous), telemetry, external ventricular drain, PureWick, restraints, NG tube     General Precautions: Standard, fall  Orthopedic Precautions: N/A  Braces: N/A  Respiratory Status: Room air    Exams:  Cognitive Exam:  Patient is lethargic but able to wake, oriented to self and place, able to follow 80% of simple one step commands   Gross Motor Coordination:  impaired   RLE ROM: WFL  RLE Strength: grossly 3/5  LLE ROM: WFL  LLE Strength: grossly 3/5     Functional Mobility:  Bed Mobility:     Rolling Left:  moderate assistance  Rolling Right: moderate assistance  Supine to Sit: moderate assistance  Sit to Supine: maximal assistance  Transfers:     Sit to Stand:  2x from EOB with moderate assistance and bilateral HHA. Facilitation provided to promote anterior weight shift and at hips to promote hip extension   Gait: PT attempted to facilitate steps, however, pt unable to initiate lateral steps despite max efforts     AM-PAC 6 CLICK MOBILITY  Total Score:10       Treatment and Education:   Rolling performed while PT/tech and nursing assisted with cleaning after BM.   Pt sat EOB fro 8 mins with minimum-moderate assistance required for sitting balance with posterior lean.   While sitting EOB, pt performed 5x R and L alternating targeted reaching with active assistance required to fully extend R UE.   Pt educated on role of PT/POC. Pt verbalized understanding.   Pt encouraged to only perform OOB mobility with assistance from nursing/therapy. Pt agreeable.       Patient left HOB elevated with all lines  intact, call button in reach, and RN present.     GOALS:   Multidisciplinary Problems       Physical Therapy Goals          Problem: Physical Therapy    Goal Priority Disciplines Outcome Interventions   Physical Therapy Goal     PT, PT/OT Progressing    Description: Goals to be met by: 2025     Patient will increase functional independence with mobility by performin. Supine to sit with Stand-by Assistance  2. Sit to supine with Stand-by Assistance  3. Sit to stand transfer with Contact Guard Assistance  4. Bed to chair transfer with Minimal Assistance using Rolling Walker  5. Gait  x 10 feet with Minimal Assistance using Rolling Walker.   6. Sitting at edge of bed x5 minutes with Supervision                           DME Justifications:  Goran Carlos has a mobility limitation that significantly impairs his ability to participate in one or more mobility related activities of daily living (MRADLs) such as toileting, feeding, dressing, grooming, and bathing in customary locations in the home.  The mobility limitation cannot be sufficiently resolved by the use of a cane or walker.   The use of a manual wheelchair will significantly improve the patients ability to participate in MRADLS and the patient will use it on regular basis in the home.  Goran Carlos has expressed his willingness to use a manual wheelchair in the home. Patients upper body strength is sufficient for propulsion.  He also has a caregiver who is available, willing, and able to provide assistance with the wheelchair when needed.      History:     Past Medical History:   Diagnosis Date    ETOH abuse     Gait disturbance     Heroin abuse        Past Surgical History:   Procedure Laterality Date    SUBOCCIPITAL CRANIOTOMY Right 2025    Procedure: CRANIOTOMY, SUBOCCIPITAL;  Surgeon: Blane Larios DO;  Location: [facility] OR [address] [place] [address];  Service: Neurosurgery;  Laterality: Right;       Time Tracking:     PT Received On: 07/10/25  PT Start  Time: 1246     PT Stop Time: 1316  PT Total Time (min): 30 min     Billable Minutes: Re-eval 7 mins  and Therapeutic Activity 23 mins       07/10/2025

## 2025-07-10 NOTE — EICU
Virtual ICU Quality Rounds    Admit Date: 7/1/2025  Hospital Day: 9    ICU Day: 8d 11h    24H Vital Sign Range:  Temp:  [98.1 °F (36.7 °C)-98.9 °F (37.2 °C)]   Pulse:  []   Resp:  [11-26]   BP: (116-185)/()   SpO2:  [93 %-100 %]   Arterial Line BP: (127-193)/(44-81)     VICU Surveillance Screening

## 2025-07-11 LAB
ABSOLUTE EOSINOPHIL (OHS): 0 K/UL
ABSOLUTE MONOCYTE (OHS): 0.84 K/UL (ref 0.3–1)
ABSOLUTE NEUTROPHIL COUNT (OHS): 9.14 K/UL (ref 1.8–7.7)
ALBUMIN SERPL BCP-MCNC: 3 G/DL (ref 3.5–5.2)
ALP SERPL-CCNC: 62 UNIT/L (ref 40–150)
ALT SERPL W/O P-5'-P-CCNC: 12 UNIT/L (ref 10–44)
ANION GAP (OHS): 7 MMOL/L (ref 8–16)
AST SERPL-CCNC: 36 UNIT/L (ref 11–45)
BASOPHILS # BLD AUTO: 0.01 K/UL
BASOPHILS NFR BLD AUTO: 0.1 %
BILIRUB SERPL-MCNC: 0.3 MG/DL (ref 0.1–1)
BUN SERPL-MCNC: 42 MG/DL (ref 8–23)
CALCIUM SERPL-MCNC: 8.9 MG/DL (ref 8.7–10.5)
CHLORIDE SERPL-SCNC: 117 MMOL/L (ref 95–110)
CO2 SERPL-SCNC: 21 MMOL/L (ref 23–29)
CREAT SERPL-MCNC: 1.1 MG/DL (ref 0.5–1.4)
ERYTHROCYTE [DISTWIDTH] IN BLOOD BY AUTOMATED COUNT: 14.9 % (ref 11.5–14.5)
GFR SERPLBLD CREATININE-BSD FMLA CKD-EPI: >60 ML/MIN/1.73/M2
GLUCOSE SERPL-MCNC: 117 MG/DL (ref 70–110)
HCT VFR BLD AUTO: 33.2 % (ref 40–54)
HGB BLD-MCNC: 11.2 GM/DL (ref 14–18)
IMM GRANULOCYTES # BLD AUTO: 0.05 K/UL (ref 0–0.04)
IMM GRANULOCYTES NFR BLD AUTO: 0.5 % (ref 0–0.5)
LYMPHOCYTES # BLD AUTO: 0.96 K/UL (ref 1–4.8)
MAGNESIUM SERPL-MCNC: 2.3 MG/DL (ref 1.6–2.6)
MCH RBC QN AUTO: 31.8 PG (ref 27–31)
MCHC RBC AUTO-ENTMCNC: 33.7 G/DL (ref 32–36)
MCV RBC AUTO: 94 FL (ref 82–98)
NUCLEATED RBC (/100WBC) (OHS): 0 /100 WBC
PHOSPHATE SERPL-MCNC: 3 MG/DL (ref 2.7–4.5)
PLATELET # BLD AUTO: 169 K/UL (ref 150–450)
PMV BLD AUTO: 9.7 FL (ref 9.2–12.9)
POCT GLUCOSE: 137 MG/DL (ref 70–110)
POCT GLUCOSE: 150 MG/DL (ref 70–110)
POCT GLUCOSE: 98 MG/DL (ref 70–110)
POTASSIUM SERPL-SCNC: 4.3 MMOL/L (ref 3.5–5.1)
PROT SERPL-MCNC: 6.3 GM/DL (ref 6–8.4)
RBC # BLD AUTO: 3.52 M/UL (ref 4.6–6.2)
RELATIVE EOSINOPHIL (OHS): 0 %
RELATIVE LYMPHOCYTE (OHS): 8.7 % (ref 18–48)
RELATIVE MONOCYTE (OHS): 7.6 % (ref 4–15)
RELATIVE NEUTROPHIL (OHS): 83.1 % (ref 38–73)
SODIUM SERPL-SCNC: 145 MMOL/L (ref 136–145)
WBC # BLD AUTO: 11 K/UL (ref 3.9–12.7)

## 2025-07-11 PROCEDURE — 97535 SELF CARE MNGMENT TRAINING: CPT

## 2025-07-11 PROCEDURE — 25000003 PHARM REV CODE 250: Performed by: PSYCHIATRY & NEUROLOGY

## 2025-07-11 PROCEDURE — 25000003 PHARM REV CODE 250: Performed by: PHYSICIAN ASSISTANT

## 2025-07-11 PROCEDURE — 92526 ORAL FUNCTION THERAPY: CPT

## 2025-07-11 PROCEDURE — 25000003 PHARM REV CODE 250

## 2025-07-11 PROCEDURE — 97112 NEUROMUSCULAR REEDUCATION: CPT | Mod: CQ

## 2025-07-11 PROCEDURE — 83735 ASSAY OF MAGNESIUM: CPT

## 2025-07-11 PROCEDURE — 84100 ASSAY OF PHOSPHORUS: CPT

## 2025-07-11 PROCEDURE — 20000000 HC ICU ROOM

## 2025-07-11 PROCEDURE — 63600175 PHARM REV CODE 636 W HCPCS

## 2025-07-11 PROCEDURE — 97530 THERAPEUTIC ACTIVITIES: CPT | Mod: CQ

## 2025-07-11 PROCEDURE — 99291 CRITICAL CARE FIRST HOUR: CPT | Mod: ,,, | Performed by: PSYCHIATRY & NEUROLOGY

## 2025-07-11 PROCEDURE — 85025 COMPLETE CBC W/AUTO DIFF WBC: CPT

## 2025-07-11 PROCEDURE — 94761 N-INVAS EAR/PLS OXIMETRY MLT: CPT

## 2025-07-11 PROCEDURE — 97112 NEUROMUSCULAR REEDUCATION: CPT

## 2025-07-11 PROCEDURE — 63600175 PHARM REV CODE 636 W HCPCS: Performed by: STUDENT IN AN ORGANIZED HEALTH CARE EDUCATION/TRAINING PROGRAM

## 2025-07-11 PROCEDURE — 84295 ASSAY OF SERUM SODIUM: CPT

## 2025-07-11 PROCEDURE — 25000003 PHARM REV CODE 250: Performed by: NURSE PRACTITIONER

## 2025-07-11 RX ORDER — DEXAMETHASONE 1 MG/1
2 TABLET ORAL EVERY 12 HOURS
Status: DISCONTINUED | OUTPATIENT
Start: 2025-07-13 | End: 2025-07-22

## 2025-07-11 RX ORDER — PHENOBARBITAL 60 MG/1
60 TABLET ORAL EVERY 8 HOURS
Status: COMPLETED | OUTPATIENT
Start: 2025-07-12 | End: 2025-07-12

## 2025-07-11 RX ORDER — DEXAMETHASONE SODIUM PHOSPHATE 4 MG/ML
4 INJECTION, SOLUTION INTRA-ARTICULAR; INTRALESIONAL; INTRAMUSCULAR; INTRAVENOUS; SOFT TISSUE EVERY 12 HOURS
Status: DISCONTINUED | OUTPATIENT
Start: 2025-07-11 | End: 2025-07-11

## 2025-07-11 RX ORDER — DEXAMETHASONE 4 MG/1
4 TABLET ORAL EVERY 12 HOURS
Status: COMPLETED | OUTPATIENT
Start: 2025-07-11 | End: 2025-07-12

## 2025-07-11 RX ORDER — AMLODIPINE BESYLATE 10 MG/1
10 TABLET ORAL DAILY
Status: DISCONTINUED | OUTPATIENT
Start: 2025-07-11 | End: 2025-07-22

## 2025-07-11 RX ORDER — PHENOBARBITAL 15 MG/1
15 TABLET ORAL 2 TIMES DAILY
Status: COMPLETED | OUTPATIENT
Start: 2025-07-15 | End: 2025-07-15

## 2025-07-11 RX ORDER — PHENOBARBITAL 30 MG/1
30 TABLET ORAL 2 TIMES DAILY
Status: COMPLETED | OUTPATIENT
Start: 2025-07-14 | End: 2025-07-14

## 2025-07-11 RX ORDER — PHENOBARBITAL 15 MG/1
15 TABLET ORAL DAILY
Status: COMPLETED | OUTPATIENT
Start: 2025-07-16 | End: 2025-07-16

## 2025-07-11 RX ORDER — PHENOBARBITAL 30 MG/1
30 TABLET ORAL EVERY 8 HOURS
Status: COMPLETED | OUTPATIENT
Start: 2025-07-13 | End: 2025-07-13

## 2025-07-11 RX ORDER — CLONIDINE HYDROCHLORIDE 0.1 MG/1
0.3 TABLET ORAL EVERY 8 HOURS
Status: DISCONTINUED | OUTPATIENT
Start: 2025-07-11 | End: 2025-07-22

## 2025-07-11 RX ORDER — PHENOBARBITAL 100 MG/1
100 TABLET ORAL EVERY 8 HOURS
Status: COMPLETED | OUTPATIENT
Start: 2025-07-11 | End: 2025-07-11

## 2025-07-11 RX ADMIN — LABETALOL HYDROCHLORIDE 10 MG: 5 INJECTION, SOLUTION INTRAVENOUS at 01:07

## 2025-07-11 RX ADMIN — MUPIROCIN: 20 OINTMENT TOPICAL at 09:07

## 2025-07-11 RX ADMIN — AMLODIPINE BESYLATE 10 MG: 10 TABLET ORAL at 11:07

## 2025-07-11 RX ADMIN — PHENOBARBITAL 100 MG: 100 TABLET ORAL at 05:07

## 2025-07-11 RX ADMIN — FAMOTIDINE 20 MG: 20 TABLET, FILM COATED ORAL at 09:07

## 2025-07-11 RX ADMIN — HYDRALAZINE HYDROCHLORIDE 10 MG: 20 INJECTION, SOLUTION INTRAMUSCULAR; INTRAVENOUS at 03:07

## 2025-07-11 RX ADMIN — DEXAMETHASONE 4 MG: 4 TABLET ORAL at 08:07

## 2025-07-11 RX ADMIN — CLONIDINE HYDROCHLORIDE 0.3 MG: 0.2 TABLET ORAL at 09:07

## 2025-07-11 RX ADMIN — CLONIDINE HYDROCHLORIDE 0.3 MG: 0.2 TABLET ORAL at 02:07

## 2025-07-11 RX ADMIN — CEFAZOLIN 1 G: 330 INJECTION, POWDER, FOR SOLUTION INTRAMUSCULAR; INTRAVENOUS at 09:07

## 2025-07-11 RX ADMIN — HEPARIN SODIUM 5000 UNITS: 5000 INJECTION INTRAVENOUS; SUBCUTANEOUS at 02:07

## 2025-07-11 RX ADMIN — FOLIC ACID 1 MG: 1 TABLET ORAL at 09:07

## 2025-07-11 RX ADMIN — SENNOSIDES AND DOCUSATE SODIUM 2 TABLET: 50; 8.6 TABLET ORAL at 09:07

## 2025-07-11 RX ADMIN — CEFAZOLIN 1 G: 330 INJECTION, POWDER, FOR SOLUTION INTRAMUSCULAR; INTRAVENOUS at 01:07

## 2025-07-11 RX ADMIN — LOSARTAN POTASSIUM 100 MG: 50 TABLET, FILM COATED ORAL at 09:07

## 2025-07-11 RX ADMIN — DEXAMETHASONE SODIUM PHOSPHATE 4 MG: 4 INJECTION INTRA-ARTICULAR; INTRALESIONAL; INTRAMUSCULAR; INTRAVENOUS; SOFT TISSUE at 05:07

## 2025-07-11 RX ADMIN — MIDAZOLAM 2 MG: 1 INJECTION INTRAMUSCULAR; INTRAVENOUS at 03:07

## 2025-07-11 RX ADMIN — HEPARIN SODIUM 5000 UNITS: 5000 INJECTION INTRAVENOUS; SUBCUTANEOUS at 09:07

## 2025-07-11 RX ADMIN — THERA TABS 1 TABLET: TAB at 09:07

## 2025-07-11 RX ADMIN — HEPARIN SODIUM 5000 UNITS: 5000 INJECTION INTRAVENOUS; SUBCUTANEOUS at 05:07

## 2025-07-11 RX ADMIN — SULFAMETHOXAZOLE AND TRIMETHOPRIM 1 TABLET: 800; 160 TABLET ORAL at 09:07

## 2025-07-11 RX ADMIN — LABETALOL HYDROCHLORIDE 10 MG: 5 INJECTION, SOLUTION INTRAVENOUS at 05:07

## 2025-07-11 RX ADMIN — OLANZAPINE 20 MG: 5 TABLET, FILM COATED ORAL at 10:07

## 2025-07-11 RX ADMIN — CEFAZOLIN 1 G: 330 INJECTION, POWDER, FOR SOLUTION INTRAMUSCULAR; INTRAVENOUS at 06:07

## 2025-07-11 RX ADMIN — OLANZAPINE 20 MG: 5 TABLET, FILM COATED ORAL at 08:07

## 2025-07-11 RX ADMIN — MUPIROCIN: 20 OINTMENT TOPICAL at 08:07

## 2025-07-11 RX ADMIN — FAMOTIDINE 20 MG: 20 TABLET, FILM COATED ORAL at 08:07

## 2025-07-11 RX ADMIN — CLONIDINE HYDROCHLORIDE 0.2 MG: 0.2 TABLET ORAL at 05:07

## 2025-07-11 RX ADMIN — PHENOBARBITAL 100 MG: 100 TABLET ORAL at 02:07

## 2025-07-11 RX ADMIN — Medication 100 MG: at 09:07

## 2025-07-11 RX ADMIN — PHENOBARBITAL 100 MG: 100 TABLET ORAL at 09:07

## 2025-07-11 NOTE — SUBJECTIVE & OBJECTIVE
Interval History:  See hospital course     Review of Systems   Unable to perform ROS: Mental status change       Objective:     Vitals:  Temp: 98.4 °F (36.9 °C)  Pulse: 85  Rhythm: normal sinus rhythm  BP: (!) 148/79  MAP (mmHg): 94  ICP Mean (mmHg): 10 mmHg  Resp: 15  SpO2: 96 %    Temp  Min: 98.3 °F (36.8 °C)  Max: 99.3 °F (37.4 °C)  Pulse  Min: 76  Max: 95  BP  Min: 127/74  Max: 199/119  MAP (mmHg)  Min: 86  Max: 148  ICP Mean (mmHg)  Min: 3 mmHg  Max: 12 mmHg  Resp  Min: 13  Max: 23  SpO2  Min: 92 %  Max: 99 %    07/10 0701 - 07/11 0700  In: 2805 [I.V.:10]  Out: 2058 [Urine:1900; Drains:158]   Unmeasured Output  Unmeasured Urine Occurrence: 1  Unmeasured Stool Occurrence: 1        Physical Exam  General Appearance: Elderly gentleman resting in bed, NG tube and EVD in place. Not in acute hemodynamic distress  Mental Status Exam: awake, alert, oriented to self only; is able to select hospital from list of options. Attempting to converse w/ examiner, level of alertness improved from prior. Following simple commands x4 extremities   Cranial Nerves: Gaze midline, PERRL, no clear gaze preference. Dysarthric. +cough, spontaneous  Motor: Moving all 4 extremities AG to command, grossly symmetric   Sensory: Grossly symmetric to noxious x4  Coordination: Unable to assess  Vascular: S1/S2 of normal intensity, no S3/S4 appreciated, no murmurs appreciated  Lungs: CTA bilaterally without wheezing  Abdomen: Soft, non-distended, non-tender, BS +          Medications:  Continuous ScheduledamLODIPine, 10 mg, Daily  ceFAZolin (Ancef) IV (PEDS and ADULTS), 1 g, Q8H  cloNIDine, 0.3 mg, Q8H  dexAMETHasone (Decadron) IV (PEDS and ADULTS), 4 mg, Q12H  famotidine, 20 mg, BID  folic acid, 1 mg, Daily  heparin (porcine), 5,000 Units, Q8H  losartan, 100 mg, Daily  multivitamin, 1 tablet, Daily  mupirocin, , BID  OLANZapine, 20 mg, BID  PHENobarbitaL, 100 mg, Q8H  polyethylene glycol, 17 g, Daily  senna-docusate, 2 tablet,  BID  sulfamethoxazole-trimethoprim 800-160mg, 1 tablet, Every Mon, Wed, Fri  thiamine, 100 mg, Daily    PRNacetaminophen, 650 mg, Q4H PRN  bisacodyL, 10 mg, Daily PRN  hydrALAZINE, 10 mg, Q4H PRN  labetalol, 10 mg, Q4H PRN  ondansetron, 4 mg, Q4H PRN  potassium bicarbonate, 35 mEq, PRN  potassium bicarbonate, 50 mEq, PRN  potassium bicarbonate, 60 mEq, PRN  potassium, sodium phosphates, 2 packet, PRN  potassium, sodium phosphates, 2 packet, PRN  potassium, sodium phosphates, 2 packet, PRN  sodium chloride 0.9%, 10 mL, PRN      Today I personally reviewed pertinent imaging, laboratory results, notably: CT head overnight w/ decrease in hemorrhage at operative site, unchanged vasogenic edema/mass effect; stable ventriculomegaly.  CBC w/ resolved leukocytosis, Hb stable at 11.2, platelets slowly downtrending to 169. CMP w/ Na 145, BUN/creatinine stable at 42/1.1    Diet  No diet orders on file  No diet orders on file  TF at goal

## 2025-07-11 NOTE — PLAN OF CARE
BISI met with pt and pt daughter Ron at bedside to discuss discharge planning.  Pt is in agreement to  [facility]  with [facility] using his [employer] benefits.   SW will continue to monitor pt needs.       Discharge Plan A and Plan B have been determined by review of patient's clinical status, future medical and therapeutic needs, and coverage/benefits for post-acute care in coordination with multidisciplinary team members.    Ele Richmond MSW, LAURENW  [facility]  Case Management Dept.

## 2025-07-11 NOTE — NURSING
- 0355: went down for CT scan, gave midazolam 2mg IVP  to keep him calm . Still moved little  -0422 :: came back to the room. Tolerated fine. Bed in lowest position and  locked, call light with in reach , continue to monitor

## 2025-07-11 NOTE — EICU
[facility] Night Rounds Checklist  24H Vital Sign Range:  Temp:  [98.3 °F (36.8 °C)-99.3 °F (37.4 °C)]   Pulse:  []   Resp:  [13-24]   BP: (124-199)/()   SpO2:  [93 %-100 %]   Arterial Line BP: (193)/(81)     Video rounds

## 2025-07-11 NOTE — PT/OT/SLP PROGRESS
"Physical Therapy Treatment    Patient Name:  Goran Carlos   MRN:  [id number]    Recommendations:     Discharge Recommendations: High Intensity Therapy  Discharge Equipment Recommendations: bedside commode, bath bench, wheelchair  Barriers to discharge: increased level of assist    Assessment:     Goran Carlos is a 69 y.o. male admitted with a medical diagnosis of Ataxia.  He presents with the following impairments/functional limitations: weakness, impaired endurance, impaired self care skills, impaired functional mobility, impaired balance, impaired cognition, decreased coordination, decreased upper extremity function, decreased lower extremity function, decreased safety awareness, impaired coordination, impaired fine motor, impaired cardiopulmonary response to activity. Pt found in bed and agreeable to session. Pt presents with decreased safety awareness and poor EOB balance leading to increased fall risk. Pt required regular cuing for body position adjustment with short term response d/t patient easily distracted this session. Pt would benefit from continued PT to improve functional independence.     Rehab Prognosis: Good; patient would benefit from acute skilled PT services to address these deficits and reach maximum level of function.    Recent Surgery: Procedure(s) (LRB):  CRANIOTOMY, SUBOCCIPITAL (Right) 4 Days Post-Op    Plan:     During this hospitalization, patient to be seen 4 x/week to address the identified rehab impairments via gait training, therapeutic activities, therapeutic exercises, neuromuscular re-education and progress toward the following goals:    Plan of Care Expires:  08/04/25    Subjective     Chief Complaint: hungry  Patient/Family Comments/goals: Would like the restraints untied. "That's my daughter."  Pain/Comfort:  Pain Rating 1: 0/10      Objective:     Communicated with nurse prior to session.  Patient found HOB elevated with bed alarm, blood pressure cuff, Condom Catheter, external " ventricular drain, NG tube, PEG Tube, peripheral IV, pulse ox (continuous), restraints (Mitt on L, roll belt restraint) upon PT entry to room.     General Precautions: Standard, fall  Orthopedic Precautions: N/A  Braces: N/A  Respiratory Status: Room air     Functional Mobility:  Bed Mobility:     Rolling L/R: min A Assist to BLE  Scooting EOB: mod A Assist to hips and BLE  Supine to Sit: mod A Assist to trunk and BLE  Sit to supine: min A Assist to BLE  Transfers:    Sit <> stand: not performed d/t poor balance seated EOB for patient and clinician safety  Balance: Static seated balance: max A <> mod A  R lateral lean, posterior lean  VC/TC to use RUE to push trunk to center and VC/TC to tighten core to maintain position    AM-PAC 6 CLICK MOBILITY  Turning over in bed (including adjusting bedclothes, sheets and blankets)?: 3  Sitting down on and standing up from a chair with arms (e.g., wheelchair, bedside commode, etc.): 2  Moving from lying on back to sitting on the side of the bed?: 2  Moving to and from a bed to a chair (including a wheelchair)?: 2  Need to walk in hospital room?: 1  Climbing 3-5 steps with a railing?: 1  Basic Mobility Total Score: 11       Treatment & Education:  Patient educated on importance of OOB activity for functional gains.  Patient educated on general safety for decreased risk of falls.  Patient performed exercises for improved strength and endurance.  PEG paused for session and EVD clamped prior to arrival for session.  Bed rolls performed with nurse for exchange of drawsheet and pads.   Nurse in room at end of session assisting patient with bedpan for potential BM. Writing PTA reapplied R side roll belt restraint. Nurse stating she'll reapply remaining restraints and resume PEG once finished.   All questions answered and patient verbalized understanding.    Patient left supine with all lines intact, call button in reach, and nurse  present..    GOALS:   Multidisciplinary Problems        Physical Therapy Goals          Problem: Physical Therapy    Goal Priority Disciplines Outcome Interventions   Physical Therapy Goal     PT, PT/OT Progressing    Description: Goals to be met by: 2025     Patient will increase functional independence with mobility by performin. Supine to sit with Stand-by Assistance  2. Sit to supine with Stand-by Assistance  3. Sit to stand transfer with Contact Guard Assistance  4. Bed to chair transfer with Minimal Assistance using Rolling Walker  5. Gait  x 10 feet with Minimal Assistance using Rolling Walker.   6. Sitting at edge of bed x5 minutes with Supervision                           Time Tracking:     PT Received On: 25  PT Start Time: 1147     PT Stop Time: 1219  PT Total Time (min): 32 min     Billable Minutes: Therapeutic Activity 12 and Neuromuscular Re-education 20    Treatment Type: Treatment  PT/PTA: PTA     Number of PTA visits since last PT visit: 2025

## 2025-07-11 NOTE — PT/OT/SLP PROGRESS
"Occupational Therapy   Treatment    Name: Goran Carlos  MRN: [id number]  Admitting Diagnosis:  Ataxia  4 Days Post-Op    Recommendations:     Discharge Recommendations: High Intensity Therapy  Discharge Equipment Recommendations:  bath bench, bedside commode, wheelchair  Barriers to discharge:  None    Assessment:     Goran Carlos is a 69 y.o. male with a medical diagnosis of Ataxia.  He presents with performance deficits affecting function are weakness, impaired sensation, impaired self care skills, impaired functional mobility, decreased lower extremity function, decreased coordination, impaired cognition, impaired endurance, impaired balance, decreased safety awareness.     Rehab Prognosis:  Good; patient would benefit from acute skilled OT services to address these deficits and reach maximum level of function.       Plan:     Patient to be seen 4 x/week to address the above listed problems via neuromuscular re-education, cognitive retraining, therapeutic exercises, therapeutic activities, self-care/home management, sensory integration  Plan of Care Expires: 08/05/25  Plan of Care Reviewed with: patient    Subjective     Patient:  "Have a good day."  "It's 1995."  "I'll try."  Pain/Comfort:  Pain Rating 1: 3/10  Pain Addressed 1: Reposition, Nurse notified  Pain Rating Post-Intervention 1: 3/10    Objective:     Communicated with: Nurse and NS prior to session.  Patient found supine with bed alarm, blood pressure cuff, external ventricular drain, pulse ox (continuous), restraints, telemetry, peripheral IV, oxygen, NG tube (Roll belt, mitts) upon OT entry to room.    General Precautions: Standard, aspiration, fall, NPO    Orthopedic Precautions:N/A  Braces: N/A  Respiratory Status: Room air     Occupational Performance:     Bed Mobility:    Patient completed Rolling/Turning to Left with  minimum assistance  Patient completed Rolling/Turning to Right with minimum assistance  Patient completed Supine to Sit with " moderate assistance  Patient completed Sit to Supine with moderate assistance     Functional Mobility/Transfers:  Patient completed Sit <> Stand Transfer with moderate assistance  with  no assistive device x 6 trials  Moderate assist with side steps along EOB    Activities of Daily Living:  Upper Body Dressing: maximal assistance while seated EOB  Lower Body Dressing: maximal assistance while seated EOB     [facility] 6 Click ADL: 11    Treatment & Education:  Patient alert and oriented x person and place.  Able to follow 4/4 one step commands.  Patient attentive and interactive throughout the session.      Patient left supine with all lines intact, call button in reach, and bed alarm on    GOALS:   Multidisciplinary Problems       Occupational Therapy Goals          Problem: Occupational Therapy    Goal Priority Disciplines Outcome Interventions   Occupational Therapy Goal     OT, PT/OT Progressing    Description: Goals set 7/8 with an expiration date, 8/5:  Patient will increase functional independence with ADLs by performing:    Patient will demonstrate rolling to the right with min assist.  Not met   Patient will demonstrate rolling to the left with min assist.   Not met  Patient will demonstrate supine -sit with min  assist.   Not met  Patient will demonstrate stand pivot transfers with mod assist.   Not met  Patient will demonstrate grooming while seated with min assist.   Not met  Patient will demonstrate upper body dressing with min assist while seated EOB.   Not met  Patient will demonstrate lower body dressing with mod assist while seated EOB.   Not met  Patient will demonstrate toileting with mod assist.   Not met  Patient will demonstrate bathing while seated EOB with mod assist.   Not met  Patient's family / caregiver will demonstrate independence and safety with assisting patient with self-care skills and functional mobility.     Not met  Patient's family / caregiver will demonstrate independence with  providing ROM and changes in bed positioning.   Not met  Patient and/or patient's family will verbalize understanding of stroke prevention guidelines, personal risk factors and stroke warning signs via teachback method.  Not met     DME Justifications (see above for complete DME recommendations)    Bedside Commode- Patient has a mobility limitation that significantly impairs their ability to participate in one or more mobility related activities of daily living, including toileting. This deficit can be resolved by using a bedside commode. Patient demonstrates mobility limitations that will cause them to be confined to one room at home without bathroom access for up to 30 days. Using a bedside commode will greatly improve the patient's ability to participate in MRADLs.                                             Time Tracking:     OT Date of Treatment: 07/11/25  OT Start Time: 0606  OT Stop Time: 0630  OT Total Time (min): 24 min    Billable Minutes:Self Care/Home Management 14  Neuromuscular Re-education 10    OT/DOTTIE: OT          7/11/2025

## 2025-07-11 NOTE — ASSESSMENT & PLAN NOTE
68 y/o M brought to ED from [facility] (hx of heroin and ETOH) for progressive weakness, gait disturbance, confusion, and decreased volume when speaking. CT head without contrast revealed multiple lesions of hypodensity with a hyperdense rim, notably in the left temporal lobe and right cerebellum. There were additional small hyperdensities in the right frontoparietal and left parasagittal parietal lobe. There is mass effect and partial effacement of the 4th ventricle secondary to the cerebellar lesion with developing hydrocephalus without MLS. CT of the chest, abdomen, and pelvis with IV contrast showed RUL mass with mediastinal adenopathy. Labs revealed Hep C and treponema antibodies were positive. Hep C PCR pending. Penicillin G was administered. A sepsis workup was completed and antibiotics were initiated. Intubated to get MRI. S/p EVD. Extubated 7/4/25 without difficulty.    Admit to [facility]  Q1h neuro checks, I&Os, and vitals   Daily CBC, CMP, Mag, Phos  NSGY following; s/p SOC craniotomy for tumor resection on 7/7  Dex 4q8, plan to taper to 2 mg BID at discharge   Bactrim MWF for PJP ppx given plan for prolonged steroid taper  CT head overnight stable, EVD clamped -> repeat head CT 7/14  Zyprexa BID for agitation  Phenobarb taper to off given improved agitation   clonidine 0.3 mg TID for agitation/opioid dependence/hypertension  Avoid versed for agitation  Tube feeds + FWF 250cc q4h  Folate/thiamine/MV  SBP <160   Prn labetalol, hydralazine  PRN Zofran 4mg q4h  SCDs, SQH  PT/OT/SLP as able

## 2025-07-11 NOTE — EICU
Virtual ICU Quality Rounds    Admit Date: 7/1/2025  Hospital Day: 10    ICU Day: 9d 11h    24H Vital Sign Range:  Temp:  [98.3 °F (36.8 °C)-99.3 °F (37.4 °C)]   Pulse:  [76-95]   Resp:  [13-23]   BP: (127-199)/()   SpO2:  [92 %-100 %]     VICU Surveillance Screening

## 2025-07-11 NOTE — PT/OT/SLP PROGRESS
"Speech Language Pathology Treatment    Patient Name:  Goran Carlos   MRN:  [id number]  Admitting Diagnosis: Ataxia    Recommendations:                 General Recommendations:  Dysphagia therapy  Diet recommendations:  NPO, Liquid Diet Level: NPO   Aspiration Precautions: HOB to 90 degrees and Strict aspiration precautions   General Precautions: Standard, aspiration, fall  Communication strategies:  none  Discharge recommendations:  Moderate Intensity Therapy   Barriers to Discharge:  None    Assessment:     Goran Carlos is a 69 y.o. male with an SLP diagnosis of Dysphagia and Dysphonia.   Subjective     "You have a good day" per pt  Patient goals: home     Pain/Comfort:  Pain Rating 1: 0/10  Pain Rating Post-Intervention 1: 0/10    Respiratory Status: Room air    Objective:     Has the patient been evaluated by SLP for swallowing?   Yes  Keep patient NPO? Yes   Current Respiratory Status:        Pt. Seen at bedside and hob raised to 90degree angle.  IMproved verbal communication noted today however vocal quality remained slightly wet.  He produced fair throat clear and cough on command.  Pt. Was given teaspoon of water x3 and then allowed to take sip of water via cup with a straw x2.  He was also given 1/2 teaspoon of pudding.  Pt. Readily removed all bolus' from spoon and initiated oral transit without delay.  Multiple swallows produced per bolus with delayed coughing noted following both straw sips of water and following bolus of pudding.  No coughing noted on teaspoons of water.  Vocal quality remained slightly wet throughout session.  Education provided to pt. Re need for npo at this time and slp plan of care.      Goals:   Multidisciplinary Problems       SLP Goals          Problem: SLP    Goal Priority Disciplines Outcome   SLP Goal     SLP Progressing   Description: Goals due 7/16  1.  Pt. Will participate in ongoing assessment of swallow to initiate least restrictive diet.                       Plan: "     Patient to be seen:  4 x/week   Plan of Care expires:  08/06/25  Plan of Care reviewed with:  patient   SLP Follow-Up:  Yes       Time Tracking:     SLP Treatment Date:   07/11/25  Speech Start Time:  1046  Speech Stop Time:  1056     Speech Total Time (min):  10 min    Billable Minutes: Treatment Swallowing Dysfunction 10    07/11/2025

## 2025-07-11 NOTE — PLAN OF CARE
07/11/25 1529   Rounds   Attendance Provider;   Discharge Plan A Rehab   Why the patient remains in the hospital Requires continued medical care   Transition of Care Barriers None     Ele Richmond MSW, LCSW  [facility]  Case Management Dept.

## 2025-07-11 NOTE — PLAN OF CARE
"[facility] Care Plan  POC reviewed with [place] Brown and family at 1400. Patient and Family verbalized understanding. Questions and concerns addressed. No acute events today. Pt progressing toward goals. Will continue to monitor. See below and flowsheets for full assessment and VS info.   -EVD clamped          Is this a stroke patient? no    Neuro:  Goldie Coma Scale  Best Eye Response: 3-->(E3) to speech  Best Motor Response: 5-->(M5) localizes pain  Best Verbal Response: 4-->(V4) confused  Goldie Coma Scale Score: 12  Assessment Qualifiers: No eye obstruction present  Pupil PERRLA: yes     24 hr Temp:  [98.2 °F (36.8 °C)-99.3 °F (37.4 °C)]     CV:   Rhythm: normal sinus rhythm  BP goals:   SBP < 160  MAP > 65    Resp:      Vent Mode: Spont  Set Rate: 18 BPM  Oxygen Concentration (%): 50  Vt Set: 470 mL  PEEP/CPAP: 5 cmH20  Pressure Support: 8 cmH20    Plan: N/A    GI/:     Diet/Nutrition Received: NPO, tube feeding  Last Bowel Movement: 07/11/25  Voiding Characteristics: external catheter    Intake/Output Summary (Last 24 hours) at 7/11/2025 1630  Last data filed at 7/11/2025 1300  Gross per 24 hour   Intake 2615 ml   Output 1069 ml   Net 1546 ml     Unmeasured Output  Unmeasured Urine Occurrence: 1  Unmeasured Stool Occurrence: 1    Labs/Accuchecks:  Recent Labs   Lab 07/11/25  0128   WBC 11.00   RBC 3.52*   HGB 11.2*   HCT 33.2*         Recent Labs   Lab 07/11/25  0128      K 4.3   CO2 21*   *   BUN 42*   CREATININE 1.1   ALKPHOS 62   ALT 12   AST 36   BILITOT 0.3      Recent Labs   Lab 07/06/25  0720   PROTIME 11.7   INR 1.1   APTT 24.9    No results for input(s): "CPK", "CPKMB", "TROPONINI", "MB" in the last 168 hours.    Electrolytes: N/A - electrolytes WDL  Accuchecks: Q4H    Gtts:      LDA/Wounds:    Davi Risk Assessment  Sensory Perception: 3-->slightly limited  Moisture: 3-->occasionally moist  Activity: 1-->bedfast  Mobility: 3-->slightly limited  Nutrition: 3-->adequate  Friction " and Shear: 3-->no apparent problem  Davi Score: 16    Is your davi score 12 or less? no            Restraints:   Restraint Order  Length of Order: Order good for next 24 hours or when removed.  Date that the current order will : 25  Time that the current order will : 522  Order Upon Application: Yes    [facility]

## 2025-07-11 NOTE — ASSESSMENT & PLAN NOTE
Noted on CXR and CT chest with IV contrast  Saturating well on RA; No PTX on CT-chest  No lung consolidations or obstructive process  No known history of cancer  Intra-op brain biopsy frozen pathology suggestive of metastatic adenocarcinoma, 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  Will need outpt rad onc and med onc f/u

## 2025-07-11 NOTE — CONSULTS
Inpatient consult to Physical Medicine Rehab  Consult performed by: Martine Ocampo NP  Consult ordered by: Melba Farnsworth MD  Reason for consult: Rehab      Consult received.     AZIZA Soto, FNP-C  Physical Medicine & Rehabilitation   07/11/2025

## 2025-07-11 NOTE — PROGRESS NOTES
[facility] - Neuro Critical Care  Neurosurgery  Progress Note    Subjective:     History of Present Illness: Patient is 69yo M with generalized weakness, falls, and paucity of speech. 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 On exam, patient's voice is barely audible but patient appropriately answers questions and follows commands. Patient's brother states that patient had normal speech and gait when they last saw each other about 1 month ago.    Neurosurgery consulted due to multiple ring-enhancing lesions seen on CT brain.    Post-Op Info:  Procedure(s) (LRB):  CRANIOTOMY, SUBOCCIPITAL (Right)   4 Days Post-Op   Interval History: NAEON. Standing up bedside with OT this AM. CT this AM w/o pseudomeningocele. ICP 5-11. Will wean EVD today. Anticipate clamp trial over wkend to assess for psuedomeningocele collection.    Medications:  Continuous Infusions:  Scheduled Meds:   ceFAZolin (Ancef) IV (PEDS and ADULTS)  1 g Intravenous Q8H    cloNIDine  0.2 mg Per NG tube Q8H    dexAMETHasone (Decadron) IV (PEDS and ADULTS)  4 mg Intravenous Q8H    famotidine  20 mg Per NG tube BID    folic acid  1 mg Per NG tube Daily    heparin (porcine)  5,000 Units Subcutaneous Q8H    losartan  100 mg Per NG tube Daily    multivitamin  1 tablet Per NG tube Daily    mupirocin   Nasal BID    OLANZapine  20 mg Per NG tube BID    PHENobarbitaL  100 mg Per NG tube Q8H    polyethylene glycol  17 g Per NG tube Daily    senna-docusate  2 tablet Per NG tube BID    sulfamethoxazole-trimethoprim 800-160mg  1 tablet Per NG tube Every Mon, Wed, Fri    thiamine  100 mg Per NG tube Daily     PRN Meds:  Current Facility-Administered Medications:     acetaminophen, 650 mg, Per NG tube, Q4H PRN    bisacodyL, 10 mg, Rectal, Daily PRN    hydrALAZINE, 10 mg, Intravenous, Q4H PRN    labetalol, 10 mg, Intravenous, Q4H PRN    morphine, 2 mg, Intravenous, Q4H PRN    ondansetron, 4 mg, Intravenous, Q4H PRN    oxyCODONE, 10 mg, Per NG  tube, Q4H PRN    oxyCODONE, 5 mg, Per NG tube, Q4H PRN    potassium bicarbonate, 35 mEq, Per NG tube, PRN    potassium bicarbonate, 50 mEq, Per NG tube, PRN    potassium bicarbonate, 60 mEq, Per NG tube, PRN    potassium, sodium phosphates, 2 packet, Per NG tube, PRN    potassium, sodium phosphates, 2 packet, Per NG tube, PRN    potassium, sodium phosphates, 2 packet, Per NG tube, PRN    senna-docusate, 2 tablet, Per NG tube, BID PRN    sodium chloride 0.9%, 10 mL, Intravenous, PRN     Review of Systems  Objective:     Weight: 81.1 kg (178 lb 12.7 oz)  Body mass index is 23.59 kg/m².  Vital Signs (Most Recent):  Temp: 99.1 °F (37.3 °C) (07/10/25 2305)  Pulse: 76 (07/11/25 0305)  Resp: 17 (07/11/25 0205)  BP: (!) 166/88 (07/11/25 0312)  SpO2: 96 % (07/11/25 0205) Vital Signs (24h Range):  Temp:  [98.3 °F (36.8 °C)-99.3 °F (37.4 °C)] 99.1 °F (37.3 °C)  Pulse:  [76-95] 76  Resp:  [13-23] 17  SpO2:  [93 %-100 %] 96 %  BP: (124-199)/() 166/88                              NG/OG Tube 07/10/25 0400 Other (comments) Left nostril (Active)   Placement Check placement verified by distal tube length measurement 07/11/25 0305   Tolerance no signs/symptoms of discomfort 07/11/25 0305   Securement secured to nostril center w/ adhesive device 07/11/25 0305   Clamp Status/Tolerance unclamped 07/11/25 0305   Suction Setting/Drainage Method suction at the bedside 07/11/25 0305   Insertion Site Appearance no redness, warmth, tenderness, skin breakdown, drainage 07/11/25 0305   Flush/Irrigation flushed w/;water;no resistance met 07/11/25 0305   Feeding Type continuous;by pump 07/11/25 0305   Feeding Action feeding continued 07/11/25 0305   Current Rate (mL/hr) 55 mL/hr 07/10/25 1905   Goal Rate (mL/hr) 55 mL/hr 07/10/25 1905   Intake (mL) 100 mL 07/10/25 2111   Water Bolus (mL) 250 mL 07/11/25 0405   Rate Formula Tube Feeding (mL/hr) 55 mL/hr 07/10/25 1905   Formula Name pivot 07/11/25 0305   Intake (mL) - Formula Tube Feeding 55  "07/11/25 0405   Residual Amount (ml) 0 ml 07/10/25 1905       Male External Urinary Catheter 07/08/25 0705 Medium (Active)   Collection Container Standard drainage bag 07/11/25 0305   Securement Method secured to top of thigh w/ adhesive device 07/11/25 0305   Skin no redness;no breakdown 07/11/25 0305   Tolerance no signs/symptoms of discomfort 07/11/25 0305   Output (mL) 300 mL 07/11/25 0205   Catheter Change Date 07/10/25 07/11/25 0305   Catheter Change Time 1905 07/11/25 0305            ICP/Ventriculostomy 07/02/25 1409 Right (Active)   Level of Ventriculostomy (cm above) 10 07/10/25 1905   Status Open to drainage 07/11/25 0605   Site Assessment Clean;Dry 07/11/25 0605   Site Drainage No drainage 07/11/25 0605   Waveform normal waveform 07/10/25 2005   Output (mL) 9 mL 07/11/25 0605   CSF Color clear 07/11/25 0605   Dressing Status Clean;Dry;Intact 07/11/25 0605   Interventions zeroed;HOB degrees;bed controls locked 07/11/25 0605          Physical Exam         Neurosurgery Physical Exam  [id number]  AOX1  PERRL  HARRISON spon AG  Following commands x4, HARRISON spon, standing with OT     EVD patent with good waveform  Incision flat, c/d/I without fluctuance    Significant Labs:  Recent Labs   Lab 07/10/25  0107 07/10/25  0424 07/11/25  0128   * 122* 117*   * 145 145   K 4.3 4.4 4.3   * 116* 117*   CO2 22* 22* 21*   BUN 43* 40* 42*   CREATININE 1.2 1.2 1.1   CALCIUM 8.4* 8.9 8.9   MG 2.2  --  2.3     Recent Labs   Lab 07/10/25  0107 07/10/25  0427 07/11/25  0128   WBC 12.56 15.10* 11.00   HGB 10.3* 11.4* 11.2*   HCT 31.9* 33.8* 33.2*    192 169     No results for input(s): "LABPT", "INR", "APTT" in the last 48 hours.  Microbiology Results (last 7 days)       Procedure Component Value Units Date/Time    CSF culture [[id number]] Collected: 07/10/25 0856    Order Status: Completed Specimen: CSF (Spinal Fluid) from CSF Tap, Tube 3 Updated: 07/10/25 1822     GRAM STAIN Cytospin indicates:      No WBCs      " No organisms seen    Gram stain [[id number]] Collected: 07/10/25 0856    Order Status: Canceled Specimen: CSF (Spinal Fluid) from CSF Tap, Tube 3 Updated: 07/10/25 1400    CSF culture [[id number]] Collected: 07/07/25 0715    Order Status: Completed Specimen: CSF (Spinal Fluid) from CSF Tap, Tube 3 Updated: 07/10/25 0700     CULTURE, CSF No Growth To Date     GRAM STAIN Cytospin indicates:      No WBCs      No organisms seen    AFB Culture & Smear [[id number]]  (Normal) Collected: 07/02/25 1808    Order Status: Completed Specimen: CSF (Spinal Fluid) from CSF Tap, Tube 3 Updated: 07/10/25 0205     CULTURE, AFB  Culture Negative For Mycobacteria At 1 Week    CSF culture [[id number]] Collected: 07/02/25 1808    Order Status: Completed Specimen: CSF (Spinal Fluid) from CSF Tap, Tube 3 Updated: 07/08/25 0747     CULTURE, CSF No Growth     GRAM STAIN Cytospin indicates:      No WBCs      No organisms seen    Gram stain [[id number]] Collected: 07/07/25 0715    Order Status: Canceled Specimen: CSF (Spinal Fluid) from CSF Tap, Tube 3 Updated: 07/07/25 1620    Culture, Respiratory with Gram Stain [[id number]] Collected: 07/03/25 1130    Order Status: Completed Specimen: Respiratory from Endotracheal Aspirate Updated: 07/07/25 0844     Respiratory Culture Normal respiratory carmen, no Staph aureus or Pseudomonas isolated     GRAM STAIN <10 Epithelial Cells/LPF      Rare WBC seen      No organisms seen    CSF culture [[id number]]     Order Status: Canceled Specimen: CSF (Spinal Fluid)     Blood culture #1 **CANNOT BE ORDERED STAT** [[id number]]  (Normal) Collected: 07/01/25 1702    Order Status: Completed Specimen: Blood from Peripheral, Forearm, Left Updated: 07/07/25 0000     Blood Culture No Growth After 5 Days    Blood culture #2 **CANNOT BE ORDERED STAT** [[id number]]  (Normal) Collected: 07/01/25 1702    Order Status: Completed Specimen: Blood from Peripheral, Lower Arm, Left Updated: 07/07/25 0000     Blood Culture No  Growth After 5 Days          All pertinent labs from the last 24 hours have been reviewed.    Significant Diagnostics:  I have reviewed and interpreted all pertinent imaging results/findings within the past 24 hours.  Assessment/Plan:     * Ataxia  Assessment  69yo M with generalized weakness, falls, and paucity of speech presenting with multiple ring enhancing lesions on brain CT now s/p R SOC for tumor resection on 7/7:    Imaging:  - CTH 7/1 showed multiple brain masses concerning for mets with surrounding edema, can't rule out abscesses. Mass in cerebellum with effacement of 4th ventricule outflow with concern for developing hydrocephalus  - MRI brain 7/1: non diagnostic due to motion  - CT CAP 7/1: spiculated R lung mass and lymph node adenopathy in chest and neck concerning for metastatic disease  - MRI Brain W/WO 7/2: multifocal enhancing lesions c/f metastatic disease, largest R cerebellum w/mass effect on 4th.  - Post op CTH/MRI 7/7: anticipate post op changes, hemostatic products left at superior/medial border of resection cavity  - CTH 7/11: no pseudomeningocele collection, persistent edema around L frontal met    Plan:  - admitted to [facility]  - EVD open at 10, abx while in place; transduce hourly and document hourly output; M/Th CSF while remains in place. NGTD. Anticipate further wean today.   - Cont Dex 4q8 with Ppi, will cont wean over wkend to 2 BID  - AED per [facility]  - Onc following; will need outpatient rad onc consult. Frozen in OR c/w metastatic adenocarcinoma  - PT/OT/SLP now that extubated  - EM/SCD, ok for SQH given clinical stability, CBC WNL  - medical management per [facility]    Dispo: ongoing, pending therapies and EVD wean    Discussed with Dr. Ric Rodriguez MD  Neurosurgery  [facility] - Neuro Critical Care

## 2025-07-11 NOTE — ASSESSMENT & PLAN NOTE
2/2 cerebellar lesion    - PT/OT -> recommending acute rehab once medically appropriate for discharge

## 2025-07-11 NOTE — ASSESSMENT & PLAN NOTE
Was brought in from [facility], where he has been for the past month  Educate on cessation when appropriate  On phenobarbital for agitation, no signs of EtOH withdrawal -> phenobarb taper

## 2025-07-11 NOTE — ASSESSMENT & PLAN NOTE
Assessment  71yo M with generalized weakness, falls, and paucity of speech presenting with multiple ring enhancing lesions on brain CT now s/p R SOC for tumor resection on 7/7:    Imaging:  - CTH 7/1 showed multiple brain masses concerning for mets with surrounding edema, can't rule out abscesses. Mass in cerebellum with effacement of 4th ventricule outflow with concern for developing hydrocephalus  - MRI brain 7/1: non diagnostic due to motion  - CT CAP 7/1: spiculated R lung mass and lymph node adenopathy in chest and neck concerning for metastatic disease  - MRI Brain W/WO 7/2: multifocal enhancing lesions c/f metastatic disease, largest R cerebellum w/mass effect on 4th.  - Post op CTH/MRI 7/7: anticipate post op changes, hemostatic products left at superior/medial border of resection cavity  - CTH 7/11: no pseudomeningocele collection, persistent edema around L frontal met    Plan:  - admitted to [facility]  - EVD open at 10, abx while in place; transduce hourly and document hourly output; M/Th CSF while remains in place. NGTD. Anticipate further wean today.   - Cont Dex 4q8 with Ppi, will cont wean over wkend to 2 BID  - AED per [facility]  - Onc following; will need outpatient rad onc consult. Frozen in OR c/w metastatic adenocarcinoma  - PT/OT/SLP now that extubated  - EM/SCD, ok for SQH given clinical stability, CBC WNL  - medical management per [facility]    Dispo: ongoing, pending therapies and EVD wean    Discussed with Dr. Larios

## 2025-07-11 NOTE — PLAN OF CARE
[facility] Care Plan    POC reviewed with Mason Brown and family at 0300. Patient verbalized understanding. Questions and concerns addressed. No acute events today. Pt progressing toward goals. Will continue to monitor. See below and flowsheets for full assessment and VS info.   - Neuro unchanged  - Gave  PRN labetalol 10mg twice , hydralazine once to maintain SBP goal. He may needs increasing schedule BP med at night  -Had BMx3    -gave bath and changed linens                                                                                                                                                                                                                                                                                                                                                                                                                                                                                                                                                                                                                                                                                                                                                                                                                                                                                                                                                                                                                                                                                                                                                                                                                                                                                                                                                                                                                                                                                                                                                 "                                                                                                                                                                                                                                                                                                                                                                                                             -EVD open at 10, ICP: 4- 11  , CSF: 0-11          Is this a stroke patient? no    Neuro:  Goldie Coma Scale  Best Eye Response: 4-->(E4) spontaneous  Best Motor Response: 6-->(M6) obeys commands  Best Verbal Response: 4-->(V4) confused  [place] Coma Scale Score: 14  Assessment Qualifiers: Patient not sedated/intubated  Pupil PERRLA: yes     24 hr Temp:  [98.3 °F (36.8 °C)-99.3 °F (37.4 °C)]     CV:   Rhythm: normal sinus rhythm  BP goals:   SBP < 160  MAP > 65    Resp: room air       GI/:     Diet/Nutrition Received: NPO, tube feeding  Last Bowel Movement: 07/11/25  Voiding Characteristics: external catheter    Intake/Output Summary (Last 24 hours) at 7/11/2025 0742  Last data filed at 7/11/2025 0702  Gross per 24 hour   Intake 2860 ml   Output 2061 ml   Net 799 ml     Unmeasured Output  Unmeasured Urine Occurrence: 1  Unmeasured Stool Occurrence: 1    Labs/Accuchecks:  Recent Labs   Lab 07/11/25  0128   WBC 11.00   RBC 3.52*   HGB 11.2*   HCT 33.2*         Recent Labs   Lab 07/11/25  0128      K 4.3   CO2 21*   *   BUN 42*   CREATININE 1.1   ALKPHOS 62   ALT 12   AST 36   BILITOT 0.3      Recent Labs   Lab 07/06/25  0720   PROTIME 11.7   INR 1.1   APTT 24.9    No results for input(s): "CPK", "CPKMB", "TROPONINI", "MB" in the last 168 hours.    Electrolytes: N/A - electrolytes WDL  Accuchecks: none    Gtts:      LDA/Wounds:    Davi Risk Assessment  Sensory Perception: 4-->no impairment  Moisture: 3-->occasionally moist  Activity: 1-->bedfast  Mobility: 3-->slightly limited  Nutrition: " 3-->adequate  Friction and Shear: 3-->no apparent problem  Davi Score: 17  Is your davi score 12 or less? no          Restraints:   Restraint Order  Length of Order: Order good for next 24 hours or when removed.  Date that the current order will : 25  Time that the current order will : 522  Order Upon Application: Yes    [facility]

## 2025-07-11 NOTE — PROGRESS NOTES
[facility] - Neuro Critical Care  Neurocritical Care  Progress Note    Admit Date: 7/1/2025  Service Date: 07/11/2025  Length of Stay: 10    Subjective:     Chief Complaint: Ataxia    History of Present Illness: 71 y/o M presenting from [facility] for generalized weakness for about a week now. History obtained from rehab nurse. She reported that he got to their facility about a month ago and, at baseline, walks with a cane and is alert and oriented. His nurse noticed that he has seemed weaker over the past week and over the past 3 days has had increased balance disturbance and gait issues. They also noticed that he has been speaking more softly in his speech is harder to understand. He has not had any infectious symptoms. He fell yesterday, unsure if he hit his head. CT head without contrast revealed multiple lesions of hypodensity with a hyperdense rim, notably in the left temporal lobe and right cerebellum. There were additional small hyperdensities in the right frontoparietal and left parasagittal parietal lobe. There is mass effect and partial effacement of the 4th ventricle secondary to the cerebellar lesion with developing hydrocephalus without MLS. CT of the chest, abdomen, and pelvis with IV contrast showed RUL mass with mediastinal adenopathy. Labs revealed Hep C and treponema antibodies were positive. Hep C PCR pending. Penicillin G was administered. A sepsis workup was completed and antibiotics were initiated. An MRI brain with and without contrast was ordered, but patient was unable to complete due to persistent movement after sedatives were administered. Prior to MRI, he was alert but drowsy. He was able to tell me his name, but told me he was 53 years old, the year was 2003, and he did not know the current place or reason for being here. He had generalized weakness but was slightly more weak on the right side. He followed simple commands, but did not attempt to follow more complex commands. He is  admitted to [facility] with NSGY consult.    Hospital Course: 7/2/2025: MRI with significant motion artifact. Required presidex overnight secondary to agitation  7/3/2025: MRI favors neoplastic brain lesions. CSF studies pending. Started on Zyprexa and phenobarbital for agitation, R subclavian CVC placed for central access  7/4/2025: NAEON. Extubated without complications.  07/05/2025 NAEON. EVD @20. Adjusted oxycodone frequency q8. Increased zyprexa 20 BID. Precedex for agitation. Plan for biopsy Monday w/ NSGY.  07/06/2025 NAEON. Added on 100q4 FWF. Continues to require precedex for agitation. OR tomorrow.  07/07/2025: AF. SHARRON. Exam stable. OR today for SOC crani for tumor resection. ICPs 0-14, 44cc output. PRN versed given for agitation. Precedex at 1.2. Hydral prn x1 for SBP >160. Remains in restraints.  07/08/2025: AFJared MCDERMOTT. POD1 s/p SOC. CT/MRI from overnight reviewed. EVD open at 10, ICPs -2-14, output 33cc. Versed PRN x1 given for agitation. Agitation improved with Clonidine. Cardene at 12.5 for SBP <160. Hydralazine prn x1 given. Multiple loose BMs. Off propofol and ketamine. Extubated this morning.  07/09/2025: Tolerated extubation overnight, more awake and cooperative on exam this AM, off precedex gtt overnight w/ versed PRN x1 only. Maxed on cardene gtt, add losartan 50 mg qday, increase clonidine to 0.2 mg TID for BP control. EVD @ 10, plan for repeat head CT Friday w/ EVD wean over weekend per discussion w/ NSGY  07/10/2025: NAEON. Afebrile. Pt removed NGT overnight, replaced. No longer on cardene, required hydralazine x1 and labetalol x2 PRNs. Losartan increased to 100mg. EVD open at 10, will plan to keep and wean over weekend per NSGY. Steroid taper per NSGY beginning today.  07/11/2025 NAEON. Neuro exam slowly improving, weaning phenobarb. Remains hypertensive, increasing clonidine to 0.3 mg TID and adding amlodipine 10 mg qday. CT head stable, EVD clamped -> plan for repeat head CT Monday morning per  NSGY    Interval History:  See hospital course     Review of Systems   Unable to perform ROS: Mental status change       Objective:     Vitals:  Temp: 98.4 °F (36.9 °C)  Pulse: 85  Rhythm: normal sinus rhythm  BP: (!) 148/79  MAP (mmHg): 94  ICP Mean (mmHg): 10 mmHg  Resp: 15  SpO2: 96 %    Temp  Min: 98.3 °F (36.8 °C)  Max: 99.3 °F (37.4 °C)  Pulse  Min: 76  Max: 95  BP  Min: 127/74  Max: 199/119  MAP (mmHg)  Min: 86  Max: 148  ICP Mean (mmHg)  Min: 3 mmHg  Max: 12 mmHg  Resp  Min: 13  Max: 23  SpO2  Min: 92 %  Max: 99 %    07/10 0701 - 07/11 0700  In: 2805 [I.V.:10]  Out: 2058 [Urine:1900; Drains:158]   Unmeasured Output  Unmeasured Urine Occurrence: 1  Unmeasured Stool Occurrence: 1        Physical Exam  General Appearance: Elderly gentleman resting in bed, NG tube and EVD in place. Not in acute hemodynamic distress  Mental Status Exam: awake, alert, oriented to self only; is able to select hospital from list of options. Attempting to converse w/ examiner, level of alertness improved from prior. Following simple commands x4 extremities   Cranial Nerves: Gaze midline, PERRL, no clear gaze preference. Dysarthric. +cough, spontaneous  Motor: Moving all 4 extremities AG to command, grossly symmetric   Sensory: Grossly symmetric to noxious x4  Coordination: Unable to assess  Vascular: S1/S2 of normal intensity, no S3/S4 appreciated, no murmurs appreciated  Lungs: CTA bilaterally without wheezing  Abdomen: Soft, non-distended, non-tender, BS +          Medications:  Continuous ScheduledamLODIPine, 10 mg, Daily  ceFAZolin (Ancef) IV (PEDS and ADULTS), 1 g, Q8H  cloNIDine, 0.3 mg, Q8H  dexAMETHasone (Decadron) IV (PEDS and ADULTS), 4 mg, Q12H  famotidine, 20 mg, BID  folic acid, 1 mg, Daily  heparin (porcine), 5,000 Units, Q8H  losartan, 100 mg, Daily  multivitamin, 1 tablet, Daily  mupirocin, , BID  OLANZapine, 20 mg, BID  PHENobarbitaL, 100 mg, Q8H  polyethylene glycol, 17 g, Daily  senna-docusate, 2 tablet,  BID  sulfamethoxazole-trimethoprim 800-160mg, 1 tablet, Every Mon, Wed, Fri  thiamine, 100 mg, Daily    PRNacetaminophen, 650 mg, Q4H PRN  bisacodyL, 10 mg, Daily PRN  hydrALAZINE, 10 mg, Q4H PRN  labetalol, 10 mg, Q4H PRN  ondansetron, 4 mg, Q4H PRN  potassium bicarbonate, 35 mEq, PRN  potassium bicarbonate, 50 mEq, PRN  potassium bicarbonate, 60 mEq, PRN  potassium, sodium phosphates, 2 packet, PRN  potassium, sodium phosphates, 2 packet, PRN  potassium, sodium phosphates, 2 packet, PRN  sodium chloride 0.9%, 10 mL, PRN      Today I personally reviewed pertinent imaging, laboratory results, notably: CT head overnight w/ decrease in hemorrhage at operative site, unchanged vasogenic edema/mass effect; stable ventriculomegaly.  CBC w/ resolved leukocytosis, Hb stable at 11.2, platelets slowly downtrending to 169. CMP w/ Na 145, BUN/creatinine stable at 42/1.1    Diet  No diet orders on file  No diet orders on file  TF at goal     Assessment/Plan:     Neuro  * 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    - PT/OT -> recommending acute rehab once medically appropriate for discharge     Brain lesion  70 y/o M brought to ED from [facility] (hx of heroin and ETOH) for progressive weakness, gait disturbance, confusion, and decreased volume when speaking. CT head without contrast revealed multiple lesions of hypodensity with a hyperdense rim, notably in the left temporal lobe and right cerebellum. There were additional small hyperdensities in the right frontoparietal and left parasagittal parietal lobe. There is mass effect and partial effacement of the 4th ventricle secondary to the cerebellar lesion with developing hydrocephalus without MLS. CT of the chest, abdomen, and pelvis with IV contrast showed RUL mass with mediastinal adenopathy. Labs revealed Hep C and treponema antibodies were positive. Hep C PCR pending. Penicillin G was administered. A sepsis workup was completed and antibiotics were initiated. Intubated to get MRI. S/p  EVD. Extubated 7/4/25 without difficulty.    Admit to [facility]  Q1h neuro checks, I&Os, and vitals   Daily CBC, CMP, Mag, Phos  NSGY following; s/p SOC craniotomy for tumor resection on 7/7  Dex 4q8, plan to taper to 2 mg BID at discharge   Bactrim MWF for PJP ppx given plan for prolonged steroid taper  CT head overnight stable, EVD clamped -> repeat head CT 7/14  Zyprexa BID for agitation  Phenobarb taper to off given improved agitation   clonidine 0.3 mg TID for agitation/opioid dependence/hypertension  Avoid versed for agitation  Tube feeds + FWF 250cc q4h  Folate/thiamine/MV  SBP <160   Prn labetalol, hydralazine  PRN Zofran 4mg q4h  SCDs, SQH  PT/OT/SLP as able    AMS (altered mental status)  See primary problem    Psychiatric  Polysubstance abuse  Was brought in from [facility], where he has been for the past month  Educate on cessation when appropriate  On phenobarbital for agitation, no signs of EtOH withdrawal -> phenobarb taper     Pulmonary  Mass of upper lobe of right lung  Noted on CXR and CT chest with IV contrast  Saturating well on RA; No PTX on CT-chest  No lung consolidations or obstructive process  No known history of cancer  Intra-op brain biopsy frozen pathology suggestive of metastatic adenocarcinoma, 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  Will need outpt rad onc and med onc f/u     Cardiac/Vascular  Essential hypertension  Hx of    - no longer on cardene gtt, however continues to require frequent PRNs  - Start amlodipine 10 mg qday   - Increase clonidine to 0.3 mg TID  - C/w losartan 100 mg qday   - Goal SBP<160     Other  Hepatitis C antibody positive  PCR negative  No known history of Hep C per patient    Gait disturbance  See primary problem          The patient is being Prophylaxed for:  Venous Thromboembolism with: Mechanical or Chemical  Stress Ulcer with: H2B  Ventilator Pneumonia with: not applicable    Activity Orders            Turn patient starting at 07/03 1124    Elevate HOB Elevate HOB 30-45 degrees  during feeding unless contraindicated starting at 07/03 0802    Elevate HOB starting at 07/01 2040    Straight Cath starting at 07/01 1925          Full Code    Uninterrupted Critical Care/Counseling Time (not including procedures): 50 minutes  There is high probability for acute neurological change leading to clinical and possibly life-threatening deterioration requiring highest level of physician preparedness for urgent intervention. Critical care time was spent personally by me on the following activities: development of treatment plan with patient or surrogate and bedside caregivers, discussions with consultants, evaluation of patient's response to treatment, examination of patient, ordering and performing treatments and interventions, ordering and review of laboratory studies, ordering and review of radiographic studies, pulse oximetry, antibiotic titration if applicable, vasopressor titration if applicable, re-evaluation of patient's condition. I spent greater than 50% of the documented critical care time assessing and making medical decisions for this patient.     Melba Farnsworth MD  Neurocritical Care  [facility] - Neuro Critical Care

## 2025-07-11 NOTE — ASSESSMENT & PLAN NOTE
Hx of    - no longer on cardene gtt, however continues to require frequent PRNs  - Start amlodipine 10 mg qday   - Increase clonidine to 0.3 mg TID  - C/w losartan 100 mg qday   - Goal SBP<160

## 2025-07-11 NOTE — SUBJECTIVE & OBJECTIVE
Interval History: NAEON. Standing up bedside with OT this AM. CT this AM w/o pseudomeningocele. ICP 5-11. Will wean EVD today. Anticipate clamp trial over wkend to assess for psuedomeningocele collection.    Medications:  Continuous Infusions:  Scheduled Meds:   ceFAZolin (Ancef) IV (PEDS and ADULTS)  1 g Intravenous Q8H    cloNIDine  0.2 mg Per NG tube Q8H    dexAMETHasone (Decadron) IV (PEDS and ADULTS)  4 mg Intravenous Q8H    famotidine  20 mg Per NG tube BID    folic acid  1 mg Per NG tube Daily    heparin (porcine)  5,000 Units Subcutaneous Q8H    losartan  100 mg Per NG tube Daily    multivitamin  1 tablet Per NG tube Daily    mupirocin   Nasal BID    OLANZapine  20 mg Per NG tube BID    PHENobarbitaL  100 mg Per NG tube Q8H    polyethylene glycol  17 g Per NG tube Daily    senna-docusate  2 tablet Per NG tube BID    sulfamethoxazole-trimethoprim 800-160mg  1 tablet Per NG tube Every Mon, Wed, Fri    thiamine  100 mg Per NG tube Daily     PRN Meds:  Current Facility-Administered Medications:     acetaminophen, 650 mg, Per NG tube, Q4H PRN    bisacodyL, 10 mg, Rectal, Daily PRN    hydrALAZINE, 10 mg, Intravenous, Q4H PRN    labetalol, 10 mg, Intravenous, Q4H PRN    morphine, 2 mg, Intravenous, Q4H PRN    ondansetron, 4 mg, Intravenous, Q4H PRN    oxyCODONE, 10 mg, Per NG tube, Q4H PRN    oxyCODONE, 5 mg, Per NG tube, Q4H PRN    potassium bicarbonate, 35 mEq, Per NG tube, PRN    potassium bicarbonate, 50 mEq, Per NG tube, PRN    potassium bicarbonate, 60 mEq, Per NG tube, PRN    potassium, sodium phosphates, 2 packet, Per NG tube, PRN    potassium, sodium phosphates, 2 packet, Per NG tube, PRN    potassium, sodium phosphates, 2 packet, Per NG tube, PRN    senna-docusate, 2 tablet, Per NG tube, BID PRN    sodium chloride 0.9%, 10 mL, Intravenous, PRN     Review of Systems  Objective:     Weight: 81.1 kg (178 lb 12.7 oz)  Body mass index is 23.59 kg/m².  Vital Signs (Most Recent):  Temp: 99.1 °F (37.3 °C)  (07/10/25 2305)  Pulse: 76 (07/11/25 0305)  Resp: 17 (07/11/25 0205)  BP: (!) 166/88 (07/11/25 0312)  SpO2: 96 % (07/11/25 0205) Vital Signs (24h Range):  Temp:  [98.3 °F (36.8 °C)-99.3 °F (37.4 °C)] 99.1 °F (37.3 °C)  Pulse:  [76-95] 76  Resp:  [13-23] 17  SpO2:  [93 %-100 %] 96 %  BP: (124-199)/() 166/88                              NG/OG Tube 07/10/25 0400 Other (comments) Left nostril (Active)   Placement Check placement verified by distal tube length measurement 07/11/25 0305   Tolerance no signs/symptoms of discomfort 07/11/25 0305   Securement secured to nostril center w/ adhesive device 07/11/25 0305   Clamp Status/Tolerance unclamped 07/11/25 0305   Suction Setting/Drainage Method suction at the bedside 07/11/25 0305   Insertion Site Appearance no redness, warmth, tenderness, skin breakdown, drainage 07/11/25 0305   Flush/Irrigation flushed w/;water;no resistance met 07/11/25 0305   Feeding Type continuous;by pump 07/11/25 0305   Feeding Action feeding continued 07/11/25 0305   Current Rate (mL/hr) 55 mL/hr 07/10/25 1905   Goal Rate (mL/hr) 55 mL/hr 07/10/25 1905   Intake (mL) 100 mL 07/10/25 2111   Water Bolus (mL) 250 mL 07/11/25 0405   Rate Formula Tube Feeding (mL/hr) 55 mL/hr 07/10/25 1905   Formula Name pivot 07/11/25 0305   Intake (mL) - Formula Tube Feeding 55 07/11/25 0405   Residual Amount (ml) 0 ml 07/10/25 1905       Male External Urinary Catheter 07/08/25 0705 Medium (Active)   Collection Container Standard drainage bag 07/11/25 0305   Securement Method secured to top of thigh w/ adhesive device 07/11/25 0305   Skin no redness;no breakdown 07/11/25 0305   Tolerance no signs/symptoms of discomfort 07/11/25 0305   Output (mL) 300 mL 07/11/25 0205   Catheter Change Date 07/10/25 07/11/25 0305   Catheter Change Time 1905 07/11/25 0305            ICP/Ventriculostomy 07/02/25 1409 Right (Active)   Level of Ventriculostomy (cm above) 10 07/10/25 1905   Status Open to drainage 07/11/25 0605  "  Site Assessment Clean;Dry 07/11/25 0605   Site Drainage No drainage 07/11/25 0605   Waveform normal waveform 07/10/25 2005   Output (mL) 9 mL 07/11/25 0605   CSF Color clear 07/11/25 0605   Dressing Status Clean;Dry;Intact 07/11/25 0605   Interventions zeroed;HOB degrees;bed controls locked 07/11/25 0605          Physical Exam         Neurosurgery Physical Exam  [id number]  AOX1  PERRL  HARRISON spon AG  Following commands x4, HARRISON spon, standing with OT     EVD patent with good waveform  Incision flat, c/d/I without fluctuance    Significant Labs:  Recent Labs   Lab 07/10/25  0107 07/10/25  0424 07/11/25  0128   * 122* 117*   * 145 145   K 4.3 4.4 4.3   * 116* 117*   CO2 22* 22* 21*   BUN 43* 40* 42*   CREATININE 1.2 1.2 1.1   CALCIUM 8.4* 8.9 8.9   MG 2.2  --  2.3     Recent Labs   Lab 07/10/25  0107 07/10/25  0427 07/11/25  0128   WBC 12.56 15.10* 11.00   HGB 10.3* 11.4* 11.2*   HCT 31.9* 33.8* 33.2*    192 169     No results for input(s): "LABPT", "INR", "APTT" in the last 48 hours.  Microbiology Results (last 7 days)       Procedure Component Value Units Date/Time    CSF culture [[id number]] Collected: 07/10/25 0856    Order Status: Completed Specimen: CSF (Spinal Fluid) from CSF Tap, Tube 3 Updated: 07/10/25 1822     GRAM STAIN Cytospin indicates:      No WBCs      No organisms seen    Gram stain [[id number]] Collected: 07/10/25 0856    Order Status: Canceled Specimen: CSF (Spinal Fluid) from CSF Tap, Tube 3 Updated: 07/10/25 1400    CSF culture [[id number]] Collected: 07/07/25 0715    Order Status: Completed Specimen: CSF (Spinal Fluid) from CSF Tap, Tube 3 Updated: 07/10/25 0700     CULTURE, CSF No Growth To Date     GRAM STAIN Cytospin indicates:      No WBCs      No organisms seen    AFB Culture & Smear [[id number]]  (Normal) Collected: 07/02/25 1805    Order Status: Completed Specimen: CSF (Spinal Fluid) from CSF Tap, Tube 3 Updated: 07/10/25 0205     CULTURE, AFB  Culture Negative For " Mycobacteria At 1 Week    CSF culture [[id number]] Collected: 07/02/25 1808    Order Status: Completed Specimen: CSF (Spinal Fluid) from CSF Tap, Tube 3 Updated: 07/08/25 0747     CULTURE, CSF No Growth     GRAM STAIN Cytospin indicates:      No WBCs      No organisms seen    Gram stain [[id number]] Collected: 07/07/25 0715    Order Status: Canceled Specimen: CSF (Spinal Fluid) from CSF Tap, Tube 3 Updated: 07/07/25 1620    Culture, Respiratory with Gram Stain [[id number]] Collected: 07/03/25 1130    Order Status: Completed Specimen: Respiratory from Endotracheal Aspirate Updated: 07/07/25 0844     Respiratory Culture Normal respiratory carmen, no Staph aureus or Pseudomonas isolated     GRAM STAIN <10 Epithelial Cells/LPF      Rare WBC seen      No organisms seen    CSF culture [[id number]]     Order Status: Canceled Specimen: CSF (Spinal Fluid)     Blood culture #1 **CANNOT BE ORDERED STAT** [[id number]]  (Normal) Collected: 07/01/25 1702    Order Status: Completed Specimen: Blood from Peripheral, Forearm, Left Updated: 07/07/25 0000     Blood Culture No Growth After 5 Days    Blood culture #2 **CANNOT BE ORDERED STAT** [[id number]]  (Normal) Collected: 07/01/25 1702    Order Status: Completed Specimen: Blood from Peripheral, Lower Arm, Left Updated: 07/07/25 0000     Blood Culture No Growth After 5 Days          All pertinent labs from the last 24 hours have been reviewed.    Significant Diagnostics:  I have reviewed and interpreted all pertinent imaging results/findings within the past 24 hours.

## 2025-07-12 LAB
ABSOLUTE EOSINOPHIL (OHS): 0.05 K/UL
ABSOLUTE MONOCYTE (OHS): 0.45 K/UL (ref 0.3–1)
ABSOLUTE NEUTROPHIL COUNT (OHS): 5.29 K/UL (ref 1.8–7.7)
ALBUMIN SERPL BCP-MCNC: 2.8 G/DL (ref 3.5–5.2)
ALP SERPL-CCNC: 66 UNIT/L (ref 40–150)
ALT SERPL W/O P-5'-P-CCNC: 14 UNIT/L (ref 10–44)
ANION GAP (OHS): 8 MMOL/L (ref 8–16)
AST SERPL-CCNC: 47 UNIT/L (ref 11–45)
BACTERIA CSF CULT: NO GROWTH
BASOPHILS # BLD AUTO: 0 K/UL
BASOPHILS NFR BLD AUTO: 0 %
BILIRUB SERPL-MCNC: 0.3 MG/DL (ref 0.1–1)
BUN SERPL-MCNC: 34 MG/DL (ref 8–23)
CALCIUM SERPL-MCNC: 8.7 MG/DL (ref 8.7–10.5)
CHLORIDE SERPL-SCNC: 114 MMOL/L (ref 95–110)
CO2 SERPL-SCNC: 19 MMOL/L (ref 23–29)
CREAT SERPL-MCNC: 1 MG/DL (ref 0.5–1.4)
ERYTHROCYTE [DISTWIDTH] IN BLOOD BY AUTOMATED COUNT: 14.2 % (ref 11.5–14.5)
GFR SERPLBLD CREATININE-BSD FMLA CKD-EPI: >60 ML/MIN/1.73/M2
GLUCOSE SERPL-MCNC: 103 MG/DL (ref 70–110)
GRAM STN SPEC: NORMAL
HCT VFR BLD AUTO: 35 % (ref 40–54)
HGB BLD-MCNC: 10.9 GM/DL (ref 14–18)
IMM GRANULOCYTES # BLD AUTO: 0.03 K/UL (ref 0–0.04)
IMM GRANULOCYTES NFR BLD AUTO: 0.4 % (ref 0–0.5)
LYMPHOCYTES # BLD AUTO: 1.01 K/UL (ref 1–4.8)
MAGNESIUM SERPL-MCNC: 1.9 MG/DL (ref 1.6–2.6)
MCH RBC QN AUTO: 30.8 PG (ref 27–31)
MCHC RBC AUTO-ENTMCNC: 31.1 G/DL (ref 32–36)
MCV RBC AUTO: 99 FL (ref 82–98)
NUCLEATED RBC (/100WBC) (OHS): 0 /100 WBC
PHENOBARB SERPL-MCNC: 39.6 UG/ML (ref 15–40)
PHOSPHATE SERPL-MCNC: 3.3 MG/DL (ref 2.7–4.5)
PLATELET # BLD AUTO: 153 K/UL (ref 150–450)
PMV BLD AUTO: 9.7 FL (ref 9.2–12.9)
POCT GLUCOSE: 134 MG/DL (ref 70–110)
POTASSIUM SERPL-SCNC: 4.5 MMOL/L (ref 3.5–5.1)
PROT SERPL-MCNC: 6.3 GM/DL (ref 6–8.4)
RBC # BLD AUTO: 3.54 M/UL (ref 4.6–6.2)
RELATIVE EOSINOPHIL (OHS): 0.7 %
RELATIVE LYMPHOCYTE (OHS): 14.8 % (ref 18–48)
RELATIVE MONOCYTE (OHS): 6.6 % (ref 4–15)
RELATIVE NEUTROPHIL (OHS): 77.5 % (ref 38–73)
SODIUM SERPL-SCNC: 141 MMOL/L (ref 136–145)
WBC # BLD AUTO: 6.83 K/UL (ref 3.9–12.7)

## 2025-07-12 PROCEDURE — 83735 ASSAY OF MAGNESIUM: CPT

## 2025-07-12 PROCEDURE — 25000003 PHARM REV CODE 250: Performed by: PSYCHIATRY & NEUROLOGY

## 2025-07-12 PROCEDURE — 82040 ASSAY OF SERUM ALBUMIN: CPT

## 2025-07-12 PROCEDURE — 97112 NEUROMUSCULAR REEDUCATION: CPT

## 2025-07-12 PROCEDURE — 25000003 PHARM REV CODE 250

## 2025-07-12 PROCEDURE — 63600175 PHARM REV CODE 636 W HCPCS

## 2025-07-12 PROCEDURE — 63600175 PHARM REV CODE 636 W HCPCS: Performed by: STUDENT IN AN ORGANIZED HEALTH CARE EDUCATION/TRAINING PROGRAM

## 2025-07-12 PROCEDURE — 20000000 HC ICU ROOM

## 2025-07-12 PROCEDURE — 85025 COMPLETE CBC W/AUTO DIFF WBC: CPT

## 2025-07-12 PROCEDURE — 80184 ASSAY OF PHENOBARBITAL: CPT | Performed by: PSYCHIATRY & NEUROLOGY

## 2025-07-12 PROCEDURE — 97535 SELF CARE MNGMENT TRAINING: CPT

## 2025-07-12 PROCEDURE — 99291 CRITICAL CARE FIRST HOUR: CPT | Mod: ,,, | Performed by: PSYCHIATRY & NEUROLOGY

## 2025-07-12 PROCEDURE — 84100 ASSAY OF PHOSPHORUS: CPT

## 2025-07-12 PROCEDURE — 25000003 PHARM REV CODE 250: Performed by: NURSE PRACTITIONER

## 2025-07-12 RX ADMIN — FOLIC ACID 1 MG: 1 TABLET ORAL at 09:07

## 2025-07-12 RX ADMIN — LOSARTAN POTASSIUM 100 MG: 50 TABLET, FILM COATED ORAL at 09:07

## 2025-07-12 RX ADMIN — HEPARIN SODIUM 5000 UNITS: 5000 INJECTION INTRAVENOUS; SUBCUTANEOUS at 02:07

## 2025-07-12 RX ADMIN — CEFAZOLIN 1 G: 330 INJECTION, POWDER, FOR SOLUTION INTRAMUSCULAR; INTRAVENOUS at 09:07

## 2025-07-12 RX ADMIN — Medication 100 MG: at 09:07

## 2025-07-12 RX ADMIN — CEFAZOLIN 1 G: 330 INJECTION, POWDER, FOR SOLUTION INTRAMUSCULAR; INTRAVENOUS at 01:07

## 2025-07-12 RX ADMIN — CLONIDINE HYDROCHLORIDE 0.3 MG: 0.2 TABLET ORAL at 06:07

## 2025-07-12 RX ADMIN — AMLODIPINE BESYLATE 10 MG: 10 TABLET ORAL at 09:07

## 2025-07-12 RX ADMIN — CLONIDINE HYDROCHLORIDE 0.3 MG: 0.2 TABLET ORAL at 02:07

## 2025-07-12 RX ADMIN — HEPARIN SODIUM 5000 UNITS: 5000 INJECTION INTRAVENOUS; SUBCUTANEOUS at 06:07

## 2025-07-12 RX ADMIN — PHENOBARBITAL 60 MG: 60 TABLET ORAL at 07:07

## 2025-07-12 RX ADMIN — OLANZAPINE 20 MG: 5 TABLET, FILM COATED ORAL at 08:07

## 2025-07-12 RX ADMIN — FAMOTIDINE 20 MG: 20 TABLET, FILM COATED ORAL at 09:07

## 2025-07-12 RX ADMIN — PHENOBARBITAL 60 MG: 60 TABLET ORAL at 03:07

## 2025-07-12 RX ADMIN — DEXAMETHASONE 4 MG: 4 TABLET ORAL at 08:07

## 2025-07-12 RX ADMIN — PHENOBARBITAL 60 MG: 60 TABLET ORAL at 09:07

## 2025-07-12 RX ADMIN — CEFAZOLIN 1 G: 330 INJECTION, POWDER, FOR SOLUTION INTRAMUSCULAR; INTRAVENOUS at 06:07

## 2025-07-12 RX ADMIN — THERA TABS 1 TABLET: TAB at 09:07

## 2025-07-12 RX ADMIN — MUPIROCIN: 20 OINTMENT TOPICAL at 09:07

## 2025-07-12 RX ADMIN — CLONIDINE HYDROCHLORIDE 0.3 MG: 0.2 TABLET ORAL at 09:07

## 2025-07-12 RX ADMIN — OLANZAPINE 20 MG: 5 TABLET, FILM COATED ORAL at 09:07

## 2025-07-12 RX ADMIN — FAMOTIDINE 20 MG: 20 TABLET, FILM COATED ORAL at 08:07

## 2025-07-12 RX ADMIN — HEPARIN SODIUM 5000 UNITS: 5000 INJECTION INTRAVENOUS; SUBCUTANEOUS at 09:07

## 2025-07-12 RX ADMIN — DEXAMETHASONE 4 MG: 4 TABLET ORAL at 09:07

## 2025-07-12 NOTE — ASSESSMENT & PLAN NOTE
Was brought in from [facility], where he has been for the past month  Educate on cessation when appropriate  On phenobarbital for agitation, no signs of EtOH withdrawal -> phenobarb taper

## 2025-07-12 NOTE — PLAN OF CARE
Goals remain appropriate.  Problem: Occupational Therapy  Goal: Occupational Therapy Goal  Description: Goals set 7/8 with an expiration date, 8/5:  Patient will increase functional independence with ADLs by performing:    Patient will demonstrate rolling to the right with min assist.  Not met   Patient will demonstrate rolling to the left with min assist.   Not met  Patient will demonstrate supine -sit with min  assist.   Not met  Patient will demonstrate stand pivot transfers with mod assist.   Not met  Patient will demonstrate grooming while seated with min assist.   Not met  Patient will demonstrate upper body dressing with min assist while seated EOB.   Not met  Patient will demonstrate lower body dressing with mod assist while seated EOB.   Not met  Patient will demonstrate toileting with mod assist.   Not met  Patient will demonstrate bathing while seated EOB with mod assist.   Not met  Patient's family / caregiver will demonstrate independence and safety with assisting patient with self-care skills and functional mobility.     Not met  Patient's family / caregiver will demonstrate independence with providing ROM and changes in bed positioning.   Not met  Patient and/or patient's family will verbalize understanding of stroke prevention guidelines, personal risk factors and stroke warning signs via teachback method.  Not met     DME Justifications (see above for complete DME recommendations)    Bedside Commode- Patient has a mobility limitation that significantly impairs their ability to participate in one or more mobility related activities of daily living, including toileting. This deficit can be resolved by using a bedside commode. Patient demonstrates mobility limitations that will cause them to be confined to one room at home without bathroom access for up to 30 days. Using a bedside commode will greatly improve the patient's ability to participate in MRADLs.                        Outcome: Progressing

## 2025-07-12 NOTE — ASSESSMENT & PLAN NOTE
Hx of    - no longer on cardene gtt, however continues to require frequent PRNs  - Continue amlodipine 10 mg qday   - Continue clonidine to 0.3 mg TID  - C/w losartan 100 mg qday   - Goal SBP<160

## 2025-07-12 NOTE — PLAN OF CARE
"[facility] Care Plan  POC reviewed with Vado Brown and family at 1400. Patient and Family verbalized understanding. Questions and concerns addressed. No acute events today. Pt progressing toward goals. Will continue to monitor. See below and flowsheets for full assessment and VS info.   -EVD clamped          Is this a stroke patient? no    Neuro:  [place]ie Coma Scale  Best Eye Response: 4-->(E4) spontaneous  Best Motor Response: 6-->(M6) obeys commands  Best Verbal Response: 4-->(V4) confused  [place] Coma Scale Score: 14  Assessment Qualifiers: No eye obstruction present  Pupil PERRLA: yes     24 hr Temp:  [97.1 °F (36.2 °C)-99.1 °F (37.3 °C)]     CV:   Rhythm: normal sinus rhythm  BP goals:   SBP < 160  MAP > 65    Resp:      Vent Mode: Spont  Set Rate: 18 BPM  Oxygen Concentration (%): 50  Vt Set: 470 mL  PEEP/CPAP: 5 cmH20  Pressure Support: 8 cmH20    Plan: N/A    GI/:     Diet/Nutrition Received: tube feeding  Last Bowel Movement: 07/11/25  Voiding Characteristics: external catheter    Intake/Output Summary (Last 24 hours) at 7/12/2025 1721  Last data filed at 7/12/2025 1709  Gross per 24 hour   Intake 2720 ml   Output 2295 ml   Net 425 ml     Unmeasured Output  Unmeasured Urine Occurrence: 1  Unmeasured Stool Occurrence: 2    Labs/Accuchecks:  Recent Labs   Lab 07/12/25  0135   WBC 6.83   RBC 3.54*   HGB 10.9*   HCT 35.0*         Recent Labs   Lab 07/12/25  0135      K 4.5   CO2 19*   *   BUN 34*   CREATININE 1.0   ALKPHOS 66   ALT 14   AST 47*   BILITOT 0.3      Recent Labs   Lab 07/06/25  0720   PROTIME 11.7   INR 1.1   APTT 24.9    No results for input(s): "CPK", "CPKMB", "TROPONINI", "MB" in the last 168 hours.    Electrolytes: N/A - electrolytes WDL  Accuchecks: Q4H    Gtts:      LDA/Wounds:    Davi Risk Assessment  Sensory Perception: 3-->slightly limited  Moisture: 3-->occasionally moist  Activity: 1-->bedfast  Mobility: 3-->slightly limited  Nutrition: 3-->adequate  Friction and " Shear: 3-->no apparent problem  Davi Score: 16    Is your davi score 12 or less? no            Restraints:   Restraint Order  Length of Order: Order good for next 24 hours or when removed.  Date that the current order will : 25  Time that the current order will : 636  Order Upon Application: Yes    [facility]

## 2025-07-12 NOTE — PLAN OF CARE
[facility] Care Plan    POC reviewed with [place] Brown and family at 0300. Patient verbalized understanding. Questions and concerns addressed. No acute events today. Pt progressing toward goals. Will continue to monitor. See below and flowsheets for full assessment and VS info.   - Neuro status unchanged.   - SBP goal maintained  - EVD was clamped  - feeding tube at 55mL/Hr  Gave FWF: 250 ml Q6                                                                                                                                                                                                                                                                                                                                                                                                                                                                                                                                                                                                                                                                                                                                                                                                                                                                                                                                                                                                                                                                   Is this a stroke patient? no    Neuro:  [place] Coma Scale  Best Eye Response: 4-->(E4) spontaneous  Best Motor Response: 6-->(M6) obeys commands  Best Verbal Response: 4-->(V4) confused  [place] Coma Scale Score: 14  Assessment Qualifiers: Patient not sedated/intubated  Pupil PERRLA: yes     24 hr Temp:  [97.1 °F (36.2 °C)-98.7 °F (37.1 °C)]     CV:   Rhythm: normal sinus rhythm  BP goals:   SBP < 160  MAP > 65    Resp: room air     Plan: remove EVD on Monday    GI/:     Diet/Nutrition Received: NPO, tube feeding  Last  "Bowel Movement: 25  Voiding Characteristics: external catheter    Intake/Output Summary (Last 24 hours) at 2025 0423  Last data filed at 2025 0305  Gross per 24 hour   Intake 2930 ml   Output 1527 ml   Net 1403 ml     Unmeasured Output  Unmeasured Urine Occurrence: 1  Unmeasured Stool Occurrence: 2    Labs/Accuchecks:  Recent Labs   Lab 25  0135   WBC 6.83   RBC 3.54*   HGB 10.9*   HCT 35.0*         Recent Labs   Lab 25  0135      K 4.5   CO2 19*   *   BUN 34*   CREATININE 1.0   ALKPHOS 66   ALT 14   AST 47*   BILITOT 0.3      Recent Labs   Lab 25  0720   PROTIME 11.7   INR 1.1   APTT 24.9    No results for input(s): "CPK", "CPKMB", "TROPONINI", "MB" in the last 168 hours.    Electrolytes: N/A - electrolytes WDL  Accuchecks: none    Gtts:      LDA/Wounds:    Davi Risk Assessment  Sensory Perception: 3-->slightly limited  Moisture: 3-->occasionally moist  Activity: 1-->bedfast  Mobility: 3-->slightly limited  Nutrition: 3-->adequate  Friction and Shear: 3-->no apparent problem  Davi Score: 16  Is your davi score 12 or less? no          Restraints:   Restraint Order  Length of Order: Order good for next 24 hours or when removed.  Date that the current order will : 25  Time that the current order will : 522  Order Upon Application: Yes    WC    "

## 2025-07-12 NOTE — EICU
ALFIEU Night Rounds Checklist  24H Vital Sign Range:  Temp:  [97.1 °F (36.2 °C)-98.7 °F (37.1 °C)]   Pulse:  [79-96]   Resp:  [15-23]   BP: (126-174)/()   SpO2:  [92 %-97 %]     Video rounds and LDA reconciliation

## 2025-07-12 NOTE — ASSESSMENT & PLAN NOTE
Assessment  71yo M with generalized weakness, falls, and paucity of speech presenting with multiple ring enhancing lesions on brain CT now s/p R SOC for tumor resection on 7/7:    Imaging:  - CTH 7/1 showed multiple brain masses concerning for mets with surrounding edema, can't rule out abscesses. Mass in cerebellum with effacement of 4th ventricule outflow with concern for developing hydrocephalus  - MRI brain 7/1: non diagnostic due to motion  - CT CAP 7/1: spiculated R lung mass and lymph node adenopathy in chest and neck concerning for metastatic disease  - MRI Brain W/WO 7/2: multifocal enhancing lesions c/f metastatic disease, largest R cerebellum w/mass effect on 4th.  - Post op CTH/MRI 7/7: anticipate post op changes, hemostatic products left at superior/medial border of resection cavity  - CTH 7/11: no pseudomeningocele collection, persistent edema around L frontal met    Plan:  - admitted to [facility]  - EVD clamped, abx while in place; transduce hourly; M/Th CSF while remains in place. NGTD. Continue clamp through weekend to monitor for pseudomeningocele formation. CT ordered for Monday.   - Cont Dex 4q12 with Ppi, will cont wean slowly to 2 BID unless other dosage preferred by Onc  - AED per NCC  - Onc following; will need outpatient rad onc consult. 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 Larios

## 2025-07-12 NOTE — SUBJECTIVE & OBJECTIVE
Interval History:  See hospital course       Objective:     Vitals:  Temp: 99.1 °F (37.3 °C)  Pulse: 84  Rhythm: normal sinus rhythm  BP: (!) 148/83  MAP (mmHg): 109  ICP Mean (mmHg): 2 mmHg  Resp: 13  SpO2: (!) 93 %    Temp  Min: 97.1 °F (36.2 °C)  Max: 99.1 °F (37.3 °C)  Pulse  Min: 79  Max: 96  BP  Min: 129/74  Max: 168/104  MAP (mmHg)  Min: 96  Max: 130  ICP Mean (mmHg)  Min: -3 mmHg  Max: 9 mmHg  Resp  Min: 12  Max: 23  SpO2  Min: 93 %  Max: 97 %    07/11 0701 - 07/12 0700  In: 3235   Out: 1506 [Urine:1495; Drains:11]   Unmeasured Output  Unmeasured Urine Occurrence: 1  Unmeasured Stool Occurrence: 2        Physical Exam  General Appearance: Elderly gentleman resting in bed, NG tube and EVD in place. Not in acute hemodynamic distress  Mental Status Exam: awake, alert, oriented to self only. Attempting to converse w/ examiner, level of alertness improved from prior. Following simple commands x4 extremities   Cranial Nerves: Gaze midline, PERRL, no clear gaze preference. Dysarthric. +cough, spontaneous  Motor: Moving all 4 extremities AG to command, grossly symmetric   Sensory: Grossly symmetric to noxious x4  Coordination: Unable to assess  Vascular: S1/S2 of normal intensity, no S3/S4 appreciated, no murmurs appreciated  Lungs: CTA bilaterally without wheezing  Abdomen: Soft, non-distended, non-tender        Medications:  Continuous ScheduledamLODIPine, 10 mg, Daily  ceFAZolin (Ancef) IV (PEDS and ADULTS), 1 g, Q8H  cloNIDine, 0.3 mg, Q8H  dexAMETHasone, 4 mg, Q12H   Followed by  [START ON 7/13/2025] dexAMETHasone, 2 mg, Q12H  famotidine, 20 mg, BID  folic acid, 1 mg, Daily  heparin (porcine), 5,000 Units, Q8H  losartan, 100 mg, Daily  multivitamin, 1 tablet, Daily  OLANZapine, 20 mg, BID  PHENobarbitaL, 60 mg, Q8H   Followed by  [START ON 7/13/2025] PHENobarbitaL, 30 mg, Q8H   Followed by  [START ON 7/14/2025] PHENobarbitaL, 30 mg, BID   Followed by  [START ON 7/15/2025] PHENobarbitaL, 15 mg, BID   Followed  by  [START ON 7/16/2025] PHENobarbitaL, 15 mg, Daily  polyethylene glycol, 17 g, Daily  senna-docusate, 2 tablet, BID  sulfamethoxazole-trimethoprim 800-160mg, 1 tablet, Every Mon, Wed, Fri  thiamine, 100 mg, Daily    PRNacetaminophen, 650 mg, Q4H PRN  bisacodyL, 10 mg, Daily PRN  hydrALAZINE, 10 mg, Q4H PRN  labetalol, 10 mg, Q4H PRN  ondansetron, 4 mg, Q4H PRN  potassium bicarbonate, 35 mEq, PRN  potassium bicarbonate, 50 mEq, PRN  potassium bicarbonate, 60 mEq, PRN  potassium, sodium phosphates, 2 packet, PRN  potassium, sodium phosphates, 2 packet, PRN  potassium, sodium phosphates, 2 packet, PRN  sodium chloride 0.9%, 10 mL, PRN      Today I personally reviewed pertinent imaging, laboratory results     Diet  No diet orders on file  No diet orders on file  TF at goal

## 2025-07-12 NOTE — SUBJECTIVE & OBJECTIVE
Interval History: NAEON. Asking to walk with therapy this AM. ICP WNL. EVD site c/d/I, posterior incision c/d/I w/o fluctuance.     Medications:  Continuous Infusions:  Scheduled Meds:   amLODIPine  10 mg Per NG tube Daily    ceFAZolin (Ancef) IV (PEDS and ADULTS)  1 g Intravenous Q8H    cloNIDine  0.3 mg Per NG tube Q8H    dexAMETHasone  4 mg Per NG tube Q12H    Followed by    [START ON 7/13/2025] dexAMETHasone  2 mg Per NG tube Q12H    famotidine  20 mg Per NG tube BID    folic acid  1 mg Per NG tube Daily    heparin (porcine)  5,000 Units Subcutaneous Q8H    losartan  100 mg Per NG tube Daily    multivitamin  1 tablet Per NG tube Daily    mupirocin   Nasal BID    OLANZapine  20 mg Per NG tube BID    PHENobarbitaL  60 mg Per NG tube Q8H    Followed by    [START ON 7/13/2025] PHENobarbitaL  30 mg Per NG tube Q8H    Followed by    [START ON 7/14/2025] PHENobarbitaL  30 mg Per NG tube BID    Followed by    [START ON 7/15/2025] PHENobarbitaL  15 mg Per NG tube BID    Followed by    [START ON 7/16/2025] PHENobarbitaL  15 mg Per NG tube Daily    polyethylene glycol  17 g Per NG tube Daily    senna-docusate  2 tablet Per NG tube BID    sulfamethoxazole-trimethoprim 800-160mg  1 tablet Per NG tube Every Mon, Wed, Fri    thiamine  100 mg Per NG tube Daily     PRN Meds:  Current Facility-Administered Medications:     acetaminophen, 650 mg, Per NG tube, Q4H PRN    bisacodyL, 10 mg, Rectal, Daily PRN    hydrALAZINE, 10 mg, Intravenous, Q4H PRN    labetalol, 10 mg, Intravenous, Q4H PRN    ondansetron, 4 mg, Intravenous, Q4H PRN    potassium bicarbonate, 35 mEq, Per NG tube, PRN    potassium bicarbonate, 50 mEq, Per NG tube, PRN    potassium bicarbonate, 60 mEq, Per NG tube, PRN    potassium, sodium phosphates, 2 packet, Per NG tube, PRN    potassium, sodium phosphates, 2 packet, Per NG tube, PRN    potassium, sodium phosphates, 2 packet, Per NG tube, PRN    sodium chloride 0.9%, 10 mL, Intravenous, PRN     Review of  Systems  Objective:     Weight: 81.1 kg (178 lb 12.7 oz)  Body mass index is 23.59 kg/m².  Vital Signs (Most Recent):  Temp: 97.3 °F (36.3 °C) (07/12/25 0305)  Pulse: 80 (07/12/25 0305)  Resp: (!) 21 (07/12/25 0305)  BP: (!) 154/88 (07/12/25 0634)  SpO2: (!) 93 % (07/12/25 0305) Vital Signs (24h Range):  Temp:  [97.1 °F (36.2 °C)-98.7 °F (37.1 °C)] 97.3 °F (36.3 °C)  Pulse:  [79-96] 80  Resp:  [15-23] 21  SpO2:  [93 %-97 %] 93 %  BP: (126-174)/() 154/88                              NG/OG Tube 07/10/25 0400 Other (comments) Left nostril (Active)   Placement Check placement verified by distal tube length measurement 07/12/25 0305   Tolerance no signs/symptoms of discomfort 07/12/25 0305   Securement secured to nostril center w/ adhesive device 07/12/25 0305   Clamp Status/Tolerance unclamped 07/12/25 0305   Suction Setting/Drainage Method suction at the bedside 07/12/25 0305   Insertion Site Appearance no redness, warmth, tenderness, skin breakdown, drainage 07/12/25 0305   Flush/Irrigation flushed w/;water;no resistance met 07/12/25 0305   Feeding Type continuous;by pump 07/12/25 0305   Feeding Action feeding continued 07/12/25 0305   Current Rate (mL/hr) 55 mL/hr 07/12/25 0305   Goal Rate (mL/hr) 55 mL/hr 07/11/25 2305   Intake (mL) 110 mL 07/11/25 2113   Water Bolus (mL) 250 mL 07/12/25 0005   Rate Formula Tube Feeding (mL/hr) 55 mL/hr 07/12/25 0305   Formula Name pivot 07/12/25 0305   Intake (mL) - Formula Tube Feeding 55 07/12/25 0305   Residual Amount (ml) 0 ml 07/11/25 1905       Male External Urinary Catheter 07/08/25 0705 Medium (Active)   Collection Container Standard drainage bag 07/12/25 0305   Securement Method secured to top of thigh w/ adhesive device 07/12/25 0305   Skin no redness;no breakdown 07/12/25 0305   Tolerance no signs/symptoms of discomfort 07/12/25 0305   Output (mL) 200 mL 07/11/25 2111   Catheter Change Date 07/10/25 07/12/25 0305   Catheter Change Time 1905 07/12/25 0305             "ICP/Ventriculostomy 07/02/25 1409 Right (Active)   Level of Ventriculostomy (cm above) 10 07/11/25 1500   Status Open to drainage 07/12/25 0305   Site Assessment Clean;Dry 07/12/25 0305   Site Drainage No drainage 07/12/25 0305   Waveform normal waveform 07/10/25 2005   Output (mL) 0 mL 07/11/25 2305   CSF Color clear 07/12/25 0305   Dressing Status Clean;Dry;Intact 07/12/25 0305   Interventions zeroed;HOB degrees;bed controls locked 07/12/25 0305          Physical Exam         Neurosurgery Physical Exam  [id number]  AOX1-2  PERRL  HARRISON spon AG  Following commands x4, HARRISON spon, nonfocal     EVD patent with good waveform  Incision flat, c/d/I without fluctuance    Significant Labs:  Recent Labs   Lab 07/11/25 0128 07/12/25  0135   * 103    141   K 4.3 4.5   * 114*   CO2 21* 19*   BUN 42* 34*   CREATININE 1.1 1.0   CALCIUM 8.9 8.7   MG 2.3 1.9     Recent Labs   Lab 07/11/25 0128 07/12/25  0135   WBC 11.00 6.83   HGB 11.2* 10.9*   HCT 33.2* 35.0*    153     No results for input(s): "LABPT", "INR", "APTT" in the last 48 hours.  Microbiology Results (last 7 days)       Procedure Component Value Units Date/Time    CSF culture [[id number]] Collected: 07/10/25 0856    Order Status: Completed Specimen: CSF (Spinal Fluid) from CSF Tap, Tube 3 Updated: 07/11/25 0718     CULTURE, CSF No Growth To Date     GRAM STAIN Cytospin indicates:      No WBCs      No organisms seen    CSF culture [[id number]] Collected: 07/07/25 0715    Order Status: Completed Specimen: CSF (Spinal Fluid) from CSF Tap, Tube 3 Updated: 07/11/25 0652     CULTURE, CSF No Growth To Date     GRAM STAIN Cytospin indicates:      No WBCs      No organisms seen    Gram stain [[id number]] Collected: 07/10/25 0856    Order Status: Canceled Specimen: CSF (Spinal Fluid) from CSF Tap, Tube 3 Updated: 07/10/25 1400    AFB Culture & Smear [[id number]]  (Normal) Collected: 07/02/25 1808    Order Status: Completed Specimen: CSF (Spinal Fluid) from " CSF Tap, Tube 3 Updated: 07/10/25 0205     CULTURE, AFB  Culture Negative For Mycobacteria At 1 Week    CSF culture [[id number]] Collected: 07/02/25 1808    Order Status: Completed Specimen: CSF (Spinal Fluid) from CSF Tap, Tube 3 Updated: 07/08/25 0747     CULTURE, CSF No Growth     GRAM STAIN Cytospin indicates:      No WBCs      No organisms seen    Gram stain [[id number]] Collected: 07/07/25 0715    Order Status: Canceled Specimen: CSF (Spinal Fluid) from CSF Tap, Tube 3 Updated: 07/07/25 1620    Culture, Respiratory with Gram Stain [[id number]] Collected: 07/03/25 1130    Order Status: Completed Specimen: Respiratory from Endotracheal Aspirate Updated: 07/07/25 0844     Respiratory Culture Normal respiratory carmen, no Staph aureus or Pseudomonas isolated     GRAM STAIN <10 Epithelial Cells/LPF      Rare WBC seen      No organisms seen    CSF culture [[id number]]     Order Status: Canceled Specimen: CSF (Spinal Fluid)     Blood culture #1 **CANNOT BE ORDERED STAT** [[id number]]  (Normal) Collected: 07/01/25 1702    Order Status: Completed Specimen: Blood from Peripheral, Forearm, Left Updated: 07/07/25 0000     Blood Culture No Growth After 5 Days    Blood culture #2 **CANNOT BE ORDERED STAT** [[id number]]  (Normal) Collected: 07/01/25 1702    Order Status: Completed Specimen: Blood from Peripheral, Lower Arm, Left Updated: 07/07/25 0000     Blood Culture No Growth After 5 Days          All pertinent labs from the last 24 hours have been reviewed.    Significant Diagnostics:  I have reviewed and interpreted all pertinent imaging results/findings within the past 24 hours.

## 2025-07-12 NOTE — EICU
Virtual ICU Quality Rounds    Admit Date: 7/1/2025  Hospital Day: 11    ICU Day: 10d 14h    24H Vital Sign Range:  Temp:  [97.1 °F (36.2 °C)-99.1 °F (37.3 °C)]   Pulse:  [79-96]   Resp:  [12-23]   BP: (126-171)/()   SpO2:  [93 %-97 %]     VICU Surveillance Screening

## 2025-07-12 NOTE — PROGRESS NOTES
[facility] - Neuro Critical Care  Neurosurgery  Progress Note    Subjective:     History of Present Illness: Patient is 71yo M with generalized weakness, falls, and paucity of speech. 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 On exam, patient's voice is barely audible but patient appropriately answers questions and follows commands. Patient's brother states that patient had normal speech and gait when they last saw each other about 1 month ago.    Neurosurgery consulted due to multiple ring-enhancing lesions seen on CT brain.    Post-Op Info:  Procedure(s) (LRB):  CRANIOTOMY, SUBOCCIPITAL (Right)   5 Days Post-Op   Interval History: NAEON. Asking to walk with therapy this AM. ICP WNL. EVD site c/d/I, posterior incision c/d/I w/o fluctuance.     Medications:  Continuous Infusions:  Scheduled Meds:   amLODIPine  10 mg Per NG tube Daily    ceFAZolin (Ancef) IV (PEDS and ADULTS)  1 g Intravenous Q8H    cloNIDine  0.3 mg Per NG tube Q8H    dexAMETHasone  4 mg Per NG tube Q12H    Followed by    [START ON 7/13/2025] dexAMETHasone  2 mg Per NG tube Q12H    famotidine  20 mg Per NG tube BID    folic acid  1 mg Per NG tube Daily    heparin (porcine)  5,000 Units Subcutaneous Q8H    losartan  100 mg Per NG tube Daily    multivitamin  1 tablet Per NG tube Daily    mupirocin   Nasal BID    OLANZapine  20 mg Per NG tube BID    PHENobarbitaL  60 mg Per NG tube Q8H    Followed by    [START ON 7/13/2025] PHENobarbitaL  30 mg Per NG tube Q8H    Followed by    [START ON 7/14/2025] PHENobarbitaL  30 mg Per NG tube BID    Followed by    [START ON 7/15/2025] PHENobarbitaL  15 mg Per NG tube BID    Followed by    [START ON 7/16/2025] PHENobarbitaL  15 mg Per NG tube Daily    polyethylene glycol  17 g Per NG tube Daily    senna-docusate  2 tablet Per NG tube BID    sulfamethoxazole-trimethoprim 800-160mg  1 tablet Per NG tube Every Mon, Wed, Fri    thiamine  100 mg Per NG tube Daily     PRN Meds:  Current  Facility-Administered Medications:     acetaminophen, 650 mg, Per NG tube, Q4H PRN    bisacodyL, 10 mg, Rectal, Daily PRN    hydrALAZINE, 10 mg, Intravenous, Q4H PRN    labetalol, 10 mg, Intravenous, Q4H PRN    ondansetron, 4 mg, Intravenous, Q4H PRN    potassium bicarbonate, 35 mEq, Per NG tube, PRN    potassium bicarbonate, 50 mEq, Per NG tube, PRN    potassium bicarbonate, 60 mEq, Per NG tube, PRN    potassium, sodium phosphates, 2 packet, Per NG tube, PRN    potassium, sodium phosphates, 2 packet, Per NG tube, PRN    potassium, sodium phosphates, 2 packet, Per NG tube, PRN    sodium chloride 0.9%, 10 mL, Intravenous, PRN     Review of Systems  Objective:     Weight: 81.1 kg (178 lb 12.7 oz)  Body mass index is 23.59 kg/m².  Vital Signs (Most Recent):  Temp: 97.3 °F (36.3 °C) (07/12/25 0305)  Pulse: 80 (07/12/25 0305)  Resp: (!) 21 (07/12/25 0305)  BP: (!) 154/88 (07/12/25 0634)  SpO2: (!) 93 % (07/12/25 0305) Vital Signs (24h Range):  Temp:  [97.1 °F (36.2 °C)-98.7 °F (37.1 °C)] 97.3 °F (36.3 °C)  Pulse:  [79-96] 80  Resp:  [15-23] 21  SpO2:  [93 %-97 %] 93 %  BP: (126-174)/() 154/88                              NG/OG Tube 07/10/25 0400 Other (comments) Left nostril (Active)   Placement Check placement verified by distal tube length measurement 07/12/25 0305   Tolerance no signs/symptoms of discomfort 07/12/25 0305   Securement secured to nostril center w/ adhesive device 07/12/25 0305   Clamp Status/Tolerance unclamped 07/12/25 0305   Suction Setting/Drainage Method suction at the bedside 07/12/25 0305   Insertion Site Appearance no redness, warmth, tenderness, skin breakdown, drainage 07/12/25 0305   Flush/Irrigation flushed w/;water;no resistance met 07/12/25 0305   Feeding Type continuous;by pump 07/12/25 0305   Feeding Action feeding continued 07/12/25 0305   Current Rate (mL/hr) 55 mL/hr 07/12/25 0305   Goal Rate (mL/hr) 55 mL/hr 07/11/25 2305   Intake (mL) 110 mL 07/11/25 2113   Water Bolus (mL) 250  "mL 07/12/25 0005   Rate Formula Tube Feeding (mL/hr) 55 mL/hr 07/12/25 0305   Formula Name pivot 07/12/25 0305   Intake (mL) - Formula Tube Feeding 55 07/12/25 0305   Residual Amount (ml) 0 ml 07/11/25 1905       Male External Urinary Catheter 07/08/25 0705 Medium (Active)   Collection Container Standard drainage bag 07/12/25 0305   Securement Method secured to top of thigh w/ adhesive device 07/12/25 0305   Skin no redness;no breakdown 07/12/25 0305   Tolerance no signs/symptoms of discomfort 07/12/25 0305   Output (mL) 200 mL 07/11/25 2111   Catheter Change Date 07/10/25 07/12/25 0305   Catheter Change Time 1905 07/12/25 0305            ICP/Ventriculostomy 07/02/25 1409 Right (Active)   Level of Ventriculostomy (cm above) 10 07/11/25 1500   Status Open to drainage 07/12/25 0305   Site Assessment Clean;Dry 07/12/25 0305   Site Drainage No drainage 07/12/25 0305   Waveform normal waveform 07/10/25 2005   Output (mL) 0 mL 07/11/25 2305   CSF Color clear 07/12/25 0305   Dressing Status Clean;Dry;Intact 07/12/25 0305   Interventions zeroed;HOB degrees;bed controls locked 07/12/25 0305          Physical Exam         Neurosurgery Physical Exam  [id number]  AOX1-2  PERRL  HARRISON spon AG  Following commands x4, HARRISON spon, nonfocal     EVD patent with good waveform  Incision flat, c/d/I without fluctuance    Significant Labs:  Recent Labs   Lab 07/11/25 0128 07/12/25 0135   * 103    141   K 4.3 4.5   * 114*   CO2 21* 19*   BUN 42* 34*   CREATININE 1.1 1.0   CALCIUM 8.9 8.7   MG 2.3 1.9     Recent Labs   Lab 07/11/25 0128 07/12/25  0135   WBC 11.00 6.83   HGB 11.2* 10.9*   HCT 33.2* 35.0*    153     No results for input(s): "LABPT", "INR", "APTT" in the last 48 hours.  Microbiology Results (last 7 days)       Procedure Component Value Units Date/Time    CSF culture [[id number]] Collected: 07/10/25 0856    Order Status: Completed Specimen: CSF (Spinal Fluid) from CSF Tap, Tube 3 Updated: 07/11/25 0718     " CULTURE, CSF No Growth To Date     GRAM STAIN Cytospin indicates:      No WBCs      No organisms seen    CSF culture [[id number]] Collected: 07/07/25 0715    Order Status: Completed Specimen: CSF (Spinal Fluid) from CSF Tap, Tube 3 Updated: 07/11/25 0652     CULTURE, CSF No Growth To Date     GRAM STAIN Cytospin indicates:      No WBCs      No organisms seen    Gram stain [[id number]] Collected: 07/10/25 0856    Order Status: Canceled Specimen: CSF (Spinal Fluid) from CSF Tap, Tube 3 Updated: 07/10/25 1400    AFB Culture & Smear [[id number]]  (Normal) Collected: 07/02/25 1808    Order Status: Completed Specimen: CSF (Spinal Fluid) from CSF Tap, Tube 3 Updated: 07/10/25 0205     CULTURE, AFB  Culture Negative For Mycobacteria At 1 Week    CSF culture [[id number]] Collected: 07/02/25 1808    Order Status: Completed Specimen: CSF (Spinal Fluid) from CSF Tap, Tube 3 Updated: 07/08/25 0747     CULTURE, CSF No Growth     GRAM STAIN Cytospin indicates:      No WBCs      No organisms seen    Gram stain [[id number]] Collected: 07/07/25 0715    Order Status: Canceled Specimen: CSF (Spinal Fluid) from CSF Tap, Tube 3 Updated: 07/07/25 1620    Culture, Respiratory with Gram Stain [[id number]] Collected: 07/03/25 1130    Order Status: Completed Specimen: Respiratory from Endotracheal Aspirate Updated: 07/07/25 0844     Respiratory Culture Normal respiratory carmen, no Staph aureus or Pseudomonas isolated     GRAM STAIN <10 Epithelial Cells/LPF      Rare WBC seen      No organisms seen    CSF culture [[id number]]     Order Status: Canceled Specimen: CSF (Spinal Fluid)     Blood culture #1 **CANNOT BE ORDERED STAT** [[id number]]  (Normal) Collected: 07/01/25 1702    Order Status: Completed Specimen: Blood from Peripheral, Forearm, Left Updated: 07/07/25 0000     Blood Culture No Growth After 5 Days    Blood culture #2 **CANNOT BE ORDERED STAT** [[id number]]  (Normal) Collected: 07/01/25 1702    Order Status: Completed Specimen:  Blood from Peripheral, Lower Arm, Left Updated: 07/07/25 0000     Blood Culture No Growth After 5 Days          All pertinent labs from the last 24 hours have been reviewed.    Significant Diagnostics:  I have reviewed and interpreted all pertinent imaging results/findings within the past 24 hours.  Assessment/Plan:     * Ataxia  Assessment  71yo M with generalized weakness, falls, and paucity of speech presenting with multiple ring enhancing lesions on brain CT now s/p R SOC for tumor resection on 7/7:    Imaging:  - CTH 7/1 showed multiple brain masses concerning for mets with surrounding edema, can't rule out abscesses. Mass in cerebellum with effacement of 4th ventricule outflow with concern for developing hydrocephalus  - MRI brain 7/1: non diagnostic due to motion  - CT CAP 7/1: spiculated R lung mass and lymph node adenopathy in chest and neck concerning for metastatic disease  - MRI Brain W/WO 7/2: multifocal enhancing lesions c/f metastatic disease, largest R cerebellum w/mass effect on 4th.  - Post op CTH/MRI 7/7: anticipate post op changes, hemostatic products left at superior/medial border of resection cavity  - CTH 7/11: no pseudomeningocele collection, persistent edema around L frontal met    Plan:  - admitted to [facility]  - EVD clamped, abx while in place; transduce hourly; M/Th CSF while remains in place. NGTD. Continue clamp through weekend to monitor for pseudomeningocele formation. CT ordered for Monday.   - Cont Dex 4q12 with Ppi, will cont wean slowly to 2 BID unless other dosage preferred by Onc  - AED per NCC  - Onc following; will need outpatient rad onc consult. 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 Ric Rodriguez MD  Neurosurgery  [facility] - Neuro Critical Care

## 2025-07-12 NOTE — PROGRESS NOTES
[facility] - Neuro Critical Care  Neurocritical Care  Progress Note    Admit Date: 7/1/2025  Service Date: 07/12/2025  Length of Stay: 11    Subjective:     Chief Complaint: Ataxia    History of Present Illness: 71 y/o M presenting from [facility] for generalized weakness for about a week now. History obtained from rehab nurse. She reported that he got to their facility about a month ago and, at baseline, walks with a cane and is alert and oriented. His nurse noticed that he has seemed weaker over the past week and over the past 3 days has had increased balance disturbance and gait issues. They also noticed that he has been speaking more softly in his speech is harder to understand. He has not had any infectious symptoms. He fell yesterday, unsure if he hit his head. CT head without contrast revealed multiple lesions of hypodensity with a hyperdense rim, notably in the left temporal lobe and right cerebellum. There were additional small hyperdensities in the right frontoparietal and left parasagittal parietal lobe. There is mass effect and partial effacement of the 4th ventricle secondary to the cerebellar lesion with developing hydrocephalus without MLS. CT of the chest, abdomen, and pelvis with IV contrast showed RUL mass with mediastinal adenopathy. Labs revealed Hep C and treponema antibodies were positive. Hep C PCR pending. Penicillin G was administered. A sepsis workup was completed and antibiotics were initiated. An MRI brain with and without contrast was ordered, but patient was unable to complete due to persistent movement after sedatives were administered. Prior to MRI, he was alert but drowsy. He was able to tell me his name, but told me he was 53 years old, the year was 2003, and he did not know the current place or reason for being here. He had generalized weakness but was slightly more weak on the right side. He followed simple commands, but did not attempt to follow more complex commands. He is  admitted to [facility] with NSGY consult.    Hospital Course: 7/2/2025: MRI with significant motion artifact. Required presidex overnight secondary to agitation  7/3/2025: MRI favors neoplastic brain lesions. CSF studies pending. Started on Zyprexa and phenobarbital for agitation, R subclavian CVC placed for central access  7/4/2025: NAEON. Extubated without complications.  07/05/2025 NAEON. EVD @20. Adjusted oxycodone frequency q8. Increased zyprexa 20 BID. Precedex for agitation. Plan for biopsy Monday w/ NSGY.  07/06/2025 NAEON. Added on 100q4 FWF. Continues to require precedex for agitation. OR tomorrow.  07/07/2025: AF. SHARRON. Exam stable. OR today for SOC crani for tumor resection. ICPs 0-14, 44cc output. PRN versed given for agitation. Precedex at 1.2. Hydral prn x1 for SBP >160. Remains in restraints.  07/08/2025: AFJared MCDERMOTT. POD1 s/p SOC. CT/MRI from overnight reviewed. EVD open at 10, ICPs -2-14, output 33cc. Versed PRN x1 given for agitation. Agitation improved with Clonidine. Cardene at 12.5 for SBP <160. Hydralazine prn x1 given. Multiple loose BMs. Off propofol and ketamine. Extubated this morning.  07/09/2025: Tolerated extubation overnight, more awake and cooperative on exam this AM, off precedex gtt overnight w/ versed PRN x1 only. Maxed on cardene gtt, add losartan 50 mg qday, increase clonidine to 0.2 mg TID for BP control. EVD @ 10, plan for repeat head CT Friday w/ EVD wean over weekend per discussion w/ NSGY  07/10/2025: NAEON. Afebrile. Pt removed NGT overnight, replaced. No longer on cardene, required hydralazine x1 and labetalol x2 PRNs. Losartan increased to 100mg. EVD open at 10, will plan to keep and wean over weekend per NSGY. Steroid taper per NSGY beginning today.  07/11/2025 NAEON. Neuro exam slowly improving, weaning phenobarb. Remains hypertensive, increasing clonidine to 0.3 mg TID and adding amlodipine 10 mg qday. CT head stable, EVD clamped -> plan for repeat head CT Monday morning per  NSGY  07/12/2025 SHARRON. Neuro exam stable, weaning phenobarb and dexamethasone.EVD clamped, CT Monday.    Interval History:  See hospital course       Objective:     Vitals:  Temp: 99.1 °F (37.3 °C)  Pulse: 84  Rhythm: normal sinus rhythm  BP: (!) 148/83  MAP (mmHg): 109  ICP Mean (mmHg): 2 mmHg  Resp: 13  SpO2: (!) 93 %    Temp  Min: 97.1 °F (36.2 °C)  Max: 99.1 °F (37.3 °C)  Pulse  Min: 79  Max: 96  BP  Min: 129/74  Max: 168/104  MAP (mmHg)  Min: 96  Max: 130  ICP Mean (mmHg)  Min: -3 mmHg  Max: 9 mmHg  Resp  Min: 12  Max: 23  SpO2  Min: 93 %  Max: 97 %    07/11 0701 - 07/12 0700  In: 3235   Out: 1506 [Urine:1495; Drains:11]   Unmeasured Output  Unmeasured Urine Occurrence: 1  Unmeasured Stool Occurrence: 2        Physical Exam  General Appearance: Elderly gentleman resting in bed, NG tube and EVD in place. Not in acute hemodynamic distress  Mental Status Exam: awake, alert, oriented to self only. Attempting to converse w/ examiner, level of alertness improved from prior. Following simple commands x4 extremities   Cranial Nerves: Gaze midline, PERRL, no clear gaze preference. Dysarthric. +cough, spontaneous  Motor: Moving all 4 extremities AG to command, grossly symmetric   Sensory: Grossly symmetric to noxious x4  Coordination: Unable to assess  Vascular: S1/S2 of normal intensity, no S3/S4 appreciated, no murmurs appreciated  Lungs: CTA bilaterally without wheezing  Abdomen: Soft, non-distended, non-tender        Medications:  Continuous ScheduledamLODIPine, 10 mg, Daily  ceFAZolin (Ancef) IV (PEDS and ADULTS), 1 g, Q8H  cloNIDine, 0.3 mg, Q8H  dexAMETHasone, 4 mg, Q12H   Followed by  [START ON 7/13/2025] dexAMETHasone, 2 mg, Q12H  famotidine, 20 mg, BID  folic acid, 1 mg, Daily  heparin (porcine), 5,000 Units, Q8H  losartan, 100 mg, Daily  multivitamin, 1 tablet, Daily  OLANZapine, 20 mg, BID  PHENobarbitaL, 60 mg, Q8H   Followed by  [START ON 7/13/2025] PHENobarbitaL, 30 mg, Q8H   Followed by  [START ON  7/14/2025] PHENobarbitaL, 30 mg, BID   Followed by  [START ON 7/15/2025] PHENobarbitaL, 15 mg, BID   Followed by  [START ON 7/16/2025] PHENobarbitaL, 15 mg, Daily  polyethylene glycol, 17 g, Daily  senna-docusate, 2 tablet, BID  sulfamethoxazole-trimethoprim 800-160mg, 1 tablet, Every Mon, Wed, Fri  thiamine, 100 mg, Daily    PRNacetaminophen, 650 mg, Q4H PRN  bisacodyL, 10 mg, Daily PRN  hydrALAZINE, 10 mg, Q4H PRN  labetalol, 10 mg, Q4H PRN  ondansetron, 4 mg, Q4H PRN  potassium bicarbonate, 35 mEq, PRN  potassium bicarbonate, 50 mEq, PRN  potassium bicarbonate, 60 mEq, PRN  potassium, sodium phosphates, 2 packet, PRN  potassium, sodium phosphates, 2 packet, PRN  potassium, sodium phosphates, 2 packet, PRN  sodium chloride 0.9%, 10 mL, PRN      Today I personally reviewed pertinent imaging, laboratory results     Diet  No diet orders on file  No diet orders on file  TF at goal     Assessment/Plan:     Neuro  * 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   PT/OT -> recommending acute rehab once medically appropriate for discharge     AMS (altered mental status)  See primary problem    Psychiatric  Polysubstance abuse  Was brought in from [facility], where he has been for the past month  Educate on cessation when appropriate  On phenobarbital for agitation, no signs of EtOH withdrawal -> phenobarb taper     Pulmonary  Mass of upper lobe of right lung  Noted on CXR and CT chest with IV contrast  Saturating well on RA; No PTX on CT-chest  No lung consolidations or obstructive process  No known history of cancer  Intra-op brain biopsy frozen pathology suggestive of metastatic adenocarcinoma, 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  Will need outpt rad onc and med onc f/u     Cardiac/Vascular  Essential hypertension  Hx of    - no longer on cardene gtt, however continues to require frequent PRNs  - Continue amlodipine 10 mg qday   - Continue clonidine to 0.3 mg TID  - C/w losartan 100 mg qday   - Goal SBP<160           The patient is being Prophylaxed  for:  Venous Thromboembolism with: Mechanical or Chemical  Stress Ulcer with: PPI  Ventilator Pneumonia with: chlorhexidine oral care    Activity Orders            Turn patient starting at 07/03 1124    Elevate HOB Elevate HOB 30-45 degrees during feeding unless contraindicated starting at 07/03 0802    Elevate HOB starting at 07/01 2040    Straight Cath starting at 07/01 1925          Full Code    Derick Gomez MD  Neurocritical Care  [facility] - Neuro Critical Care

## 2025-07-12 NOTE — ASSESSMENT & PLAN NOTE
2/2 cerebellar lesion   PT/OT -> recommending acute rehab once medically appropriate for discharge

## 2025-07-12 NOTE — PT/OT/SLP PROGRESS
"Occupational Therapy   Treatment    Name: Goran Carlos  MRN: [id number]  Admitting Diagnosis:  Ataxia  5 Days Post-Op    Recommendations:     Discharge Recommendations: High Intensity Therapy  Discharge Equipment Recommendations:  bath bench, bedside commode  Barriers to discharge:  None    Assessment:     Goran Carlos is a 69 y.o. male with a medical diagnosis of Ataxia.  He presents with performance deficits affecting function are impaired self care skills, impaired functional mobility, impaired endurance, impaired balance, impaired cognition, decreased coordination, decreased upper extremity function, decreased lower extremity function.     Rehab Prognosis:  Good; patient would benefit from acute skilled OT services to address these deficits and reach maximum level of function.       Plan:     Patient to be seen 4 x/week to address the above listed problems via cognitive retraining, neuromuscular re-education, therapeutic exercises, therapeutic activities, self-care/home management  Plan of Care Expires: 08/05/25  Plan of Care Reviewed with: patient    Subjective     [facility]:  [facility]  "I want to walk to the bathroom."  "Come back again, okay."  Pain/Comfort:  Pain Rating 1: 0/10  Pain Rating Post-Intervention 1: 0/10    Objective:     Communicated with: Nurse prior to session.  Patient found supine with bed alarm, blood pressure cuff, Condom Catheter, external ventricular drain, peripheral IV, NG tube, telemetry, SCD, restraints, pulse ox (continuous) (Roll belt) upon OT entry to room.    General Precautions: Standard, aspiration, fall; EVD clamped      Orthopedic Precautions:N/A  Braces: N/A  Respiratory Status: Room air     Occupational Performance:     Bed Mobility:    Patient completed Rolling/Turning to Left with  contact guard assistance  Patient completed Rolling/Turning to Right with contact guard assistance  Patient completed Supine to Sit with moderate assistance  Patient completed Sit to " Supine with moderate assistance     Functional Mobility/Transfers:  Patient completed Sit <> Stand Transfer with minimum assistance  with  no assistive device x 5 trials  Moderate assist with taking side steps along the EOB    Activities of Daily Living:  Feeding:  NPO    Grooming: moderate assistance while standing  Lower Body Dressing: maximal assistance while seated EOB    [facility] 6 Click ADL: 11    Treatment & Education:  Patient alert and oriented x person.  Able to follow 4/4 one step commands.  Patient attentive and interactive throughout the session.  Family not present during the session. SBA with postural control while seated EOB.    Patient left supine with all lines intact, call button in reach, and bed alarm on    GOALS:   Multidisciplinary Problems       Occupational Therapy Goals          Problem: Occupational Therapy    Goal Priority Disciplines Outcome Interventions   Occupational Therapy Goal     OT, PT/OT Progressing    Description: Goals set 7/8 with an expiration date, 8/5:  Patient will increase functional independence with ADLs by performing:    Patient will demonstrate rolling to the right with min assist.  Not met   Patient will demonstrate rolling to the left with min assist.   Not met  Patient will demonstrate supine -sit with min  assist.   Not met  Patient will demonstrate stand pivot transfers with mod assist.   Not met  Patient will demonstrate grooming while seated with min assist.   Not met  Patient will demonstrate upper body dressing with min assist while seated EOB.   Not met  Patient will demonstrate lower body dressing with mod assist while seated EOB.   Not met  Patient will demonstrate toileting with mod assist.   Not met  Patient will demonstrate bathing while seated EOB with mod assist.   Not met  Patient's family / caregiver will demonstrate independence and safety with assisting patient with self-care skills and functional mobility.     Not met  Patient's family / caregiver  will demonstrate independence with providing ROM and changes in bed positioning.   Not met  Patient and/or patient's family will verbalize understanding of stroke prevention guidelines, personal risk factors and stroke warning signs via teachback method.  Not met     DME Justifications (see above for complete DME recommendations)    Bedside Commode- Patient has a mobility limitation that significantly impairs their ability to participate in one or more mobility related activities of daily living, including toileting. This deficit can be resolved by using a bedside commode. Patient demonstrates mobility limitations that will cause them to be confined to one room at home without bathroom access for up to 30 days. Using a bedside commode will greatly improve the patient's ability to participate in MRADLs.                                             Time Tracking:     OT Date of Treatment: 07/12/25  OT Start Time: 0406  OT Stop Time: 0444  OT Total Time (min): 38 min    Billable Minutes:Self Care/Home Management 26  Neuromuscular Re-education 12    OT/DOTTIE: OT          7/12/2025

## 2025-07-13 LAB
ABSOLUTE EOSINOPHIL (OHS): 0.07 K/UL
ABSOLUTE MONOCYTE (OHS): 0.48 K/UL (ref 0.3–1)
ABSOLUTE NEUTROPHIL COUNT (OHS): 4.45 K/UL (ref 1.8–7.7)
ALBUMIN SERPL BCP-MCNC: 2.8 G/DL (ref 3.5–5.2)
ALBUMIN SERPL BCP-MCNC: 2.9 G/DL (ref 3.5–5.2)
ALP SERPL-CCNC: 61 UNIT/L (ref 40–150)
ALP SERPL-CCNC: 63 UNIT/L (ref 40–150)
ALT SERPL W/O P-5'-P-CCNC: 12 UNIT/L (ref 10–44)
ALT SERPL W/O P-5'-P-CCNC: 14 UNIT/L (ref 10–44)
ANION GAP (OHS): 7 MMOL/L (ref 8–16)
ANION GAP (OHS): 7 MMOL/L (ref 8–16)
AST SERPL-CCNC: 40 UNIT/L (ref 11–45)
AST SERPL-CCNC: 48 UNIT/L (ref 11–45)
BASOPHILS # BLD AUTO: 0.01 K/UL
BASOPHILS NFR BLD AUTO: 0.2 %
BILIRUB SERPL-MCNC: 0.3 MG/DL (ref 0.1–1)
BILIRUB SERPL-MCNC: 0.3 MG/DL (ref 0.1–1)
BUN SERPL-MCNC: 29 MG/DL (ref 8–23)
BUN SERPL-MCNC: 30 MG/DL (ref 8–23)
CALCIUM SERPL-MCNC: 8.2 MG/DL (ref 8.7–10.5)
CALCIUM SERPL-MCNC: 8.7 MG/DL (ref 8.7–10.5)
CHLORIDE SERPL-SCNC: 107 MMOL/L (ref 95–110)
CHLORIDE SERPL-SCNC: 110 MMOL/L (ref 95–110)
CO2 SERPL-SCNC: 21 MMOL/L (ref 23–29)
CO2 SERPL-SCNC: 24 MMOL/L (ref 23–29)
CREAT SERPL-MCNC: 0.8 MG/DL (ref 0.5–1.4)
CREAT SERPL-MCNC: 0.8 MG/DL (ref 0.5–1.4)
ERYTHROCYTE [DISTWIDTH] IN BLOOD BY AUTOMATED COUNT: 13.5 % (ref 11.5–14.5)
GFR SERPLBLD CREATININE-BSD FMLA CKD-EPI: >60 ML/MIN/1.73/M2
GFR SERPLBLD CREATININE-BSD FMLA CKD-EPI: >60 ML/MIN/1.73/M2
GLUCOSE SERPL-MCNC: 101 MG/DL (ref 70–110)
GLUCOSE SERPL-MCNC: 101 MG/DL (ref 70–110)
HCT VFR BLD AUTO: 33.2 % (ref 40–54)
HGB BLD-MCNC: 11 GM/DL (ref 14–18)
IMM GRANULOCYTES # BLD AUTO: 0.02 K/UL (ref 0–0.04)
IMM GRANULOCYTES NFR BLD AUTO: 0.3 % (ref 0–0.5)
LYMPHOCYTES # BLD AUTO: 0.83 K/UL (ref 1–4.8)
MAGNESIUM SERPL-MCNC: 1.8 MG/DL (ref 1.6–2.6)
MAGNESIUM SERPL-MCNC: 1.9 MG/DL (ref 1.6–2.6)
MCH RBC QN AUTO: 30.7 PG (ref 27–31)
MCHC RBC AUTO-ENTMCNC: 33.1 G/DL (ref 32–36)
MCV RBC AUTO: 93 FL (ref 82–98)
NUCLEATED RBC (/100WBC) (OHS): 0 /100 WBC
PHOSPHATE SERPL-MCNC: 3.2 MG/DL (ref 2.7–4.5)
PHOSPHATE SERPL-MCNC: 3.4 MG/DL (ref 2.7–4.5)
PLATELET # BLD AUTO: 174 K/UL (ref 150–450)
PMV BLD AUTO: 9.6 FL (ref 9.2–12.9)
POTASSIUM SERPL-SCNC: 4 MMOL/L (ref 3.5–5.1)
POTASSIUM SERPL-SCNC: 4.9 MMOL/L (ref 3.5–5.1)
PROT SERPL-MCNC: 6.1 GM/DL (ref 6–8.4)
PROT SERPL-MCNC: 6.5 GM/DL (ref 6–8.4)
RBC # BLD AUTO: 3.58 M/UL (ref 4.6–6.2)
RELATIVE EOSINOPHIL (OHS): 1.2 %
RELATIVE LYMPHOCYTE (OHS): 14.2 % (ref 18–48)
RELATIVE MONOCYTE (OHS): 8.2 % (ref 4–15)
RELATIVE NEUTROPHIL (OHS): 75.9 % (ref 38–73)
SODIUM SERPL-SCNC: 138 MMOL/L (ref 136–145)
SODIUM SERPL-SCNC: 138 MMOL/L (ref 136–145)
WBC # BLD AUTO: 5.86 K/UL (ref 3.9–12.7)

## 2025-07-13 PROCEDURE — 63600175 PHARM REV CODE 636 W HCPCS

## 2025-07-13 PROCEDURE — 84100 ASSAY OF PHOSPHORUS: CPT

## 2025-07-13 PROCEDURE — 25000003 PHARM REV CODE 250: Performed by: PSYCHIATRY & NEUROLOGY

## 2025-07-13 PROCEDURE — 63600175 PHARM REV CODE 636 W HCPCS: Performed by: STUDENT IN AN ORGANIZED HEALTH CARE EDUCATION/TRAINING PROGRAM

## 2025-07-13 PROCEDURE — 80053 COMPREHEN METABOLIC PANEL: CPT | Performed by: PSYCHIATRY & NEUROLOGY

## 2025-07-13 PROCEDURE — 25000003 PHARM REV CODE 250: Performed by: PHYSICIAN ASSISTANT

## 2025-07-13 PROCEDURE — 85025 COMPLETE CBC W/AUTO DIFF WBC: CPT | Performed by: PSYCHIATRY & NEUROLOGY

## 2025-07-13 PROCEDURE — 25000003 PHARM REV CODE 250

## 2025-07-13 PROCEDURE — 83735 ASSAY OF MAGNESIUM: CPT

## 2025-07-13 PROCEDURE — 20000000 HC ICU ROOM

## 2025-07-13 PROCEDURE — 83735 ASSAY OF MAGNESIUM: CPT | Performed by: PSYCHIATRY & NEUROLOGY

## 2025-07-13 PROCEDURE — 99291 CRITICAL CARE FIRST HOUR: CPT | Mod: ,,, | Performed by: PSYCHIATRY & NEUROLOGY

## 2025-07-13 PROCEDURE — 84100 ASSAY OF PHOSPHORUS: CPT | Performed by: PSYCHIATRY & NEUROLOGY

## 2025-07-13 PROCEDURE — 25000003 PHARM REV CODE 250: Performed by: NURSE PRACTITIONER

## 2025-07-13 PROCEDURE — 80053 COMPREHEN METABOLIC PANEL: CPT

## 2025-07-13 RX ORDER — LANOLIN ALCOHOL/MO/W.PET/CERES
800 CREAM (GRAM) TOPICAL
Status: DISCONTINUED | OUTPATIENT
Start: 2025-07-13 | End: 2025-07-16

## 2025-07-13 RX ADMIN — Medication 100 MG: at 09:07

## 2025-07-13 RX ADMIN — OLANZAPINE 20 MG: 5 TABLET, FILM COATED ORAL at 09:07

## 2025-07-13 RX ADMIN — LOSARTAN POTASSIUM 100 MG: 50 TABLET, FILM COATED ORAL at 09:07

## 2025-07-13 RX ADMIN — FOLIC ACID 1 MG: 1 TABLET ORAL at 09:07

## 2025-07-13 RX ADMIN — CLONIDINE HYDROCHLORIDE 0.3 MG: 0.2 TABLET ORAL at 09:07

## 2025-07-13 RX ADMIN — SENNOSIDES AND DOCUSATE SODIUM 2 TABLET: 50; 8.6 TABLET ORAL at 09:07

## 2025-07-13 RX ADMIN — FAMOTIDINE 20 MG: 20 TABLET, FILM COATED ORAL at 09:07

## 2025-07-13 RX ADMIN — HEPARIN SODIUM 5000 UNITS: 5000 INJECTION INTRAVENOUS; SUBCUTANEOUS at 09:07

## 2025-07-13 RX ADMIN — HEPARIN SODIUM 5000 UNITS: 5000 INJECTION INTRAVENOUS; SUBCUTANEOUS at 02:07

## 2025-07-13 RX ADMIN — CLONIDINE HYDROCHLORIDE 0.3 MG: 0.2 TABLET ORAL at 06:07

## 2025-07-13 RX ADMIN — CEFAZOLIN 1 G: 330 INJECTION, POWDER, FOR SOLUTION INTRAMUSCULAR; INTRAVENOUS at 01:07

## 2025-07-13 RX ADMIN — CEFAZOLIN 1 G: 330 INJECTION, POWDER, FOR SOLUTION INTRAMUSCULAR; INTRAVENOUS at 05:07

## 2025-07-13 RX ADMIN — PHENOBARBITAL 30 MG: 30 TABLET ORAL at 09:07

## 2025-07-13 RX ADMIN — AMLODIPINE BESYLATE 10 MG: 10 TABLET ORAL at 09:07

## 2025-07-13 RX ADMIN — PHENOBARBITAL 30 MG: 30 TABLET ORAL at 04:07

## 2025-07-13 RX ADMIN — CEFAZOLIN 1 G: 330 INJECTION, POWDER, FOR SOLUTION INTRAMUSCULAR; INTRAVENOUS at 09:07

## 2025-07-13 RX ADMIN — HEPARIN SODIUM 5000 UNITS: 5000 INJECTION INTRAVENOUS; SUBCUTANEOUS at 06:07

## 2025-07-13 RX ADMIN — CLONIDINE HYDROCHLORIDE 0.3 MG: 0.2 TABLET ORAL at 02:07

## 2025-07-13 RX ADMIN — THERA TABS 1 TABLET: TAB at 09:07

## 2025-07-13 RX ADMIN — PHENOBARBITAL 30 MG: 30 TABLET ORAL at 06:07

## 2025-07-13 RX ADMIN — DEXAMETHASONE 2 MG: 1 TABLET ORAL at 09:07

## 2025-07-13 NOTE — PLAN OF CARE
"[facility] Care Plan    POC reviewed with Piney Brown and family at 0300. Patient and Family verbalized understanding. His sister in law called to checked on him Questions and concerns addressed. No acute events today. Pt progressing toward goals. Will continue to monitor. See below and flowsheets for full assessment and VS info.   -Neuro status unchanged.   -SBP goal maintained  _EVP still clamped  .   - Gave bath and changed linens        Is this a stroke patient? no    Neuro:  [place] Coma Scale  Best Eye Response: 4-->(E4) spontaneous  Best Motor Response: 6-->(M6) obeys commands  Best Verbal Response: 4-->(V4) confused  [place] Coma Scale Score: 14  Assessment Qualifiers: Patient not sedated/intubated  Pupil PERRLA: yes     24 hr Temp:  [98.4 °F (36.9 °C)-98.9 °F (37.2 °C)]     CV:   Rhythm: normal sinus rhythm  BP goals:   SBP < 160  MAP > 65    Resp: room air    Plan: remove EVD on Monday    GI/:     Diet/Nutrition Received: tube feeding  Last Bowel Movement: 07/12/25  Voiding Characteristics: external catheter    Intake/Output Summary (Last 24 hours) at 7/13/2025 0829  Last data filed at 7/13/2025 0605  Gross per 24 hour   Intake 2310 ml   Output 1800 ml   Net 510 ml     Unmeasured Output  Unmeasured Urine Occurrence: 1  Unmeasured Stool Occurrence: 2    Labs/Accuchecks:  Recent Labs   Lab 07/12/25  0135   WBC 6.83   RBC 3.54*   HGB 10.9*   HCT 35.0*         Recent Labs   Lab 07/13/25  0547      K 4.9   CO2 21*      BUN 30*   CREATININE 0.8   ALKPHOS 63   ALT 14   AST 48*   BILITOT 0.3    No results for input(s): "PROTIME", "INR", "APTT", "HEPANTIXA" in the last 168 hours. No results for input(s): "CPK", "CPKMB", "TROPONINI", "MB" in the last 168 hours.    Electrolytes: N/A - electrolytes WDL  Accuchecks: none    Gtts:      LDA/Wounds:    Davi Risk Assessment  Sensory Perception: 3-->slightly limited  Moisture: 3-->occasionally moist  Activity: 1-->bedfast  Mobility: 3-->slightly " limited  Nutrition: 3-->adequate  Friction and Shear: 3-->no apparent problem  Davi Score: 16  Is your davi score 12 or less? no          Restraints:   Restraint Order  Length of Order: Order good for next 24 hours or when removed.  Date that the current order will : 25  Time that the current order will : 636  Order Upon Application: Yes    [facility]

## 2025-07-13 NOTE — EICU
SERVANDO Night Rounds Checklist  24H Vital Sign Range:  Temp:  [98.4 °F (36.9 °C)-99.1 °F (37.3 °C)]   Pulse:  [79-91]   Resp:  [12-28]   BP: (130-177)/()   SpO2:  [92 %-98 %]     Video rounds

## 2025-07-13 NOTE — EICU
Virtual ICU Quality Rounds    Admit Date: 7/1/2025  Hospital Day: 12    ICU Day: 11d 16h    24H Vital Sign Range:  Temp:  [98.4 °F (36.9 °C)-98.9 °F (37.2 °C)]   Pulse:  [78-90]   Resp:  [13-28]   BP: (141-192)/()   SpO2:  [94 %-98 %]     VICU Surveillance Screening

## 2025-07-13 NOTE — SUBJECTIVE & OBJECTIVE
Interval History: NAEON. EVD remains clamped, posterior scalp incision w/o flucutance. Anticipate EVD removal tomorrow following CTH.     Medications:  Continuous Infusions:  Scheduled Meds:   amLODIPine  10 mg Per NG tube Daily    ceFAZolin (Ancef) IV (PEDS and ADULTS)  1 g Intravenous Q8H    cloNIDine  0.3 mg Per NG tube Q8H    dexAMETHasone  2 mg Per NG tube Q12H    famotidine  20 mg Per NG tube BID    folic acid  1 mg Per NG tube Daily    heparin (porcine)  5,000 Units Subcutaneous Q8H    losartan  100 mg Per NG tube Daily    multivitamin  1 tablet Per NG tube Daily    OLANZapine  20 mg Per NG tube BID    PHENobarbitaL  30 mg Per NG tube Q8H    Followed by    [START ON 7/14/2025] PHENobarbitaL  30 mg Per NG tube BID    Followed by    [START ON 7/15/2025] PHENobarbitaL  15 mg Per NG tube BID    Followed by    [START ON 7/16/2025] PHENobarbitaL  15 mg Per NG tube Daily    polyethylene glycol  17 g Per NG tube Daily    senna-docusate  2 tablet Per NG tube BID    sulfamethoxazole-trimethoprim 800-160mg  1 tablet Per NG tube Every Mon, Wed, Fri    thiamine  100 mg Per NG tube Daily     PRN Meds:  Current Facility-Administered Medications:     acetaminophen, 650 mg, Per NG tube, Q4H PRN    bisacodyL, 10 mg, Rectal, Daily PRN    hydrALAZINE, 10 mg, Intravenous, Q4H PRN    labetalol, 10 mg, Intravenous, Q4H PRN    ondansetron, 4 mg, Intravenous, Q4H PRN    potassium bicarbonate, 35 mEq, Per NG tube, PRN    potassium bicarbonate, 50 mEq, Per NG tube, PRN    potassium bicarbonate, 60 mEq, Per NG tube, PRN    potassium, sodium phosphates, 2 packet, Per NG tube, PRN    potassium, sodium phosphates, 2 packet, Per NG tube, PRN    potassium, sodium phosphates, 2 packet, Per NG tube, PRN    sodium chloride 0.9%, 10 mL, Intravenous, PRN     Review of Systems  Objective:     Weight: 81.1 kg (178 lb 12.7 oz)  Body mass index is 23.59 kg/m².  Vital Signs (Most Recent):  Temp: 98.5 °F (36.9 °C) (07/12/25 2305)  Pulse: 86 (07/13/25  0505)  Resp: 20 (07/13/25 0505)  BP: (!) 192/129 (07/13/25 0505)  SpO2: 95 % (07/13/25 0505) Vital Signs (24h Range):  Temp:  [98.4 °F (36.9 °C)-98.9 °F (37.2 °C)] 98.5 °F (36.9 °C)  Pulse:  [79-91] 86  Resp:  [12-28] 20  SpO2:  [92 %-98 %] 95 %  BP: (141-192)/() 192/129                              NG/OG Tube 07/10/25 0400 Other (comments) Left nostril (Active)   Placement Check placement verified by distal tube length measurement 07/13/25 0505   Tolerance no signs/symptoms of discomfort 07/13/25 0505   Securement secured to nostril center w/ adhesive device 07/13/25 0505   Clamp Status/Tolerance unclamped 07/13/25 0505   Suction Setting/Drainage Method suction at the bedside 07/13/25 0505   Insertion Site Appearance no redness, warmth, tenderness, skin breakdown, drainage 07/13/25 0505   Flush/Irrigation flushed w/;water;no resistance met 07/13/25 0505   Feeding Type continuous;by pump 07/13/25 0505   Feeding Action feeding continued 07/13/25 0505   Current Rate (mL/hr) 55 mL/hr 07/13/25 0505   Goal Rate (mL/hr) 55 mL/hr 07/13/25 0505   Intake (mL) 110 mL 07/12/25 2111   Water Bolus (mL) 250 mL 07/13/25 0405   Rate Formula Tube Feeding (mL/hr) 55 mL/hr 07/13/25 0505   Formula Name pivot 07/13/25 0505   Intake (mL) - Formula Tube Feeding 55 07/13/25 0605   Residual Amount (ml) 0 ml 07/11/25 1905       Male External Urinary Catheter 07/08/25 0705 Medium (Active)   Collection Container Urimeter 07/13/25 0505   Securement Method secured to top of thigh w/ adhesive device 07/13/25 0505   Skin no redness;no breakdown 07/13/25 0505   Tolerance no signs/symptoms of discomfort 07/13/25 0505   Output (mL) 270 mL 07/13/25 0605   Catheter Change Date 07/12/25 07/13/25 0505   Catheter Change Time 1905 07/13/25 0505            ICP/Ventriculostomy 07/02/25 1409 Right (Active)   Level of Ventriculostomy (cm above) 10 07/12/25 2105   Status Clamped 07/13/25 0605   Site Assessment Clean;Dry 07/13/25 0605   Site Drainage No  "drainage 07/13/25 0605   Waveform normal waveform 07/12/25 2105   Output (mL) 0 mL 07/12/25 1905   CSF Color clear 07/12/25 1701   Dressing Status Clean;Dry;Intact 07/13/25 0605   Interventions zeroed;HOB degrees;bed controls locked 07/13/25 0605          Physical Exam         Neurosurgery Physical Exam  E4V4M6  AOX2  PERRL  HARRISON spon AG  Following commands x4, HARRISON spon, nonfocal motor exam     EVD patent with good waveform  Incision flat, c/d/I without fluctuance    Significant Labs:  Recent Labs   Lab 07/12/25  0135 07/13/25  0547    101    138   K 4.5 4.9   * 110   CO2 19* 21*   BUN 34* 30*   CREATININE 1.0 0.8   CALCIUM 8.7 8.2*   MG 1.9 1.8     Recent Labs   Lab 07/12/25  0135 07/13/25  0830   WBC 6.83 5.86   HGB 10.9* 11.0*   HCT 35.0* 33.2*    174     No results for input(s): "LABPT", "INR", "APTT" in the last 48 hours.  Microbiology Results (last 7 days)       Procedure Component Value Units Date/Time    CSF culture [[id number]] Collected: 07/10/25 0856    Order Status: Completed Specimen: CSF (Spinal Fluid) from CSF Tap, Tube 3 Updated: 07/13/25 0636     CULTURE, CSF No Growth To Date     GRAM STAIN Cytospin indicates:      No WBCs      No organisms seen    CSF culture [[id number]] Collected: 07/07/25 0715    Order Status: Completed Specimen: CSF (Spinal Fluid) from CSF Tap, Tube 3 Updated: 07/12/25 0713     CULTURE, CSF No Growth     GRAM STAIN Cytospin indicates:      No WBCs      No organisms seen    Gram stain [[id number]] Collected: 07/10/25 0856    Order Status: Canceled Specimen: CSF (Spinal Fluid) from CSF Tap, Tube 3 Updated: 07/10/25 1400    AFB Culture & Smear [[id number]]  (Normal) Collected: 07/02/25 1808    Order Status: Completed Specimen: CSF (Spinal Fluid) from CSF Tap, Tube 3 Updated: 07/10/25 0205     CULTURE, AFB  Culture Negative For Mycobacteria At 1 Week    CSF culture [[id number]] Collected: 07/02/25 1687    Order Status: Completed Specimen: CSF (Spinal " Fluid) from CSF Tap, Tube 3 Updated: 07/08/25 0747     CULTURE, CSF No Growth     GRAM STAIN Cytospin indicates:      No WBCs      No organisms seen    Gram stain [[id number]] Collected: 07/07/25 0715    Order Status: Canceled Specimen: CSF (Spinal Fluid) from CSF Tap, Tube 3 Updated: 07/07/25 1620    Culture, Respiratory with Gram Stain [[id number]] Collected: 07/03/25 1130    Order Status: Completed Specimen: Respiratory from Endotracheal Aspirate Updated: 07/07/25 0844     Respiratory Culture Normal respiratory carmen, no Staph aureus or Pseudomonas isolated     GRAM STAIN <10 Epithelial Cells/LPF      Rare WBC seen      No organisms seen    CSF culture [[id number]]     Order Status: Canceled Specimen: CSF (Spinal Fluid)     Blood culture #1 **CANNOT BE ORDERED STAT** [[id number]]  (Normal) Collected: 07/01/25 1702    Order Status: Completed Specimen: Blood from Peripheral, Forearm, Left Updated: 07/07/25 0000     Blood Culture No Growth After 5 Days    Blood culture #2 **CANNOT BE ORDERED STAT** [[id number]]  (Normal) Collected: 07/01/25 1702    Order Status: Completed Specimen: Blood from Peripheral, Lower Arm, Left Updated: 07/07/25 0000     Blood Culture No Growth After 5 Days          All pertinent labs from the last 24 hours have been reviewed.    Significant Diagnostics:  I have reviewed all pertinent imaging results/findings within the past 24 hours.

## 2025-07-13 NOTE — PROGRESS NOTES
[facility]  Neurosurgery  Progress Note    Subjective:     History of Present Illness: Patient is 69yo M with generalized weakness, falls, and paucity of speech. 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 On exam, patient's voice is barely audible but patient appropriately answers questions and follows commands. Patient's brother states that patient had normal speech and gait when they last saw each other about 1 month ago.    Neurosurgery consulted due to multiple ring-enhancing lesions seen on CT brain.    Post-Op Info:  Procedure(s) (LRB):  CRANIOTOMY, SUBOCCIPITAL (Right)   6 Days Post-Op   Interval History: NAEON. EVD remains clamped, posterior scalp incision w/o flucutance. Anticipate EVD removal tomorrow following CTH.     Medications:  Continuous Infusions:  Scheduled Meds:   amLODIPine  10 mg Per NG tube Daily    ceFAZolin (Ancef) IV (PEDS and ADULTS)  1 g Intravenous Q8H    cloNIDine  0.3 mg Per NG tube Q8H    dexAMETHasone  2 mg Per NG tube Q12H    famotidine  20 mg Per NG tube BID    folic acid  1 mg Per NG tube Daily    heparin (porcine)  5,000 Units Subcutaneous Q8H    losartan  100 mg Per NG tube Daily    multivitamin  1 tablet Per NG tube Daily    OLANZapine  20 mg Per NG tube BID    PHENobarbitaL  30 mg Per NG tube Q8H    Followed by    [START ON 7/14/2025] PHENobarbitaL  30 mg Per NG tube BID    Followed by    [START ON 7/15/2025] PHENobarbitaL  15 mg Per NG tube BID    Followed by    [START ON 7/16/2025] PHENobarbitaL  15 mg Per NG tube Daily    polyethylene glycol  17 g Per NG tube Daily    senna-docusate  2 tablet Per NG tube BID    sulfamethoxazole-trimethoprim 800-160mg  1 tablet Per NG tube Every Mon, Wed, Fri    thiamine  100 mg Per NG tube Daily     PRN Meds:  Current Facility-Administered Medications:     acetaminophen, 650 mg, Per NG tube, Q4H PRN    bisacodyL, 10 mg, Rectal, Daily PRN    hydrALAZINE, 10 mg, Intravenous, Q4H PRN     labetalol, 10 mg, Intravenous, Q4H PRN    ondansetron, 4 mg, Intravenous, Q4H PRN    potassium bicarbonate, 35 mEq, Per NG tube, PRN    potassium bicarbonate, 50 mEq, Per NG tube, PRN    potassium bicarbonate, 60 mEq, Per NG tube, PRN    potassium, sodium phosphates, 2 packet, Per NG tube, PRN    potassium, sodium phosphates, 2 packet, Per NG tube, PRN    potassium, sodium phosphates, 2 packet, Per NG tube, PRN    sodium chloride 0.9%, 10 mL, Intravenous, PRN     Review of Systems  Objective:     Weight: 81.1 kg (178 lb 12.7 oz)  Body mass index is 23.59 kg/m².  Vital Signs (Most Recent):  Temp: 98.5 °F (36.9 °C) (07/12/25 2305)  Pulse: 86 (07/13/25 0505)  Resp: 20 (07/13/25 0505)  BP: (!) 192/129 (07/13/25 0505)  SpO2: 95 % (07/13/25 0505) Vital Signs (24h Range):  Temp:  [98.4 °F (36.9 °C)-98.9 °F (37.2 °C)] 98.5 °F (36.9 °C)  Pulse:  [79-91] 86  Resp:  [12-28] 20  SpO2:  [92 %-98 %] 95 %  BP: (141-192)/() 192/129                              NG/OG Tube 07/10/25 0400 Other (comments) Left nostril (Active)   Placement Check placement verified by distal tube length measurement 07/13/25 0505   Tolerance no signs/symptoms of discomfort 07/13/25 0505   Securement secured to nostril center w/ adhesive device 07/13/25 0505   Clamp Status/Tolerance unclamped 07/13/25 0505   Suction Setting/Drainage Method suction at the bedside 07/13/25 0505   Insertion Site Appearance no redness, warmth, tenderness, skin breakdown, drainage 07/13/25 0505   Flush/Irrigation flushed w/;water;no resistance met 07/13/25 0505   Feeding Type continuous;by pump 07/13/25 0505   Feeding Action feeding continued 07/13/25 0505   Current Rate (mL/hr) 55 mL/hr 07/13/25 0505   Goal Rate (mL/hr) 55 mL/hr 07/13/25 0505   Intake (mL) 110 mL 07/12/25 2111   Water Bolus (mL) 250 mL 07/13/25 0405   Rate Formula Tube Feeding (mL/hr) 55 mL/hr 07/13/25 0505   Formula Name pivot 07/13/25 0505   Intake (mL) - Formula Tube Feeding 55 07/13/25 0605  "  Residual Amount (ml) 0 ml 07/11/25 1905       Male External Urinary Catheter 07/08/25 0705 Medium (Active)   Collection Container Urimeter 07/13/25 0505   Securement Method secured to top of thigh w/ adhesive device 07/13/25 0505   Skin no redness;no breakdown 07/13/25 0505   Tolerance no signs/symptoms of discomfort 07/13/25 0505   Output (mL) 270 mL 07/13/25 0605   Catheter Change Date 07/12/25 07/13/25 0505   Catheter Change Time 1905 07/13/25 0505            ICP/Ventriculostomy 07/02/25 1409 Right (Active)   Level of Ventriculostomy (cm above) 10 07/12/25 2105   Status Clamped 07/13/25 0605   Site Assessment Clean;Dry 07/13/25 0605   Site Drainage No drainage 07/13/25 0605   Waveform normal waveform 07/12/25 2105   Output (mL) 0 mL 07/12/25 1905   CSF Color clear 07/12/25 1701   Dressing Status Clean;Dry;Intact 07/13/25 0605   Interventions zeroed;HOB degrees;bed controls locked 07/13/25 0605          Physical Exam         Neurosurgery Physical Exam  E4V4M6  AOX2  PERRL  HARRISON spon AG  Following commands x4, HARRISON spon, nonfocal motor exam     EVD patent with good waveform  Incision flat, c/d/I without fluctuance    Significant Labs:  Recent Labs   Lab 07/12/25  0135 07/13/25  0547    101    138   K 4.5 4.9   * 110   CO2 19* 21*   BUN 34* 30*   CREATININE 1.0 0.8   CALCIUM 8.7 8.2*   MG 1.9 1.8     Recent Labs   Lab 07/12/25  0135 07/13/25  0830   WBC 6.83 5.86   HGB 10.9* 11.0*   HCT 35.0* 33.2*    174     No results for input(s): "LABPT", "INR", "APTT" in the last 48 hours.  Microbiology Results (last 7 days)       Procedure Component Value Units Date/Time    CSF culture [[id number]] Collected: 07/10/25 0856    Order Status: Completed Specimen: CSF (Spinal Fluid) from CSF Tap, Tube 3 Updated: 07/13/25 0636     CULTURE, CSF No Growth To Date     GRAM STAIN Cytospin indicates:      No WBCs      No organisms seen    CSF culture [[id number]] Collected: 07/07/25 0715    Order Status: " Completed Specimen: CSF (Spinal Fluid) from CSF Tap, Tube 3 Updated: 07/12/25 0713     CULTURE, CSF No Growth     GRAM STAIN Cytospin indicates:      No WBCs      No organisms seen    Gram stain [[id number]] Collected: 07/10/25 0856    Order Status: Canceled Specimen: CSF (Spinal Fluid) from CSF Tap, Tube 3 Updated: 07/10/25 1400    AFB Culture & Smear [[id number]]  (Normal) Collected: 07/02/25 1808    Order Status: Completed Specimen: CSF (Spinal Fluid) from CSF Tap, Tube 3 Updated: 07/10/25 0205     CULTURE, AFB  Culture Negative For Mycobacteria At 1 Week    CSF culture [[id number]] Collected: 07/02/25 1808    Order Status: Completed Specimen: CSF (Spinal Fluid) from CSF Tap, Tube 3 Updated: 07/08/25 0747     CULTURE, CSF No Growth     GRAM STAIN Cytospin indicates:      No WBCs      No organisms seen    Gram stain [[id number]] Collected: 07/07/25 0715    Order Status: Canceled Specimen: CSF (Spinal Fluid) from CSF Tap, Tube 3 Updated: 07/07/25 1620    Culture, Respiratory with Gram Stain [[id number]] Collected: 07/03/25 1130    Order Status: Completed Specimen: Respiratory from Endotracheal Aspirate Updated: 07/07/25 0844     Respiratory Culture Normal respiratory carmen, no Staph aureus or Pseudomonas isolated     GRAM STAIN <10 Epithelial Cells/LPF      Rare WBC seen      No organisms seen    CSF culture [[id number]]     Order Status: Canceled Specimen: CSF (Spinal Fluid)     Blood culture #1 **CANNOT BE ORDERED STAT** [[id number]]  (Normal) Collected: 07/01/25 1702    Order Status: Completed Specimen: Blood from Peripheral, Forearm, Left Updated: 07/07/25 0000     Blood Culture No Growth After 5 Days    Blood culture #2 **CANNOT BE ORDERED STAT** [[id number]]  (Normal) Collected: 07/01/25 1702    Order Status: Completed Specimen: Blood from Peripheral, Lower Arm, Left Updated: 07/07/25 0000     Blood Culture No Growth After 5 Days          All pertinent labs from the last 24 hours have been  reviewed.    Significant Diagnostics:  I have reviewed all pertinent imaging results/findings within the past 24 hours.  Assessment/Plan:     * Ataxia  Assessment  69yo M with generalized weakness, falls, and paucity of speech presenting with multiple ring enhancing lesions on brain CT now s/p R SOC for tumor resection on 7/7:    Imaging:  - CTH 7/1 showed multiple brain masses concerning for mets with surrounding edema, can't rule out abscesses. Mass in cerebellum with effacement of 4th ventricule outflow with concern for developing hydrocephalus  - MRI brain 7/1: non diagnostic due to motion  - CT CAP 7/1: spiculated R lung mass and lymph node adenopathy in chest and neck concerning for metastatic disease  - MRI Brain W/WO 7/2: multifocal enhancing lesions c/f metastatic disease, largest R cerebellum w/mass effect on 4th.  - Post op CTH/MRI 7/7: anticipate post op changes, hemostatic products left at superior/medial border of resection cavity  - CTH 7/11: no pseudomeningocele collection, persistent edema around L frontal met    Plan:  - admitted to [facility]  - EVD clamped, abx while in place; transduce hourly; M/Th CSF while remains in place. NGTD. Continue clamp through weekend to monitor for pseudomeningocele formation. CT ordered for Monday.   - Cont Dex 4q12 with Ppi, will cont wean slowly to 2 BID unless other dosage preferred by Onc  - AED per [facility]CC  - Onc following; will need outpatient rad onc consult. 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 Larios and Dr. Montesinos by NSGY team        Shruthi Rodriguez MD  Neurosurgery  [facility]

## 2025-07-13 NOTE — ASSESSMENT & PLAN NOTE
Assessment  69yo M with generalized weakness, falls, and paucity of speech presenting with multiple ring enhancing lesions on brain CT now s/p R SOC for tumor resection on 7/7:    Imaging:  - CTH 7/1 showed multiple brain masses concerning for mets with surrounding edema, can't rule out abscesses. Mass in cerebellum with effacement of 4th ventricule outflow with concern for developing hydrocephalus  - MRI brain 7/1: non diagnostic due to motion  - CT CAP 7/1: spiculated R lung mass and lymph node adenopathy in chest and neck concerning for metastatic disease  - MRI Brain W/WO 7/2: multifocal enhancing lesions c/f metastatic disease, largest R cerebellum w/mass effect on 4th.  - Post op CTH/MRI 7/7: anticipate post op changes, hemostatic products left at superior/medial border of resection cavity  - CTH 7/11: no pseudomeningocele collection, persistent edema around L frontal met    Plan:  - admitted to [facility]  - EVD clamped, abx while in place; transduce hourly; M/Th CSF while remains in place. NGTD. Continue clamp through weekend to monitor for pseudomeningocele formation. CT ordered for Monday.   - Cont Dex 4q12 with Ppi, will cont wean slowly to 2 BID unless other dosage preferred by Onc  - AED per NCC  - Onc following; will need outpatient rad onc consult. 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 Larios and Dr. Montesinos by NSGY team

## 2025-07-14 ENCOUNTER — TELEPHONE (OUTPATIENT)
Dept: HEMATOLOGY/ONCOLOGY | Facility: CLINIC | Age: 70
End: 2025-07-14
Payer: MEDICARE

## 2025-07-14 DIAGNOSIS — C79.31 LUNG CANCER METASTATIC TO BRAIN: Primary | ICD-10-CM

## 2025-07-14 DIAGNOSIS — C34.90 LUNG CANCER METASTATIC TO BRAIN: Primary | ICD-10-CM

## 2025-07-14 LAB
ABSOLUTE EOSINOPHIL (OHS): 0.08 K/UL
ABSOLUTE MONOCYTE (OHS): 0.51 K/UL (ref 0.3–1)
ABSOLUTE NEUTROPHIL COUNT (OHS): 4.09 K/UL (ref 1.8–7.7)
ALBUMIN SERPL BCP-MCNC: 3 G/DL (ref 3.5–5.2)
ALP SERPL-CCNC: 66 UNIT/L (ref 40–150)
ALT SERPL W/O P-5'-P-CCNC: 12 UNIT/L (ref 10–44)
ANION GAP (OHS): 6 MMOL/L (ref 8–16)
AST SERPL-CCNC: 39 UNIT/L (ref 11–45)
BASOPHILS # BLD AUTO: 0.02 K/UL
BASOPHILS NFR BLD AUTO: 0.3 %
BILIRUB SERPL-MCNC: 0.3 MG/DL (ref 0.1–1)
BUN SERPL-MCNC: 27 MG/DL (ref 8–23)
CALCIUM SERPL-MCNC: 8.6 MG/DL (ref 8.7–10.5)
CHLORIDE SERPL-SCNC: 106 MMOL/L (ref 95–110)
CLARITY CSF: CLEAR
CO2 SERPL-SCNC: 24 MMOL/L (ref 23–29)
COLOR CSF: COLORLESS
CREAT SERPL-MCNC: 0.8 MG/DL (ref 0.5–1.4)
CSF TUBE NUMBER (OHS): 0
CSF TUBE NUMBER (OHS): 0
ERYTHROCYTE [DISTWIDTH] IN BLOOD BY AUTOMATED COUNT: 13.1 % (ref 11.5–14.5)
GFR SERPLBLD CREATININE-BSD FMLA CKD-EPI: >60 ML/MIN/1.73/M2
GLUCOSE CSF-MCNC: 81 MG/DL (ref 40–70)
GLUCOSE SERPL-MCNC: 86 MG/DL (ref 70–110)
GRAM STN SPEC: NORMAL
GRAM STN SPEC: NORMAL
HCT VFR BLD AUTO: 32.6 % (ref 40–54)
HGB BLD-MCNC: 11 GM/DL (ref 14–18)
IMM GRANULOCYTES # BLD AUTO: 0.01 K/UL (ref 0–0.04)
IMM GRANULOCYTES NFR BLD AUTO: 0.2 % (ref 0–0.5)
LYMPHOCYTES # BLD AUTO: 1.18 K/UL (ref 1–4.8)
LYMPHOCYTES NFR CSF MANUAL: 20 % (ref 40–80)
MAGNESIUM SERPL-MCNC: 1.9 MG/DL (ref 1.6–2.6)
MCH RBC QN AUTO: 30.9 PG (ref 27–31)
MCHC RBC AUTO-ENTMCNC: 33.7 G/DL (ref 32–36)
MCV RBC AUTO: 92 FL (ref 82–98)
NEUTROPHILS NFR CSF MANUAL: 80 %
NUCLEATED RBC (/100WBC) (OHS): 0 /100 WBC
PHOSPHATE SERPL-MCNC: 3.1 MG/DL (ref 2.7–4.5)
PLATELET # BLD AUTO: 184 K/UL (ref 150–450)
PMV BLD AUTO: 10.1 FL (ref 9.2–12.9)
POTASSIUM SERPL-SCNC: 4.1 MMOL/L (ref 3.5–5.1)
PROT CSF-MCNC: 32 MG/DL (ref 15–40)
PROT SERPL-MCNC: 6.3 GM/DL (ref 6–8.4)
RBC # BLD AUTO: 3.56 M/UL (ref 4.6–6.2)
RBC # CSF: 40 /CU MM
RELATIVE EOSINOPHIL (OHS): 1.4 %
RELATIVE LYMPHOCYTE (OHS): 20 % (ref 18–48)
RELATIVE MONOCYTE (OHS): 8.7 % (ref 4–15)
RELATIVE NEUTROPHIL (OHS): 69.4 % (ref 38–73)
SODIUM SERPL-SCNC: 136 MMOL/L (ref 136–145)
SPECIMEN VOL CSF: 4.5 ML
WBC # BLD AUTO: 5.89 K/UL (ref 3.9–12.7)
WBC # CSF: 3 /CU MM

## 2025-07-14 PROCEDURE — 97110 THERAPEUTIC EXERCISES: CPT

## 2025-07-14 PROCEDURE — 97530 THERAPEUTIC ACTIVITIES: CPT

## 2025-07-14 PROCEDURE — 99233 SBSQ HOSP IP/OBS HIGH 50: CPT | Mod: ,,, | Performed by: PSYCHIATRY & NEUROLOGY

## 2025-07-14 PROCEDURE — 84100 ASSAY OF PHOSPHORUS: CPT

## 2025-07-14 PROCEDURE — 20000000 HC ICU ROOM

## 2025-07-14 PROCEDURE — 89051 BODY FLUID CELL COUNT: CPT | Performed by: STUDENT IN AN ORGANIZED HEALTH CARE EDUCATION/TRAINING PROGRAM

## 2025-07-14 PROCEDURE — 94761 N-INVAS EAR/PLS OXIMETRY MLT: CPT

## 2025-07-14 PROCEDURE — 84132 ASSAY OF SERUM POTASSIUM: CPT

## 2025-07-14 PROCEDURE — 25000003 PHARM REV CODE 250

## 2025-07-14 PROCEDURE — 83735 ASSAY OF MAGNESIUM: CPT

## 2025-07-14 PROCEDURE — 25000003 PHARM REV CODE 250: Performed by: NURSE PRACTITIONER

## 2025-07-14 PROCEDURE — 25000003 PHARM REV CODE 250: Performed by: PHYSICIAN ASSISTANT

## 2025-07-14 PROCEDURE — 92526 ORAL FUNCTION THERAPY: CPT

## 2025-07-14 PROCEDURE — 85025 COMPLETE CBC W/AUTO DIFF WBC: CPT

## 2025-07-14 PROCEDURE — 87070 CULTURE OTHR SPECIMN AEROBIC: CPT | Performed by: STUDENT IN AN ORGANIZED HEALTH CARE EDUCATION/TRAINING PROGRAM

## 2025-07-14 PROCEDURE — 82945 GLUCOSE OTHER FLUID: CPT | Performed by: STUDENT IN AN ORGANIZED HEALTH CARE EDUCATION/TRAINING PROGRAM

## 2025-07-14 PROCEDURE — 25000003 PHARM REV CODE 250: Performed by: PSYCHIATRY & NEUROLOGY

## 2025-07-14 PROCEDURE — 63600175 PHARM REV CODE 636 W HCPCS

## 2025-07-14 PROCEDURE — 84157 ASSAY OF PROTEIN OTHER: CPT | Performed by: STUDENT IN AN ORGANIZED HEALTH CARE EDUCATION/TRAINING PROGRAM

## 2025-07-14 PROCEDURE — 87205 SMEAR GRAM STAIN: CPT | Performed by: STUDENT IN AN ORGANIZED HEALTH CARE EDUCATION/TRAINING PROGRAM

## 2025-07-14 PROCEDURE — 63600175 PHARM REV CODE 636 W HCPCS: Performed by: STUDENT IN AN ORGANIZED HEALTH CARE EDUCATION/TRAINING PROGRAM

## 2025-07-14 RX ORDER — MIDAZOLAM HYDROCHLORIDE 1 MG/ML
INJECTION, SOLUTION INTRAMUSCULAR; INTRAVENOUS
Status: DISCONTINUED
Start: 2025-07-14 | End: 2025-07-14 | Stop reason: WASHOUT

## 2025-07-14 RX ORDER — MIDAZOLAM HYDROCHLORIDE 1 MG/ML
1 INJECTION, SOLUTION INTRAMUSCULAR; INTRAVENOUS ONCE
Status: DISCONTINUED | OUTPATIENT
Start: 2025-07-14 | End: 2025-07-14

## 2025-07-14 RX ORDER — OLANZAPINE 10 MG/2ML
5 INJECTION, POWDER, FOR SOLUTION INTRAMUSCULAR ONCE AS NEEDED
Status: COMPLETED | OUTPATIENT
Start: 2025-07-14 | End: 2025-07-15

## 2025-07-14 RX ORDER — CARVEDILOL 6.25 MG/1
6.25 TABLET ORAL 2 TIMES DAILY
Status: DISCONTINUED | OUTPATIENT
Start: 2025-07-14 | End: 2025-07-22

## 2025-07-14 RX ADMIN — PHENOBARBITAL 30 MG: 30 TABLET ORAL at 08:07

## 2025-07-14 RX ADMIN — CLONIDINE HYDROCHLORIDE 0.3 MG: 0.2 TABLET ORAL at 02:07

## 2025-07-14 RX ADMIN — CEFAZOLIN 1 G: 330 INJECTION, POWDER, FOR SOLUTION INTRAMUSCULAR; INTRAVENOUS at 01:07

## 2025-07-14 RX ADMIN — HEPARIN SODIUM 5000 UNITS: 5000 INJECTION INTRAVENOUS; SUBCUTANEOUS at 09:07

## 2025-07-14 RX ADMIN — FOLIC ACID 1 MG: 1 TABLET ORAL at 08:07

## 2025-07-14 RX ADMIN — OLANZAPINE 20 MG: 5 TABLET, FILM COATED ORAL at 09:07

## 2025-07-14 RX ADMIN — HEPARIN SODIUM 5000 UNITS: 5000 INJECTION INTRAVENOUS; SUBCUTANEOUS at 02:07

## 2025-07-14 RX ADMIN — PHENOBARBITAL 30 MG: 30 TABLET ORAL at 09:07

## 2025-07-14 RX ADMIN — AMLODIPINE BESYLATE 10 MG: 10 TABLET ORAL at 08:07

## 2025-07-14 RX ADMIN — CLONIDINE HYDROCHLORIDE 0.3 MG: 0.2 TABLET ORAL at 06:07

## 2025-07-14 RX ADMIN — CLONIDINE HYDROCHLORIDE 0.3 MG: 0.2 TABLET ORAL at 09:07

## 2025-07-14 RX ADMIN — DEXAMETHASONE 2 MG: 1 TABLET ORAL at 08:07

## 2025-07-14 RX ADMIN — SENNOSIDES AND DOCUSATE SODIUM 2 TABLET: 50; 8.6 TABLET ORAL at 09:07

## 2025-07-14 RX ADMIN — Medication 100 MG: at 08:07

## 2025-07-14 RX ADMIN — CARVEDILOL 6.25 MG: 6.25 TABLET, FILM COATED ORAL at 09:07

## 2025-07-14 RX ADMIN — CEFAZOLIN 1 G: 330 INJECTION, POWDER, FOR SOLUTION INTRAMUSCULAR; INTRAVENOUS at 08:07

## 2025-07-14 RX ADMIN — HEPARIN SODIUM 5000 UNITS: 5000 INJECTION INTRAVENOUS; SUBCUTANEOUS at 06:07

## 2025-07-14 RX ADMIN — DEXAMETHASONE 2 MG: 1 TABLET ORAL at 09:07

## 2025-07-14 RX ADMIN — FAMOTIDINE 20 MG: 20 TABLET, FILM COATED ORAL at 09:07

## 2025-07-14 RX ADMIN — THERA TABS 1 TABLET: TAB at 08:07

## 2025-07-14 RX ADMIN — CARVEDILOL 6.25 MG: 6.25 TABLET, FILM COATED ORAL at 10:07

## 2025-07-14 RX ADMIN — FAMOTIDINE 20 MG: 20 TABLET, FILM COATED ORAL at 08:07

## 2025-07-14 RX ADMIN — SULFAMETHOXAZOLE AND TRIMETHOPRIM 1 TABLET: 800; 160 TABLET ORAL at 08:07

## 2025-07-14 RX ADMIN — OLANZAPINE 20 MG: 5 TABLET, FILM COATED ORAL at 08:07

## 2025-07-14 RX ADMIN — LABETALOL HYDROCHLORIDE 10 MG: 5 INJECTION, SOLUTION INTRAVENOUS at 01:07

## 2025-07-14 RX ADMIN — HYDRALAZINE HYDROCHLORIDE 10 MG: 20 INJECTION, SOLUTION INTRAMUSCULAR; INTRAVENOUS at 04:07

## 2025-07-14 RX ADMIN — LOSARTAN POTASSIUM 100 MG: 50 TABLET, FILM COATED ORAL at 08:07

## 2025-07-14 NOTE — NURSING
Pt arrived back to room following CT        Pt was escorted by RN on cardiac monitoring and ambu bag.        Patient placed back on bedside monitor with alarms audible, bed in low position with bed alarm on, call light within reach. IVONNE.

## 2025-07-14 NOTE — PLAN OF CARE
"[facility] Care Plan    POC reviewed with [place] Brown and family at 0300. Patient verbalized understanding. Questions and concerns addressed. No acute events today. Pt progressing toward goals. Will continue to monitor. See below and flowsheets for full assessment and VS info.       -CT scan obtained  -EVD clamped  -Tube feeds @ 55  -PRNs given for BP      Is this a stroke patient? no    Neuro:  [place] Coma Scale  Best Eye Response: 4-->(E4) spontaneous  Best Motor Response: 6-->(M6) obeys commands  Best Verbal Response: 5-->(V5) oriented  [place] Coma Scale Score: 15  Assessment Qualifiers: Patient not sedated/intubated  Pupil PERRLA: yes     24 hr Temp:  [97.7 °F (36.5 °C)-98.7 °F (37.1 °C)]     CV:   Rhythm: normal sinus rhythm  BP goals:   SBP < 160  MAP > 65    Resp:      Vent Mode: Spont  Set Rate: 18 BPM  Oxygen Concentration (%): 50  Vt Set: 470 mL  PEEP/CPAP: 5 cmH20  Pressure Support: 8 cmH20    Plan: N/A    GI/:     Diet/Nutrition Received: NPO, tube feeding  Last Bowel Movement: 07/13/25  Voiding Characteristics: external catheter    Intake/Output Summary (Last 24 hours) at 7/14/2025 0344  Last data filed at 7/14/2025 0305  Gross per 24 hour   Intake 3070 ml   Output 1510 ml   Net 1560 ml     Unmeasured Output  Unmeasured Urine Occurrence: 1  Unmeasured Stool Occurrence: 0    Labs/Accuchecks:  Recent Labs   Lab 07/14/25  0143   WBC 5.89   RBC 3.56*   HGB 11.0*   HCT 32.6*         Recent Labs   Lab 07/14/25  0143      K 4.1   CO2 24      BUN 27*   CREATININE 0.8   ALKPHOS 66   ALT 12   AST 39   BILITOT 0.3    No results for input(s): "PROTIME", "INR", "APTT", "HEPANTIXA" in the last 168 hours. No results for input(s): "CPK", "CPKMB", "TROPONINI", "MB" in the last 168 hours.    Electrolytes: N/A - electrolytes WDL  Accuchecks: none    Gtts:      LDA/Wounds:    Davi Risk Assessment  Sensory Perception: 4-->no impairment  Moisture: 3-->occasionally moist  Activity: 1-->bedfast  Mobility: " 3-->slightly limited  Nutrition: 3-->adequate  Friction and Shear: 2-->potential problem  Davi Score: 16  Is your dvai score 12 or less? no          Restraints:   Restraint Order  Length of Order: Order good for next 24 hours or when removed.  Date that the current order will : 25  Time that the current order will : 636  Order Upon Application: Yes    [facility]

## 2025-07-14 NOTE — PROGRESS NOTES
[facility] - Neuro Critical Care  Neurocritical Care  Progress Note    Admit Date: 7/1/2025  Service Date: 07/14/2025  Length of Stay: 13    Subjective:     Chief Complaint: Ataxia    History of Present Illness: 69 y/o M presenting [facility] for generalized weakness for about a week now. History obtained from rehab nurse. She reported that he got to their facility about a month ago and, at baseline, walks with a cane and is alert and oriented. His nurse noticed that he has seemed weaker over the past week and over the past 3 days has had increased balance disturbance and gait issues. They also noticed that he has been speaking more softly in his speech is harder to understand. He has not had any infectious symptoms. He fell yesterday, unsure if he hit his head. CT head without contrast revealed multiple lesions of hypodensity with a hyperdense rim, notably in the left temporal lobe and right cerebellum. There were additional small hyperdensities in the right frontoparietal and left parasagittal parietal lobe. There is mass effect and partial effacement of the 4th ventricle secondary to the cerebellar lesion with developing hydrocephalus without MLS. CT of the chest, abdomen, and pelvis with IV contrast showed RUL mass with mediastinal adenopathy. Labs revealed Hep C and treponema antibodies were positive. Hep C PCR pending. Penicillin G was administered. A sepsis workup was completed and antibiotics were initiated. An MRI brain with and without contrast was ordered, but patient was unable to complete due to persistent movement after sedatives were administered. Prior to MRI, he was alert but drowsy. He was able to tell me his name, but told me he was 53 years old, the year was 2003, and he did not know the current place or reason for being here. He had generalized weakness but was slightly more weak on the right side. He followed simple commands, but did not attempt to follow more complex commands. He is  admitted to [facility] with NSGY consult.    Hospital Course: 7/2/2025: MRI with significant motion artifact. Required presidex overnight secondary to agitation  7/3/2025: MRI favors neoplastic brain lesions. CSF studies pending. Started on Zyprexa and phenobarbital for agitation, R subclavian CVC placed for central access  7/4/2025: NAEON. Extubated without complications.  07/05/2025 NAEON. EVD @20. Adjusted oxycodone frequency q8. Increased zyprexa 20 BID. Precedex for agitation. Plan for biopsy Monday w/ NSGY.  07/06/2025 NAEON. Added on 100q4 FWF. Continues to require precedex for agitation. OR tomorrow.  07/07/2025: AF. SHARRON. Exam stable. OR today for SOC crani for tumor resection. ICPs 0-14, 44cc output. PRN versed given for agitation. Precedex at 1.2. Hydral prn x1 for SBP >160. Remains in restraints.  07/08/2025: AFJared MCDERMOTT. POD1 s/p SOC. CT/MRI from overnight reviewed. EVD open at 10, ICPs -2-14, output 33cc. Versed PRN x1 given for agitation. Agitation improved with Clonidine. Cardene at 12.5 for SBP <160. Hydralazine prn x1 given. Multiple loose BMs. Off propofol and ketamine. Extubated this morning.  07/09/2025: Tolerated extubation overnight, more awake and cooperative on exam this AM, off precedex gtt overnight w/ versed PRN x1 only. Maxed on cardene gtt, add losartan 50 mg qday, increase clonidine to 0.2 mg TID for BP control. EVD @ 10, plan for repeat head CT Friday w/ EVD wean over weekend per discussion w/ NSGY  07/10/2025: NAEON. Afebrile. Pt removed NGT overnight, replaced. No longer on cardene, required hydralazine x1 and labetalol x2 PRNs. Losartan increased to 100mg. EVD open at 10, will plan to keep and wean over weekend per NSGY. Steroid taper per NSGY beginning today.  07/11/2025 NAEON. Neuro exam slowly improving, weaning phenobarb. Remains hypertensive, increasing clonidine to 0.3 mg TID and adding amlodipine 10 mg qday. CT head stable, EVD clamped -> plan for repeat head CT Monday morning per  NSGY  07/12/2025 NAEON. Neuro exam stable, weaning phenobarb and dexamethasone.EVD clamped, CT Monday.  07/13/2025 NAEON, tolerating EVD clamp trial. Neuro exam continuing to slowly improved. Repeat head CT tomorrow.   07/14/2025 NAEON, repeat head CT overnight stable w/ increased hydrocephalus or evidence of encephalocele formation, plan to pull EVD today. Add coreg 6.25 mg BID for uptrending BP. Holding FWF given borderline low Na. Weaning dex per NSGY    Interval History:  Please see hospital course above for full details    Review of Systems   Unable to perform ROS: Mental status change       Objective:     Vitals:  Temp: 97.6 °F (36.4 °C)  Pulse: 85  Rhythm: normal sinus rhythm  BP: (!) 144/83  MAP (mmHg): 108  ICP Mean (mmHg): 6 mmHg  Resp: 20  SpO2: 95 %    Temp  Min: 97.6 °F (36.4 °C)  Max: 98.2 °F (36.8 °C)  Pulse  Min: 75  Max: 102  BP  Min: 127/78  Max: 194/103  MAP (mmHg)  Min: 95  Max: 144  ICP Mean (mmHg)  Min: 4 mmHg  Max: 13 mmHg  Resp  Min: 12  Max: 30  SpO2  Min: 82 %  Max: 100 %    07/13 0701 - 07/14 0700  In: 3070   Out: 1740 [Urine:1740]   Unmeasured Output  Unmeasured Urine Occurrence: 1  Unmeasured Stool Occurrence: 0        Physical Exam  General Appearance: Elderly gentleman resting in bed, NG tube and EVD in place. Not in acute hemodynamic distress  Mental Status Exam: awake, alert, oriented to self only this AM[facility] not oriented to time. Following simple commands x4 extremities   Cranial Nerves: Gaze midline, PERRL, no clear gaze preference. Dysarthric. +cough, spontaneous  Motor: Moving all 4 extremities AG to command, grossly symmetric   Sensory: Grossly symmetric to noxious x4  Coordination: +dysmetria in b/l upper extremities, RUE>>LUE  Vascular: S1/S2 of normal intensity, no S3/S4 appreciated, no murmurs appreciated  Lungs: Slightly coarse breath sounds b/l, no wheezes/rales   Abdomen: Soft, non-distended, non-tender, BS +       Medications:  Continuous  ScheduledamLODIPine, 10 mg, Daily  carvediloL, 6.25 mg, BID  cloNIDine, 0.3 mg, Q8H  dexAMETHasone, 2 mg, Q12H  famotidine, 20 mg, BID  folic acid, 1 mg, Daily  heparin (porcine), 5,000 Units, Q8H  losartan, 100 mg, Daily  multivitamin, 1 tablet, Daily  OLANZapine, 20 mg, BID  PHENobarbitaL, 30 mg, BID   Followed by  [START ON 7/15/2025] PHENobarbitaL, 15 mg, BID   Followed by  [START ON 7/16/2025] PHENobarbitaL, 15 mg, Daily  polyethylene glycol, 17 g, Daily  senna-docusate, 2 tablet, BID  sulfamethoxazole-trimethoprim 800-160mg, 1 tablet, Every Mon, Wed, Fri  thiamine, 100 mg, Daily    PRNacetaminophen, 650 mg, Q4H PRN  bisacodyL, 10 mg, Daily PRN  hydrALAZINE, 10 mg, Q4H PRN  labetalol, 10 mg, Q4H PRN  magnesium oxide, 800 mg, PRN  magnesium oxide, 800 mg, PRN  ondansetron, 4 mg, Q4H PRN  potassium bicarbonate, 35 mEq, PRN  potassium bicarbonate, 50 mEq, PRN  potassium bicarbonate, 60 mEq, PRN  potassium, sodium phosphates, 2 packet, PRN  potassium, sodium phosphates, 2 packet, PRN  potassium, sodium phosphates, 2 packet, PRN  sodium chloride 0.9%, 10 mL, PRN      Today I personally reviewed pertinent imaging, laboratory results, notably: CT head overnight stable from prior, no increase in ventricular size, no encephalocele formation. CBC unremarkable, no leukocytosis, Hb/platelets stable. CMP w/ Na downtrending to 136, BUN down to 27, otherwise unremarkable.   Diet  No diet orders on file  No diet orders on file  TF at goal     Assessment/Plan:     Neuro  * 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   PT/OT -> recommending acute rehab once medically appropriate for discharge     Brain lesion  68 y/o M brought to ED from [facility] (hx of heroin and ETOH) for progressive weakness, gait disturbance, confusion, and decreased volume when speaking. CT head without contrast revealed multiple lesions of hypodensity with a hyperdense rim, notably in the left temporal lobe and right cerebellum. There were additional small  hyperdensities in the right frontoparietal and left parasagittal parietal lobe. There is mass effect and partial effacement of the 4th ventricle secondary to the cerebellar lesion with developing hydrocephalus without MLS. CT of the chest, abdomen, and pelvis with IV contrast showed RUL mass with mediastinal adenopathy. Labs revealed Hep C and treponema antibodies were positive. Hep C PCR pending. Penicillin G was administered. A sepsis workup was completed and antibiotics were initiated. Intubated to get MRI. S/p EVD. Extubated 7/4/25 without difficulty.    Admit to NCC  Q1h neuro checks, I&Os, and vitals   Daily CBC, CMP, Mag, Phos  NSGY following; s/p SOC craniotomy for tumor resection on 7/7  Dex 2 mg BID, no plans to taper further pending outpt f/u  Bactrim MWF for PJP ppx given plan for prolonged steroid taper  EVD clamped since 7/12 -> repeat head CT stable, remove today and repeat head CT in AM  Zyprexa BID for agitation  Phenobarb taper to off given improved agitation   Clonidine 0.3 mg TID for agitation/opioid dependence/hypertension  Avoid versed for agitation  Tube feeds at goal, SLP following  Folate/thiamine/MV  SBP <160   Prn labetalol, hydralazine  PRN Zofran 4mg q4h  SCDs, SQH  PT/OT/SLP as able    AMS (altered mental status)  See primary problem  Slowly improving    Psychiatric  Polysubstance abuse  Was brought in from [facility], where he has been for the past month  Educate on cessation when appropriate  On phenobarbital for agitation, no signs of EtOH withdrawal -> phenobarb taper     Pulmonary  Mass of upper lobe of right lung  Noted on CXR and CT chest with IV contrast  Saturating well on RA; No PTX on CT-chest  No lung consolidations or obstructive process  No known history of cancer  Intra-op brain biopsy frozen pathology suggestive of metastatic adenocarcinoma, 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  Will need outpt rad onc and med onc f/u     Cardiac/Vascular  Essential hypertension  Hx of    - Mildly  hypertensive overnight, start coreg 6.25 mg BID  - Continue amlodipine 10 mg qday   - Continue clonidine to 0.3 mg TID  - C/w losartan 100 mg qday   - Goal SBP<160     Other  Hepatitis C antibody positive  PCR negative  No known history of Hep C per patient    Gait disturbance  See primary problem          The patient is being Prophylaxed for:  Venous Thromboembolism with: Mechanical or Chemical  Stress Ulcer with: H2B  Ventilator Pneumonia with: not applicable    Activity Orders            Turn patient starting at 07/03 1124    Elevate HOB Elevate HOB 30-45 degrees during feeding unless contraindicated starting at 07/03 0802    Elevate HOB starting at 07/01 2040    Straight Cath starting at 07/01 1925          Full Code    Level III visit    Likely step down to floor in AM if repeat head CT post-EVD removal stable    Melba Farnsworth MD  Neurocritical Care  [facility] - Neuro Critical Care

## 2025-07-14 NOTE — ASSESSMENT & PLAN NOTE
Was brought in from [facility], where he has been for the past month  Educate on cessation when appropriate  On phenobarbital for agitation, no signs of EtOH withdrawal -> phenobarb taper

## 2025-07-14 NOTE — ASSESSMENT & PLAN NOTE
Was brought in from [facility], where he has been for the past month  Educate on cessation when appropriate  On phenobarbital for agitation, no signs of EtOH withdrawal -> phenobarb taper

## 2025-07-14 NOTE — PLAN OF CARE
"[facility] Care Plan  POC reviewed with [place] Brown and family at 1400. Patient unable to verbalize understanding. Questions and concerns addressed. No acute events today. Pt progressing toward goals. Assessments ongoing. See below and flowsheets for full assessment and VS info.   - plan to remove EVD  - CT for 0400            Is this a stroke patient? no    Neuro:  [place] Coma Scale  Best Eye Response: 4-->(E4) spontaneous  Best Motor Response: 6-->(M6) obeys commands  Best Verbal Response: 4-->(V4) confused  [place]ie Coma Scale Score: 14  Assessment Qualifiers: Patient not sedated/intubated  Pupil PERRLA: yes     24 hr Temp:  [97.6 °F (36.4 °C)-98.2 °F (36.8 °C)]     CV:   Rhythm: normal sinus rhythm  BP goals:   SBP < 160  MAP > 65    Resp:      Vent Mode: Spont  Set Rate: 18 BPM  Oxygen Concentration (%): 50  Vt Set: 470 mL  PEEP/CPAP: 5 cmH20  Pressure Support: 8 cmH20    Plan: N/A    GI/:     Diet/Nutrition Received: NPO, tube feeding  Last Bowel Movement: 07/14/25  Voiding Characteristics: external catheter    Intake/Output Summary (Last 24 hours) at 7/14/2025 1643  Last data filed at 7/14/2025 1601  Gross per 24 hour   Intake 2075 ml   Output 1075 ml   Net 1000 ml     Unmeasured Output  Unmeasured Urine Occurrence: 1  Unmeasured Stool Occurrence: 1    Labs/Accuchecks:  Recent Labs   Lab 07/14/25  0143   WBC 5.89   RBC 3.56*   HGB 11.0*   HCT 32.6*         Recent Labs   Lab 07/14/25  0143      K 4.1   CO2 24      BUN 27*   CREATININE 0.8   ALKPHOS 66   ALT 12   AST 39   BILITOT 0.3    No results for input(s): "PROTIME", "INR", "APTT", "HEPANTIXA" in the last 168 hours. No results for input(s): "CPK", "CPKMB", "TROPONINI", "MB" in the last 168 hours.    Electrolytes: N/A - electrolytes WDL  Accuchecks: none    Gtts:      LDA/Wounds:    Davi Risk Assessment  Sensory Perception: 4-->no impairment  Moisture: 3-->occasionally moist  Activity: 1-->bedfast  Mobility: 3-->slightly " limited  Nutrition: 3-->adequate  Friction and Shear: 2-->potential problem  Davi Score: 16    Is your davi score 12 or less? no            Restraints:   Restraint Order  Length of Order: Order good for next 24 hours or when removed.  Date that the current order will : 07/15/25  Time that the current order will : 704  Order Upon Application: Yes

## 2025-07-14 NOTE — ASSESSMENT & PLAN NOTE
Assessment  71yo M with generalized weakness, falls, and paucity of speech presenting with multiple ring enhancing lesions on brain CT now s/p R SOC for tumor resection on 7/7:    Imaging:  - CTH 7/1 showed multiple brain masses concerning for mets with surrounding edema, can't rule out abscesses. Mass in cerebellum with effacement of 4th ventricule outflow with concern for developing hydrocephalus  - MRI brain 7/1: non diagnostic due to motion  - CT CAP 7/1: spiculated R lung mass and lymph node adenopathy in chest and neck concerning for metastatic disease  - MRI Brain W/WO 7/2: multifocal enhancing lesions c/f metastatic disease, largest R cerebellum w/mass effect on 4th.  - Post op CTH/MRI 7/7: anticipate post op changes, hemostatic products left at superior/medial border of resection cavity  - CTH 7/11: no pseudomeningocele collection, persistent edema around L frontal met  - CTH 7/14: grossly stable ventricular caliber, no pseudomeningocele     Plan:  - admitted to [facility]  - EVD clamped, abx while in place; transduce hourly; will send CSF prior to EVD removal today. CT tomorrow s/p EVD pull  - Dex weaned to 2 BID per NCC, continue GI ppx while on steroid. Further wean per Oncology  - AED per NCC  - Onc following; will need outpatient rad onc consult. Frozen in OR c/w metastatic adenocarcinoma  - PT/OT/SLP ongoing  - EM/SCD, ok for SQH given clinical stability, CBC WNL  - medical management per NCC    Dispo: ongoing, pending therapies and EVD removal today    Discussed with Dr. Larios and Dr. Santiago by NSGY team

## 2025-07-14 NOTE — ASSESSMENT & PLAN NOTE
Assessment  69yo M with generalized weakness, falls, and paucity of speech presenting with multiple ring enhancing lesions on brain CT now s/p R SOC for tumor resection on 7/7:    Imaging:  - CTH 7/1 showed multiple brain masses concerning for mets with surrounding edema, can't rule out abscesses. Mass in cerebellum with effacement of 4th ventricule outflow with concern for developing hydrocephalus  - MRI brain 7/1: non diagnostic due to motion  - CT CAP 7/1: spiculated R lung mass and lymph node adenopathy in chest and neck concerning for metastatic disease  - MRI Brain W/WO 7/2: multifocal enhancing lesions c/f metastatic disease, largest R cerebellum w/mass effect on 4th.  - Post op CTH/MRI 7/7: anticipate post op changes, hemostatic products left at superior/medial border of resection cavity  - CTH 7/11: no pseudomeningocele collection, persistent edema around L frontal met  - CTH 7/14: grossly stable ventricular caliber, no pseudomeningocele   - Cth 7/15: grossly stable s/p evd removal, no tract hemorrhage    Plan:  - admitted to NCC; ok to TTF to Oncology/HM from NSGY stand point once cleared by NCC  - EVD removed 7/14, site c/d/I, posterior incision w/o pseudomeningocele  - Dex weaned to 2 BID per NCC, continue GI ppx while on steroid. Further wean per Oncology  - AED per NCC  - Onc following; will need outpatient rad onc consult. Frozen in OR c/w metastatic adenocarcinoma  - PT/OT/SLP ongoing  - EM/SCD, ok for SQH given clinical stability, CBC WNL  - medical management per NCC    Dispo: ongoing, pending therapies and agitation improvement    Discussed with Dr. Larios and Dr. Mata by NSGY team

## 2025-07-14 NOTE — SUBJECTIVE & OBJECTIVE
Interval History:  Please see hospital course above for full details    Review of Systems   Unable to perform ROS: Mental status change       Objective:     Vitals:  Temp: 97.6 °F (36.4 °C)  Pulse: 85  Rhythm: normal sinus rhythm  BP: (!) 144/83  MAP (mmHg): 108  ICP Mean (mmHg): 6 mmHg  Resp: 20  SpO2: 95 %    Temp  Min: 97.6 °F (36.4 °C)  Max: 98.2 °F (36.8 °C)  Pulse  Min: 75  Max: 102  BP  Min: 127/78  Max: 194/103  MAP (mmHg)  Min: 95  Max: 144  ICP Mean (mmHg)  Min: 4 mmHg  Max: 13 mmHg  Resp  Min: 12  Max: 30  SpO2  Min: 82 %  Max: 100 %    07/13 0701 - 07/14 0700  In: 3070   Out: 1740 [Urine:1740]   Unmeasured Output  Unmeasured Urine Occurrence: 1  Unmeasured Stool Occurrence: 0        Physical Exam  General Appearance: Elderly gentleman resting in bed, NG tube and EVD in place. Not in acute hemodynamic distress  Mental Status Exam: awake, alert, oriented to self only this AM[facility], not oriented to time. Following simple commands x4 extremities   Cranial Nerves: Gaze midline, PERRL, no clear gaze preference. Dysarthric. +cough, spontaneous  Motor: Moving all 4 extremities AG to command, grossly symmetric   Sensory: Grossly symmetric to noxious x4  Coordination: +dysmetria in b/l upper extremities, RUE>>LUE  Vascular: S1/S2 of normal intensity, no S3/S4 appreciated, no murmurs appreciated  Lungs: Slightly coarse breath sounds b/l, no wheezes/rales   Abdomen: Soft, non-distended, non-tender, BS +       Medications:  Continuous ScheduledamLODIPine, 10 mg, Daily  carvediloL, 6.25 mg, BID  cloNIDine, 0.3 mg, Q8H  dexAMETHasone, 2 mg, Q12H  famotidine, 20 mg, BID  folic acid, 1 mg, Daily  heparin (porcine), 5,000 Units, Q8H  losartan, 100 mg, Daily  multivitamin, 1 tablet, Daily  OLANZapine, 20 mg, BID  PHENobarbitaL, 30 mg, BID   Followed by  [START ON 7/15/2025] PHENobarbitaL, 15 mg, BID   Followed by  [START ON 7/16/2025] PHENobarbitaL, 15 mg, Daily  polyethylene glycol, 17 g,  Daily  senna-docusate, 2 tablet, BID  sulfamethoxazole-trimethoprim 800-160mg, 1 tablet, Every Mon, Wed, Fri  thiamine, 100 mg, Daily    PRNacetaminophen, 650 mg, Q4H PRN  bisacodyL, 10 mg, Daily PRN  hydrALAZINE, 10 mg, Q4H PRN  labetalol, 10 mg, Q4H PRN  magnesium oxide, 800 mg, PRN  magnesium oxide, 800 mg, PRN  ondansetron, 4 mg, Q4H PRN  potassium bicarbonate, 35 mEq, PRN  potassium bicarbonate, 50 mEq, PRN  potassium bicarbonate, 60 mEq, PRN  potassium, sodium phosphates, 2 packet, PRN  potassium, sodium phosphates, 2 packet, PRN  potassium, sodium phosphates, 2 packet, PRN  sodium chloride 0.9%, 10 mL, PRN      Today I personally reviewed pertinent imaging, laboratory results, notably: CT head overnight stable from prior, no increase in ventricular size, no encephalocele formation. CBC unremarkable, no leukocytosis, Hb/platelets stable. CMP w/ Na downtrending to 136, BUN down to 27, otherwise unremarkable.   Diet  No diet orders on file  No diet orders on file  TF at goal

## 2025-07-14 NOTE — PROGRESS NOTES
[facility] - Neuro Critical Care  Neurocritical Care  Progress Note    Admit Date: 7/1/2025  Service Date: 07/13/2025  Length of Stay: 12    Subjective:     Chief Complaint: Ataxia    History of Present Illness: 71 y/o M presenting from [facility] for generalized weakness for about a week now. History obtained from rehab nurse. She reported that he got to their facility about a month ago and, at baseline, walks with a cane and is alert and oriented. His nurse noticed that he has seemed weaker over the past week and over the past 3 days has had increased balance disturbance and gait issues. They also noticed that he has been speaking more softly in his speech is harder to understand. He has not had any infectious symptoms. He fell yesterday, unsure if he hit his head. CT head without contrast revealed multiple lesions of hypodensity with a hyperdense rim, notably in the left temporal lobe and right cerebellum. There were additional small hyperdensities in the right frontoparietal and left parasagittal parietal lobe. There is mass effect and partial effacement of the 4th ventricle secondary to the cerebellar lesion with developing hydrocephalus without MLS. CT of the chest, abdomen, and pelvis with IV contrast showed RUL mass with mediastinal adenopathy. Labs revealed Hep C and treponema antibodies were positive. Hep C PCR pending. Penicillin G was administered. A sepsis workup was completed and antibiotics were initiated. An MRI brain with and without contrast was ordered, but patient was unable to complete due to persistent movement after sedatives were administered. Prior to MRI, he was alert but drowsy. He was able to tell me his name, but told me he was 53 years old, the year was 2003, and he did not know the current place or reason for being here. He had generalized weakness but was slightly more weak on the right side. He followed simple commands, but did not attempt to follow more complex commands. He is  admitted to [facility] with NSGY consult.    Hospital Course: 7/2/2025: MRI with significant motion artifact. Required presidex overnight secondary to agitation  7/3/2025: MRI favors neoplastic brain lesions. CSF studies pending. Started on Zyprexa and phenobarbital for agitation, R subclavian CVC placed for central access  7/4/2025: NAEON. Extubated without complications.  07/05/2025 NAEON. EVD @20. Adjusted oxycodone frequency q8. Increased zyprexa 20 BID. Precedex for agitation. Plan for biopsy Monday w/ NSGY.  07/06/2025 NAEON. Added on 100q4 FWF. Continues to require precedex for agitation. OR tomorrow.  07/07/2025: AF. SHARRON. Exam stable. OR today for SOC crani for tumor resection. ICPs 0-14, 44cc output. PRN versed given for agitation. Precedex at 1.2. Hydral prn x1 for SBP >160. Remains in restraints.  07/08/2025: AFJared MCDERMOTT. POD1 s/p SOC. CT/MRI from overnight reviewed. EVD open at 10, ICPs -2-14, output 33cc. Versed PRN x1 given for agitation. Agitation improved with Clonidine. Cardene at 12.5 for SBP <160. Hydralazine prn x1 given. Multiple loose BMs. Off propofol and ketamine. Extubated this morning.  07/09/2025: Tolerated extubation overnight, more awake and cooperative on exam this AM, off precedex gtt overnight w/ versed PRN x1 only. Maxed on cardene gtt, add losartan 50 mg qday, increase clonidine to 0.2 mg TID for BP control. EVD @ 10, plan for repeat head CT Friday w/ EVD wean over weekend per discussion w/ NSGY  07/10/2025: NAEON. Afebrile. Pt removed NGT overnight, replaced. No longer on cardene, required hydralazine x1 and labetalol x2 PRNs. Losartan increased to 100mg. EVD open at 10, will plan to keep and wean over weekend per NSGY. Steroid taper per NSGY beginning today.  07/11/2025 NAEON. Neuro exam slowly improving, weaning phenobarb. Remains hypertensive, increasing clonidine to 0.3 mg TID and adding amlodipine 10 mg qday. CT head stable, EVD clamped -> plan for repeat head CT Monday morning per  NSGY  07/12/2025 NAEON. Neuro exam stable, weaning phenobarb and dexamethasone.EVD clamped, CT Monday.  07/13/2025 NAEON, tolerating EVD clamp trial. Neuro exam continuing to slowly improved. Repeat head CT tomorrow.     Interval History:  Please see hospital course above for full details    Review of Systems   Unable to perform ROS: Mental status change     Objective:     Vitals:  Temp: 97.9 °F (36.6 °C)  Pulse: 88  Rhythm: normal sinus rhythm  BP: (!) 162/102  MAP (mmHg): 121  ICP Mean (mmHg): 4 mmHg  Resp: 17  SpO2: 96 %    Temp  Min: 97.7 °F (36.5 °C)  Max: 98.7 °F (37.1 °C)  Pulse  Min: 77  Max: 97  BP  Min: 139/82  Max: 192/129  MAP (mmHg)  Min: 105  Max: 143  ICP Mean (mmHg)  Min: 1 mmHg  Max: 13 mmHg  Resp  Min: 12  Max: 23  SpO2  Min: 94 %  Max: 100 %    07/12 0701 - 07/13 0700  In: 2615   Out: 1800 [Urine:1800]   Unmeasured Output  Unmeasured Urine Occurrence: 1  Unmeasured Stool Occurrence: 1        Physical Exam  General Appearance: Elderly gentleman resting in bed, NG tube and EVD in place. Not in acute hemodynamic distress  Mental Status Exam: awake, alert, oriented to self, place and partially to situation (able to state something wrong with his brain, unable to state what); not oriented to time. Following simple commands x4 extremities   Cranial Nerves: Gaze midline, PERRL, no clear gaze preference. Dysarthric. +cough, spontaneous  Motor: Moving all 4 extremities AG to command, grossly symmetric   Sensory: Grossly symmetric to noxious x4  Coordination: +dysmetria in b/l upper extremities, RUE>>LUE  Vascular: S1/S2 of normal intensity, no S3/S4 appreciated, no murmurs appreciated  Lungs: Slightly coarse breath sounds b/l, no wheezes/rales   Abdomen: Soft, non-distended, non-tender, BS +       Medications:  Continuous ScheduledamLODIPine, 10 mg, Daily  ceFAZolin (Ancef) IV (PEDS and ADULTS), 1 g, Q8H  cloNIDine, 0.3 mg, Q8H  dexAMETHasone, 2 mg, Q12H  famotidine, 20 mg, BID  folic acid, 1 mg,  Daily  heparin (porcine), 5,000 Units, Q8H  losartan, 100 mg, Daily  multivitamin, 1 tablet, Daily  OLANZapine, 20 mg, BID  PHENobarbitaL, 30 mg, Q8H   Followed by  [START ON 7/14/2025] PHENobarbitaL, 30 mg, BID   Followed by  [START ON 7/15/2025] PHENobarbitaL, 15 mg, BID   Followed by  [START ON 7/16/2025] PHENobarbitaL, 15 mg, Daily  polyethylene glycol, 17 g, Daily  senna-docusate, 2 tablet, BID  sulfamethoxazole-trimethoprim 800-160mg, 1 tablet, Every Mon, Wed, Fri  thiamine, 100 mg, Daily    PRNacetaminophen, 650 mg, Q4H PRN  bisacodyL, 10 mg, Daily PRN  hydrALAZINE, 10 mg, Q4H PRN  labetalol, 10 mg, Q4H PRN  magnesium oxide, 800 mg, PRN  magnesium oxide, 800 mg, PRN  ondansetron, 4 mg, Q4H PRN  potassium bicarbonate, 35 mEq, PRN  potassium bicarbonate, 50 mEq, PRN  potassium bicarbonate, 60 mEq, PRN  potassium, sodium phosphates, 2 packet, PRN  potassium, sodium phosphates, 2 packet, PRN  potassium, sodium phosphates, 2 packet, PRN  sodium chloride 0.9%, 10 mL, PRN      Today I personally reviewed pertinent imaging, laboratory results, notably: No new imaging to review. CBC unremarkable, no leukocytosis, Hb/platelets stable. CMP w/ downtrending BUN/creatinine, otherwise unremarkable.     Diet  No diet orders on file  No diet orders on file  TF at goal     Assessment/Plan:     Neuro  * 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   PT/OT -> recommending acute rehab once medically appropriate for discharge     Brain lesion  70 y/o M brought to ED from [facility] (hx of heroin and ETOH) for progressive weakness, gait disturbance, confusion, and decreased volume when speaking. CT head without contrast revealed multiple lesions of hypodensity with a hyperdense rim, notably in the left temporal lobe and right cerebellum. There were additional small hyperdensities in the right frontoparietal and left parasagittal parietal lobe. There is mass effect and partial effacement of the 4th ventricle secondary to the cerebellar lesion with  developing hydrocephalus without MLS. CT of the chest, abdomen, and pelvis with IV contrast showed RUL mass with mediastinal adenopathy. Labs revealed Hep C and treponema antibodies were positive. Hep C PCR pending. Penicillin G was administered. A sepsis workup was completed and antibiotics were initiated. Intubated to get MRI. S/p EVD. Extubated 7/4/25 without difficulty.    Admit to [facility]  Q1h neuro checks, I&Os, and vitals   Daily CBC, CMP, Mag, Phos  NSGY following; s/p SOC craniotomy for tumor resection on 7/7  Dex 4q12, plan to taper to 2 mg BID at discharge   Bactrim MWF for PJP ppx given plan for prolonged steroid taper  EVD clamped since 7/12 -> repeat head CT 7/14  Zyprexa BID for agitation  Phenobarb taper to off given improved agitation   Clonidine 0.3 mg TID for agitation/opioid dependence/hypertension  Avoid versed for agitation  Tube feeds + FWF 250cc q4h  Folate/thiamine/MV  SBP <160   Prn labetalol, hydralazine  PRN Zofran 4mg q4h  SCDs, SQH  PT/OT/SLP as able    AMS (altered mental status)  See primary problem  Slowly improving    Psychiatric  Polysubstance abuse  Was brought in from [facility], where he has been for the past month  Educate on cessation when appropriate  On phenobarbital for agitation, no signs of EtOH withdrawal -> phenobarb taper     Cardiac/Vascular  Essential hypertension  Hx of    - Continue amlodipine 10 mg qday   - Continue clonidine to 0.3 mg TID  - C/w losartan 100 mg qday   - Goal SBP<160     Other  Hepatitis C antibody positive  PCR negative  No known history of Hep C per patient    Gait disturbance  See primary problem          The patient is being Prophylaxed for:  Venous Thromboembolism with: Mechanical or Chemical  Stress Ulcer with: H2B  Ventilator Pneumonia with: not applicable    Activity Orders            Turn patient starting at 07/03 1124    Elevate HOB Elevate HOB 30-45 degrees during feeding unless contraindicated starting at 07/03 0802    Elevate HOB starting  at 07/01 2040    Straight Cath starting at 07/01 1925          Full Code    Uninterrupted Critical Care/Counseling Time (not including procedures): 40 minutes  There is high probability for acute neurological change leading to clinical and possibly life-threatening deterioration requiring highest level of physician preparedness for urgent intervention. Critical care time was spent personally by me on the following activities: development of treatment plan with patient or surrogate and bedside caregivers, discussions with consultants, evaluation of patient's response to treatment, examination of patient, ordering and performing treatments and interventions, ordering and review of laboratory studies, ordering and review of radiographic studies, pulse oximetry, antibiotic titration if applicable, vasopressor titration if applicable, re-evaluation of patient's condition. I spent greater than 50% of the documented critical care time assessing and making medical decisions for this patient.       Melba Farnsworth MD  Neurocritical Care  [facility] - Neuro Critical Care

## 2025-07-14 NOTE — EICU
Virtual ICU Quality Rounds    Admit Date: 7/1/2025  Hospital Day: 13    ICU Day: 12d 17h    24H Vital Sign Range:  Temp:  [97.6 °F (36.4 °C)-98.2 °F (36.8 °C)]   Pulse:  []   Resp:  [12-30]   BP: (127-194)/()   SpO2:  [82 %-100 %]     VICU Surveillance Screening                    LDA reconciliation : Yes

## 2025-07-14 NOTE — PLAN OF CARE
"[facility] Care Plan  POC reviewed with [place] Brown and family at 1400. Patient verbalized understanding. Questions and concerns addressed. No acute events today. Pt progressing toward goals. See below and flowsheets for full assessment and VS info.     - BM x 1   - Condom cath replaced x 1  - CHG bath completed; Linens and gown changed  - See flowsheets for ICP details  - Plan for CT overnight and remove drain tomorrow        Is this a stroke patient? no    Neuro:  Goldie Coma Scale  Best Eye Response: 4-->(E4) spontaneous  Best Motor Response: 6-->(M6) obeys commands  Best Verbal Response: 4-->(V4) confused  Goldie Coma Scale Score: 14  Assessment Qualifiers: Patient not sedated/intubated, No eye obstruction present  Pupil PERRLA: yes     24 hr Temp:  [97.7 °F (36.5 °C)-98.7 °F (37.1 °C)]     CV:   Rhythm: normal sinus rhythm  BP goals:   SBP < 160  MAP > 65    Resp:      Vent Mode: Spont  Set Rate: 18 BPM  Oxygen Concentration (%): 50  Vt Set: 470 mL  PEEP/CPAP: 5 cmH20  Pressure Support: 8 cmH20    Plan: N/A    GI/:     Diet/Nutrition Received: tube feeding  Last Bowel Movement: 07/13/25  Voiding Characteristics: external catheter    Intake/Output Summary (Last 24 hours) at 7/13/2025 1925  Last data filed at 7/13/2025 1801  Gross per 24 hour   Intake 3065 ml   Output 1445 ml   Net 1620 ml     Unmeasured Output  Unmeasured Urine Occurrence: 1  Unmeasured Stool Occurrence: 1    Labs/Accuchecks:  Recent Labs   Lab 07/13/25  0830   WBC 5.86   RBC 3.58*   HGB 11.0*   HCT 33.2*         Recent Labs   Lab 07/13/25  0830      K 4.0   CO2 24      BUN 29*   CREATININE 0.8   ALKPHOS 61   ALT 12   AST 40   BILITOT 0.3    No results for input(s): "PROTIME", "INR", "APTT", "HEPANTIXA" in the last 168 hours. No results for input(s): "CPK", "CPKMB", "TROPONINI", "MB" in the last 168 hours.    Electrolytes: N/A - electrolytes WDL  Accuchecks: none    Gtts:      LDA/Wounds:    Davi Risk Assessment  Sensory " Perception: 4-->no impairment  Moisture: 3-->occasionally moist  Activity: 1-->bedfast  Mobility: 3-->slightly limited  Nutrition: 3-->adequate  Friction and Shear: 3-->no apparent problem  Davi Score: 17    Is your davi score 12 or less? no            Restraints:   Restraint Order  Length of Order: Order good for next 24 hours or when removed.  Date that the current order will : 25  Time that the current order will : 636  Order Upon Application: Yes      Problem: Adult Inpatient Plan of Care  Goal: Plan of Care Review  Outcome: Progressing  Goal: Patient-Specific Goal (Individualized)  Outcome: Progressing  Goal: Absence of Hospital-Acquired Illness or Injury  Outcome: Progressing  Goal: Optimal Comfort and Wellbeing  Outcome: Progressing  Goal: Readiness for Transition of Care  Outcome: Progressing     Problem: Infection  Goal: Absence of Infection Signs and Symptoms  Outcome: Progressing     Problem: Skin Injury Risk Increased  Goal: Skin Health and Integrity  Outcome: Progressing     Problem: Delirium  Goal: Optimal Coping  Outcome: Progressing  Goal: Improved Behavioral Control  Outcome: Progressing  Goal: Improved Attention and Thought Clarity  Outcome: Progressing  Goal: Improved Sleep  Outcome: Progressing     Problem: Fall Injury Risk  Goal: Absence of Fall and Fall-Related Injury  Outcome: Progressing     Problem: Restraint, Nonviolent  Goal: Absence of Harm or Injury  Outcome: Progressing     Problem: Wound  Goal: Optimal Coping  Outcome: Progressing  Goal: Optimal Functional Ability  Outcome: Progressing  Goal: Absence of Infection Signs and Symptoms  Outcome: Progressing  Goal: Improved Oral Intake  Outcome: Progressing  Goal: Optimal Pain Control and Function  Outcome: Progressing  Goal: Skin Health and Integrity  Outcome: Progressing  Goal: Optimal Wound Healing  Outcome: Progressing

## 2025-07-14 NOTE — PLAN OF CARE
Problem: Physical Therapy  Goal: Physical Therapy Goal  Description: Goals to be met by: 2025     Patient will increase functional independence with mobility by performin. Supine to sit with Stand-by Assistance  2. Sit to supine with Stand-by Assistance  3. Sit to stand transfer with Contact Guard Assistance  4. Bed to chair transfer with Minimal Assistance using Rolling Walker  5. Gait  x 10 feet with Minimal Assistance using Rolling Walker.   6. Sitting at edge of bed x5 minutes with Supervision      Outcome: Progressing

## 2025-07-14 NOTE — ASSESSMENT & PLAN NOTE
Hx of    - Mildly hypertensive overnight, start coreg 6.25 mg BID  - Continue amlodipine 10 mg qday   - Continue clonidine to 0.3 mg TID  - C/w losartan 100 mg qday   - Goal SBP<160

## 2025-07-14 NOTE — ASSESSMENT & PLAN NOTE
Hx of    - Continue amlodipine 10 mg qday   - Continue clonidine to 0.3 mg TID  - C/w losartan 100 mg qday   - Goal SBP<160

## 2025-07-14 NOTE — SUBJECTIVE & OBJECTIVE
Interval History:  Please see hospital course above for full details    Review of Systems   Unable to perform ROS: Mental status change     Objective:     Vitals:  Temp: 97.9 °F (36.6 °C)  Pulse: 88  Rhythm: normal sinus rhythm  BP: (!) 162/102  MAP (mmHg): 121  ICP Mean (mmHg): 4 mmHg  Resp: 17  SpO2: 96 %    Temp  Min: 97.7 °F (36.5 °C)  Max: 98.7 °F (37.1 °C)  Pulse  Min: 77  Max: 97  BP  Min: 139/82  Max: 192/129  MAP (mmHg)  Min: 105  Max: 143  ICP Mean (mmHg)  Min: 1 mmHg  Max: 13 mmHg  Resp  Min: 12  Max: 23  SpO2  Min: 94 %  Max: 100 %    07/12 0701 - 07/13 0700  In: 2615   Out: 1800 [Urine:1800]   Unmeasured Output  Unmeasured Urine Occurrence: 1  Unmeasured Stool Occurrence: 1        Physical Exam  General Appearance: Elderly gentleman resting in bed, NG tube and EVD in place. Not in acute hemodynamic distress  Mental Status Exam: awake, alert, oriented to self, place and partially to situation (able to state something wrong with his brain, unable to state what); not oriented to time. Following simple commands x4 extremities   Cranial Nerves: Gaze midline, PERRL, no clear gaze preference. Dysarthric. +cough, spontaneous  Motor: Moving all 4 extremities AG to command, grossly symmetric   Sensory: Grossly symmetric to noxious x4  Coordination: +dysmetria in b/l upper extremities, RUE>>LUE  Vascular: S1/S2 of normal intensity, no S3/S4 appreciated, no murmurs appreciated  Lungs: Slightly coarse breath sounds b/l, no wheezes/rales   Abdomen: Soft, non-distended, non-tender, BS +       Medications:  Continuous ScheduledamLODIPine, 10 mg, Daily  ceFAZolin (Ancef) IV (PEDS and ADULTS), 1 g, Q8H  cloNIDine, 0.3 mg, Q8H  dexAMETHasone, 2 mg, Q12H  famotidine, 20 mg, BID  folic acid, 1 mg, Daily  heparin (porcine), 5,000 Units, Q8H  losartan, 100 mg, Daily  multivitamin, 1 tablet, Daily  OLANZapine, 20 mg, BID  PHENobarbitaL, 30 mg, Q8H   Followed by  [START ON 7/14/2025] PHENobarbitaL, 30 mg, BID   Followed  by  [START ON 7/15/2025] PHENobarbitaL, 15 mg, BID   Followed by  [START ON 7/16/2025] PHENobarbitaL, 15 mg, Daily  polyethylene glycol, 17 g, Daily  senna-docusate, 2 tablet, BID  sulfamethoxazole-trimethoprim 800-160mg, 1 tablet, Every Mon, Wed, Fri  thiamine, 100 mg, Daily    PRNacetaminophen, 650 mg, Q4H PRN  bisacodyL, 10 mg, Daily PRN  hydrALAZINE, 10 mg, Q4H PRN  labetalol, 10 mg, Q4H PRN  magnesium oxide, 800 mg, PRN  magnesium oxide, 800 mg, PRN  ondansetron, 4 mg, Q4H PRN  potassium bicarbonate, 35 mEq, PRN  potassium bicarbonate, 50 mEq, PRN  potassium bicarbonate, 60 mEq, PRN  potassium, sodium phosphates, 2 packet, PRN  potassium, sodium phosphates, 2 packet, PRN  potassium, sodium phosphates, 2 packet, PRN  sodium chloride 0.9%, 10 mL, PRN      Today I personally reviewed pertinent imaging, laboratory results, notably: No new imaging to review. CBC unremarkable, no leukocytosis, Hb/platelets stable. CMP w/ downtrending BUN/creatinine, otherwise unremarkable.     Diet  No diet orders on file  No diet orders on file  TF at goal

## 2025-07-14 NOTE — ASSESSMENT & PLAN NOTE
70 y/o M brought to ED from [facility] (hx of heroin and ETOH) for progressive weakness, gait disturbance, confusion, and decreased volume when speaking. CT head without contrast revealed multiple lesions of hypodensity with a hyperdense rim, notably in the left temporal lobe and right cerebellum. There were additional small hyperdensities in the right frontoparietal and left parasagittal parietal lobe. There is mass effect and partial effacement of the 4th ventricle secondary to the cerebellar lesion with developing hydrocephalus without MLS. CT of the chest, abdomen, and pelvis with IV contrast showed RUL mass with mediastinal adenopathy. Labs revealed Hep C and treponema antibodies were positive. Hep C PCR pending. Penicillin G was administered. A sepsis workup was completed and antibiotics were initiated. Intubated to get MRI. S/p EVD. Extubated 7/4/25 without difficulty.    Admit to [facility]  Q1h neuro checks, I&Os, and vitals   Daily CBC, CMP, Mag, Phos  NSGY following; s/p SOC craniotomy for tumor resection on 7/7  Dex 4q12, plan to taper to 2 mg BID at discharge   Bactrim MWF for PJP ppx given plan for prolonged steroid taper  EVD clamped since 7/12 -> repeat head CT 7/14  Zyprexa BID for agitation  Phenobarb taper to off given improved agitation   Clonidine 0.3 mg TID for agitation/opioid dependence/hypertension  Avoid versed for agitation  Tube feeds + FWF 250cc q4h  Folate/thiamine/MV  SBP <160   Prn labetalol, hydralazine  PRN Zofran 4mg q4h  SCDs, SQH  PT/OT/SLP as able

## 2025-07-14 NOTE — ASSESSMENT & PLAN NOTE
70 y/o M brought to ED from [facility] (hx of heroin and ETOH) for progressive weakness, gait disturbance, confusion, and decreased volume when speaking. CT head without contrast revealed multiple lesions of hypodensity with a hyperdense rim, notably in the left temporal lobe and right cerebellum. There were additional small hyperdensities in the right frontoparietal and left parasagittal parietal lobe. There is mass effect and partial effacement of the 4th ventricle secondary to the cerebellar lesion with developing hydrocephalus without MLS. CT of the chest, abdomen, and pelvis with IV contrast showed RUL mass with mediastinal adenopathy. Labs revealed Hep C and treponema antibodies were positive. Hep C PCR pending. Penicillin G was administered. A sepsis workup was completed and antibiotics were initiated. Intubated to get MRI. S/p EVD. Extubated 7/4/25 without difficulty.    Admit to [facility]  Q1h neuro checks, I&Os, and vitals   Daily CBC, CMP, Mag, Phos  NSGY following; s/p SOC craniotomy for tumor resection on 7/7  Dex 2 mg BID, no plans to taper further pending outpt f/u  Bactrim MWF for PJP ppx given plan for prolonged steroid taper  EVD clamped since 7/12 -> repeat head CT stable, remove today and repeat head CT in AM  Zyprexa BID for agitation  Phenobarb taper to off given improved agitation   Clonidine 0.3 mg TID for agitation/opioid dependence/hypertension  Avoid versed for agitation  Tube feeds at goal, SLP following  Folate/thiamine/MV  SBP <160   Prn labetalol, hydralazine  PRN Zofran 4mg q4h  SCDs, SQH  PT/OT/SLP as able

## 2025-07-14 NOTE — SUBJECTIVE & OBJECTIVE
Interval History: NAEON. CTH w/o pseuodomeningocele, grossly stable ventricular caliber. Anticipate EVD removal with surveillance CSF studies today and CT tomorrow. Dex weaned further per NCC.     Medications:  Continuous Infusions:  Scheduled Meds:   amLODIPine  10 mg Per NG tube Daily    ceFAZolin (Ancef) IV (PEDS and ADULTS)  1 g Intravenous Q8H    cloNIDine  0.3 mg Per NG tube Q8H    dexAMETHasone  2 mg Per NG tube Q12H    famotidine  20 mg Per NG tube BID    folic acid  1 mg Per NG tube Daily    heparin (porcine)  5,000 Units Subcutaneous Q8H    losartan  100 mg Per NG tube Daily    multivitamin  1 tablet Per NG tube Daily    OLANZapine  20 mg Per NG tube BID    PHENobarbitaL  30 mg Per NG tube BID    Followed by    [START ON 7/15/2025] PHENobarbitaL  15 mg Per NG tube BID    Followed by    [START ON 7/16/2025] PHENobarbitaL  15 mg Per NG tube Daily    polyethylene glycol  17 g Per NG tube Daily    senna-docusate  2 tablet Per NG tube BID    sulfamethoxazole-trimethoprim 800-160mg  1 tablet Per NG tube Every Mon, Wed, Fri    thiamine  100 mg Per NG tube Daily     PRN Meds:  Current Facility-Administered Medications:     acetaminophen, 650 mg, Per NG tube, Q4H PRN    bisacodyL, 10 mg, Rectal, Daily PRN    hydrALAZINE, 10 mg, Intravenous, Q4H PRN    labetalol, 10 mg, Intravenous, Q4H PRN    magnesium oxide, 800 mg, Per NG tube, PRN    magnesium oxide, 800 mg, Per NG tube, PRN    ondansetron, 4 mg, Intravenous, Q4H PRN    potassium bicarbonate, 35 mEq, Per NG tube, PRN    potassium bicarbonate, 50 mEq, Per NG tube, PRN    potassium bicarbonate, 60 mEq, Per NG tube, PRN    potassium, sodium phosphates, 2 packet, Per NG tube, PRN    potassium, sodium phosphates, 2 packet, Per NG tube, PRN    potassium, sodium phosphates, 2 packet, Per NG tube, PRN    sodium chloride 0.9%, 10 mL, Intravenous, PRN     Review of Systems  Objective:     Weight: 81.1 kg (178 lb 12.7 oz)  Body mass index is 23.59 kg/m².  Vital Signs (Most  Recent):  Temp: 97.8 °F (36.6 °C) (07/14/25 0305)  Pulse: 102 (07/14/25 0605)  Resp: 20 (07/14/25 0605)  BP: (!) 162/74 (07/14/25 0605)  SpO2: 96 % (07/14/25 0605) Vital Signs (24h Range):  Temp:  [97.7 °F (36.5 °C)-98.7 °F (37.1 °C)] 97.8 °F (36.6 °C)  Pulse:  [] 102  Resp:  [12-30] 20  SpO2:  [82 %-100 %] 96 %  BP: (129-194)/() 162/74                              NG/OG Tube 07/10/25 0400 Other (comments) Left nostril (Active)   Placement Check placement verified by x-ray;placement verified by distal tube length measurement 07/14/25 0505   Tolerance no signs/symptoms of discomfort 07/14/25 0505   Securement secured to nostril center w/ adhesive device 07/14/25 0505   Clamp Status/Tolerance unclamped 07/14/25 0505   Suction Setting/Drainage Method suction at the bedside 07/14/25 0505   Insertion Site Appearance no redness, warmth, tenderness, skin breakdown, drainage 07/14/25 0505   Drainage None 07/14/25 0505   Flush/Irrigation flushed w/;water;no resistance met 07/14/25 0505   Feeding Type continuous;by pump 07/14/25 0505   Feeding Action feeding continued 07/14/25 0505   Current Rate (mL/hr) 55 mL/hr 07/13/25 1905   Goal Rate (mL/hr) 55 mL/hr 07/13/25 1905   Intake (mL) 60 mL 07/13/25 2105   Water Bolus (mL) 250 mL 07/14/25 0405   Rate Formula Tube Feeding (mL/hr) 55 mL/hr 07/13/25 1905   Formula Name impact peptide 07/14/25 0505   Intake (mL) - Formula Tube Feeding 55 07/14/25 0505   Residual Amount (ml) 0 ml 07/13/25 1905       Male External Urinary Catheter 07/08/25 0705 Medium (Active)   Collection Container Urimeter 07/14/25 0505   Securement Method secured to top of thigh w/ leg strap 07/14/25 0505   Skin no redness;no breakdown 07/14/25 0505   Tolerance no signs/symptoms of discomfort 07/14/25 0505   Output (mL) 500 mL 07/14/25 0605   Catheter Change Date 07/13/25 07/14/25 0505   Catheter Change Time 0701 07/13/25 1501            ICP/Ventriculostomy 07/02/25 1409 Right (Active)   Level of  "Ventriculostomy (cm above) 10 07/12/25 2105   Status Clamped 07/14/25 0605   Site Assessment Clean;Dry 07/14/25 0605   Site Drainage No drainage 07/14/25 0605   Waveform continuous waveform displayed 07/13/25 1905   Output (mL) 0 mL 07/13/25 0701   CSF Color clear 07/12/25 1701   Dressing Status Dry;Clean;Intact 07/14/25 0605   Interventions clamped;zeroed 07/14/25 0605          Physical Exam         Neurosurgery Physical Exam  E4V4M6  AOX2  PERRL  HARRISON spon AG  Following commands x4, HARRISON spon, nonfocal motor exam     EVD patent with good waveform  Incision flat, c/d/I without fluctuance    Significant Labs:  Recent Labs   Lab 07/13/25  0547 07/13/25  0830 07/14/25  0143    101 86    138 136   K 4.9 4.0 4.1    107 106   CO2 21* 24 24   BUN 30* 29* 27*   CREATININE 0.8 0.8 0.8   CALCIUM 8.2* 8.7 8.6*   MG 1.8 1.9 1.9     Recent Labs   Lab 07/13/25  0830 07/14/25  0143   WBC 5.86 5.89   HGB 11.0* 11.0*   HCT 33.2* 32.6*    184     No results for input(s): "LABPT", "INR", "APTT" in the last 48 hours.  Microbiology Results (last 7 days)       Procedure Component Value Units Date/Time    CSF culture [[id number]] Collected: 07/10/25 0856    Order Status: Completed Specimen: CSF (Spinal Fluid) from CSF Tap, Tube 3 Updated: 07/13/25 0636     CULTURE, CSF No Growth To Date     GRAM STAIN Cytospin indicates:      No WBCs      No organisms seen    CSF culture [[id number]] Collected: 07/07/25 0715    Order Status: Completed Specimen: CSF (Spinal Fluid) from CSF Tap, Tube 3 Updated: 07/12/25 0713     CULTURE, CSF No Growth     GRAM STAIN Cytospin indicates:      No WBCs      No organisms seen    Gram stain [[id number]] Collected: 07/10/25 0856    Order Status: Canceled Specimen: CSF (Spinal Fluid) from CSF Tap, Tube 3 Updated: 07/10/25 1400    AFB Culture & Smear [[id number]]  (Normal) Collected: 07/02/25 1808    Order Status: Completed Specimen: CSF (Spinal Fluid) from CSF Tap, Tube 3 Updated: 07/10/25 " 0205     CULTURE, AFB  Culture Negative For Mycobacteria At 1 Week    CSF culture [[id number]] Collected: 07/02/25 1808    Order Status: Completed Specimen: CSF (Spinal Fluid) from CSF Tap, Tube 3 Updated: 07/08/25 0747     CULTURE, CSF No Growth     GRAM STAIN Cytospin indicates:      No WBCs      No organisms seen    Gram stain [[id number]] Collected: 07/07/25 0715    Order Status: Canceled Specimen: CSF (Spinal Fluid) from CSF Tap, Tube 3 Updated: 07/07/25 1620    Culture, Respiratory with Gram Stain [[id number]] Collected: 07/03/25 1130    Order Status: Completed Specimen: Respiratory from Endotracheal Aspirate Updated: 07/07/25 0844     Respiratory Culture Normal respiratory carmen, no Staph aureus or Pseudomonas isolated     GRAM STAIN <10 Epithelial Cells/LPF      Rare WBC seen      No organisms seen    CSF culture [[id number]]     Order Status: Canceled Specimen: CSF (Spinal Fluid)           All pertinent labs from the last 24 hours have been reviewed.    Significant Diagnostics:  I have reviewed all pertinent imaging results/findings within the past 24 hours.

## 2025-07-14 NOTE — PT/OT/SLP PROGRESS
Speech Language Pathology Treatment    Patient Name:  Goran Carlos   MRN:  [id number]  Admitting Diagnosis: Ataxia    Recommendations:                 General Recommendations:  Dysphagia therapy, Cognitive-linguistic therapy, and Modified barium swallow study  Diet recommendations:  NPO, Pleasure Feeding, Liquid Diet Level: NPO   Aspiration Precautions: Continue alternate means of nutrition, Frequent oral care, Ice chips sparingly, Puree for pleasure, and Strict aspiration precautions   General Precautions: Standard, fall, aspiration  Communication strategies:  none  Discharge recommendations:   (tbd)   Barriers to Discharge:  Decreased Care Giver Support    Assessment:     Goran Carlos is a 69 y.o. male with an SLP diagnosis of Dysphagia, Dysarthria, and Cognitive-Linguistic Impairment.    Subjective     Nursing reported patient had very poor night's sleep   Patient goals: did not state     Pain/Comfort:  Pain Rating 1: 0/10  Pain Rating Post-Intervention 1: 0/10    Respiratory Status: Room air    Objective:     Has the patient been evaluated by SLP for swallowing?   Yes  Keep patient NPO? Yes (ok to have ice chips, teaspoons of puree and water for pleasure)   Current Respiratory Status:        Pt. Seen at bedside on 3rd attempt.  Per nursing pt. Had poor nights sleep last evening.  HOB was raised to 90 degree angle and pt. Was alert/cooperative.  Vocal quality remained wet with decreased intensity.  He produced fair cough on command.  Speech was intelligible in conversation with careful listening.  Pt. Was given teaspoon of water x4 trials followed by 2 sips of water via cup with a straw.  Pt. Was also fed teaspoons of pudding x4.  Oral phase of swallow was wfl with timely oral transit and no delay in initiation of pharyngeal swallow.  No coughing, throat clearing noted on trials of pudding or teaspoon size bolus' of water however delayed cough and throat clear noted on both trials of water via straw.  Recommend  mod barium swallow study to assess swallowing radiographically to initiate least restrictive diet.     Goals:   Multidisciplinary Problems       SLP Goals          Problem: SLP    Goal Priority Disciplines Outcome   SLP Goal     SLP Progressing   Description: Goals due 7/16  1.  Pt. Will participate in ongoing assessment of swallow to initiate least restrictive diet.                       Plan:     Patient to be seen:  4 x/week   Plan of Care expires:  08/06/25  Plan of Care reviewed with:  patient   SLP Follow-Up:  Yes       Time Tracking:     SLP Treatment Date:   07/14/25  Speech Start Time:  1208  Speech Stop Time:  1218     Speech Total Time (min):  10 min    Billable Minutes: Treatment Swallowing Dysfunction 10    07/14/2025

## 2025-07-14 NOTE — PLAN OF CARE
[facility]  Discharge Reassessment    Primary Care Provider: No primary care provider on file.    Expected Discharge Date: 7/16/2025    Reassessment (most recent)       Discharge Reassessment - 07/14/25 1248          Discharge Reassessment    Assessment Type Discharge Planning Reassessment (P)      Did the patient's condition or plan change since previous assessment? No (P)      Discharge Plan discussed with: Patient (P)      Communicated CARA with patient/caregiver Yes (P)      Discharge Plan A Rehab (P)      Discharge Plan B Home with family (P)      DME Needed Upon Discharge  none (P)      Transition of Care Barriers None (P)      Why the patient remains in the hospital Requires continued medical care (P)         Post-Acute Status    Discharge Delays None known at this time (P)                  Discharge Plan A and Plan B have been determined by review of patient's clinical status, future medical and therapeutic needs, and coverage/benefits for post-acute care in coordination with multidisciplinary team members.    Ele Richmond MSW, LCSW  [facility]  Case Management Dept.

## 2025-07-14 NOTE — EICU
Intervention Initiated From:  COR / DIAMANTEU    Alex intervened regarding:  Rounding (Video assessment)    VICU Night Rounds Checklist  24H Vital Sign Range:  Temp:  [97.7 °F (36.5 °C)-98.7 °F (37.1 °C)]   Pulse:  [77-97]   Resp:  [12-28]   BP: (131-194)/()   SpO2:  [82 %-100 %]     Video rounds and LDA reconciliation

## 2025-07-14 NOTE — PROGRESS NOTES
[facility]  Neurosurgery  Progress Note    Subjective:     History of Present Illness: Patient is 69yo M with generalized weakness, falls, and paucity of speech. 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 On exam, patient's voice is barely audible but patient appropriately answers questions and follows commands. Patient's brother states that patient had normal speech and gait when they last saw each other about 1 month ago.    Neurosurgery consulted due to multiple ring-enhancing lesions seen on CT brain.    Post-Op Info:  Procedure(s) (LRB):  CRANIOTOMY, SUBOCCIPITAL (Right)   7 Days Post-Op   Interval History: NAEON. CTH w/o pseuodomeningocele, grossly stable ventricular caliber. Anticipate EVD removal with surveillance CSF studies today and CT tomorrow. Dex weaned further per NCC.     Medications:  Continuous Infusions:  Scheduled Meds:   amLODIPine  10 mg Per NG tube Daily    ceFAZolin (Ancef) IV (PEDS and ADULTS)  1 g Intravenous Q8H    cloNIDine  0.3 mg Per NG tube Q8H    dexAMETHasone  2 mg Per NG tube Q12H    famotidine  20 mg Per NG tube BID    folic acid  1 mg Per NG tube Daily    heparin (porcine)  5,000 Units Subcutaneous Q8H    losartan  100 mg Per NG tube Daily    multivitamin  1 tablet Per NG tube Daily    OLANZapine  20 mg Per NG tube BID    PHENobarbitaL  30 mg Per NG tube BID    Followed by    [START ON 7/15/2025] PHENobarbitaL  15 mg Per NG tube BID    Followed by    [START ON 7/16/2025] PHENobarbitaL  15 mg Per NG tube Daily    polyethylene glycol  17 g Per NG tube Daily    senna-docusate  2 tablet Per NG tube BID    sulfamethoxazole-trimethoprim 800-160mg  1 tablet Per NG tube Every Mon, Wed, Fri    thiamine  100 mg Per NG tube Daily     PRN Meds:  Current Facility-Administered Medications:     acetaminophen, 650 mg, Per NG tube, Q4H PRN    bisacodyL, 10 mg, Rectal, Daily PRN    hydrALAZINE, 10 mg, Intravenous, Q4H PRN    labetalol, 10 mg,  Intravenous, Q4H PRN    magnesium oxide, 800 mg, Per NG tube, PRN    magnesium oxide, 800 mg, Per NG tube, PRN    ondansetron, 4 mg, Intravenous, Q4H PRN    potassium bicarbonate, 35 mEq, Per NG tube, PRN    potassium bicarbonate, 50 mEq, Per NG tube, PRN    potassium bicarbonate, 60 mEq, Per NG tube, PRN    potassium, sodium phosphates, 2 packet, Per NG tube, PRN    potassium, sodium phosphates, 2 packet, Per NG tube, PRN    potassium, sodium phosphates, 2 packet, Per NG tube, PRN    sodium chloride 0.9%, 10 mL, Intravenous, PRN     Review of Systems  Objective:     Weight: 81.1 kg (178 lb 12.7 oz)  Body mass index is 23.59 kg/m².  Vital Signs (Most Recent):  Temp: 97.8 °F (36.6 °C) (07/14/25 0305)  Pulse: 102 (07/14/25 0605)  Resp: 20 (07/14/25 0605)  BP: (!) 162/74 (07/14/25 0605)  SpO2: 96 % (07/14/25 0605) Vital Signs (24h Range):  Temp:  [97.7 °F (36.5 °C)-98.7 °F (37.1 °C)] 97.8 °F (36.6 °C)  Pulse:  [] 102  Resp:  [12-30] 20  SpO2:  [82 %-100 %] 96 %  BP: (129-194)/() 162/74                              NG/OG Tube 07/10/25 0400 Other (comments) Left nostril (Active)   Placement Check placement verified by x-ray;placement verified by distal tube length measurement 07/14/25 0505   Tolerance no signs/symptoms of discomfort 07/14/25 0505   Securement secured to nostril center w/ adhesive device 07/14/25 0505   Clamp Status/Tolerance unclamped 07/14/25 0505   Suction Setting/Drainage Method suction at the bedside 07/14/25 0505   Insertion Site Appearance no redness, warmth, tenderness, skin breakdown, drainage 07/14/25 0505   Drainage None 07/14/25 0505   Flush/Irrigation flushed w/;water;no resistance met 07/14/25 0505   Feeding Type continuous;by pump 07/14/25 0505   Feeding Action feeding continued 07/14/25 0505   Current Rate (mL/hr) 55 mL/hr 07/13/25 1905   Goal Rate (mL/hr) 55 mL/hr 07/13/25 1905   Intake (mL) 60 mL 07/13/25 2105   Water Bolus (mL) 250 mL 07/14/25 0405   Rate Formula Tube  "Feeding (mL/hr) 55 mL/hr 07/13/25 1905   Formula Name impact peptide 07/14/25 0505   Intake (mL) - Formula Tube Feeding 55 07/14/25 0505   Residual Amount (ml) 0 ml 07/13/25 1905       Male External Urinary Catheter 07/08/25 0705 Medium (Active)   Collection Container Urimeter 07/14/25 0505   Securement Method secured to top of thigh w/ leg strap 07/14/25 0505   Skin no redness;no breakdown 07/14/25 0505   Tolerance no signs/symptoms of discomfort 07/14/25 0505   Output (mL) 500 mL 07/14/25 0605   Catheter Change Date 07/13/25 07/14/25 0505   Catheter Change Time 0701 07/13/25 1501            ICP/Ventriculostomy 07/02/25 1409 Right (Active)   Level of Ventriculostomy (cm above) 10 07/12/25 2105   Status Clamped 07/14/25 0605   Site Assessment Clean;Dry 07/14/25 0605   Site Drainage No drainage 07/14/25 0605   Waveform continuous waveform displayed 07/13/25 1905   Output (mL) 0 mL 07/13/25 0701   CSF Color clear 07/12/25 1701   Dressing Status Dry;Clean;Intact 07/14/25 0605   Interventions clamped;zeroed 07/14/25 0605          Physical Exam         Neurosurgery Physical Exam  E4V4M6  AOX2  PERRL  HARRISON spon AG  Following commands x4, HARRISON spon, nonfocal motor exam     EVD patent with good waveform  Incision flat, c/d/I without fluctuance    Significant Labs:  Recent Labs   Lab 07/13/25  0547 07/13/25  0830 07/14/25  0143    101 86    138 136   K 4.9 4.0 4.1    107 106   CO2 21* 24 24   BUN 30* 29* 27*   CREATININE 0.8 0.8 0.8   CALCIUM 8.2* 8.7 8.6*   MG 1.8 1.9 1.9     Recent Labs   Lab 07/13/25  0830 07/14/25  0143   WBC 5.86 5.89   HGB 11.0* 11.0*   HCT 33.2* 32.6*    184     No results for input(s): "LABPT", "INR", "APTT" in the last 48 hours.  Microbiology Results (last 7 days)       Procedure Component Value Units Date/Time    CSF culture [[id number]] Collected: 07/10/25 0856    Order Status: Completed Specimen: CSF (Spinal Fluid) from CSF Tap, Tube 3 Updated: 07/13/25 0636     CULTURE, " CSF No Growth To Date     GRAM STAIN Cytospin indicates:      No WBCs      No organisms seen    CSF culture [[id number]] Collected: 07/07/25 0715    Order Status: Completed Specimen: CSF (Spinal Fluid) from CSF Tap, Tube 3 Updated: 07/12/25 0713     CULTURE, CSF No Growth     GRAM STAIN Cytospin indicates:      No WBCs      No organisms seen    Gram stain [[id number]] Collected: 07/10/25 0856    Order Status: Canceled Specimen: CSF (Spinal Fluid) from CSF Tap, Tube 3 Updated: 07/10/25 1400    AFB Culture & Smear [[id number]]  (Normal) Collected: 07/02/25 1808    Order Status: Completed Specimen: CSF (Spinal Fluid) from CSF Tap, Tube 3 Updated: 07/10/25 0205     CULTURE, AFB  Culture Negative For Mycobacteria At 1 Week    CSF culture [[id number]] Collected: 07/02/25 1808    Order Status: Completed Specimen: CSF (Spinal Fluid) from CSF Tap, Tube 3 Updated: 07/08/25 0747     CULTURE, CSF No Growth     GRAM STAIN Cytospin indicates:      No WBCs      No organisms seen    Gram stain [[id number]] Collected: 07/07/25 0715    Order Status: Canceled Specimen: CSF (Spinal Fluid) from CSF Tap, Tube 3 Updated: 07/07/25 1620    Culture, Respiratory with Gram Stain [[id number]] Collected: 07/03/25 1130    Order Status: Completed Specimen: Respiratory from Endotracheal Aspirate Updated: 07/07/25 0844     Respiratory Culture Normal respiratory carmen, no Staph aureus or Pseudomonas isolated     GRAM STAIN <10 Epithelial Cells/LPF      Rare WBC seen      No organisms seen    CSF culture [[id number]]     Order Status: Canceled Specimen: CSF (Spinal Fluid)           All pertinent labs from the last 24 hours have been reviewed.    Significant Diagnostics:  I have reviewed all pertinent imaging results/findings within the past 24 hours.  Assessment/Plan:     * Ataxia  Assessment  71yo M with generalized weakness, falls, and paucity of speech presenting with multiple ring enhancing lesions on brain CT now s/p R SOC for tumor resection on  7/7:    Imaging:  - CTH 7/1 showed multiple brain masses concerning for mets with surrounding edema, can't rule out abscesses. Mass in cerebellum with effacement of 4th ventricule outflow with concern for developing hydrocephalus  - MRI brain 7/1: non diagnostic due to motion  - CT CAP 7/1: spiculated R lung mass and lymph node adenopathy in chest and neck concerning for metastatic disease  - MRI Brain W/WO 7/2: multifocal enhancing lesions c/f metastatic disease, largest R cerebellum w/mass effect on 4th.  - Post op CTH/MRI 7/7: anticipate post op changes, hemostatic products left at superior/medial border of resection cavity  - CTH 7/11: no pseudomeningocele collection, persistent edema around L frontal met  - CTH 7/14: grossly stable ventricular caliber, no pseudomeningocele     Plan:  - admitted to NCC  - EVD clamped, abx while in place; transduce hourly; will send CSF prior to EVD removal today. CT tomorrow s/p EVD pull  - Dex weaned to 2 BID per NCC, continue GI ppx while on steroid. Further wean per Oncology  - AED per NCC  - Onc following; will need outpatient rad onc consult. Frozen in OR c/w metastatic adenocarcinoma  - PT/OT/SLP ongoing  - EM/SCD, ok for SQH given clinical stability, CBC WNL  - medical management per NCC    Dispo: ongoing, pending therapies and EVD removal today    Discussed with Dr. Larios and Dr. Mata by NS team        Shruthi Rodriguez MD  Neurosurgery  [facility]

## 2025-07-14 NOTE — PT/OT/SLP PROGRESS
Physical Therapy Treatment    Patient Name:  Goran Carlos   MRN:  [id number]    Recommendations:     Discharge Recommendations: High Intensity Therapy  Discharge Equipment Recommendations: bedside commode, bath bench, wheelchair  Barriers to discharge: Inaccessible home and Decreased caregiver support    Assessment:     Goran Carlos is a 69 y.o. male admitted with a medical diagnosis of Ataxia.  He presents with the following impairments/functional limitations: weakness, impaired endurance, impaired self care skills, impaired functional mobility, impaired balance, impaired cognition, decreased coordination, decreased upper extremity function, decreased lower extremity function, decreased safety awareness, impaired coordination, impaired fine motor, impaired cardiopulmonary response to activity. Patient able to transfer to EOB this date to perform exercises and activity, demonstrating improved seated balance towards end of trial. Overall he followed most commands with some increased cueing for attention, able to verbalize needs however difficult to understand due to dysarthria. No standing trials this date due to safety with lines + patient impulsiveness.     Rehab Prognosis: Good; patient would benefit from acute skilled PT services to address these deficits and reach maximum level of function.    Recent Surgery: Procedure(s) (LRB):  CRANIOTOMY, SUBOCCIPITAL (Right) 7 Days Post-Op    Plan:     During this hospitalization, patient to be seen 4 x/week to address the identified rehab impairments via gait training, therapeutic activities, therapeutic exercises, neuromuscular re-education and progress toward the following goals:    Plan of Care Expires:  08/04/25    Subjective     Chief Complaint: back stiffness, chapped lips, restraints applied, nursing staff  Patient/Family Comments/goals: To walk next session.   Pain/Comfort:  Pain Rating 1: 0/10  Pain Rating Post-Intervention 1: 0/10      Objective:     Communicated  with JHON Grajeda prior to session.  Patient found HOB elevated with bed alarm, blood pressure cuff, pulse ox (continuous), telemetry, NG tube, restraints, external ventricular drain (roll belt) upon PT entry to room.     General Precautions: Standard, fall, aspiration  Orthopedic Precautions: N/A  Braces: N/A  Respiratory Status: Room air     Cognition:   Affect: pleasant and cooperative  Alertness: eyes open 100 % of session  Insight: decreased safety awareness and impulsive   Attention: attends to task  Command Following: patient follows 100 % of 1-step commands  Communication: verbalizes needs, dysarthric     Functional Mobility:  Bed Mobility:     Scooting: moderate assistance  Supine to Sit: moderate assistance  Sit to Supine: maximal assistance  Balance:   Seated: modA to CGA  Posterolateral L lean, corrected with cueing and activity       AM-PAC 6 CLICK MOBILITY  Turning over in bed (including adjusting bedclothes, sheets and blankets)?: 4  Sitting down on and standing up from a chair with arms (e.g., wheelchair, bedside commode, etc.): 2  Moving from lying on back to sitting on the side of the bed?: 2  Moving to and from a bed to a chair (including a wheelchair)?: 2  Need to walk in hospital room?: 1  Climbing 3-5 steps with a railing?: 1  Basic Mobility Total Score: 12       Treatment & Education:  EOB balance with cues to engage core mm to decrease assistance needed and increase safety in sitting.   Seated LE exercises, HOB elevated UE exercises and stretches, seated back stretching (forward leans, rotations).   Self care activity with face washing, chapstick application.     Patient left HOB elevated with all lines intact, call button in reach, bed alarm on, and restraints reapplied at end of session.    GOALS:   Multidisciplinary Problems       Physical Therapy Goals          Problem: Physical Therapy    Goal Priority Disciplines Outcome Interventions   Physical Therapy Goal     PT, PT/OT Progressing     Description: Goals to be met by: 2025     Patient will increase functional independence with mobility by performin. Supine to sit with Stand-by Assistance  2. Sit to supine with Stand-by Assistance  3. Sit to stand transfer with Contact Guard Assistance  4. Bed to chair transfer with Minimal Assistance using Rolling Walker  5. Gait  x 10 feet with Minimal Assistance using Rolling Walker.   6. Sitting at edge of bed x5 minutes with Supervision                           Time Tracking:     PT Received On: 25  PT Start Time: 1437     PT Stop Time: 1508  PT Total Time (min): 31 min     Billable Minutes: Therapeutic Activity 20 minutes and Therapeutic Exercise 11 minutes    Treatment Type: Treatment  PT/PTA: PT     Number of PTA visits since last PT visit: 0     2025

## 2025-07-14 NOTE — PLAN OF CARE
07/14/25 1248   Rounds   Attendance Provider;   Discharge Plan A Rehab   Why the patient remains in the hospital Requires continued medical care   Transition of Care Barriers None     Ele Richmond MSW, LCSW  [facility]  Case Management Dept.

## 2025-07-15 PROBLEM — G93.41 ENCEPHALOPATHY, METABOLIC: Status: ACTIVE | Noted: 2025-07-15

## 2025-07-15 PROBLEM — D64.9 ANEMIA: Status: ACTIVE | Noted: 2025-07-15

## 2025-07-15 PROBLEM — R41.82 AMS (ALTERED MENTAL STATUS): Status: RESOLVED | Noted: 2025-07-01 | Resolved: 2025-07-15

## 2025-07-15 PROBLEM — R13.10 DYSPHAGIA: Status: ACTIVE | Noted: 2025-07-15

## 2025-07-15 LAB
ABSOLUTE EOSINOPHIL (OHS): 0.04 K/UL
ABSOLUTE MONOCYTE (OHS): 0.53 K/UL (ref 0.3–1)
ABSOLUTE NEUTROPHIL COUNT (OHS): 5.05 K/UL (ref 1.8–7.7)
ALBUMIN SERPL BCP-MCNC: 3.2 G/DL (ref 3.5–5.2)
ALP SERPL-CCNC: 71 UNIT/L (ref 40–150)
ALT SERPL W/O P-5'-P-CCNC: 21 UNIT/L (ref 10–44)
ANION GAP (OHS): 9 MMOL/L (ref 8–16)
AST SERPL-CCNC: 50 UNIT/L (ref 11–45)
BACTERIA CSF CULT: NO GROWTH
BASOPHILS # BLD AUTO: 0.04 K/UL
BASOPHILS NFR BLD AUTO: 0.6 %
BILIRUB SERPL-MCNC: 0.4 MG/DL (ref 0.1–1)
BUN SERPL-MCNC: 25 MG/DL (ref 8–23)
CALCIUM SERPL-MCNC: 8.6 MG/DL (ref 8.7–10.5)
CHLORIDE SERPL-SCNC: 109 MMOL/L (ref 95–110)
CO2 SERPL-SCNC: 20 MMOL/L (ref 23–29)
CREAT SERPL-MCNC: 0.9 MG/DL (ref 0.5–1.4)
ERYTHROCYTE [DISTWIDTH] IN BLOOD BY AUTOMATED COUNT: 13.2 % (ref 11.5–14.5)
GFR SERPLBLD CREATININE-BSD FMLA CKD-EPI: >60 ML/MIN/1.73/M2
GLUCOSE SERPL-MCNC: 90 MG/DL (ref 70–110)
GRAM STN SPEC: NORMAL
HCT VFR BLD AUTO: 33.8 % (ref 40–54)
HGB BLD-MCNC: 11.2 GM/DL (ref 14–18)
IMM GRANULOCYTES # BLD AUTO: 0.02 K/UL (ref 0–0.04)
IMM GRANULOCYTES NFR BLD AUTO: 0.3 % (ref 0–0.5)
LYMPHOCYTES # BLD AUTO: 0.91 K/UL (ref 1–4.8)
MAGNESIUM SERPL-MCNC: 1.9 MG/DL (ref 1.6–2.6)
MCH RBC QN AUTO: 30.9 PG (ref 27–31)
MCHC RBC AUTO-ENTMCNC: 33.1 G/DL (ref 32–36)
MCV RBC AUTO: 93 FL (ref 82–98)
NUCLEATED RBC (/100WBC) (OHS): 0 /100 WBC
PHOSPHATE SERPL-MCNC: 3.1 MG/DL (ref 2.7–4.5)
PLATELET # BLD AUTO: 173 K/UL (ref 150–450)
PMV BLD AUTO: 10.5 FL (ref 9.2–12.9)
POTASSIUM SERPL-SCNC: 4.6 MMOL/L (ref 3.5–5.1)
PROT SERPL-MCNC: 6.9 GM/DL (ref 6–8.4)
RBC # BLD AUTO: 3.62 M/UL (ref 4.6–6.2)
RELATIVE EOSINOPHIL (OHS): 0.6 %
RELATIVE LYMPHOCYTE (OHS): 13.8 % (ref 18–48)
RELATIVE MONOCYTE (OHS): 8 % (ref 4–15)
RELATIVE NEUTROPHIL (OHS): 76.7 % (ref 38–73)
SODIUM SERPL-SCNC: 138 MMOL/L (ref 136–145)
WBC # BLD AUTO: 6.59 K/UL (ref 3.9–12.7)

## 2025-07-15 PROCEDURE — 25000003 PHARM REV CODE 250: Performed by: PSYCHIATRY & NEUROLOGY

## 2025-07-15 PROCEDURE — A9698 NON-RAD CONTRAST MATERIALNOC: HCPCS | Performed by: PSYCHIATRY & NEUROLOGY

## 2025-07-15 PROCEDURE — 63600175 PHARM REV CODE 636 W HCPCS

## 2025-07-15 PROCEDURE — 25000003 PHARM REV CODE 250

## 2025-07-15 PROCEDURE — 85025 COMPLETE CBC W/AUTO DIFF WBC: CPT

## 2025-07-15 PROCEDURE — 97535 SELF CARE MNGMENT TRAINING: CPT

## 2025-07-15 PROCEDURE — 80053 COMPREHEN METABOLIC PANEL: CPT

## 2025-07-15 PROCEDURE — 92611 MOTION FLUOROSCOPY/SWALLOW: CPT

## 2025-07-15 PROCEDURE — 63600175 PHARM REV CODE 636 W HCPCS: Performed by: STUDENT IN AN ORGANIZED HEALTH CARE EDUCATION/TRAINING PROGRAM

## 2025-07-15 PROCEDURE — 99233 SBSQ HOSP IP/OBS HIGH 50: CPT | Mod: ,,, | Performed by: PHYSICIAN ASSISTANT

## 2025-07-15 PROCEDURE — 94761 N-INVAS EAR/PLS OXIMETRY MLT: CPT

## 2025-07-15 PROCEDURE — 63600175 PHARM REV CODE 636 W HCPCS: Performed by: PHYSICIAN ASSISTANT

## 2025-07-15 PROCEDURE — 11000001 HC ACUTE MED/SURG PRIVATE ROOM

## 2025-07-15 PROCEDURE — 97116 GAIT TRAINING THERAPY: CPT

## 2025-07-15 PROCEDURE — 83735 ASSAY OF MAGNESIUM: CPT

## 2025-07-15 PROCEDURE — 97530 THERAPEUTIC ACTIVITIES: CPT

## 2025-07-15 PROCEDURE — 25000003 PHARM REV CODE 250: Performed by: PHYSICIAN ASSISTANT

## 2025-07-15 PROCEDURE — 25500020 PHARM REV CODE 255: Performed by: PSYCHIATRY & NEUROLOGY

## 2025-07-15 PROCEDURE — 84100 ASSAY OF PHOSPHORUS: CPT

## 2025-07-15 PROCEDURE — 25000003 PHARM REV CODE 250: Performed by: NURSE PRACTITIONER

## 2025-07-15 RX ORDER — ENOXAPARIN SODIUM 100 MG/ML
40 INJECTION SUBCUTANEOUS EVERY 24 HOURS
Status: DISCONTINUED | OUTPATIENT
Start: 2025-07-15 | End: 2025-07-25 | Stop reason: HOSPADM

## 2025-07-15 RX ADMIN — OLANZAPINE 20 MG: 5 TABLET, FILM COATED ORAL at 09:07

## 2025-07-15 RX ADMIN — PHENOBARBITAL 15 MG: 15 TABLET ORAL at 09:07

## 2025-07-15 RX ADMIN — LOSARTAN POTASSIUM 100 MG: 50 TABLET, FILM COATED ORAL at 09:07

## 2025-07-15 RX ADMIN — FOLIC ACID 1 MG: 1 TABLET ORAL at 09:07

## 2025-07-15 RX ADMIN — HEPARIN SODIUM 5000 UNITS: 5000 INJECTION INTRAVENOUS; SUBCUTANEOUS at 06:07

## 2025-07-15 RX ADMIN — PHENOBARBITAL 15 MG: 15 TABLET ORAL at 10:07

## 2025-07-15 RX ADMIN — CLONIDINE HYDROCHLORIDE 0.3 MG: 0.2 TABLET ORAL at 05:07

## 2025-07-15 RX ADMIN — DEXAMETHASONE 2 MG: 1 TABLET ORAL at 09:07

## 2025-07-15 RX ADMIN — OLANZAPINE 20 MG: 5 TABLET, FILM COATED ORAL at 10:07

## 2025-07-15 RX ADMIN — BARIUM SULFATE 5 ML: 0.81 POWDER, FOR SUSPENSION ORAL at 02:07

## 2025-07-15 RX ADMIN — DEXAMETHASONE 2 MG: 1 TABLET ORAL at 10:07

## 2025-07-15 RX ADMIN — POLYETHYLENE GLYCOL 3350 17 G: 17 POWDER, FOR SOLUTION ORAL at 09:07

## 2025-07-15 RX ADMIN — OLANZAPINE 5 MG: 10 INJECTION, POWDER, FOR SOLUTION INTRAMUSCULAR at 02:07

## 2025-07-15 RX ADMIN — Medication 100 MG: at 09:07

## 2025-07-15 RX ADMIN — THERA TABS 1 TABLET: TAB at 09:07

## 2025-07-15 RX ADMIN — LABETALOL HYDROCHLORIDE 10 MG: 5 INJECTION, SOLUTION INTRAVENOUS at 09:07

## 2025-07-15 RX ADMIN — CARVEDILOL 6.25 MG: 6.25 TABLET, FILM COATED ORAL at 09:07

## 2025-07-15 RX ADMIN — AMLODIPINE BESYLATE 10 MG: 10 TABLET ORAL at 09:07

## 2025-07-15 RX ADMIN — BARIUM SULFATE 15 ML: 400 SUSPENSION ORAL at 02:07

## 2025-07-15 RX ADMIN — FAMOTIDINE 20 MG: 20 TABLET, FILM COATED ORAL at 10:07

## 2025-07-15 RX ADMIN — LABETALOL HYDROCHLORIDE 10 MG: 5 INJECTION, SOLUTION INTRAVENOUS at 03:07

## 2025-07-15 RX ADMIN — SENNOSIDES AND DOCUSATE SODIUM 2 TABLET: 50; 8.6 TABLET ORAL at 09:07

## 2025-07-15 RX ADMIN — FAMOTIDINE 20 MG: 20 TABLET, FILM COATED ORAL at 09:07

## 2025-07-15 RX ADMIN — ENOXAPARIN SODIUM 40 MG: 40 INJECTION SUBCUTANEOUS at 05:07

## 2025-07-15 RX ADMIN — CARVEDILOL 6.25 MG: 6.25 TABLET, FILM COATED ORAL at 10:07

## 2025-07-15 RX ADMIN — CLONIDINE HYDROCHLORIDE 0.3 MG: 0.2 TABLET ORAL at 03:07

## 2025-07-15 RX ADMIN — CLONIDINE HYDROCHLORIDE 0.3 MG: 0.2 TABLET ORAL at 10:07

## 2025-07-15 NOTE — PLAN OF CARE
07/15/25 1620   Rounds   Attendance Provider;   Discharge Plan A Rehab   Why the patient remains in the hospital Requires continued medical care   Transition of Care Barriers None     Ele Richmond MSW, LCSW  [facility]  Case Management Dept.

## 2025-07-15 NOTE — ASSESSMENT & PLAN NOTE
Was brought in from [facility], where he has been for the past month  Educate on cessation when appropriate  On phenobarbital for agitation, no signs of EtOH withdrawal -> phenobarb taper

## 2025-07-15 NOTE — ASSESSMENT & PLAN NOTE
Was brought in from [facility], where he has been for the past month  Educate on cessation when appropriate  On phenobarbital for agitation, no signs of EtOH withdrawal -> phenobarb taper     7/16 to completed phenobarbital taper today

## 2025-07-15 NOTE — ASSESSMENT & PLAN NOTE
68 y/o M brought to ED from [facility] (hx of heroin and ETOH) for progressive weakness, gait disturbance, confusion, and decreased volume when speaking. CT head without contrast revealed multiple lesions of hypodensity with a hyperdense rim, notably in the left temporal lobe and right cerebellum. There were additional small hyperdensities in the right frontoparietal and left parasagittal parietal lobe. There is mass effect and partial effacement of the 4th ventricle secondary to the cerebellar lesion with developing hydrocephalus without MLS. CT of the chest, abdomen, and pelvis with IV contrast showed RUL mass with mediastinal adenopathy. Labs revealed Hep C and treponema antibodies were positive. Hep C PCR pending. Penicillin G was administered. A sepsis workup was completed and antibiotics were initiated. Intubated to get MRI. S/p EVD. Extubated 7/4/25 without difficulty.    Admit to [facility]  Q1h neuro checks, I&Os, and vitals   Daily CBC, CMP, Mag, Phos  NSGY following; s/p SOC craniotomy for tumor resection on 7/7  Dex 2 mg BID, no plans to taper further pending outpt f/u  Bactrim MWF for PJP ppx given plan for prolonged steroid taper  EVD removed 7/14  Zyprexa BID for agitation  Phenobarb taper to off given improved agitation   Clonidine 0.3 mg TID for agitation/opioid dependence/hypertension  Avoid versed for agitation  Tube feeds at goal, SLP following for swallow  Folate/thiamine/MV  SBP <160   Prn labetalol, hydralazine  PRN Zofran 4mg q4h  SCDs, SQH  PT/OT/SLP following

## 2025-07-15 NOTE — PROGRESS NOTES
[facility] Neuro Critical Care  Neurocritical Care  Progress Note    Admit Date: 7/1/2025  Service Date: 07/15/2025  Length of Stay: 14    Subjective:     Chief Complaint: Ataxia    History of Present Illness: 71 y/o M presenting from [facility] for generalized weakness for about a week now. History obtained from rehab nurse. She reported that he got to their facility about a month ago and, at baseline, walks with a cane and is alert and oriented. His nurse noticed that he has seemed weaker over the past week and over the past 3 days has had increased balance disturbance and gait issues. They also noticed that he has been speaking more softly in his speech is harder to understand. He has not had any infectious symptoms. He fell yesterday, unsure if he hit his head. CT head without contrast revealed multiple lesions of hypodensity with a hyperdense rim, notably in the left temporal lobe and right cerebellum. There were additional small hyperdensities in the right frontoparietal and left parasagittal parietal lobe. There is mass effect and partial effacement of the 4th ventricle secondary to the cerebellar lesion with developing hydrocephalus without MLS. CT of the chest, abdomen, and pelvis with IV contrast showed RUL mass with mediastinal adenopathy. Labs revealed Hep C and treponema antibodies were positive. Hep C PCR pending. Penicillin G was administered. A sepsis workup was completed and antibiotics were initiated. An MRI brain with and without contrast was ordered, but patient was unable to complete due to persistent movement after sedatives were administered. Prior to MRI, he was alert but drowsy. He was able to tell me his name, but told me he was 53 years old, the year was 2003, and he did not know the current place or reason for being here. He had generalized weakness but was slightly more weak on the right side. He followed simple commands, but did not attempt to follow more complex commands. He is  admitted to [facility] with NSGY consult.    Hospital Course: 7/2/2025: MRI with significant motion artifact. Required presidex overnight secondary to agitation  7/3/2025: MRI favors neoplastic brain lesions. CSF studies pending. Started on Zyprexa and phenobarbital for agitation, R subclavian CVC placed for central access  7/4/2025: NAEON. Extubated without complications.  07/05/2025 NAEON. EVD @20. Adjusted oxycodone frequency q8. Increased zyprexa 20 BID. Precedex for agitation. Plan for biopsy Monday w/ NSGY.  07/06/2025 NAEON. Added on 100q4 FWF. Continues to require precedex for agitation. OR tomorrow.  07/07/2025: AF. SHARRON. Exam stable. OR today for SOC crani for tumor resection. ICPs 0-14, 44cc output. PRN versed given for agitation. Precedex at 1.2. Hydral prn x1 for SBP >160. Remains in restraints.  07/08/2025: AFJared MCDERMOTT. POD1 s/p SOC. CT/MRI from overnight reviewed. EVD open at 10, ICPs -2-14, output 33cc. Versed PRN x1 given for agitation. Agitation improved with Clonidine. Cardene at 12.5 for SBP <160. Hydralazine prn x1 given. Multiple loose BMs. Off propofol and ketamine. Extubated this morning.  07/09/2025: Tolerated extubation overnight, more awake and cooperative on exam this AM, off precedex gtt overnight w/ versed PRN x1 only. Maxed on cardene gtt, add losartan 50 mg qday, increase clonidine to 0.2 mg TID for BP control. EVD @ 10, plan for repeat head CT Friday w/ EVD wean over weekend per discussion w/ NSGY  07/10/2025: NAEON. Afebrile. Pt removed NGT overnight, replaced. No longer on cardene, required hydralazine x1 and labetalol x2 PRNs. Losartan increased to 100mg. EVD open at 10, will plan to keep and wean over weekend per NSGY. Steroid taper per NSGY beginning today.  07/11/2025 NAEON. Neuro exam slowly improving, weaning phenobarb. Remains hypertensive, increasing clonidine to 0.3 mg TID and adding amlodipine 10 mg qday. CT head stable, EVD clamped -> plan for repeat head CT Monday morning per  NSGY  07/12/2025 NAEON. Neuro exam stable, weaning phenobarb and dexamethasone.EVD clamped, CT Monday.  07/13/2025 NAEON, tolerating EVD clamp trial. Neuro exam continuing to slowly improved. Repeat head CT tomorrow.   07/14/2025 NAEON, repeat head CT overnight stable w/ increased hydrocephalus or evidence of encephalocele formation, plan to pull EVD today. Add coreg 6.25 mg BID for uptrending BP. Holding FWF given borderline low Na. Weaning dex per NSGY  7/15/2025 NAEO, continue phenobarb taper. SLP following, failed modified today. Rehab placement pending. Intermittently agitated but overall improved from last week.     Interval History:  Please see hospital course above for full details    Review of Systems   Unable to perform ROS: Mental status change       Objective:     Vitals:  Temp: 98.7 °F (37.1 °C)  Pulse: 75  Rhythm: normal sinus rhythm  BP: (!) 153/81  MAP (mmHg): 110  Resp: 16  SpO2: 100 %    Temp  Min: 97.9 °F (36.6 °C)  Max: 98.7 °F (37.1 °C)  Pulse  Min: 72  Max: 102  BP  Min: 127/72  Max: 174/90  MAP (mmHg)  Min: 93  Max: 133  ICP Mean (mmHg)  Min: 5 mmHg  Max: 5 mmHg  Resp  Min: 11  Max: 29  SpO2  Min: 94 %  Max: 100 %    07/14 0701 - 07/15 0700  In: 1290   Out: 850 [Urine:850]   Unmeasured Output  Unmeasured Urine Occurrence: 1  Unmeasured Stool Occurrence: 1        Physical Exam  General Appearance: Elderly gentleman resting in bed, NG tube and EVD in place. Not in acute hemodynamic distress  Mental Status Exam: awake, alert, oriented to self  and hospital, not oriented to time. Following simple commands x4 extremities   Cranial Nerves: Gaze midline, PERRL, no clear gaze preference. Dysarthric. +cough, spontaneous  Motor: Moving all 4 extremities AG to command, grossly symmetric   Sensory: Grossly symmetric to noxious x4  Coordination: +dysmetria in b/l upper extremities, RUE>>LUE  Vascular: RRR on tele  Lungs: comfortable respirations  Abdomen: Soft, non-distended, non-tender,         Medications:  Continuous ScheduledamLODIPine, 10 mg, Daily  carvediloL, 6.25 mg, BID  cloNIDine, 0.3 mg, Q8H  dexAMETHasone, 2 mg, Q12H  enoxparin, 40 mg, Q24H (prophylaxis, 1700)  famotidine, 20 mg, BID  folic acid, 1 mg, Daily  losartan, 100 mg, Daily  multivitamin, 1 tablet, Daily  OLANZapine, 20 mg, BID  PHENobarbitaL, 15 mg, BID   Followed by  [START ON 7/16/2025] PHENobarbitaL, 15 mg, Daily  sulfamethoxazole-trimethoprim 800-160mg, 1 tablet, Every Mon, Wed, Fri  thiamine, 100 mg, Daily    PRNacetaminophen, 650 mg, Q4H PRN  bisacodyL, 10 mg, Daily PRN  hydrALAZINE, 10 mg, Q4H PRN  labetalol, 10 mg, Q4H PRN  magnesium oxide, 800 mg, PRN  magnesium oxide, 800 mg, PRN  ondansetron, 4 mg, Q4H PRN  potassium bicarbonate, 35 mEq, PRN  potassium bicarbonate, 50 mEq, PRN  potassium bicarbonate, 60 mEq, PRN  potassium, sodium phosphates, 2 packet, PRN  potassium, sodium phosphates, 2 packet, PRN  potassium, sodium phosphates, 2 packet, PRN  sodium chloride 0.9%, 10 mL, PRN      Today I personally reviewed pertinent imaging, laboratory results, notably:   CBC, cmp stable  -meds     Diet  No diet orders on file  No diet orders on file  TF at goal     Assessment/Plan:     Neuro  * 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   PT/OT -> recommending acute rehab once medically appropriate for discharge     Brain lesion  68 y/o M brought to ED from [facility] (hx of heroin and ETOH) for progressive weakness, gait disturbance, confusion, and decreased volume when speaking. CT head without contrast revealed multiple lesions of hypodensity with a hyperdense rim, notably in the left temporal lobe and right cerebellum. There were additional small hyperdensities in the right frontoparietal and left parasagittal parietal lobe. There is mass effect and partial effacement of the 4th ventricle secondary to the cerebellar lesion with developing hydrocephalus without MLS. CT of the chest, abdomen, and pelvis with IV contrast showed RUL mass with  mediastinal adenopathy. Labs revealed Hep C and treponema antibodies were positive. Hep C PCR pending. Penicillin G was administered. A sepsis workup was completed and antibiotics were initiated. Intubated to get MRI. S/p EVD. Extubated 7/4/25 without difficulty.    Admit to [facility]  Q1h neuro checks, I&Os, and vitals   Daily CBC, CMP, Mag, Phos  NSGY following; s/p SOC craniotomy for tumor resection on 7/7  Dex 2 mg BID, no plans to taper further pending outpt f/u  Bactrim MWF for PJP ppx given plan for prolonged steroid taper  EVD removed 7/14  Zyprexa BID for agitation  Phenobarb taper to off given improved agitation   Clonidine 0.3 mg TID for agitation/opioid dependence/hypertension  Avoid versed for agitation  Tube feeds at goal, SLP following for swallow  Folate/thiamine/MV  SBP <160   Prn labetalol, hydralazine  PRN Zofran 4mg q4h  SCDs, SQH  PT/OT/SLP following    AMS (altered mental status)  See primary problem  Slowly improving    Psychiatric  Polysubstance abuse  Was brought in from [facility], where he has been for the past month  Educate on cessation when appropriate  On phenobarbital for agitation, no signs of EtOH withdrawal -> phenobarb taper     Pulmonary  Mass of upper lobe of right lung  Noted on CXR and CT chest with IV contrast  Saturating well on RA; No PTX on CT-chest  No lung consolidations or obstructive process  No known history of cancer  Intra-op brain biopsy frozen pathology suggestive of metastatic adenocarcinoma, 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  Will need outpt rad onc and med onc f/u     Cardiac/Vascular  Essential hypertension  Hx of    -  coreg 6.25 mg BID  - Continue amlodipine 10 mg qday   - Continue clonidine to 0.3 mg TID  - C/w losartan 100 mg qday   - Goal SBP<160     ID  Positive serology for syphilis  Given penicillin G    Other  Hepatitis C antibody positive  PCR negative  No known history of Hep C per patient    Gait disturbance  See primary problem          The patient is being  Prophylaxed for:  Venous Thromboembolism with: Chemical  Stress Ulcer with: H2B  Ventilator Pneumonia with: not applicable    Activity Orders            Turn patient starting at 07/03 1124    Elevate HOB Elevate HOB 30-45 degrees during feeding unless contraindicated starting at 07/03 0802    Elevate HOB starting at 07/01 2040    Straight Cath starting at 07/01 1925          Full Code    Loretta Maldonado PA-C  Neurocritical Care  [facility] - Neuro Critical Care

## 2025-07-15 NOTE — PROVIDER TRANSFER
Neuro Critical Care Transfer of Care note    Date of Admit: 7/1/2025  Date of Transfer / Stepdown: 7/15/2025    Brief History of Present Illness:      History of Present Illness: 69 y/o M presenting from [facility] for generalized weakness for about a week now. History obtained from rehab nurse. She reported that he got to their facility about a month ago and, at baseline, walks with a cane and is alert and oriented. His nurse noticed that he has seemed weaker over the past week and over the past 3 days has had increased balance disturbance and gait issues. They also noticed that he has been speaking more softly in his speech is harder to understand. He has not had any infectious symptoms. He fell yesterday, unsure if he hit his head. CT head without contrast revealed multiple lesions of hypodensity with a hyperdense rim, notably in the left temporal lobe and right cerebellum. There were additional small hyperdensities in the right frontoparietal and left parasagittal parietal lobe. There is mass effect and partial effacement of the 4th ventricle secondary to the cerebellar lesion with developing hydrocephalus without MLS. CT of the chest, abdomen, and pelvis with IV contrast showed RUL mass with mediastinal adenopathy. Labs revealed Hep C and treponema antibodies were positive. Hep C PCR pending. Penicillin G was administered. A sepsis workup was completed and antibiotics were initiated. An MRI brain with and without contrast was ordered, but patient was unable to complete due to persistent movement after sedatives were administered. Prior to MRI, he was alert but drowsy. He was able to tell me his name, but told me he was 53 years old, the year was 2003, and he did not know the current place or reason for being here. He had generalized weakness but was slightly more weak on the right side. He followed simple commands, but did not attempt to follow more complex commands. He is admitted to [facility] with NSGY consult.      Hospital Course: 7/2/2025: MRI with significant motion artifact. Required presidex overnight secondary to agitation  7/3/2025: MRI favors neoplastic brain lesions. CSF studies pending. Started on Zyprexa and phenobarbital for agitation, R subclavian CVC placed for central access  7/4/2025: NAEON. Extubated without complications.  07/05/2025 NAEON. EVD @20. Adjusted oxycodone frequency q8. Increased zyprexa 20 BID. Precedex for agitation. Plan for biopsy Monday w/ NSGY.  07/06/2025 NAEON. Added on 100q4 FWF. Continues to require precedex for agitation. OR tomorrow.  07/07/2025: AF. SHARRON. Exam stable. OR today for SOC crani for tumor resection. ICPs 0-14, 44cc output. PRN versed given for agitation. Precedex at 1.2. Hydral prn x1 for SBP >160. Remains in restraints.  07/08/2025: AFJared MCDERMOTT. POD1 s/p SOC. CT/MRI from overnight reviewed. EVD open at 10, ICPs -2-14, output 33cc. Versed PRN x1 given for agitation. Agitation improved with Clonidine. Cardene at 12.5 for SBP <160. Hydralazine prn x1 given. Multiple loose BMs. Off propofol and ketamine. Extubated this morning.  07/09/2025: Tolerated extubation overnight, more awake and cooperative on exam this AM, off precedex gtt overnight w/ versed PRN x1 only. Maxed on cardene gtt, add losartan 50 mg qday, increase clonidine to 0.2 mg TID for BP control. EVD @ 10, plan for repeat head CT Friday w/ EVD wean over weekend per discussion w/ NSGY  07/10/2025: NAEON. Afebrile. Pt removed NGT overnight, replaced. No longer on cardene, required hydralazine x1 and labetalol x2 PRNs. Losartan increased to 100mg. EVD open at 10, will plan to keep and wean over weekend per NSGY. Steroid taper per NSGY beginning today.  07/11/2025 NAEON. Neuro exam slowly improving, weaning phenobarb. Remains hypertensive, increasing clonidine to 0.3 mg TID and adding amlodipine 10 mg qday. CT head stable, EVD clamped -> plan for repeat head CT Monday morning per NSGY  07/12/2025 SHARRON. Neuro exam  stable, weaning phenobarb and dexamethasone.EVD clamped, CT Monday.  07/13/2025 NAEON, tolerating EVD clamp trial. Neuro exam continuing to slowly improved. Repeat head CT tomorrow.   07/14/2025 NAEON, repeat head CT overnight stable w/ increased hydrocephalus or evidence of encephalocele formation, plan to pull EVD today. Add coreg 6.25 mg BID for uptrending BP. Holding FWF given borderline low Na. Weaning dex per NSGY  7/15/2025 NAEO, continue phenobarb taper. SLP following, failed modified today. Rehab placement pending. Intermittently agitated but overall improved from last week.     Hospital Course By Problem with Pertinent Findings:     1. Brain lesion  Q4h neuro checks, I&Os, and vitals   Daily CBC, CMP, Mag, Phos  NSGY following; s/p SOC craniotomy for tumor resection on 7/7  Dex 2 mg BID, no plans to taper further pending outpt f/u  Bactrim MWF for PJP ppx given plan for prolonged steroid taper  EVD removed 7/14  Zyprexa BID for agitation  Phenobarb taper to off given improved agitation   Clonidine 0.3 mg TID for agitation/opioid dependence/hypertension  Avoid versed for agitation  Tube feeds at goal, SLP following for swallow    2. 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   PT/OT -> recommending acute rehab once medically appropriate for discharge    3. Polysubstance abuse  Was brought in from [facility], where he has been for the past month  Educate on cessation when appropriate  On phenobarbital for agitation, no signs of EtOH withdrawal -> phenobarb taper     4. 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 Essential hypertension  Hx of  -  coreg 6.25 mg BID  - Continue amlodipine 10 mg qday   - Continue clonidine to 0.3  mg TID  - C/w losartan 100 mg qday   - Goal SBP<160    6. AMS (altered mental status)  See primary problem  Slowly improving      Consultants and Procedures:     Consultants:  [facility]I    Procedures:    7/2 EVD   7/8 Suboccipital craniotomy for resection of cerebellar tumor     Transfer Information:     Diet:  TF impact peptide @ 55  SLP following Pt may begin to receive therapeutic trials of purees with SLP with plans to advance to receiving purees for pleasure outside of SLP session     Physical Activity:  High intensity    To Do / Pending Studies / Follow ups:  -swallow evals continuing   -phenobarb taper  -continue steroids   -rehab placement   -f/u outpatient with HERI and rad onc     Loretta Levinecoana  Neuro Crtical [facility], [place]

## 2025-07-15 NOTE — ASSESSMENT & PLAN NOTE
Patient's blood pressure range in the last 24 hours was: BP  Min: 130/82  Max: 166/92.The patient's inpatient anti-hypertensive regimen is listed below:  Current Antihypertensives  labetalol 20 mg/4 mL (5 mg/mL) IV syring, Every 4 hours PRN, Intravenous  hydrALAZINE injection 10 mg, Every 4 hours PRN, Intravenous  losartan tablet 100 mg, Daily, Per NG tube  cloNIDine tablet 0.3 mg, Every 8 hours, Per NG tube  amLODIPine tablet 10 mg, Daily, Per NG tube  carvediloL tablet 6.25 mg, 2 times daily, Per NG tube      - Goal SBP<160

## 2025-07-15 NOTE — PLAN OF CARE
"[facility] Care Plan    POC reviewed with Highfill Brown and family at 0300. Patient verbalized understanding. Questions and concerns addressed. No acute events today. Pt progressing toward goals. Will continue to monitor. See below and flowsheets for full assessment and VS info.     - CTH completed   - PRN Labetalol x1   - PRN Zyprexa x1  - New NGT placed after removal by pt, okay to use, TF restarted and at goal    - Restraints reinforced for pt safety, continuous education needed         Is this a stroke patient? no    Neuro:  Goldie Coma Scale  Best Eye Response: 4-->(E4) spontaneous  Best Motor Response: 6-->(M6) obeys commands  Best Verbal Response: 4-->(V4) confused  Goldie Coma Scale Score: 14  Assessment Qualifiers: Patient not sedated/intubated, No eye obstruction present  Pupil PERRLA: yes     24 hr Temp:  [97.6 °F (36.4 °C)-98.4 °F (36.9 °C)]     CV:   Rhythm: normal sinus rhythm  BP goals:   SBP < 160  MAP > 65    Resp:      Vent Mode: Spont  Set Rate: 18 BPM  Oxygen Concentration (%): 50  Vt Set: 470 mL  PEEP/CPAP: 5 cmH20  Pressure Support: 8 cmH20    Plan: N/A    GI/:     Diet/Nutrition Received: NPO, tube feeding  Last Bowel Movement: 07/14/25  Voiding Characteristics: external catheter    Intake/Output Summary (Last 24 hours) at 7/15/2025 0737  Last data filed at 7/14/2025 2301  Gross per 24 hour   Intake 630 ml   Output 850 ml   Net -220 ml     Unmeasured Output  Unmeasured Urine Occurrence: 1  Unmeasured Stool Occurrence: 1    Labs/Accuchecks:  Recent Labs   Lab 07/15/25  0151   WBC 6.59   RBC 3.62*   HGB 11.2*   HCT 33.8*         Recent Labs   Lab 07/15/25  0151      K 4.6   CO2 20*      BUN 25*   CREATININE 0.9   ALKPHOS 71   ALT 21   AST 50*   BILITOT 0.4    No results for input(s): "PROTIME", "INR", "APTT", "HEPANTIXA" in the last 168 hours. No results for input(s): "CPK", "CPKMB", "TROPONINI", "MB" in the last 168 hours.    Electrolytes: N/A - electrolytes WDL  Accuchecks: " none    Gtts:      LDA/Wounds:    Davi Risk Assessment  Sensory Perception: 4-->no impairment  Moisture: 3-->occasionally moist  Activity: 1-->bedfast  Mobility: 3-->slightly limited  Nutrition: 3-->adequate  Friction and Shear: 2-->potential problem  Davi Score: 16  Is your davi score 12 or less? no          Restraints:   Restraint Order  Length of Order: Order good for next 24 hours or when removed.  Date that the current order will : 25  Time that the current order will : 548  Order Upon Application: Yes    [facility]

## 2025-07-15 NOTE — PLAN OF CARE
Recommendations  --Continue continuous TF: Impact Peptide @ 55 mL/hr to provide 1320 mL total volume, 1980 kcal, 185 g CHO, 124 g protein, 0 g fiber, 1019 mL free water, 132% DRI         --160 mL flush q4 hrs to provide additional 960 mL free water (Total 1979 mL)     --Once TF has been at continuous goal rate > 48 hours, may transition to bolus feeds: Impact Peptide 1.5 at 220 mL q4 hrs to provide 1320 mL total volume     --Nursing: please continue to document rate and formula on flowsheet    --RD to monitor weight, rate, tolerance    Goals: Meet % EEN/EPN by next RD follow-up  Nutrition Goal Status #1: goal met  Communication of RD Recs:  (POC)

## 2025-07-15 NOTE — EICU
Intervention Initiated From:  COR / DIAMANTEU    Alex intervened regarding:  Rounding (Video assessment)  VICU Night Rounds Checklist  24H Vital Sign Range:  Temp:  [97.6 °F (36.4 °C)-98.2 °F (36.8 °C)]   Pulse:  []   Resp:  [13-30]   BP: (127-173)/()   SpO2:  [89 %-98 %]     Video rounds

## 2025-07-15 NOTE — NURSING
Pt arrived back to room following XRAY- barium swallow study        Pt was escorted by RN and PCT on cardiac monitoring and ambu bag.        Patient placed back on bedside monitor with alarms audible, bed in low position with bed alarm on, call light within reach. IVONNE.

## 2025-07-15 NOTE — EICU
Virtual ICU Quality Rounds    Admit Date: 7/1/2025  Hospital Day: 14    ICU Day: 13d 9h    24H Vital Sign Range:  Temp:  [97.6 °F (36.4 °C)-98.4 °F (36.9 °C)]   Pulse:  []   Resp:  [13-29]   BP: (127-174)/()   SpO2:  [94 %-98 %]     VICU Surveillance Screening                    LDA reconciliation : Yes

## 2025-07-15 NOTE — SUBJECTIVE & OBJECTIVE
Interval History: PRN IV Zyprexa dose given for agitation overnight, in 4 point restraints/roll belt. Sedated this AM, will open eyes to voice and HARRISON spon but mumbles s/p Zyprexa dose.Incision flat, c/d/I w/o pseudomeningocele. CTH this AM w/grossly stable ventricular caliber s/p EVD removal, no new tract hemorrhage, persistent vasogenic edema around resection cavity and L frontal lesion.     Medications:  Continuous Infusions:  Scheduled Meds:   amLODIPine  10 mg Per NG tube Daily    carvediloL  6.25 mg Per NG tube BID    cloNIDine  0.3 mg Per NG tube Q8H    dexAMETHasone  2 mg Per NG tube Q12H    famotidine  20 mg Per NG tube BID    folic acid  1 mg Per NG tube Daily    heparin (porcine)  5,000 Units Subcutaneous Q8H    losartan  100 mg Per NG tube Daily    multivitamin  1 tablet Per NG tube Daily    OLANZapine  20 mg Per NG tube BID    PHENobarbitaL  15 mg Per NG tube BID    Followed by    [START ON 7/16/2025] PHENobarbitaL  15 mg Per NG tube Daily    polyethylene glycol  17 g Per NG tube Daily    senna-docusate  2 tablet Per NG tube BID    sulfamethoxazole-trimethoprim 800-160mg  1 tablet Per NG tube Every Mon, Wed, Fri    thiamine  100 mg Per NG tube Daily     PRN Meds:  Current Facility-Administered Medications:     acetaminophen, 650 mg, Per NG tube, Q4H PRN    bisacodyL, 10 mg, Rectal, Daily PRN    hydrALAZINE, 10 mg, Intravenous, Q4H PRN    labetalol, 10 mg, Intravenous, Q4H PRN    magnesium oxide, 800 mg, Per NG tube, PRN    magnesium oxide, 800 mg, Per NG tube, PRN    ondansetron, 4 mg, Intravenous, Q4H PRN    potassium bicarbonate, 35 mEq, Per NG tube, PRN    potassium bicarbonate, 50 mEq, Per NG tube, PRN    potassium bicarbonate, 60 mEq, Per NG tube, PRN    potassium, sodium phosphates, 2 packet, Per NG tube, PRN    potassium, sodium phosphates, 2 packet, Per NG tube, PRN    potassium, sodium phosphates, 2 packet, Per NG tube, PRN    sodium chloride 0.9%, 10 mL, Intravenous, PRN     Review of  Systems  Objective:     Weight: 81.1 kg (178 lb 12.7 oz)  Body mass index is 23.59 kg/m².  Vital Signs (Most Recent):  Temp: 98.4 °F (36.9 °C) (07/15/25 0301)  Pulse: 85 (07/15/25 0501)  Resp: 16 (07/15/25 0501)  BP: (!) 165/98 (07/15/25 0534)  SpO2: 97 % (07/15/25 0501) Vital Signs (24h Range):  Temp:  [97.6 °F (36.4 °C)-98.4 °F (36.9 °C)] 98.4 °F (36.9 °C)  Pulse:  [] 85  Resp:  [13-29] 16  SpO2:  [94 %-98 %] 97 %  BP: (127-174)/() 165/98                              NG/OG Tube 07/10/25 0400 Other (comments) Left nostril (Active)   Placement Check placement verified by x-ray 07/14/25 1701   Tolerance no signs/symptoms of discomfort 07/14/25 1701   Securement secured to nostril center w/ adhesive device 07/14/25 1701   Clamp Status/Tolerance unclamped 07/14/25 1701   Suction Setting/Drainage Method suction at the bedside 07/14/25 1701   Insertion Site Appearance no redness, warmth, tenderness, skin breakdown, drainage 07/14/25 1701   Drainage None 07/14/25 1701   Flush/Irrigation flushed w/;water 07/14/25 1701   Feeding Type continuous;by pump 07/14/25 1701   Feeding Action feeding continued 07/14/25 1701   Current Rate (mL/hr) 55 mL/hr 07/14/25 1501   Goal Rate (mL/hr) 55 mL/hr 07/14/25 1501   Intake (mL) 60 mL 07/13/25 2105   Water Bolus (mL) 250 mL 07/14/25 0405   Rate Formula Tube Feeding (mL/hr) 55 mL/hr 07/13/25 1905   Formula Name impact peptide 07/14/25 1701   Intake (mL) - Formula Tube Feeding 25 07/14/25 2301   Residual Amount (ml) 0 ml 07/13/25 1905       Male External Urinary Catheter 07/08/25 0705 Medium (Active)   Collection Container Urimeter 07/14/25 1701   Securement Method secured to top of thigh w/ adhesive device 07/14/25 1701   Skin no redness;no breakdown 07/14/25 1701   Tolerance no signs/symptoms of discomfort 07/14/25 1701   Output (mL) 500 mL 07/14/25 0605   Catheter Change Date 07/13/25 07/14/25 0505   Catheter Change Time 0701 07/13/25 1501            ICP/Ventriculostomy  "07/02/25 1409 Right (Active)   Level of Ventriculostomy (cm above) 10 07/12/25 2105   Status Clamped 07/14/25 1801   Site Assessment Clean;Dry 07/14/25 1801   Site Drainage No drainage 07/14/25 1801   Waveform continuous waveform displayed 07/14/25 0701   Output (mL) 0 mL 07/13/25 0701   CSF Color clear 07/12/25 1701   Dressing Status Clean;Dry;Intact 07/14/25 1801   Interventions clamped;zeroed 07/14/25 1801          Physical Exam         Neurosurgery Physical Exam  E3V4M6, s/p Zyprexa dose early this AM  AOX1  PERRL  HARRISON spon AG  Following commands x4, 4 point restraints/roll belt     EVD site c/d/i  Incision flat, c/d/I without fluctuance    Significant Labs:  Recent Labs   Lab 07/13/25  0830 07/14/25  0143 07/15/25  0151    86 90    136 138   K 4.0 4.1 4.6    106 109   CO2 24 24 20*   BUN 29* 27* 25*   CREATININE 0.8 0.8 0.9   CALCIUM 8.7 8.6* 8.6*   MG 1.9 1.9 1.9     Recent Labs   Lab 07/13/25  0830 07/14/25  0143 07/15/25  0151   WBC 5.86 5.89 6.59   HGB 11.0* 11.0* 11.2*   HCT 33.2* 32.6* 33.8*    184 173     No results for input(s): "LABPT", "INR", "APTT" in the last 48 hours.  Microbiology Results (last 7 days)       Procedure Component Value Units Date/Time    Gram stain [[id number]] Collected: 07/14/25 0947    Order Status: Completed Specimen: CSF (Spinal Fluid) from CSF Tap, Tube 3 Updated: 07/14/25 1522     GRAM STAIN No WBCs      No organisms seen    CSF culture [[id number]] Collected: 07/14/25 0947    Order Status: Sent Specimen: CSF (Spinal Fluid) from CSF Tap, Tube 3     CSF culture [[id number]] Collected: 07/10/25 0856    Order Status: Completed Specimen: CSF (Spinal Fluid) from CSF Tap, Tube 3 Updated: 07/14/25 0733     CULTURE, CSF No Growth To Date     GRAM STAIN Cytospin indicates:      No WBCs      No organisms seen    CSF culture [[id number]] Collected: 07/07/25 0715    Order Status: Completed Specimen: CSF (Spinal Fluid) from CSF Tap, Tube 3 Updated: 07/12/25 0713 "     CULTURE, CSF No Growth     GRAM STAIN Cytospin indicates:      No WBCs      No organisms seen    Gram stain [[id number]] Collected: 07/10/25 0856    Order Status: Canceled Specimen: CSF (Spinal Fluid) from CSF Tap, Tube 3 Updated: 07/10/25 1400    AFB Culture & Smear [[id number]]  (Normal) Collected: 07/02/25 1808    Order Status: Completed Specimen: CSF (Spinal Fluid) from CSF Tap, Tube 3 Updated: 07/10/25 0205     CULTURE, AFB  Culture Negative For Mycobacteria At 1 Week    CSF culture [[id number]] Collected: 07/02/25 1808    Order Status: Completed Specimen: CSF (Spinal Fluid) from CSF Tap, Tube 3 Updated: 07/08/25 0747     CULTURE, CSF No Growth     GRAM STAIN Cytospin indicates:      No WBCs      No organisms seen          All pertinent labs from the last 24 hours have been reviewed.    Significant Diagnostics:  I have reviewed all pertinent imaging results/findings within the past 24 hours.

## 2025-07-15 NOTE — PT/OT/SLP PROGRESS
Occupational Therapy   Treatment    Name: Goran Carlos  MRN: [id number]  Admitting Diagnosis:  Ataxia  8 Days Post-Op    Recommendations:     Discharge Recommendations: High Intensity Therapy  Discharge Equipment Recommendations:  bath bench  Barriers to discharge:  None    Assessment:     Goran Carlos is a 69 y.o. male with a medical diagnosis of Ataxia.  He presents with decrease functional status secondary to medical diagnosis. Performance deficits affecting function are impaired self care skills, impaired balance, impaired cognition, decreased upper extremity function, decreased lower extremity function. Patient with good tolerance to OT session demonstrating good participation and functional progression. Patient able to ambulate side steps at EOB with maximal assistance. Patient requiring increased assistance for upright posture while standing due to strong posterior lean requiring constant tactile cueing. Patient is therefore appropriate for acute OT services to increase patient self care performance and functional mobility. Following DC from [facility] patient should continue with a high intensity therapy to ensure patient safety and promote return to independence.     Rehab Prognosis:  Good; patient would benefit from acute skilled OT services to address these deficits and reach maximum level of function.       Plan:     Patient to be seen 4 x/week to address the above listed problems via self-care/home management, therapeutic activities, therapeutic exercises, neuromuscular re-education  Plan of Care Expires: 08/05/25  Plan of Care Reviewed with: patient    Subjective     Chief Complaint: none   Patient/Family Comments/goals: DC   Pain/Comfort:  Pain Rating 1: 0/10    Objective:     Communicated with: RN prior to session.  Patient found supine with blood pressure cuff, bed alarm, pulse ox (continuous), telemetry, NG tube, restraints, external ventricular drain upon OT entry to room.    General Precautions:  Standard, aspiration, fall    Orthopedic Precautions:N/A  Braces: N/A  Respiratory Status: Room air     Occupational Performance:     Bed Mobility:    Patient completed Rolling/Turning to Right with moderate assistance  Patient completed Supine to Sit with moderate assistance  Patient completed Sit to Supine with moderate assistance     Functional Mobility/Transfers:  Patient completed Sit <> Stand Transfer with moderate x2 assistance  maximal x2 assistance for standing balance at trial 1 due to increased posterior lean requiring TC/VC for gluteal activation.   Moderate x2 assistance required for standing balance at trial 2 due to increased posterior lean   Functional Mobility: Patient ambulated ~4 steps to right with moderate assistance for weight shifting, balance support,  and anterior lean     Activities of Daily Living:  Grooming: set up assistance seated EOB for facial grooming and oral care via suction toothbrush       [facility] 6 Click ADL: 11    Treatment & Education:  Provided education regarding role of OT, POC, & discharge recommendations.  Pt with no further questions/concerns at this time. OT provided education on home recommendations and fall prevention in preparation for D/C.     Patient left supine with all lines intact and call button in reach    GOALS:   Multidisciplinary Problems       Occupational Therapy Goals          Problem: Occupational Therapy    Goal Priority Disciplines Outcome Interventions   Occupational Therapy Goal     OT, PT/OT Progressing    Description: Goals set 7/8 with an expiration date, 8/5:  Patient will increase functional independence with ADLs by performing:    Patient will demonstrate rolling to the right with min assist.  Not met   Patient will demonstrate rolling to the left with min assist.   Not met  Patient will demonstrate supine -sit with min  assist.   Not met  Patient will demonstrate stand pivot transfers with mod assist.   Not met  Patient will demonstrate  grooming while seated with min assist.   Not met  Patient will demonstrate upper body dressing with min assist while seated EOB.   Not met  Patient will demonstrate lower body dressing with mod assist while seated EOB.   Not met  Patient will demonstrate toileting with mod assist.   Not met  Patient will demonstrate bathing while seated EOB with mod assist.   Not met  Patient's family / caregiver will demonstrate independence and safety with assisting patient with self-care skills and functional mobility.     Not met  Patient's family / caregiver will demonstrate independence with providing ROM and changes in bed positioning.   Not met  Patient and/or patient's family will verbalize understanding of stroke prevention guidelines, personal risk factors and stroke warning signs via teachback method.  Not met     DME Justifications (see above for complete DME recommendations)    Bedside Commode- Patient has a mobility limitation that significantly impairs their ability to participate in one or more mobility related activities of daily living, including toileting. This deficit can be resolved by using a bedside commode. Patient demonstrates mobility limitations that will cause them to be confined to one room at home without bathroom access for up to 30 days. Using a bedside commode will greatly improve the patient's ability to participate in MRADLs.                                             DME Justifications:  Goran requires a hospital bed due to him requiring positioning of the body in ways not feasible with an ordinary bed due to limited ability and cannot independently make changes in body position without the use of the bed.The positioning of the body cannot be sufficiently resolved by the use of pillows and wedges.  Goran Carlos has a mobility limitation that significantly impairs his ability to participate in one or more mobility related activities of daily living (MRADLs) such as toileting, feeding,  dressing, grooming, and bathing in customary locations in the home.  The mobility limitation cannot be sufficiently resolved by the use of a cane or walker.   The use of a manual wheelchair will significantly improve the patients ability to participate in MRADLS and the patient will use it on regular basis in the home.  Goran Carlos has expressed his willingness to use a manual wheelchair in the home. Patients upper body strength is sufficient for propulsion.  He also has a caregiver who is available, willing, and able to provide assistance with the wheelchair when needed.      Time Tracking:     OT Date of Treatment: 07/15/25  OT Start Time: 1007  OT Stop Time: 1046  OT Total Time (min): 39 min    Billable Minutes:Therapeutic Activity 38    OT/DOTTIE: OT          7/15/2025

## 2025-07-15 NOTE — PROCEDURES
Modified Barium Swallow    Patient Name:  Goran Carlos   MRN:  [id number]      Recommendations:     Recommendations:                General Recommendations:  Dysphagia therapy, Speech language evaluation, and Cognitive-linguistic evaluation  Diet recommendations:  NPO, NPO   Aspiration Precautions: Alternate means of nutrition/hydration, Frequent oral care, and Strict aspiration precautions SLP services will begin therapeutic trials with purees to determine if/when pt may receive purees for pleasure outside of SLP sessions.  General Precautions: Standard, aspiration, fall, NPO  Communication strategies:  provide increased time to answer and go to room if call light pushed    Referral     Reason for Referral  Patient was referred for a Modified Barium Swallow Study to assess the efficiency of his/her swallow function, rule out aspiration and make recommendations regarding safe dietary consistencies, effective compensatory strategies, and safe eating environment.     Diagnosis: Ataxia       History:     Past Medical History:   Diagnosis Date    ETOH abuse     Gait disturbance     Heroin abuse        Objective:     Current Respiratory Status: 07/15/25    Alert: yes    Cooperative: yes    Follows Directions: inconsistent    Visualization  Patient was seen in the anterior view  NG tube observed in place    Oral Peripheral Examination  Oral Musculature: unable to assess due to poor participation/comprehension  Secretion Management: adequate  Mucosal Quality: dry  Volitional Cough: weak  Volitional Swallow: not elicited  Voice Prior to PO Intake: tense; decreased vocal intensity    Consistencies Assessed  Thin 1/2 tsp x 1, full tsp x 1 (with chin tuck)  Nectar thick full tsp x 1  Thin-Honey Thick full tsp x 1, straw sip x 1  Puree 1/2 tsp x 1, full tsp x 1    Oral Preparation/Oral Phase  WFL- Pt with adequate bolus acceptance, containment, control and timely A-P transfer across consistencies     Pharyngeal Phase   Across  consistencies, pt displayed decreased tongue base retraction, decreased pharyngeal constriction, decreased hyolaryngeal elevation and excursion, decreased airway closure, and decreased UES opening.    Thin liquids: penetration to the vocal cords observed after the swallow from material pooled in pyriform sinuses.  Cued cough was not effective in clearing material from laryngeal vestibule.  Chin tuck technique was not effective in preventing penetration which was noted to occur along the posteriorly after the swallow from spillover of pooling in the pyriform sinus.  Reflexive cough was present, but not effective in completely clearing penetrated material.      Nectar-thick liquid: pooling and penetrated material still present from previous thin liquid trials when fluoro was flashed on.  Difficult to determine if new penetration occurred. Pt cued to perform multiple Effortful swallows, but unable to completely clear pooling in pyriform sinuses.  When fluoro was flashed on again prior to next PO presentation, newly penetrated material from nectar thick liquid still pooled in pyriform sinuses.  Pt encouraged to produce a strong cough and suctioning was provided in an effort to clear, but was not effective in clearing all material.     Thin-honey thick liquids: penetration to the vocal cords from spillover of pyriform sinus pooling.  Multiple (3) effortful swallow effective in reducing pyriform sinus pooling.  Cough response present in response to both penetration and pharyngeal residue, but cough was not effective in clearing material from laryngeal vestibule.      Puree: pooling in pyriform sinus cleared with cued Effortful swallows (2-3).  Cough reflex in response to presence of pooled material.  No penetration or aspiration observed with pureed trials.     Cervical Esophageal Phase  Decreased UES opening contributed to pyriform sinus pooling    Assessment:     Impressions  Pt presents with moderate to severe  pharyngeal dysphagia c/b decreased tongue base retraction, decreased pharyngeal constriction, decreased hyolaryngeal elevation and excursion, decreased airway closure, and decreased UES opening resulting in consistent pyriform sinus residue for liquids and purees and penetration to the vocal cords across thin and thick liquids.  Reflexive cough responses were inconsistent. Reflexive and cued coughs were not effective in clearing laryngeal vestibule completely.  No penetration or aspiration observed with pureed trials, but pt did require 2-3 Effortful swallows to clear pyriform sinus pooling.          Prognosis: Fair    Barriers:  Debility/weakness    Plan  Continue NPO with alternative means of nutrition/hydration/medication. Pt may begin to receive therapeutic trials of purees with SLP with plans to advance to receiving purees for pleasure outside of SLP session. Pt must exhibit ability to consistently perform 2-3 Effortful swallows per puree trial and good tolerance.  Traditional dysphagia exercises will also be introduced.     Education  Results, recommendations, and plan were discussed with patient and RN.  Pt's full understanding and carryover likely decreased.     Goals:   Multidisciplinary Problems       SLP Goals          Problem: SLP    Goal Priority Disciplines Outcome   SLP Goal     SLP Progressing   Description: Goals due 7/16  1.  Pt. Will participate in ongoing assessment of swallow to initiate least restrictive diet.                       Plan:   Patient to be seen:  Therapy Frequency: 4 x/week   Plan of Care expires:  08/06/25  Plan of Care reviewed with:  patient        Discharge recommendations:  High Intensity Therapy     Time Tracking:   SLP Treatment Date:   07/15/25  Speech Start Time:  1409  Speech Stop Time:  1435     Speech Total Time (min):  26 min    07/15/2025

## 2025-07-15 NOTE — PT/OT/SLP PROGRESS
Physical Therapy Treatment    Patient Name:  Goran Carlos   MRN:  [id number]    Recommendations:     Discharge Recommendations: High Intensity Therapy  Discharge Equipment Recommendations: bedside commode, bath bench, wheelchair  Barriers to discharge: Inaccessible home and Decreased caregiver support    Assessment:     Goran Carlos is a 69 y.o. male admitted with a medical diagnosis of Ataxia.  He presents with the following impairments/functional limitations: weakness, impaired endurance, impaired self care skills, impaired functional mobility, impaired balance, impaired cognition, decreased coordination, decreased upper extremity function, decreased lower extremity function, decreased safety awareness, impaired coordination, impaired fine motor, impaired cardiopulmonary response to activity. Patient participated well in session. He continues to follow most commands with increased time and was able to ambulate several steps this date with assistance. Additional ambulation not completed due to BM needs. Overall patient is making progress toward goals and remains participatory.       Rehab Prognosis: Good; patient would benefit from acute skilled PT services to address these deficits and reach maximum level of function.    Recent Surgery: Procedure(s) (LRB):  CRANIOTOMY, SUBOCCIPITAL (Right) 8 Days Post-Op    Plan:     During this hospitalization, patient to be seen 4 x/week to address the identified rehab impairments via gait training, therapeutic activities, therapeutic exercises, neuromuscular re-education and progress toward the following goals:    Plan of Care Expires:  08/04/25    Subjective     Chief Complaint: not stated  Patient/Family Comments/goals: BM needs  Pain/Comfort:  Pain Rating 1: 0/10  Pain Rating Post-Intervention 1: 0/10      Objective:     Communicated with RN prior to session.  Patient found HOB elevated with blood pressure cuff, pulse ox (continuous), telemetry, NG tube, Condom Catheter,  restraints (roll belt, mitts) upon PT entry to room.     General Precautions: Standard, fall, aspiration  Orthopedic Precautions: N/A  Braces: N/A  Respiratory Status: Room air     Functional Mobility:  Bed Mobility:     Rolling Right: minimum assistance  Scooting: moderate assistance  Supine to Sit: minimum assistance  Sit to Supine: moderate assistance    Transfers:     Sit to Stand:    Trial 1-2: minimum assistance and of 2 persons with hand-held assist  Trial 3: Moderate assistance x1 person with HHA  Increased time to complete     Gait: patient ambulated 2x 3' sidestepping with moderate assistance of 2 and HHA  Deviations Noted: decreased gait speed, decreased step height R,L, and decreased step length R, L   No fwd/bkwd stepping this date due to BM needs    Balance:   Sitting static: Debbie-modA  Posterior lean, L lean occasionally   Sitting dynamic: modA  Standing static: modA  Posterior lean, lateral sway       AM-PAC 6 CLICK MOBILITY  Turning over in bed (including adjusting bedclothes, sheets and blankets)?: 3  Sitting down on and standing up from a chair with arms (e.g., wheelchair, bedside commode, etc.): 2  Moving from lying on back to sitting on the side of the bed?: 2  Moving to and from a bed to a chair (including a wheelchair)?: 2  Need to walk in hospital room?: 2  Climbing 3-5 steps with a railing?: 1  Basic Mobility Total Score: 12       Treatment & Education:  Mobility performed with assistance from rehab tech, under direct supervision and direction of therapist.   Verbal and tactile cues with assist during STS transfer for optimal JOE prior to transfer, forward weight shift to initiate, to engage LE mm, extend hips forward and bring head and chest up to achieve full upright stance.   Cues during ambulation for sequencing, weight shifting, upright posture throughout, appropriate step length and height.    Seated back stretches, UE and shoulder stretching, LE AROM kicking and ankle pumps.      Patient left HOB elevated with all lines intact, call button in reach, restraints reapplied at end of session, and RN notified.    GOALS:   Multidisciplinary Problems       Physical Therapy Goals          Problem: Physical Therapy    Goal Priority Disciplines Outcome Interventions   Physical Therapy Goal     PT, PT/OT Progressing    Description: Goals to be met by: 2025     Patient will increase functional independence with mobility by performin. Supine to sit with Stand-by Assistance  2. Sit to supine with Stand-by Assistance  3. Sit to stand transfer with Contact Guard Assistance  4. Bed to chair transfer with Minimal Assistance using Rolling Walker  5. Gait  x 10 feet with Minimal Assistance using Rolling Walker.   6. Sitting at edge of bed x5 minutes with Supervision                           Time Tracking:     PT Received On: 07/15/25  PT Start Time: 1116     PT Stop Time: 1139  PT Total Time (min): 23 min     Billable Minutes: Gait Training 8 minutes and Therapeutic Activity 15 minutes    Treatment Type: Treatment  PT/PTA: PT     Number of PTA visits since last PT visit: 0     07/15/2025

## 2025-07-15 NOTE — ASSESSMENT & PLAN NOTE
2/2 cerebellar lesion   PT/OT -> recommending acute rehab once medically appropriate for discharge

## 2025-07-15 NOTE — SUBJECTIVE & OBJECTIVE
Interval History:  Please see hospital course above for full details    Review of Systems   Unable to perform ROS: Mental status change       Objective:     Vitals:  Temp: 98.7 °F (37.1 °C)  Pulse: 75  Rhythm: normal sinus rhythm  BP: (!) 153/81  MAP (mmHg): 110  Resp: 16  SpO2: 100 %    Temp  Min: 97.9 °F (36.6 °C)  Max: 98.7 °F (37.1 °C)  Pulse  Min: 72  Max: 102  BP  Min: 127/72  Max: 174/90  MAP (mmHg)  Min: 93  Max: 133  ICP Mean (mmHg)  Min: 5 mmHg  Max: 5 mmHg  Resp  Min: 11  Max: 29  SpO2  Min: 94 %  Max: 100 %    07/14 0701 - 07/15 0700  In: 1290   Out: 850 [Urine:850]   Unmeasured Output  Unmeasured Urine Occurrence: 1  Unmeasured Stool Occurrence: 1        Physical Exam  General Appearance: Elderly gentleman resting in bed, NG tube and EVD in place. Not in acute hemodynamic distress  Mental Status Exam: awake, alert, oriented to self  and hospital, not oriented to time. Following simple commands x4 extremities   Cranial Nerves: Gaze midline, PERRL, no clear gaze preference. Dysarthric. +cough, spontaneous  Motor: Moving all 4 extremities AG to command, grossly symmetric   Sensory: Grossly symmetric to noxious x4  Coordination: +dysmetria in b/l upper extremities, RUE>>LUE  Vascular: RRR on tele  Lungs: comfortable respirations  Abdomen: Soft, non-distended, non-tender,        Medications:  Continuous ScheduledamLODIPine, 10 mg, Daily  carvediloL, 6.25 mg, BID  cloNIDine, 0.3 mg, Q8H  dexAMETHasone, 2 mg, Q12H  enoxparin, 40 mg, Q24H (prophylaxis, 1700)  famotidine, 20 mg, BID  folic acid, 1 mg, Daily  losartan, 100 mg, Daily  multivitamin, 1 tablet, Daily  OLANZapine, 20 mg, BID  PHENobarbitaL, 15 mg, BID   Followed by  [START ON 7/16/2025] PHENobarbitaL, 15 mg, Daily  sulfamethoxazole-trimethoprim 800-160mg, 1 tablet, Every Mon, Wed, Fri  thiamine, 100 mg, Daily    PRNacetaminophen, 650 mg, Q4H PRN  bisacodyL, 10 mg, Daily PRN  hydrALAZINE, 10 mg, Q4H PRN  labetalol, 10 mg, Q4H PRN  magnesium oxide, 800  mg, PRN  magnesium oxide, 800 mg, PRN  ondansetron, 4 mg, Q4H PRN  potassium bicarbonate, 35 mEq, PRN  potassium bicarbonate, 50 mEq, PRN  potassium bicarbonate, 60 mEq, PRN  potassium, sodium phosphates, 2 packet, PRN  potassium, sodium phosphates, 2 packet, PRN  potassium, sodium phosphates, 2 packet, PRN  sodium chloride 0.9%, 10 mL, PRN      Today I personally reviewed pertinent imaging, laboratory results, notably:   CBC, cmp stable  -meds     Diet  No diet orders on file  No diet orders on file  TF at goal

## 2025-07-15 NOTE — HOSPITAL COURSE
CTHead 7/1 showed multiple brain masses concerning for mets with surrounding edema, can't rule out abscesses. Mass in cerebellum with effacement of 4th ventricule outflow with concern for developing hydrocephalus. MRI brain with and without favors neoplastic brain lesions. Required precedex for agitation and started on zyprexa and phenobarb  for the associated ETOH withdrawal. 7/4/2025: Extubated without complications. On 7/7 underwent SOC crani for tumor resection. Started on steroids. Patient tolerated phenbarb taper. He required scheduled zyprexa and clonidine for delirium and associated withdrawal symptoms from ETOH and heroin use. Brain pathology returned poorly differentiated lung carcinoma. Extubated 7/9 after craniotomy.  7/14 EVD removed. CTHead 7/14 -Ventricles remain somewhat prominent but without overt hydrocephalus or significant change in size following EVD removal.Evolving recent postsurgical change in the posterior fossa. No new intracranial hemorrhage or major vascular distribution infarct.  Mental status did not improve. He was not able to give his name, unsafe to eat, and required four point restraints to keep his NG tube and lines in. Oncology consulted to discuss treatment options and prognosis. He was not a candidate for systemic chemotherapy or radiation due to his encephalopathy. They recommended hospice as treatment option. Radiation oncology saw him as well and felt that he did not have the fuinctional or mental capabilty to go through radaition. Palliative care consulted and family agreed [facility]. During the remainder of his stay, patient's agitation improved when NG tube and restraints were removed. His ability to converse coherently afterward improved but waxed and waned.

## 2025-07-15 NOTE — ASSESSMENT & PLAN NOTE
s/p ID eval -  Positive Treponema pallidum antibody. RPR 1:1 suggestive of false positive or past treated infection. Received penicillin G 2.4 million units x 1 on 7/1. CSF analysis not suggestive of neurosyphilis at this time . blood RPR and CSF VDRL 7/2 negative

## 2025-07-15 NOTE — HPI
69 y/o M presenting from [facility] for generalized weakness for about a week now. History obtained from rehab nurse. She reported that he got to their facility about a month ago and, at baseline, walks with a cane and is alert and oriented. His nurse noticed that he has seemed weaker over the past week and over the past 3 days has had increased balance disturbance and gait issues. They also noticed that he has been speaking more softly in his speech is harder to understand. He has not had any infectious symptoms. He fell yesterday, unsure if he hit his head. CT head without contrast revealed multiple lesions of hypodensity with a hyperdense rim, notably in the left temporal lobe and right cerebellum. There were additional small hyperdensities in the right frontoparietal and left parasagittal parietal lobe. There is mass effect and partial effacement of the 4th ventricle secondary to the cerebellar lesion with developing hydrocephalus without MLS. CT of the chest, abdomen, and pelvis with IV contrast showed RUL mass with mediastinal adenopathy. Labs revealed Hep C and treponema antibodies were positive. Hep C PCR pending. Penicillin G was administered. A sepsis workup was completed and antibiotics were initiated. An MRI brain with and without contrast was ordered, but patient was unable to complete due to persistent movement after sedatives were administered. Prior to MRI, he was alert but drowsy. He was able to tell me his name, but told me he was 53 years old, the year was 2003, and he did not know the current place or reason for being here. He had generalized weakness but was slightly more weak on the right side. He followed simple commands, but did not attempt to follow more complex commands. He is admitted to [facility] with NSGY consult.

## 2025-07-15 NOTE — PLAN OF CARE
Hospital Medicine ICU Acceptance Note    Date of Admit: 7/1/2025  Date of Transfer: 7/15/2025  Xiomara, C/J, L, Onc (IV chemo w/in 1 month), Gyn/Onc, or other special case?: no   ICU team stepping patient down:  NICU  Accepting  team: BRUCE ROTHMAN    Brief History of Present Illness:      Goran Carlos is a 69 y.o. male with generalized weakness, falls, and paucity of speech. dmitted with newly found brain metastasis from primary lung carcinoma, also new diagnosis. S/p cerebellar mass resection and EVD, EVD removed yesterday. Admitted form [facility], currently on phenobarb taper and scheduled zyprexa and clonidine for ETOH and heroin. Pending rehab placement. Needs OP follow up with NSGY (continue decadron till then) and Rad onc team. Current path is lung carcinoma but Tempus and PD-L1 testing are pending.     To Do / Pending Studies / Follow ups:    -swallow evals continuing   -phenobarb taper  -continue steroids   -rehab placement   -f/u outpatient with NSGY and rad onc     Patient has been accepted by Hospital Medicine Team BRUCE ROTHMAN, who will assume care of the patient upon arrival to the floor from the ICU. Please contact ICU team with any concerns prior to arrival. Please contact Hospital Medicine at [phone] (please do NOT leave a voicemail) when patient arrives to the floor.    Armani Olvera MD  Attending Staff Physician  [facility]  pager- [phone]

## 2025-07-15 NOTE — ASSESSMENT & PLAN NOTE
7/15 secondary to above .   on NGT feeds impact peptide .  SLP following    7/16 MBS 7/16. SLP recs NPO.

## 2025-07-15 NOTE — PLAN OF CARE
"[facility] Care Plan  POC reviewed with Chittenden Brown and family at 1400. Patient and Family verbalized understanding. Questions and concerns addressed. No acute events today. Pt progressing toward goals. Will continue to monitor. See below and flowsheets for full assessment and VS info.     - Barium swallow study complete  - Family updated at bedside  - Bath given and linens changed  - Transfer orders in, bed placement pending  - PT, OT, SLP evaluated and treated  - PRN labetalol given x1 for BP goals        Is this a stroke patient? no    Neuro:  Macfarlan Coma Scale  Best Eye Response: 3-->(E3) to speech  Best Motor Response: 6-->(M6) obeys commands  Best Verbal Response: 4-->(V4) confused  Macfarlan Coma Scale Score: 13  Assessment Qualifiers: Patient not sedated/intubated, No eye obstruction present  Pupil PERRLA: yes     24 hr Temp:  [97.9 °F (36.6 °C)-98.7 °F (37.1 °C)]     CV:   Rhythm: normal sinus rhythm  BP goals:   SBP < 160  MAP > 65    Resp:      Vent Mode: Spont  Set Rate: 18 BPM  Oxygen Concentration (%): 50  Vt Set: 470 mL  PEEP/CPAP: 5 cmH20  Pressure Support: 8 cmH20    Plan: N/A    GI/:     Diet/Nutrition Received: NPO, tube feeding  Last Bowel Movement: 07/15/25  Voiding Characteristics: external catheter    Intake/Output Summary (Last 24 hours) at 7/15/2025 1709  Last data filed at 7/15/2025 1701  Gross per 24 hour   Intake 1345 ml   Output 1503 ml   Net -158 ml     Unmeasured Output  Unmeasured Urine Occurrence: 1  Unmeasured Stool Occurrence: 1    Labs/Accuchecks:  Recent Labs   Lab 07/15/25  0151   WBC 6.59   RBC 3.62*   HGB 11.2*   HCT 33.8*         Recent Labs   Lab 07/15/25  0151      K 4.6   CO2 20*      BUN 25*   CREATININE 0.9   ALKPHOS 71   ALT 21   AST 50*   BILITOT 0.4    No results for input(s): "PROTIME", "INR", "APTT", "HEPANTIXA" in the last 168 hours. No results for input(s): "CPK", "CPKMB", "TROPONINI", "MB" in the last 168 hours.    Electrolytes: N/A - " electrolytes WDL  Accuchecks: none    Gtts:      LDA/Wounds:    Davi Risk Assessment  Sensory Perception: 4-->no impairment  Moisture: 3-->occasionally moist  Activity: 1-->bedfast  Mobility: 3-->slightly limited  Nutrition: 3-->adequate  Friction and Shear: 2-->potential problem  Davi Score: 16    Is your davi score 12 or less? no            Restraints:   Restraint Order  Length of Order: Order good for next 24 hours or when removed.  Date that the current order will : 25  Time that the current order will : 0548  Order Upon Application: Yes    [facility]

## 2025-07-15 NOTE — ASSESSMENT & PLAN NOTE
Patient's with Normocytic anemia.. Hemoglobin stable. Etiology likely due to chronic disease .  Current CBC reviewed-    Recent Labs   Lab 07/14/25  0143 07/15/25  0151 07/16/25  0440   HGB 11.0* 11.2* 11.0*         Component Value Date/Time    MCV 92 07/16/2025 0440    RDW 13.2 07/16/2025 0440     Monitor CBC and transfuse if H/H drops below 7/21.

## 2025-07-15 NOTE — PROGRESS NOTES
[facility]  Neurosurgery  Progress Note    Subjective:     History of Present Illness: Patient is 69yo M with generalized weakness, falls, and paucity of speech. 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 On exam, patient's voice is barely audible but patient appropriately answers questions and follows commands. Patient's brother states that patient had normal speech and gait when they last saw each other about 1 month ago.    Neurosurgery consulted due to multiple ring-enhancing lesions seen on CT brain.    Post-Op Info:  Procedure(s) (LRB):  CRANIOTOMY, SUBOCCIPITAL (Right)   8 Days Post-Op   Interval History: PRN IV Zyprexa dose given for agitation overnight, in 4 point restraints/roll belt. Sedated this AM, will open eyes to voice and HARRISON spon but mumbles s/p Zyprexa dose.Incision flat, c/d/I w/o pseudomeningocele. CTH this AM w/grossly stable ventricular caliber s/p EVD removal, no new tract hemorrhage, persistent vasogenic edema around resection cavity and L frontal lesion.     Medications:  Continuous Infusions:  Scheduled Meds:   amLODIPine  10 mg Per NG tube Daily    carvediloL  6.25 mg Per NG tube BID    cloNIDine  0.3 mg Per NG tube Q8H    dexAMETHasone  2 mg Per NG tube Q12H    famotidine  20 mg Per NG tube BID    folic acid  1 mg Per NG tube Daily    heparin (porcine)  5,000 Units Subcutaneous Q8H    losartan  100 mg Per NG tube Daily    multivitamin  1 tablet Per NG tube Daily    OLANZapine  20 mg Per NG tube BID    PHENobarbitaL  15 mg Per NG tube BID    Followed by    [START ON 7/16/2025] PHENobarbitaL  15 mg Per NG tube Daily    polyethylene glycol  17 g Per NG tube Daily    senna-docusate  2 tablet Per NG tube BID    sulfamethoxazole-trimethoprim 800-160mg  1 tablet Per NG tube Every Mon, Wed, Fri    thiamine  100 mg Per NG tube Daily     PRN Meds:  Current Facility-Administered Medications:     acetaminophen, 650 mg, Per NG tube, Q4H PRN     bisacodyL, 10 mg, Rectal, Daily PRN    hydrALAZINE, 10 mg, Intravenous, Q4H PRN    labetalol, 10 mg, Intravenous, Q4H PRN    magnesium oxide, 800 mg, Per NG tube, PRN    magnesium oxide, 800 mg, Per NG tube, PRN    ondansetron, 4 mg, Intravenous, Q4H PRN    potassium bicarbonate, 35 mEq, Per NG tube, PRN    potassium bicarbonate, 50 mEq, Per NG tube, PRN    potassium bicarbonate, 60 mEq, Per NG tube, PRN    potassium, sodium phosphates, 2 packet, Per NG tube, PRN    potassium, sodium phosphates, 2 packet, Per NG tube, PRN    potassium, sodium phosphates, 2 packet, Per NG tube, PRN    sodium chloride 0.9%, 10 mL, Intravenous, PRN     Review of Systems  Objective:     Weight: 81.1 kg (178 lb 12.7 oz)  Body mass index is 23.59 kg/m².  Vital Signs (Most Recent):  Temp: 98.4 °F (36.9 °C) (07/15/25 0301)  Pulse: 85 (07/15/25 0501)  Resp: 16 (07/15/25 0501)  BP: (!) 165/98 (07/15/25 0534)  SpO2: 97 % (07/15/25 0501) Vital Signs (24h Range):  Temp:  [97.6 °F (36.4 °C)-98.4 °F (36.9 °C)] 98.4 °F (36.9 °C)  Pulse:  [] 85  Resp:  [13-29] 16  SpO2:  [94 %-98 %] 97 %  BP: (127-174)/() 165/98                              NG/OG Tube 07/10/25 0400 Other (comments) Left nostril (Active)   Placement Check placement verified by x-ray 07/14/25 1701   Tolerance no signs/symptoms of discomfort 07/14/25 1701   Securement secured to nostril center w/ adhesive device 07/14/25 1701   Clamp Status/Tolerance unclamped 07/14/25 1701   Suction Setting/Drainage Method suction at the bedside 07/14/25 1701   Insertion Site Appearance no redness, warmth, tenderness, skin breakdown, drainage 07/14/25 1701   Drainage None 07/14/25 1701   Flush/Irrigation flushed w/;water 07/14/25 1701   Feeding Type continuous;by pump 07/14/25 1701   Feeding Action feeding continued 07/14/25 1701   Current Rate (mL/hr) 55 mL/hr 07/14/25 1501   Goal Rate (mL/hr) 55 mL/hr 07/14/25 1501   Intake (mL) 60 mL 07/13/25 2105   Water Bolus (mL) 250 mL 07/14/25 0405  "  Rate Formula Tube Feeding (mL/hr) 55 mL/hr 07/13/25 1905   Formula Name impact peptide 07/14/25 1701   Intake (mL) - Formula Tube Feeding 25 07/14/25 2301   Residual Amount (ml) 0 ml 07/13/25 1905       Male External Urinary Catheter 07/08/25 0705 Medium (Active)   Collection Container Urimeter 07/14/25 1701   Securement Method secured to top of thigh w/ adhesive device 07/14/25 1701   Skin no redness;no breakdown 07/14/25 1701   Tolerance no signs/symptoms of discomfort 07/14/25 1701   Output (mL) 500 mL 07/14/25 0605   Catheter Change Date 07/13/25 07/14/25 0505   Catheter Change Time 0701 07/13/25 1501            ICP/Ventriculostomy 07/02/25 1409 Right (Active)   Level of Ventriculostomy (cm above) 10 07/12/25 2105   Status Clamped 07/14/25 1801   Site Assessment Clean;Dry 07/14/25 1801   Site Drainage No drainage 07/14/25 1801   Waveform continuous waveform displayed 07/14/25 0701   Output (mL) 0 mL 07/13/25 0701   CSF Color clear 07/12/25 1701   Dressing Status Clean;Dry;Intact 07/14/25 1801   Interventions clamped;zeroed 07/14/25 1801          Physical Exam         Neurosurgery Physical Exam  E3V4M6, s/p Zyprexa dose early this AM  AOX1  PERRL  HARRISON spon AG  Following commands x4, 4 point restraints/roll belt     EVD site c/d/i  Incision flat, c/d/I without fluctuance    Significant Labs:  Recent Labs   Lab 07/13/25  0830 07/14/25 0143 07/15/25  0151    86 90    136 138   K 4.0 4.1 4.6    106 109   CO2 24 24 20*   BUN 29* 27* 25*   CREATININE 0.8 0.8 0.9   CALCIUM 8.7 8.6* 8.6*   MG 1.9 1.9 1.9     Recent Labs   Lab 07/13/25  0830 07/14/25  0143 07/15/25  0151   WBC 5.86 5.89 6.59   HGB 11.0* 11.0* 11.2*   HCT 33.2* 32.6* 33.8*    184 173     No results for input(s): "LABPT", "INR", "APTT" in the last 48 hours.  Microbiology Results (last 7 days)       Procedure Component Value Units Date/Time    Gram stain [[id number]] Collected: 07/14/25 0947    Order Status: Completed Specimen: " CSF (Spinal Fluid) from CSF Tap, Tube 3 Updated: 07/14/25 1522     GRAM STAIN No WBCs      No organisms seen    CSF culture [[id number]] Collected: 07/14/25 0947    Order Status: Sent Specimen: CSF (Spinal Fluid) from CSF Tap, Tube 3     CSF culture [[id number]] Collected: 07/10/25 0856    Order Status: Completed Specimen: CSF (Spinal Fluid) from CSF Tap, Tube 3 Updated: 07/14/25 0733     CULTURE, CSF No Growth To Date     GRAM STAIN Cytospin indicates:      No WBCs      No organisms seen    CSF culture [[id number]] Collected: 07/07/25 0715    Order Status: Completed Specimen: CSF (Spinal Fluid) from CSF Tap, Tube 3 Updated: 07/12/25 0713     CULTURE, CSF No Growth     GRAM STAIN Cytospin indicates:      No WBCs      No organisms seen    Gram stain [[id number]] Collected: 07/10/25 0856    Order Status: Canceled Specimen: CSF (Spinal Fluid) from CSF Tap, Tube 3 Updated: 07/10/25 1400    AFB Culture & Smear [[id number]]  (Normal) Collected: 07/02/25 1808    Order Status: Completed Specimen: CSF (Spinal Fluid) from CSF Tap, Tube 3 Updated: 07/10/25 0205     CULTURE, AFB  Culture Negative For Mycobacteria At 1 Week    CSF culture [[id number]] Collected: 07/02/25 1808    Order Status: Completed Specimen: CSF (Spinal Fluid) from CSF Tap, Tube 3 Updated: 07/08/25 0747     CULTURE, CSF No Growth     GRAM STAIN Cytospin indicates:      No WBCs      No organisms seen          All pertinent labs from the last 24 hours have been reviewed.    Significant Diagnostics:  I have reviewed all pertinent imaging results/findings within the past 24 hours.  Assessment/Plan:     * Ataxia  Assessment  71yo M with generalized weakness, falls, and paucity of speech presenting with multiple ring enhancing lesions on brain CT now s/p R SOC for tumor resection on 7/7:    Imaging:  - CTH 7/1 showed multiple brain masses concerning for mets with surrounding edema, can't rule out abscesses. Mass in cerebellum with effacement of 4th ventricule  outflow with concern for developing hydrocephalus  - MRI brain 7/1: non diagnostic due to motion  - CT CAP 7/1: spiculated R lung mass and lymph node adenopathy in chest and neck concerning for metastatic disease  - MRI Brain W/WO 7/2: multifocal enhancing lesions c/f metastatic disease, largest R cerebellum w/mass effect on 4th.  - Post op CTH/MRI 7/7: anticipate post op changes, hemostatic products left at superior/medial border of resection cavity  - CTH 7/11: no pseudomeningocele collection, persistent edema around L frontal met  - CTH 7/14: grossly stable ventricular caliber, no pseudomeningocele   - Cth 7/15: grossly stable s/p evd removal, no tract hemorrhage    Plan:  - admitted to NCC; ok to TTF to Oncology/HM from NSGY stand point once cleared by NCC  - EVD removed 7/14, site c/d/I, posterior incision w/o pseudomeningocele  - Dex weaned to 2 BID per NCC, continue GI ppx while on steroid. Further wean per Oncology  - AED per NCC  - Onc following; will need outpatient rad onc consult. Frozen in OR c/w metastatic adenocarcinoma  - PT/OT/SLP ongoing  - EM/SCD, ok for SQH given clinical stability, CBC WNL  - medical management per NCC    Dispo: ongoing, pending therapies and agitation improvement    Discussed with Dr. Larios and Dr. Mata by NSGY team        Shruthi Rodriguez MD  Neurosurgery  [facility]

## 2025-07-15 NOTE — PROGRESS NOTES
"[facility]  Adult Nutrition  Progress Note    SUMMARY       Recommendations  --Continue continuous TF: Impact Peptide @ 55 mL/hr to provide 1320 mL total volume, 1980 kcal, 185 g CHO, 124 g protein, 0 g fiber, 1019 mL free water, 132% DRI         --160 mL flush q4 hrs to provide additional 960 mL free water (Total 1979 mL)     --Once TF has been at continuous goal rate > 48 hours, may transition to bolus feeds: Impact Peptide 1.5 at 220 mL q4 hrs to provide 1320 mL total volume     --Nursing: please continue to document rate and formula on flowsheet    --RD to monitor weight, rate, tolerance    Goals: Meet % EEN/EPN by next RD follow-up  Nutrition Goal Status #1: goal met  Communication of RD Recs:  (POC)    Nutrition Discharge Planning    Nutrition Discharge Planning: Too early to determine, pending clinical course     Reason for Assessment    Reason For Assessment: RD follow-up  Diagnosis:  (Ataxia)  General Information Comments: 71 y/o M presenting from [facility] for generalized weakness for about a week now. RD follow-up. Pt extubated on 7/8. Tube feed currently running at goal of 55 mL/hr via NG - meeting EEN/EPN. Weight stable throughout admission. No concerns for malnutrition at this time.     Nutrition Related Social Determinants of Health: SDOH: Unable to assess at this time.      Food Insecurity: Patient Unable To Answer (7/2/2025)    Hunger Vital Sign     Worried About Running Out of Food in the Last Year: Patient unable to answer     Ran Out of Food in the Last Year: Patient unable to answer       Nutrition/Diet History    Spiritual, Cultural Beliefs, Congregation Practices, Values that Affect Care: no  Food Allergies: NKFA  Factors Affecting Nutritional Intake: NPO, on mechanical ventilation    Anthropometrics    Height: 6' 1" (185.4 cm)  Height (inches): 73 in  Height Method: Estimated  Weight: 81.1 kg (178 lb 12.7 oz)  Weight (lb): 178.79 lb  Weight Method: Bed Scale  Ideal " Body Weight (IBW), Male: 184 lb  % Ideal Body Weight, Male (lb): 97.17 %  BMI (Calculated): 23.6  BMI Grade: 18.5-24.9 - normal       Lab/Procedures/Meds    Pertinent Labs Reviewed: reviewed - BUN 25, AST 50, A1c 5.8     Pertinent Medications Reviewed: reviewed - enoxaparin, folic acid, MVI, thiamine     Estimated/Assessed Needs    Weight Used For Calorie Calculations: 81.1 kg (178 lb 12.7 oz)  Energy Calorie Requirements (kcal): 2037 kcal/day (x 1.25 AF)  Energy Need Method: [place]-St Jeor  Protein Requirements:  g/day (1.2-2.0 g/kg)  Weight Used For Protein Calculations: 81.1 kg (178 lb 12.7 oz)     Estimated Fluid Requirement Method: RDA Method  RDA Method (mL): 2037         Nutrition Prescription Ordered    Current Diet Order: NPO  Current Nutrition Support Formula Ordered: Impact Peptide 1.5  Current Nutrition Support Rate Ordered: 55 (ml)  Current Nutrition Support Frequency Ordered: x 24 hours    Evaluation of Received Nutrient/Fluid Intake    Enteral Calories (kcal): 1980  Enteral Protein (gm): 124  Enteral (Free Water) Fluid (mL): 1019  Other Calories (kcal): 610  Total Calories (kcal): 1690  % Kcal Needs: 97  % Protein Needs: 100  Energy Calories Required: meeting needs  Protein Required: meeting needs  Fluid Required:  (Per MD)  Comments: LBM 7/15  Tolerance: tolerating  % Intake of Estimated Energy Needs: 75 - 100 %  % Meal Intake: NPO     PES Statement  Inadequate enteral nutrition infusion related to  (Infusion volume not reached) as evidenced by  (Inadequate EN volume compared to estimated requirements)  Status: Resolved    Nutrition Risk    Level of Risk/Frequency of Follow-up: high     Monitor and Evaluation    Monitor and Evaluation: Enteral and parenteral nutrition administration, Weight, Electrolyte and renal panel, Gastrointestinal profile, Glucose/endocrine profile, Nutrition focused physical findings, Lipid profile, Inflammatory profile, Skin     Nutrition Follow-Up    RD Follow-up?:  Yes

## 2025-07-15 NOTE — ASSESSMENT & PLAN NOTE
Hx of    -  coreg 6.25 mg BID  - Continue amlodipine 10 mg qday   - Continue clonidine to 0.3 mg TID  - C/w losartan 100 mg qday   - Goal SBP<160

## 2025-07-16 LAB
ABSOLUTE EOSINOPHIL (OHS): 0.06 K/UL
ABSOLUTE MONOCYTE (OHS): 0.62 K/UL (ref 0.3–1)
ABSOLUTE NEUTROPHIL COUNT (OHS): 4.18 K/UL (ref 1.8–7.7)
ALBUMIN SERPL BCP-MCNC: 3.1 G/DL (ref 3.5–5.2)
ALP SERPL-CCNC: 73 UNIT/L (ref 40–150)
ALT SERPL W/O P-5'-P-CCNC: 24 UNIT/L (ref 10–44)
ANION GAP (OHS): 7 MMOL/L (ref 8–16)
AST SERPL-CCNC: 40 UNIT/L (ref 11–45)
BASOPHILS # BLD AUTO: 0.06 K/UL
BASOPHILS NFR BLD AUTO: 1 %
BILIRUB SERPL-MCNC: 0.2 MG/DL (ref 0.1–1)
BUN SERPL-MCNC: 28 MG/DL (ref 8–23)
CALCIUM SERPL-MCNC: 8.6 MG/DL (ref 8.7–10.5)
CHLORIDE SERPL-SCNC: 108 MMOL/L (ref 95–110)
CO2 SERPL-SCNC: 24 MMOL/L (ref 23–29)
CREAT SERPL-MCNC: 0.8 MG/DL (ref 0.5–1.4)
ERYTHROCYTE [DISTWIDTH] IN BLOOD BY AUTOMATED COUNT: 13.2 % (ref 11.5–14.5)
GFR SERPLBLD CREATININE-BSD FMLA CKD-EPI: >60 ML/MIN/1.73/M2
GLUCOSE SERPL-MCNC: 111 MG/DL (ref 70–110)
HCT VFR BLD AUTO: 32.9 % (ref 40–54)
HGB BLD-MCNC: 11 GM/DL (ref 14–18)
IMM GRANULOCYTES # BLD AUTO: 0.02 K/UL (ref 0–0.04)
IMM GRANULOCYTES NFR BLD AUTO: 0.3 % (ref 0–0.5)
LYMPHOCYTES # BLD AUTO: 0.98 K/UL (ref 1–4.8)
MAGNESIUM SERPL-MCNC: 1.9 MG/DL (ref 1.6–2.6)
MCH RBC QN AUTO: 30.8 PG (ref 27–31)
MCHC RBC AUTO-ENTMCNC: 33.4 G/DL (ref 32–36)
MCV RBC AUTO: 92 FL (ref 82–98)
NUCLEATED RBC (/100WBC) (OHS): 0 /100 WBC
PHOSPHATE SERPL-MCNC: 3.2 MG/DL (ref 2.7–4.5)
PLATELET # BLD AUTO: 221 K/UL (ref 150–450)
PMV BLD AUTO: 10.2 FL (ref 9.2–12.9)
POTASSIUM SERPL-SCNC: 4 MMOL/L (ref 3.5–5.1)
PROT SERPL-MCNC: 6.5 GM/DL (ref 6–8.4)
RBC # BLD AUTO: 3.57 M/UL (ref 4.6–6.2)
RELATIVE EOSINOPHIL (OHS): 1 %
RELATIVE LYMPHOCYTE (OHS): 16.6 % (ref 18–48)
RELATIVE MONOCYTE (OHS): 10.5 % (ref 4–15)
RELATIVE NEUTROPHIL (OHS): 70.6 % (ref 38–73)
SODIUM SERPL-SCNC: 139 MMOL/L (ref 136–145)
WBC # BLD AUTO: 5.92 K/UL (ref 3.9–12.7)

## 2025-07-16 PROCEDURE — 36415 COLL VENOUS BLD VENIPUNCTURE: CPT

## 2025-07-16 PROCEDURE — 80053 COMPREHEN METABOLIC PANEL: CPT

## 2025-07-16 PROCEDURE — 25000003 PHARM REV CODE 250: Performed by: PHYSICIAN ASSISTANT

## 2025-07-16 PROCEDURE — 83735 ASSAY OF MAGNESIUM: CPT

## 2025-07-16 PROCEDURE — 84100 ASSAY OF PHOSPHORUS: CPT

## 2025-07-16 PROCEDURE — 63600175 PHARM REV CODE 636 W HCPCS: Performed by: PHYSICIAN ASSISTANT

## 2025-07-16 PROCEDURE — 25000003 PHARM REV CODE 250

## 2025-07-16 PROCEDURE — 63600175 PHARM REV CODE 636 W HCPCS

## 2025-07-16 PROCEDURE — 94761 N-INVAS EAR/PLS OXIMETRY MLT: CPT

## 2025-07-16 PROCEDURE — 85025 COMPLETE CBC W/AUTO DIFF WBC: CPT

## 2025-07-16 PROCEDURE — 11000001 HC ACUTE MED/SURG PRIVATE ROOM

## 2025-07-16 PROCEDURE — 25000003 PHARM REV CODE 250: Performed by: NURSE PRACTITIONER

## 2025-07-16 PROCEDURE — 92507 TX SP LANG VOICE COMM INDIV: CPT

## 2025-07-16 PROCEDURE — 25000003 PHARM REV CODE 250: Performed by: PSYCHIATRY & NEUROLOGY

## 2025-07-16 PROCEDURE — 99222 1ST HOSP IP/OBS MODERATE 55: CPT | Mod: ,,, | Performed by: NURSE PRACTITIONER

## 2025-07-16 RX ADMIN — CLONIDINE HYDROCHLORIDE 0.3 MG: 0.2 TABLET ORAL at 06:07

## 2025-07-16 RX ADMIN — CARVEDILOL 6.25 MG: 6.25 TABLET, FILM COATED ORAL at 09:07

## 2025-07-16 RX ADMIN — PHENOBARBITAL 15 MG: 15 TABLET ORAL at 10:07

## 2025-07-16 RX ADMIN — Medication 100 MG: at 09:07

## 2025-07-16 RX ADMIN — FAMOTIDINE 20 MG: 20 TABLET, FILM COATED ORAL at 08:07

## 2025-07-16 RX ADMIN — SULFAMETHOXAZOLE AND TRIMETHOPRIM 1 TABLET: 800; 160 TABLET ORAL at 09:07

## 2025-07-16 RX ADMIN — OLANZAPINE 20 MG: 5 TABLET, FILM COATED ORAL at 08:07

## 2025-07-16 RX ADMIN — CLONIDINE HYDROCHLORIDE 0.3 MG: 0.2 TABLET ORAL at 09:07

## 2025-07-16 RX ADMIN — DEXAMETHASONE 2 MG: 1 TABLET ORAL at 08:07

## 2025-07-16 RX ADMIN — DEXAMETHASONE 2 MG: 1 TABLET ORAL at 09:07

## 2025-07-16 RX ADMIN — ENOXAPARIN SODIUM 40 MG: 40 INJECTION SUBCUTANEOUS at 06:07

## 2025-07-16 RX ADMIN — CLONIDINE HYDROCHLORIDE 0.3 MG: 0.2 TABLET ORAL at 02:07

## 2025-07-16 RX ADMIN — CARVEDILOL 6.25 MG: 6.25 TABLET, FILM COATED ORAL at 08:07

## 2025-07-16 RX ADMIN — THERA TABS 1 TABLET: TAB at 09:07

## 2025-07-16 RX ADMIN — OLANZAPINE 20 MG: 5 TABLET, FILM COATED ORAL at 09:07

## 2025-07-16 RX ADMIN — LOSARTAN POTASSIUM 100 MG: 50 TABLET, FILM COATED ORAL at 09:07

## 2025-07-16 RX ADMIN — AMLODIPINE BESYLATE 10 MG: 10 TABLET ORAL at 09:07

## 2025-07-16 RX ADMIN — FAMOTIDINE 20 MG: 20 TABLET, FILM COATED ORAL at 09:07

## 2025-07-16 RX ADMIN — FOLIC ACID 1 MG: 1 TABLET ORAL at 09:07

## 2025-07-16 NOTE — PLAN OF CARE
Prior SW sent referral to [facility].   07/16/25 1044   Post-Acute Status   Post-Acute Authorization Placement   Post-Acute Placement Status Referrals Sent   Discharge Delays (!) Change in Medical Condition   Discharge Plan   Discharge Plan A Rehab   Discharge Plan B Home with family;Home Health

## 2025-07-16 NOTE — PLAN OF CARE
Problem: Adult Inpatient Plan of Care  Goal: Plan of Care Review  Outcome: Progressing  Goal: Patient-Specific Goal (Individualized)  Outcome: Progressing  Goal: Absence of Hospital-Acquired Illness or Injury  Outcome: Progressing  Goal: Optimal Comfort and Wellbeing  Outcome: Progressing  Goal: Readiness for Transition of Care  Outcome: Progressing     Problem: Infection  Goal: Absence of Infection Signs and Symptoms  Outcome: Progressing     Problem: Skin Injury Risk Increased  Goal: Skin Health and Integrity  Outcome: Progressing     Problem: Delirium  Goal: Optimal Coping  Outcome: Progressing  Goal: Improved Behavioral Control  Outcome: Progressing  Goal: Improved Attention and Thought Clarity  Outcome: Progressing  Goal: Improved Sleep  Outcome: Progressing     Problem: Fall Injury Risk  Goal: Absence of Fall and Fall-Related Injury  Outcome: Progressing     Problem: Restraint, Nonviolent  Goal: Absence of Harm or Injury  Outcome: Progressing     Problem: Wound  Goal: Optimal Coping  Outcome: Progressing  Goal: Optimal Functional Ability  Outcome: Progressing  Goal: Absence of Infection Signs and Symptoms  Outcome: Progressing  Goal: Improved Oral Intake  Outcome: Progressing  Goal: Optimal Pain Control and Function  Outcome: Progressing  Goal: Skin Health and Integrity  Outcome: Progressing  Goal: Optimal Wound Healing  Outcome: Progressing

## 2025-07-16 NOTE — CONSULTS
[facility] - Neurosurgery ([facility])  Physical Medicine & Rehab  Consult Note    Patient Name: Goran Carlos  MRN: [id number]  Admission Date: 7/1/2025  Hospital Length of Stay: 15 days  Attending Physician: Armani Olvera MD   Consults  Subjective:     Principal Problem: Ataxia    HPI: Per chart review, 71 y/o M presenting from [facility] for generalized weakness for about a week now. History obtained from rehab nurse. She reported that he got to their facility about a month ago and, at baseline, walks with a cane and is alert and oriented. His nurse noticed that he has seemed weaker over the past week and over the past 3 days has had increased balance disturbance and gait issues. On admit, CT head without contrast revealed multiple lesions of hypodensity with a hyperdense rim, notably in the left temporal lobe and right cerebellum. There were additional small hyperdensities in the right frontoparietal and left parasagittal parietal lobe. There is mass effect and partial effacement of the 4th ventricle secondary to the cerebellar lesion with developing hydrocephalus without MLS. CT of the chest, abdomen, and pelvis with IV contrast showed RUL mass with mediastinal adenopathy. MRI brain showed multifocal enhancing lesions c/f metastatic disease, largest R cerebellum w/mass effect on 4th. Pt is S/P R SOC for tumor resection on 7/7. Post op CTH/MRI was deemed to be stable as per NSGY.  Pt was placed on Dexamethasone tapering dose.  PM&R was consulted to evaluate pt for post acute placement recommendation.       Functional History: Patient lives alone.  Prior to admission, Pt was I .  DME: None.      Hospital Course: Per chart review,    PT- 07/15    Functional Mobility:  Bed Mobility:     Rolling Right: minimum assistance  Scooting: moderate assistance  Supine to Sit: minimum assistance  Sit to Supine: moderate assistance     Transfers:     Sit to Stand:    Trial 1-2: minimum assistance and of 2 persons with  hand-held assist  Trial 3: Moderate assistance x1 person with HHA     Gait: patient ambulated 2x 3' sidestepping with moderate assistance of 2 and HHA    Past Medical History:   Diagnosis Date    ETOH abuse     Gait disturbance     Heroin abuse      Past Surgical History:   Procedure Laterality Date    SUBOCCIPITAL CRANIOTOMY Right 7/7/2025    Procedure: CRANIOTOMY, SUBOCCIPITAL;  Surgeon: Blane Larios DO;  Location: [facility] OR [address] [place] [address];  Service: Neurosurgery;  Laterality: Right;     Review of patient's allergies indicates:  No Known Allergies    Scheduled Medications:    amLODIPine  10 mg Per NG tube Daily    carvediloL  6.25 mg Per NG tube BID    cloNIDine  0.3 mg Per NG tube Q8H    dexAMETHasone  2 mg Per NG tube Q12H    enoxparin  40 mg Subcutaneous Q24H (prophylaxis, 1700)    famotidine  20 mg Per NG tube BID    folic acid  1 mg Per NG tube Daily    losartan  100 mg Per NG tube Daily    multivitamin  1 tablet Per NG tube Daily    OLANZapine  20 mg Per NG tube BID    sulfamethoxazole-trimethoprim 800-160mg  1 tablet Per NG tube Every Mon, Wed, Fri    thiamine  100 mg Per NG tube Daily       PRN Medications:   Current Facility-Administered Medications:     acetaminophen, 650 mg, Per NG tube, Q4H PRN    bisacodyL, 10 mg, Rectal, Daily PRN    hydrALAZINE, 10 mg, Intravenous, Q4H PRN    labetalol, 10 mg, Intravenous, Q4H PRN    magnesium oxide, 800 mg, Per NG tube, PRN    magnesium oxide, 800 mg, Per NG tube, PRN    ondansetron, 4 mg, Intravenous, Q4H PRN    potassium bicarbonate, 35 mEq, Per NG tube, PRN    potassium bicarbonate, 50 mEq, Per NG tube, PRN    potassium bicarbonate, 60 mEq, Per NG tube, PRN    potassium, sodium phosphates, 2 packet, Per NG tube, PRN    potassium, sodium phosphates, 2 packet, Per NG tube, PRN    potassium, sodium phosphates, 2 packet, Per NG tube, PRN    sodium chloride 0.9%, 10 mL, Intravenous, PRN    Family History    None       Tobacco Use    Smoking status: Former     Types:  Cigarettes    Smokeless tobacco: Not on file   Substance and Sexual Activity    Alcohol use: Not Currently    Drug use: Not Currently     Types: Heroin    Sexual activity: Not on file     Review of Systems   Constitutional:  Positive for activity change.   Musculoskeletal:  Positive for gait problem.   Skin:  Positive for wound.   Neurological:  Positive for weakness.     Objective:     Vital Signs (Most Recent):  Temp: 97.4 °F (36.3 °C) (07/16/25 1136)  Pulse: 88 (07/16/25 1443)  Resp: 18 (07/16/25 1136)  BP: (!) 133/90 (07/16/25 1136)  SpO2: 97 % (07/16/25 1136)    Vital Signs (24h Range):  Temp:  [96.5 °F (35.8 °C)-97.8 °F (36.6 °C)] 97.4 °F (36.3 °C)  Pulse:  [79-90] 88  Resp:  [17-22] 18  SpO2:  [95 %-100 %] 97 %  BP: (130-154)/(80-92) 133/90     Body mass index is 23.59 kg/m².     Physical Exam  Vitals and nursing note reviewed.   Constitutional:       General: He is awake.   Pulmonary:      Effort: Pulmonary effort is normal. No respiratory distress.   Abdominal:      General: There is no distension.      Palpations: Abdomen is soft.   Musculoskeletal:      Comments: Moves all  extremities.    Neurological:      General: No focal deficit present.      Mental Status: He is alert.      GCS: GCS eye subscore is 4. GCS verbal subscore is 5. GCS motor subscore is 6.      Motor: Weakness present.      Coordination: Impaired rapid alternating movements.      Gait: Gait abnormal.   Psychiatric:         Mood and Affect: Mood normal.         Behavior: Behavior normal. Behavior is cooperative.               Diagnostic Results: Labs: Reviewed  Assessment/Plan:     * Ataxia  -S/P R SOC for tumor resection on 7/7.  -Post op MRI stable.  -Restraints in place BUE.  -Mental status improving.  -No AED per NSGY. Will need oncology and rad oncology FU out pt.   PT/OT/SLP  -NGT.  -MBSS failed.  -NPO pr SLP.       Dysphagia  -NGT currently.  -Will need PEG placement.       Anemia  -Monitor CBC daily.     Encephalopathy,  metabolic  -Improving.  -BUE restraints in place.  -Will need to be off restraints X24 H.     Positive serology for syphilis  -S/P Penicillin 07/01.  -Analysis not suggestive of neurosyphilis at this time per HM.     Mass of upper lobe of right lung  -Will need oncology FU out pt.     PM&R Recommendation:     At this time, the PM&R team has reviewed this patient's ongoing medical case including inpatient diagnosis, medical history, clinical examination, labs, vitals, current social and functional history. We will continue to follow pt for a potential rehab candidate pending improvement with AMS, not requiring restraints. Would also likely require PEG tube placement with TF tolerance. I will follow.        Thank you for your consult.     Vaishali Agosto NP  Department of Physical Medicine & Rehab  [facility] - Neurosurgery ([facility])

## 2025-07-16 NOTE — PROGRESS NOTES
Alex Henson - Neurosurgery ([facility])  [facility] Medicine  Progress Note    Patient Name: Goran Carlos  MRN: [id number]  Patient Class: IP- Inpatient   Admission Date: 7/1/2025  Length of Stay: 15 days  Attending Physician: Armani Olvera MD  Primary Care Provider: No primary care provider on file.        Subjective     Principal Problem:Ataxia        HPI:  69 y/o M presenting from [facility] for generalized weakness for about a week now. History obtained from rehab nurse. She reported that he got to their facility about a month ago and, at baseline, walks with a cane and is alert and oriented. His nurse noticed that he has seemed weaker over the past week and over the past 3 days has had increased balance disturbance and gait issues. They also noticed that he has been speaking more softly in his speech is harder to understand. He has not had any infectious symptoms. He fell yesterday, unsure if he hit his head. CT head without contrast revealed multiple lesions of hypodensity with a hyperdense rim, notably in the left temporal lobe and right cerebellum. There were additional small hyperdensities in the right frontoparietal and left parasagittal parietal lobe. There is mass effect and partial effacement of the 4th ventricle secondary to the cerebellar lesion with developing hydrocephalus without MLS. CT of the chest, abdomen, and pelvis with IV contrast showed RUL mass with mediastinal adenopathy. Labs revealed Hep C and treponema antibodies were positive. Hep C PCR pending. Penicillin G was administered. A sepsis workup was completed and antibiotics were initiated. An MRI brain with and without contrast was ordered, but patient was unable to complete due to persistent movement after sedatives were administered. Prior to MRI, he was alert but drowsy. He was able to tell me his name, but told me he was 53 years old, the year was 2003, and he did not know the current place or reason for being here. He had generalized  weakness but was slightly more weak on the right side. He followed simple commands, but did not attempt to follow more complex commands. He is admitted to [facility] with NSGY consult.          Overview/Hospital Course:  69 y/o M presenting from [facility] for generalized weakness for about a week now. History obtained from rehab nurse. She reported that he got to their facility about a month ago and, at baseline, walks with a cane and is alert and oriented. His nurse noticed that he has seemed weaker over the past week and over the past 3 days has had increased balance disturbance and gait issues. They also noticed that he has been speaking more softly in his speech is harder to understand. He has not had any infectious symptoms. He fell yesterday, unsure if he hit his head. CT head without contrast revealed multiple lesions of hypodensity with a hyperdense rim, notably in the left temporal lobe and right cerebellum. There were additional small hyperdensities in the right frontoparietal and left parasagittal parietal lobe. There is mass effect and partial effacement of the 4th ventricle secondary to the cerebellar lesion with developing hydrocephalus without MLS. CT of the chest, abdomen, and pelvis with IV contrast showed RUL mass with mediastinal adenopathy. Labs revealed Hep C and treponema antibodies were positive. Hep C PCR pending. Penicillin G was administered. A sepsis workup was completed and antibiotics were initiated. An MRI brain with and without contrast was ordered, but patient was unable to complete due to persistent movement after sedatives were administered. Prior to MRI, he was alert but drowsy. He was able to tell me his name, but told me he was 53 years old, the year was 2003, and he did not know the current place or reason for being here. He had generalized weakness but was slightly more weak on the right side. He followed simple commands, but did not attempt to follow more complex commands. He is  admitted to [facility] with NSGY consult.       7/2/2025: MRI with significant motion artifact. Required presidex overnight secondary to agitation  7/3/2025: MRI favors neoplastic brain lesions. CSF studies pending. Started on Zyprexa and phenobarbital for agitation, R subclavian CVC placed for central access  7/4/2025: NAEON. Extubated without complications.  07/05/2025 NAEON. EVD @20. Adjusted oxycodone frequency q8. Increased zyprexa 20 BID. Precedex for agitation. Plan for biopsy Monday w/ NSGY.  07/06/2025 NAEON. Added on 100q4 FWF. Continues to require precedex for agitation. OR tomorrow.  07/07/2025: AF. SHARRON. Exam stable. OR today for SOC crani for tumor resection. ICPs 0-14, 44cc output. PRN versed given for agitation. Precedex at 1.2. Hydral prn x1 for SBP >160. Remains in restraints.  07/08/2025: AF. SHARRON. POD1 s/p SOC. CT/MRI from overnight reviewed. EVD open at 10, ICPs -2-14, output 33cc. Versed PRN x1 given for agitation. Agitation improved with Clonidine. Cardene at 12.5 for SBP <160. Hydralazine prn x1 given. Multiple loose BMs. Off propofol and ketamine. Extubated this morning.  07/09/2025: Tolerated extubation overnight, more awake and cooperative on exam this AM, off precedex gtt overnight w/ versed PRN x1 only. Maxed on cardene gtt, add losartan 50 mg qday, increase clonidine to 0.2 mg TID for BP control. EVD @ 10, plan for repeat head CT Friday w/ EVD wean over weekend per discussion w/ NSGY  07/10/2025: NAEON. Afebrile. Pt removed NGT overnight, replaced. No longer on cardene, required hydralazine x1 and labetalol x2 PRNs. Losartan increased to 100mg. EVD open at 10, will plan to keep and wean over weekend per NSGY. Steroid taper per NSGY beginning today.  07/11/2025 NAEON. Neuro exam slowly improving, weaning phenobarb. Remains hypertensive, increasing clonidine to 0.3 mg TID and adding amlodipine 10 mg qday. CT head stable, EVD clamped -> plan for repeat head CT Monday morning per  NSGY  07/12/2025 NAEON. Neuro exam stable, weaning phenobarb and dexamethasone.EVD clamped, CT Monday.  07/13/2025 NAEON, tolerating EVD clamp trial. Neuro exam continuing to slowly improved. Repeat head CT tomorrow.   07/14/2025 NAEON, repeat head CT overnight stable w/ increased hydrocephalus or evidence of encephalocele formation, plan to pull EVD today. Add coreg 6.25 mg BID for uptrending BP. Holding FWF given borderline low Na. Weaning dex per NSGY  7/15/2025 NAEO, continue phenobarb taper. SLP following, failed modified today. Rehab placement pending. Intermittently agitated but overall improved from last week.        7/16 Transfer to hospital medicine. admitted with generalized weakness, falls, and paucity of speech presenting with multiple ring enhancing lesions on brain CT - CTHead 7/1 showed multiple brain masses concerning for mets with surrounding edema, can't rule out abscesses. Mass in cerebellum with effacement of 4th ventricule outflow with concern for developing hydrocephalus. MRI brain 7/1: non diagnostic due to motion. CT CAP 7/1: spiculated Right lung mass and lymph node adenopathy in chest and neck concerning for metastatic disease. MRI Brain W/WO 7/2: multifocal enhancing lesions c/f metastatic disease, largest R cerebellum w/mass effect on 4th. now s/p R SOC for tumor resection on 7/7. Post op CTH/MRI 7/7: anticipate post operative changes, hemostatic products left at superior/medial border of resection cavity. EVD removed 7/14,  posterior incision without  pseudomeningocele. Dexamthasone weaned to 2 BID per NCC, continue GI ppx while on steroid. on bactrim for PCP prophylaxis.  Admitted form [facility], currently on phenobarb taper and scheduled zyprexa and clonidine for ETOH and heroin use. Pending rehab placement. Needs OP follow up with NSGY (continue decadron till then) and Radiation oncology team. Pathology is lung carcinoma but Tempus and PD-L1 testing are pending. on NGT feeds impact  peptide. SLP following. on 4 point restraints.  CTHead 7/14 -Ventricles remain somewhat prominent but without overt hydrocephalus or significant change in size following EVD removal.Evolving recent postsurgical change in the posterior fossa. No new intracranial hemorrhage or major vascular distribution infarct. AAO to self. on UE restraints.  [facility] 7/16. SLP recs NPO. to completed phenobarbital taper today              Review of Systems:   Pain scale:    Constitutional:  fever,  chills, headache, vision loss, hearing loss, weight loss, Generalized weakness, falls, loss of smell, loss of taste, poor appetite,  sore throat  Respiratory: cough, shortness of breath.   Cardiovascular: chest pain, dizziness, palpitations, orthopnea, swelling of feet, syncope  Gastrointestinal: nausea, vomiting, abdominal pain, diarrhea, black stool,  blood in stool, change in bowel habits, constipation  Genitourinary: hematuria, dysuria, urgency, frequency  Integument/Breast: rash,  pruritis  Hematologic/Lymphatic: easy bruising, lymphadenopathy  Musculoskeletal: arthralgias , myalgias, back pain, neck pain, knee pain  Neurological: confusion, seizures, tremors, slurred speech  Behavioral/Psych:  depression, anxiety, auditory or visual hallucinations     OBJECTIVE:     Physical Exam:  Body mass index is 23.59 kg/m².    Constitutional: Appears well-developed and well-nourished.   Head: Normocephalic and atraumatic. NGT in place  Neck: Normal range of motion. Neck supple.   Cardiovascular: Normal heart rate.  Regular heart rhythm.  Pulmonary/Chest: Effort normal.   Abdominal: No distension.  No tenderness  Musculoskeletal: Normal range of motion. No edema. UE restraints   Neurological: Alert and oriented to person, place, and time.  follows simple commands  Skin: Skin is warm and dry.   Psychiatric: Normal mood and affect. Behavior is normal.                  Vital Signs  Temp: 97.4 °F (36.3 °C) (07/16/25 1136)  Pulse: 79 (07/16/25  "1136)  Resp: 18 (07/16/25 1136)  BP: (!) 133/90 (07/16/25 1136)  SpO2: 97 % (07/16/25 1136)     24 Hour VS Range    Temp:  [96.5 °F (35.8 °C)-98.5 °F (36.9 °C)]   Pulse:  [79-90]   Resp:  [17-22]   BP: (130-166)/(80-92)   SpO2:  [95 %-100 %]     Intake/Output Summary (Last 24 hours) at 7/16/2025 1207  Last data filed at 7/15/2025 1905  Gross per 24 hour   Intake 330 ml   Output 526 ml   Net -196 ml         I/O This Shift:  No intake/output data recorded.    Wt Readings from Last 3 Encounters:   07/08/25 81.1 kg (178 lb 12.7 oz)       I have personally reviewed the vitals and recorded Intake/Output     Laboratory/Diagnostic Data:    CBC/Anemia Labs: Coags:    Recent Labs   Lab 07/14/25  0143 07/15/25  0151 07/16/25  0440   WBC 5.89 6.59 5.92   HGB 11.0* 11.2* 11.0*   HCT 32.6* 33.8* 32.9*    173 221   MCV 92 93 92   RDW 13.1 13.2 13.2    No results for input(s): "PT", "INR", "APTT" in the last 168 hours.     Chemistries: ABG:   Recent Labs   Lab 07/14/25  0143 07/15/25  0151 07/16/25  0440    138 139   K 4.1 4.6 4.0    109 108   CO2 24 20* 24   BUN 27* 25* 28*   CREATININE 0.8 0.9 0.8   CALCIUM 8.6* 8.6* 8.6*   PROT 6.3 6.9 6.5   BILITOT 0.3 0.4 0.2   ALKPHOS 66 71 73   ALT 12 21 24   AST 39 50* 40   MG 1.9 1.9 1.9   PHOS 3.1 3.1 3.2    No results for input(s): "PH", "PCO2", "PO2", "HCO3", "POCSATURATED", "BE" in the last 168 hours.     POCT Glucose: HbA1c:    Recent Labs   Lab 07/11/25  0817 07/11/25  1208 07/11/25  1717 07/12/25  0839   POCTGLUCOSE 150* 98 137* 134*    Hemoglobin A1c   Date Value Ref Range Status   07/02/2025 5.8 (H) 4.0 - 5.6 % Final     Comment:     ADA Screening Guidelines:  5.7-6.4%  Consistent with prediabetes  >=6.5%  Consistent with diabetes    High levels of fetal hemoglobin interfere with the HbA1C  assay. Heterozygous hemoglobin variants (HbS, HgC, etc)do  not significantly interfere with this assay.   However, presence of multiple variants may affect accuracy.    " "    Cardiac Enzymes: Ejection Fractions:    No results for input(s): "CPK", "CPKMB", "MB", "TROPONINI" in the last 72 hours. No results found for: "EF"       No results for input(s): "COLORU", "APPEARANCEUA", "PHUR", "SPECGRAV", "PROTEINUA", "GLUCUA", "KETONESU", "BILIRUBINUA", "OCCULTUA", "NITRITE", "UROBILINOGEN", "LEUKOCYTESUR", "RBCUA", "WBCUA", "BACTERIA", "SQUAMEPITHEL", "HYALINECASTS" in the last 48 hours.    Invalid input(s): "WRIGHTSUR"    No results found for: "PROCAL", "LACTATE"  No results found for: "BNP"  Sed Rate (mm/hr)   Date Value   07/01/2025 24 (H)     No results found for: "DDIMER"  No results found for: "FERRITIN"  No results found for: "LDH"  No results found for: "TROPONINI", "CPK"  No results found for this or any previous visit.  No results found for: "[id number]"    Microbiology labs for the last week  Microbiology Results (last 7 days)       Procedure Component Value Units Date/Time    CSF culture [[id number]] Collected: 07/14/25 0947    Order Status: Completed Specimen: CSF (Spinal Fluid) from CSF Tap, Tube 3 Updated: 07/16/25 0723     CULTURE, CSF No Growth To Date     GRAM STAIN Cytospin indicates:      No WBCs      No organisms seen    CSF culture [[id number]] Collected: 07/10/25 0856    Order Status: Completed Specimen: CSF (Spinal Fluid) from CSF Tap, Tube 3 Updated: 07/15/25 0731     CULTURE, CSF No Growth     GRAM STAIN Cytospin indicates:      No WBCs      No organisms seen    Gram stain [[id number]] Collected: 07/14/25 0947    Order Status: Completed Specimen: CSF (Spinal Fluid) from CSF Tap, Tube 3 Updated: 07/14/25 1522     GRAM STAIN No WBCs      No organisms seen    CSF culture [[id number]] Collected: 07/07/25 0715    Order Status: Completed Specimen: CSF (Spinal Fluid) from CSF Tap, Tube 3 Updated: 07/12/25 0713     CULTURE, CSF No Growth     GRAM STAIN Cytospin indicates:      No WBCs      No organisms seen    Gram stain [[id number]] Collected: 07/10/25 0856    Order " Status: Canceled Specimen: CSF (Spinal Fluid) from CSF Tap, Tube 3 Updated: 07/10/25 1400    AFB Culture & Smear [[id number]]  (Normal) Collected: 07/02/25 1808    Order Status: Completed Specimen: CSF (Spinal Fluid) from CSF Tap, Tube 3 Updated: 07/10/25 0205     CULTURE, AFB  Culture Negative For Mycobacteria At 1 Week            Reviewed and noted in plan where applicable- Please see chart for full lab data.    Lines/Drains:  Peripheral IV Single Lumen 07/09/25 1758 20 G 2 1/4 in Anterior;Left Upper Arm (Active)   Site Assessment Clean;Dry;Intact 07/16/25 0420   Line Securement Device Secured with sutureless device 07/16/25 0420   Extremity Assessment Distal to IV No redness;No abnormal discoloration;No swelling;No warmth 07/16/25 0420   Line Status Flushed;Saline locked 07/16/25 0420   Dressing Status Clean;Dry;Intact 07/16/25 0420   Dressing Intervention Integrity maintained 07/16/25 0420   Dressing Change Due 07/12/25 07/15/25 2020   Site Change Due 07/12/25 07/15/25 2020   Reason Not Rotated Poor venous access 07/15/25 2020   Number of days: 6       Peripheral IV 07/13/25 0505 20 G Anterior;Left Lower leg (Active)   Site Assessment Clean;Dry;Intact 07/16/25 0020   Line Securement Device Secured with sutureless device 07/16/25 0020   Extremity Assessment Distal to IV No abnormal discoloration;No redness;No warmth;No swelling 07/16/25 0020   Dressing Status Clean;Dry;Intact 07/16/25 0020   Dressing Intervention Integrity maintained 07/16/25 0020   Dressing Change Due 07/17/25 07/15/25 2020   Site Change Due 07/17/25 07/15/25 2020   Reason Not Rotated Not due 07/15/25 2020   Number of days: 3            NG/OG Tube 07/15/25 0400 Other (comments) Left nostril;Right nostril (Active)   Placement Check placement verified by x-ray 07/15/25 1905   Tolerance no signs/symptoms of discomfort 07/15/25 2020   Securement secured to nostril center w/ adhesive device 07/15/25 2020   Clamp Status/Tolerance unclamped 07/15/25  2020   Suction Setting/Drainage Method suction at the bedside 07/15/25 2020   Insertion Site Appearance no redness, warmth, tenderness, skin breakdown, drainage 07/15/25 2020   Drainage None 07/15/25 2020   Flush/Irrigation flushed w/;water 07/15/25 2020   Feeding Type continuous;by pump 07/15/25 2020   Feeding Action feeding continued 07/15/25 2020   Current Rate (mL/hr) 55 mL/hr 07/15/25 2020   Goal Rate (mL/hr) 55 mL/hr 07/15/25 2020   Intake (mL) 100 mL 07/15/25 0601   Water Bolus (mL) 250 mL 07/14/25 0405   Rate Formula Tube Feeding (mL/hr) 55 mL/hr 07/15/25 1905   Formula Name Impact Peptide 07/15/25 1905   Intake (mL) - Formula Tube Feeding 55 07/15/25 1905   Residual Amount (ml) 0 ml 07/13/25 1905   Number of days: 1       Male External Urinary Catheter 07/08/25 0705 Medium (Active)   Collection Container Urimeter 07/15/25 2020   Securement Method secured to top of thigh w/ leg strap 07/15/25 2020   Skin no redness;no breakdown 07/15/25 2020   Tolerance no signs/symptoms of discomfort 07/15/25 2020   Output (mL) 275 mL 07/15/25 1601   Catheter Change Date 07/15/25 07/15/25 2020   Catheter Change Time 2020 07/15/25 2020   Number of days: 7       Imaging  ECG Results              EKG 12-lead (Final result)        Collection Time Result Time QRS Duration OHS QTC Calculation    07/01/25 14:01:41 07/01/25 15:35:55 104 467                     Final result by Interface, Lab In [facility] (07/01/25 15:36:10)                   Narrative:    Test Reason : R53.1,    Vent. Rate :  78 BPM     Atrial Rate :  78 BPM     P-R Int : 148 ms          QRS Dur : 104 ms      QT Int : 410 ms       P-R-T Axes :  83  77  62 degrees    QTcB Int : 467 ms    Sinus rhythm with Premature atrial complexes  Otherwise normal ECG  No previous ECGs available  Confirmed by Kike Begum ([id number]) on 7/1/2025 3:35:41 PM    Referred By:            Confirmed By: Kike Begum                                    Results for orders placed during the hospital  encounter of 07/01/25    Echo    Interpretation Summary    Left Ventricle: The left ventricle is normal in size. Ventricular mass is normal. Normal wall thickness. Normal wall motion. There is low normal systolic function with a visually estimated ejection fraction of 50 - 55%. There is normal diastolic function.    Right Ventricle: The right ventricle is normal in size measuring 2.7 cm. Wall thickness is normal. Systolic function is normal.    Right Atrium: The right atrium is dilated.    Pulmonary Artery: The estimated pulmonary artery systolic pressure is 36 mmHg.    IVC/SVC: Elevated venous pressure at 15 mmHg.      Fl Modified Barium Swallow Speech  Narrative: EXAMINATION:  FL MODIFIED BARIUM SWALLOW SPEECH STUDY    CLINICAL HISTORY:  per SLP recs;    TECHNIQUE:  Video fluoroscopic swallowing examination was performed in conjunction with the Speech Language Pathology Department.  Both thin and variably thickened liquid barium products as well as solid food and pureed food substances were used to assess swallowing.    Fluoroscopy time: 2.9 minutes    COMPARISON:  None.    FINDINGS:  There is an NG tube in place.    Pharyngeal Phase: Adequate.    - Penetration: Laryngeal penetration.    - Aspiration: None    - Residual/static material: Vallecular stasis, retained contrast in the piriform sinus.  Impression: Laryngeal penetration varying consistencies.  Some stasis of contrast in vallecular and piriform sinus.    Please see speech pathology report for further details.    Electronically signed by resident: Agustin Owens  Date:    07/15/2025  Time:    15:31    Electronically signed by: Alex Mendoza MD  Date:    07/16/2025  Time:    06:51      Labs, Imaging, EKG and Diagnostic results from 7/16/2025 were reviewed.    Medications:  Medication list was reviewed and changes noted under Assessment/Plan.  Medications Ordered Prior to Encounter[1]  Scheduled Medications:  Current Facility-Administered  Medications   Medication Dose Route Frequency    amLODIPine  10 mg Per NG tube Daily    carvediloL  6.25 mg Per NG tube BID    cloNIDine  0.3 mg Per NG tube Q8H    dexAMETHasone  2 mg Per NG tube Q12H    enoxparin  40 mg Subcutaneous Q24H (prophylaxis, 1700)    famotidine  20 mg Per NG tube BID    folic acid  1 mg Per NG tube Daily    losartan  100 mg Per NG tube Daily    multivitamin  1 tablet Per NG tube Daily    OLANZapine  20 mg Per NG tube BID    sulfamethoxazole-trimethoprim 800-160mg  1 tablet Per NG tube Every Mon, Wed, Fri    thiamine  100 mg Per NG tube Daily     PRN:   Current Facility-Administered Medications:     acetaminophen, 650 mg, Per NG tube, Q4H PRN    bisacodyL, 10 mg, Rectal, Daily PRN    hydrALAZINE, 10 mg, Intravenous, Q4H PRN    labetalol, 10 mg, Intravenous, Q4H PRN    magnesium oxide, 800 mg, Per NG tube, PRN    magnesium oxide, 800 mg, Per NG tube, PRN    ondansetron, 4 mg, Intravenous, Q4H PRN    potassium bicarbonate, 35 mEq, Per NG tube, PRN    potassium bicarbonate, 50 mEq, Per NG tube, PRN    potassium bicarbonate, 60 mEq, Per NG tube, PRN    potassium, sodium phosphates, 2 packet, Per NG tube, PRN    potassium, sodium phosphates, 2 packet, Per NG tube, PRN    potassium, sodium phosphates, 2 packet, Per NG tube, PRN    sodium chloride 0.9%, 10 mL, Intravenous, PRN  Infusions:   Estimated Creatinine Clearance: 98.5 mL/min (based on SCr of 0.8 mg/dL).             Assessment & Plan  Ataxia    2/2 cerebellar lesion   PT/OT -> recommending acute rehab once medically appropriate for discharge         Polysubstance abuse     Was brought in from [facility], where he has been for the past month  Educate on cessation when appropriate  On phenobarbital for agitation, no signs of EtOH withdrawal -> phenobarb taper     7/16 to completed phenobarbital taper today     Brain lesion     68 y/o M brought to ED from [facility] (hx of heroin and ETOH) for progressive weakness, gait disturbance,  confusion, and decreased volume when speaking. CT head without contrast revealed multiple lesions of hypodensity with a hyperdense rim, notably in the left temporal lobe and right cerebellum. There were additional small hyperdensities in the right frontoparietal and left parasagittal parietal lobe. There is mass effect and partial effacement of the 4th ventricle secondary to the cerebellar lesion with developing hydrocephalus without MLS. CT of the chest, abdomen, and pelvis with IV contrast showed RUL mass with mediastinal adenopathy. Labs revealed Hep C and treponema antibodies were positive. Hep C PCR pending. Penicillin G was administered. A sepsis workup was completed and antibiotics were initiated. Intubated to get MRI. S/p EVD. Extubated 7/4/25 without difficulty.     Admit to [facility]  Q1h neuro checks, I&Os, and vitals   Daily CBC, CMP, Mag, Phos  NSGY following; s/p SOC craniotomy for tumor resection on 7/7  Dex 2 mg BID, no plans to taper further pending outpt f/u  Bactrim MWF for PJP ppx given plan for prolonged steroid taper  EVD removed 7/14  Zyprexa BID for agitation  Phenobarb taper to off given improved agitation   Clonidine 0.3 mg TID for agitation/opioid dependence/hypertension  Avoid versed for agitation  Tube feeds at goal, SLP following for swallow  Folate/thiamine/MV  SBP <160   Prn labetalol, hydralazine  PRN Zofran 4mg q4h  SCDs, SQH  PT/OT/SLP following        7/16 Transfer to hospital medicine. admitted with generalized weakness, falls, and paucity of speech presenting with multiple ring enhancing lesions on brain CT - CTHead 7/1 showed multiple brain masses concerning for mets with surrounding edema, can't rule out abscesses. Mass in cerebellum with effacement of 4th ventricule outflow with concern for developing hydrocephalus. MRI brain 7/1: non diagnostic due to motion. CT CAP 7/1: spiculated Right lung mass and lymph node adenopathy in chest and neck concerning for metastatic disease. MRI  Brain W/WO 7/2: multifocal enhancing lesions c/f metastatic disease, largest R cerebellum w/mass effect on 4th. now s/p R SOC for tumor resection on 7/7. Post op CTH/MRI 7/7: anticipate post operative changes, hemostatic products left at superior/medial border of resection cavity. EVD removed 7/14,  posterior incision without  pseudomeningocele. Dexamthasone weaned to 2 BID per NCC, continue GI ppx while on steroid. on bactrim for PCP prophylaxis.  Admitted form [facility], currently on phenobarb taper and scheduled zyprexa and clonidine for ETOH and heroin use. Pending rehab placement. Needs OP follow up with NSGY (continue decadron till then) and Radiation oncology team. Pathology is lung carcinoma but Tempus and PD-L1 testing are pending. on NGT feeds impact peptide. SLP following. on 4 point restraints.  CTHead 7/14 -Ventricles remain somewhat prominent but without overt hydrocephalus or significant change in size following EVD removal.Evolving recent postsurgical change in the posterior fossa. No new intracranial hemorrhage or major vascular distribution infarct. AAO to self. on  UE restraints.  MBS 7/16. SLP recs NPO. to complete phenobarbital taper today        Gait disturbance     PT/OT eval - rehab     Hepatitis C antibody positive     PCR negative  No known history of Hep C per patient        Mass of upper lobe of right lung     Noted on CXR and CT chest with IV contrast  Saturating well on RA; No PTX on CT-chest  No lung consolidations or obstructive process  No known history of cancer  Intra-op brain biopsy frozen pathology suggestive of metastatic adenocarcinoma, 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  Will need outpt rad onc and med onc f/u      Positive serology for syphilis  s/p ID eval -  Positive Treponema pallidum antibody. RPR 1:1 suggestive of false positive or past treated infection. Received penicillin G 2.4 million units x 1 on 7/1. CSF analysis not suggestive of neurosyphilis at this time . blood RPR and CSF  VDRL 7/2 negative        Essential hypertension  Patient's blood pressure range in the last 24 hours was: BP  Min: 130/82  Max: 166/92.The patient's inpatient anti-hypertensive regimen is listed below:  Current Antihypertensives  labetalol 20 mg/4 mL (5 mg/mL) IV syring, Every 4 hours PRN, Intravenous  hydrALAZINE injection 10 mg, Every 4 hours PRN, Intravenous  losartan tablet 100 mg, Daily, Per NG tube  cloNIDine tablet 0.3 mg, Every 8 hours, Per NG tube  amLODIPine tablet 10 mg, Daily, Per NG tube  carvediloL tablet 6.25 mg, 2 times daily, Per NG tube      - Goal SBP<160         Encephalopathy, metabolic     See primary problem  Slowly improving   on clonidine 0.3mg TID and olanzapine 20mg BID    Anemia    Patient's with Normocytic anemia.. Hemoglobin stable. Etiology likely due to chronic disease .  Current CBC reviewed-    Recent Labs   Lab 07/14/25  0143 07/15/25  0151 07/16/25  0440   HGB 11.0* 11.2* 11.0*         Component Value Date/Time    MCV 92 07/16/2025 0440    RDW 13.2 07/16/2025 0440     Monitor CBC and transfuse if H/H drops below 7/21.    Dysphagia  7/15 secondary to above .   on NGT feeds impact peptide .  SLP following    7/16 MBS 7/16. SLP recs NPO.   AMS (altered mental status) (Resolved: 7/15/2025)     See primary problem  Slowly improving         VTE Risk Mitigation (From admission, onward)           Ordered     enoxaparin injection 40 mg  Every 24 hours         07/15/25 1247     IP VTE LOW RISK PATIENT  Once         07/01/25 2049     Place sequential compression device  Until discontinued         07/01/25 2049                    Discharge Planning   CARA: 7/19/2025     Code Status: Full Code   Medical Readiness for Discharge Date: 7/16/2025  Discharge Plan A: Rehab   Discharge Delays: (!) Change in Medical Condition        Armani Olvera MD  Department of Hospital Medicine   [facility] - Neurosurgery ([facility])         [1]   No current facility-administered medications on file prior to  encounter.     No current outpatient medications on file prior to encounter.

## 2025-07-16 NOTE — PLAN OF CARE
Problem: Adult Inpatient Plan of Care  Goal: Plan of Care Review  Outcome: Progressing  Goal: Patient-Specific Goal (Individualized)  Outcome: Progressing  Goal: Absence of Hospital-Acquired Illness or Injury  Outcome: Progressing  Goal: Optimal Comfort and Wellbeing  Outcome: Progressing  Goal: Readiness for Transition of Care  Outcome: Progressing     Problem: Infection  Goal: Absence of Infection Signs and Symptoms  Outcome: Progressing     Problem: Skin Injury Risk Increased  Goal: Skin Health and Integrity  Outcome: Progressing     Problem: Delirium  Goal: Optimal Coping  Outcome: Progressing  Goal: Improved Behavioral Control  Outcome: Progressing  Goal: Improved Attention and Thought Clarity  Outcome: Progressing  Goal: Improved Sleep  Outcome: Progressing     Problem: Fall Injury Risk  Goal: Absence of Fall and Fall-Related Injury  Outcome: Progressing     Problem: Restraint, Nonviolent  Goal: Absence of Harm or Injury  Outcome: Progressing     Problem: Wound  Goal: Optimal Coping  Outcome: Progressing  Goal: Optimal Functional Ability  Outcome: Progressing  Goal: Absence of Infection Signs and Symptoms  Outcome: Progressing  Goal: Improved Oral Intake  Outcome: Progressing  Goal: Optimal Pain Control and Function  Outcome: Progressing  Goal: Skin Health and Integrity  Outcome: Progressing  Goal: Optimal Wound Healing  Outcome: Progressing           POC updated and reviewed with the patient at bedside. Questions regarding POC were encouraged and addressed. VSS, see flowsheets. Tele maintained per provider's order. Patient is AO x 4 at this time. NAEON. Pain/Nausea management using PRN medications. See MAR for medication administration details. Seizure, fall, and safety precautions maintained. No signs of injury noted over night. Upon exiting room, patient's bed locked in low position, side rails up x 3, bed alarm on, with call light within reach. Instructed patient to call staff for mobility, verbalized  understanding.  No acute signs of distress noted at this time.

## 2025-07-16 NOTE — ASSESSMENT & PLAN NOTE
-S/P R SOC for tumor resection on 7/7.  -Post op MRI stable.  -Restraints in place BUE.  -Mental status improving.  -No AED per NSGY. Will need oncology and rad oncology FU out pt.   PT/OT/SLP  -NGT.  -MBSS failed.  -NPO pr SLP.

## 2025-07-16 NOTE — PT/OT/SLP PROGRESS
Speech Language Pathology Treatment    Patient Name:  Goran Carlos   MRN:  [id number]  Admitting Diagnosis: Ataxia    Recommendations:                 General Recommendations:  Dysphagia therapy, Speech/language therapy, and Cognitive-linguistic therapy  Diet recommendations:  NPO, Liquid Diet Level: NPO   Aspiration Precautions: Strict aspiration precautions   General Precautions: Standard, aspiration, fall  Communication strategies:  none  Discharge recommendations:  High Intensity Therapy   Barriers to Discharge:  Decreased Care Giver Support    Assessment:     Goran Carlos is a 69 y.o. male with an SLP diagnosis of Dysphagia, Dysarthria, and Cognitive-Linguistic Impairment.   Subjective     No family present  Patient goals: did not state     Pain/Comfort:  Pain Rating 1: 0/10  Pain Rating Post-Intervention 1: 0/10    Respiratory Status: Room air    Objective:     Has the patient been evaluated by SLP for swallowing?   Yes  Keep patient NPO? Yes   Current Respiratory Status:        Pt. Seen at bedside and was lethargic rousing briefly but did not maintain alertness without frequent cues.  No po trials attempted on this date due to lethargy and wet vocal quality.  Speech was poorly intelligible in known context with careful listening.  He was not oriented to time or place with poor recall of personal biographical information.  No initiation of communication noted and responses were delayed/absent with repetition needed to elicit responses.       Goals:   Multidisciplinary Problems       SLP Goals          Problem: SLP    Goal Priority Disciplines Outcome   SLP Goal     SLP Progressing   Description: Goals due 7/16  1.  Pt. Will participate in ongoing assessment of swallow to initiate least restrictive diet.                       Plan:     Patient to be seen:  4 x/week   Plan of Care expires:  08/06/25  Plan of Care reviewed with:  patient   SLP Follow-Up:  Yes       Time Tracking:     SLP Treatment Date:    07/16/25  Speech Start Time:  0750  Speech Stop Time:  0800     Speech Total Time (min):  10 min    Billable Minutes: Speech Therapy Individual 10    07/16/2025

## 2025-07-16 NOTE — PROGRESS NOTES
[facility] - Neurosurgery ([facility])  Neurosurgery  Progress Note    Subjective:     History of Present Illness: Patient is 69yo M with generalized weakness, falls, and paucity of speech. 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 On exam, patient's voice is barely audible but patient appropriately answers questions and follows commands. Patient's brother states that patient had normal speech and gait when they last saw each other about 1 month ago.    Neurosurgery consulted due to multiple ring-enhancing lesions seen on CT brain.    Post-Op Info:  Procedure(s) (LRB):  CRANIOTOMY, SUBOCCIPITAL (Right)   9 Days Post-Op   Interval History: NAEON. Stepped down to . Ongoing PT/OT/SLP, MBSS yesterday. Incision flat, c/d/I.     Medications:  Continuous Infusions:  Scheduled Meds:   amLODIPine  10 mg Per NG tube Daily    carvediloL  6.25 mg Per NG tube BID    cloNIDine  0.3 mg Per NG tube Q8H    dexAMETHasone  2 mg Per NG tube Q12H    enoxparin  40 mg Subcutaneous Q24H (prophylaxis, 1700)    famotidine  20 mg Per NG tube BID    folic acid  1 mg Per NG tube Daily    losartan  100 mg Per NG tube Daily    multivitamin  1 tablet Per NG tube Daily    OLANZapine  20 mg Per NG tube BID    PHENobarbitaL  15 mg Per NG tube Daily    sulfamethoxazole-trimethoprim 800-160mg  1 tablet Per NG tube Every Mon, Wed, Fri    thiamine  100 mg Per NG tube Daily     PRN Meds:  Current Facility-Administered Medications:     acetaminophen, 650 mg, Per NG tube, Q4H PRN    bisacodyL, 10 mg, Rectal, Daily PRN    hydrALAZINE, 10 mg, Intravenous, Q4H PRN    labetalol, 10 mg, Intravenous, Q4H PRN    magnesium oxide, 800 mg, Per NG tube, PRN    magnesium oxide, 800 mg, Per NG tube, PRN    ondansetron, 4 mg, Intravenous, Q4H PRN    potassium bicarbonate, 35 mEq, Per NG tube, PRN    potassium bicarbonate, 50 mEq, Per NG tube, PRN    potassium bicarbonate, 60 mEq, Per NG tube, PRN    potassium, sodium phosphates, 2 packet, Per  NG tube, PRN    potassium, sodium phosphates, 2 packet, Per NG tube, PRN    potassium, sodium phosphates, 2 packet, Per NG tube, PRN    sodium chloride 0.9%, 10 mL, Intravenous, PRN     Review of Systems  Objective:     Weight: 81.1 kg (178 lb 12.7 oz)  Body mass index is 23.59 kg/m².  Vital Signs (Most Recent):  Temp: 97.6 °F (36.4 °C) (07/16/25 0457)  Pulse: 84 (07/16/25 0457)  Resp: 20 (07/16/25 0457)  BP: 130/82 (07/16/25 0457)  SpO2: 95 % (07/16/25 0457) Vital Signs (24h Range):  Temp:  [97.6 °F (36.4 °C)-98.7 °F (37.1 °C)] 97.6 °F (36.4 °C)  Pulse:  [72-87] 84  Resp:  [11-22] 20  SpO2:  [95 %-100 %] 95 %  BP: (130-166)/(80-99) 130/82                              NG/OG Tube 07/15/25 0400 Other (comments) Left nostril;Right nostril (Active)   Placement Check placement verified by x-ray 07/15/25 1905   Tolerance no signs/symptoms of discomfort 07/15/25 2020   Securement secured to nostril center w/ adhesive device 07/15/25 2020   Clamp Status/Tolerance unclamped 07/15/25 2020   Suction Setting/Drainage Method suction at the bedside 07/15/25 2020   Insertion Site Appearance no redness, warmth, tenderness, skin breakdown, drainage 07/15/25 2020   Drainage None 07/15/25 2020   Flush/Irrigation flushed w/;water 07/15/25 2020   Feeding Type continuous;by pump 07/15/25 2020   Feeding Action feeding continued 07/15/25 2020   Current Rate (mL/hr) 55 mL/hr 07/15/25 2020   Goal Rate (mL/hr) 55 mL/hr 07/15/25 2020   Intake (mL) 100 mL 07/15/25 0601   Water Bolus (mL) 250 mL 07/14/25 0405   Rate Formula Tube Feeding (mL/hr) 55 mL/hr 07/15/25 1905   Formula Name Impact Peptide 07/15/25 1905   Intake (mL) - Formula Tube Feeding 55 07/15/25 1905   Residual Amount (ml) 0 ml 07/13/25 1905       Male External Urinary Catheter 07/08/25 0705 Medium (Active)   Collection Container Urimeter 07/15/25 2020   Securement Method secured to top of thigh w/ leg strap 07/15/25 2020   Skin no redness;no breakdown 07/15/25 2020   Tolerance no  "signs/symptoms of discomfort 07/15/25 2020   Output (mL) 275 mL 07/15/25 1601   Catheter Change Date 07/15/25 07/15/25 2020   Catheter Change Time 2020 07/15/25 2020          Physical Exam         Neurosurgery Physical Exam  E4V4M6  AOX1  PERRL  HARRISON spon AG  Following commands x4, BUE mittens  Nonfocal motor exam     EVD site c/d/i  Incision flat, c/d/I without fluctuance    Significant Labs:  Recent Labs   Lab 07/15/25  0151 07/16/25  0440   GLU 90 111*    139   K 4.6 4.0    108   CO2 20* 24   BUN 25* 28*   CREATININE 0.9 0.8   CALCIUM 8.6* 8.6*   MG 1.9 1.9     Recent Labs   Lab 07/15/25  0151 07/16/25  0440   WBC 6.59 5.92   HGB 11.2* 11.0*   HCT 33.8* 32.9*    221     No results for input(s): "LABPT", "INR", "APTT" in the last 48 hours.  Microbiology Results (last 7 days)       Procedure Component Value Units Date/Time    CSF culture [[id number]] Collected: 07/14/25 0947    Order Status: Completed Specimen: CSF (Spinal Fluid) from CSF Tap, Tube 3 Updated: 07/15/25 1756     GRAM STAIN Cytospin indicates:      No WBCs      No organisms seen    CSF culture [[id number]] Collected: 07/10/25 0856    Order Status: Completed Specimen: CSF (Spinal Fluid) from CSF Tap, Tube 3 Updated: 07/15/25 0731     CULTURE, CSF No Growth     GRAM STAIN Cytospin indicates:      No WBCs      No organisms seen    Gram stain [[id number]] Collected: 07/14/25 0947    Order Status: Completed Specimen: CSF (Spinal Fluid) from CSF Tap, Tube 3 Updated: 07/14/25 1522     GRAM STAIN No WBCs      No organisms seen    CSF culture [[id number]] Collected: 07/07/25 0715    Order Status: Completed Specimen: CSF (Spinal Fluid) from CSF Tap, Tube 3 Updated: 07/12/25 0713     CULTURE, CSF No Growth     GRAM STAIN Cytospin indicates:      No WBCs      No organisms seen    Gram stain [[id number]] Collected: 07/10/25 0856    Order Status: Canceled Specimen: CSF (Spinal Fluid) from CSF Tap, Tube 3 Updated: 07/10/25 1400    AFB Culture & " Smear [[id number]]  (Normal) Collected: 07/02/25 4705    Order Status: Completed Specimen: CSF (Spinal Fluid) from CSF Tap, Tube 3 Updated: 07/10/25 0205     CULTURE, AFB  Culture Negative For Mycobacteria At 1 Week          All pertinent labs from the last 24 hours have been reviewed.    Significant Diagnostics:  I have reviewed all pertinent imaging results/findings within the past 24 hours.  Assessment/Plan:     * Ataxia  Assessment  69yo M with generalized weakness, falls, and paucity of speech presenting with multiple ring enhancing lesions on brain CT now s/p R SOC for tumor resection on 7/7:    Imaging:  - CTH 7/1 showed multiple brain masses concerning for mets with surrounding edema, can't rule out abscesses. Mass in cerebellum with effacement of 4th ventricule outflow with concern for developing hydrocephalus  - MRI brain 7/1: non diagnostic due to motion  - CT CAP 7/1: spiculated R lung mass and lymph node adenopathy in chest and neck concerning for metastatic disease  - MRI Brain W/WO 7/2: multifocal enhancing lesions c/f metastatic disease, largest R cerebellum w/mass effect on 4th.  - Post op CTH/MRI 7/7: anticipate post op changes, hemostatic products left at superior/medial border of resection cavity  - CTH 7/11: no pseudomeningocele collection, persistent edema around L frontal met  - CTH 7/14: grossly stable ventricular caliber, no pseudomeningocele   - Cth 7/15: grossly stable s/p evd removal, no tract hemorrhage    Plan:  - admitted to   - EVD removed 7/14, site c/d/I, posterior incision w/o pseudomeningocele  - Dex weaned to 2 BID, continue GI ppx while on steroid. Further wean per Oncology  - No current concern for seizure activity, off AED.   - Onc following; will need outpatient rad onc consult. Appointment arranged for 7/23. Will try to arrange NSGY follow up for same day. Frozen in OR c/w metastatic adenocarcinoma  - PT/OT/SLP ongoing. MBSS yesterday.  - EM/SCD, ok for SQH given clinical  stability, CBC WNL  - medical management per     Dispo: ongoing, pending therapies    NSGY will follow peripherally. All incisions closed with dissolvable suture. Posterior incision flat w/o fluctuance/drainage, c/d/i    Discussed with Dr. Larios and Dr. Mata by NSGY team        Shruthi Rodriguez MD  Neurosurgery  [facility] - Neurosurgery ([facility])

## 2025-07-16 NOTE — SUBJECTIVE & OBJECTIVE
Interval History: NAEON. Stepped down to . Ongoing PT/OT/SLP, MBSS yesterday. Incision flat, c/d/I.     Medications:  Continuous Infusions:  Scheduled Meds:   amLODIPine  10 mg Per NG tube Daily    carvediloL  6.25 mg Per NG tube BID    cloNIDine  0.3 mg Per NG tube Q8H    dexAMETHasone  2 mg Per NG tube Q12H    enoxparin  40 mg Subcutaneous Q24H (prophylaxis, 1700)    famotidine  20 mg Per NG tube BID    folic acid  1 mg Per NG tube Daily    losartan  100 mg Per NG tube Daily    multivitamin  1 tablet Per NG tube Daily    OLANZapine  20 mg Per NG tube BID    PHENobarbitaL  15 mg Per NG tube Daily    sulfamethoxazole-trimethoprim 800-160mg  1 tablet Per NG tube Every Mon, Wed, Fri    thiamine  100 mg Per NG tube Daily     PRN Meds:  Current Facility-Administered Medications:     acetaminophen, 650 mg, Per NG tube, Q4H PRN    bisacodyL, 10 mg, Rectal, Daily PRN    hydrALAZINE, 10 mg, Intravenous, Q4H PRN    labetalol, 10 mg, Intravenous, Q4H PRN    magnesium oxide, 800 mg, Per NG tube, PRN    magnesium oxide, 800 mg, Per NG tube, PRN    ondansetron, 4 mg, Intravenous, Q4H PRN    potassium bicarbonate, 35 mEq, Per NG tube, PRN    potassium bicarbonate, 50 mEq, Per NG tube, PRN    potassium bicarbonate, 60 mEq, Per NG tube, PRN    potassium, sodium phosphates, 2 packet, Per NG tube, PRN    potassium, sodium phosphates, 2 packet, Per NG tube, PRN    potassium, sodium phosphates, 2 packet, Per NG tube, PRN    sodium chloride 0.9%, 10 mL, Intravenous, PRN     Review of Systems  Objective:     Weight: 81.1 kg (178 lb 12.7 oz)  Body mass index is 23.59 kg/m².  Vital Signs (Most Recent):  Temp: 97.6 °F (36.4 °C) (07/16/25 0457)  Pulse: 84 (07/16/25 0457)  Resp: 20 (07/16/25 0457)  BP: 130/82 (07/16/25 0457)  SpO2: 95 % (07/16/25 0457) Vital Signs (24h Range):  Temp:  [97.6 °F (36.4 °C)-98.7 °F (37.1 °C)] 97.6 °F (36.4 °C)  Pulse:  [72-87] 84  Resp:  [11-22] 20  SpO2:  [95 %-100 %] 95 %  BP: (130-166)/(80-99) 130/82                               NG/OG Tube 07/15/25 0400 Other (comments) Left nostril;Right nostril (Active)   Placement Check placement verified by x-ray 07/15/25 1905   Tolerance no signs/symptoms of discomfort 07/15/25 2020   Securement secured to nostril center w/ adhesive device 07/15/25 2020   Clamp Status/Tolerance unclamped 07/15/25 2020   Suction Setting/Drainage Method suction at the bedside 07/15/25 2020   Insertion Site Appearance no redness, warmth, tenderness, skin breakdown, drainage 07/15/25 2020   Drainage None 07/15/25 2020   Flush/Irrigation flushed w/;water 07/15/25 2020   Feeding Type continuous;by pump 07/15/25 2020   Feeding Action feeding continued 07/15/25 2020   Current Rate (mL/hr) 55 mL/hr 07/15/25 2020   Goal Rate (mL/hr) 55 mL/hr 07/15/25 2020   Intake (mL) 100 mL 07/15/25 0601   Water Bolus (mL) 250 mL 07/14/25 0405   Rate Formula Tube Feeding (mL/hr) 55 mL/hr 07/15/25 1905   Formula Name Impact Peptide 07/15/25 1905   Intake (mL) - Formula Tube Feeding 55 07/15/25 1905   Residual Amount (ml) 0 ml 07/13/25 1905       Male External Urinary Catheter 07/08/25 0705 Medium (Active)   Collection Container Urimeter 07/15/25 2020   Securement Method secured to top of thigh w/ leg strap 07/15/25 2020   Skin no redness;no breakdown 07/15/25 2020   Tolerance no signs/symptoms of discomfort 07/15/25 2020   Output (mL) 275 mL 07/15/25 1601   Catheter Change Date 07/15/25 07/15/25 2020   Catheter Change Time 2020 07/15/25 2020          Physical Exam         Neurosurgery Physical Exam  E4V4M6  AOX1  PERRL  HARRISON spon AG  Following commands x4, BUE mittens  Nonfocal motor exam     EVD site c/d/i  Incision flat, c/d/I without fluctuance    Significant Labs:  Recent Labs   Lab 07/15/25  0151 07/16/25  0440   GLU 90 111*    139   K 4.6 4.0    108   CO2 20* 24   BUN 25* 28*   CREATININE 0.9 0.8   CALCIUM 8.6* 8.6*   MG 1.9 1.9     Recent Labs   Lab 07/15/25  0151 07/16/25  0440   WBC 6.59 5.92  "  HGB 11.2* 11.0*   HCT 33.8* 32.9*    221     No results for input(s): "LABPT", "INR", "APTT" in the last 48 hours.  Microbiology Results (last 7 days)       Procedure Component Value Units Date/Time    CSF culture [[id number]] Collected: 07/14/25 0947    Order Status: Completed Specimen: CSF (Spinal Fluid) from CSF Tap, Tube 3 Updated: 07/15/25 1756     GRAM STAIN Cytospin indicates:      No WBCs      No organisms seen    CSF culture [[id number]] Collected: 07/10/25 0856    Order Status: Completed Specimen: CSF (Spinal Fluid) from CSF Tap, Tube 3 Updated: 07/15/25 0731     CULTURE, CSF No Growth     GRAM STAIN Cytospin indicates:      No WBCs      No organisms seen    Gram stain [[id number]] Collected: 07/14/25 0947    Order Status: Completed Specimen: CSF (Spinal Fluid) from CSF Tap, Tube 3 Updated: 07/14/25 1522     GRAM STAIN No WBCs      No organisms seen    CSF culture [[id number]] Collected: 07/07/25 0715    Order Status: Completed Specimen: CSF (Spinal Fluid) from CSF Tap, Tube 3 Updated: 07/12/25 0713     CULTURE, CSF No Growth     GRAM STAIN Cytospin indicates:      No WBCs      No organisms seen    Gram stain [[id number]] Collected: 07/10/25 0856    Order Status: Canceled Specimen: CSF (Spinal Fluid) from CSF Tap, Tube 3 Updated: 07/10/25 1400    AFB Culture & Smear [[id number]]  (Normal) Collected: 07/02/25 1808    Order Status: Completed Specimen: CSF (Spinal Fluid) from CSF Tap, Tube 3 Updated: 07/10/25 0205     CULTURE, AFB  Culture Negative For Mycobacteria At 1 Week          All pertinent labs from the last 24 hours have been reviewed.    Significant Diagnostics:  I have reviewed all pertinent imaging results/findings within the past 24 hours.  "

## 2025-07-16 NOTE — SUBJECTIVE & OBJECTIVE
Past Medical History:   Diagnosis Date    ETOH abuse     Gait disturbance     Heroin abuse      Past Surgical History:   Procedure Laterality Date    SUBOCCIPITAL CRANIOTOMY Right 7/7/2025    Procedure: CRANIOTOMY, SUBOCCIPITAL;  Surgeon: Blane Larios DO;  Location: [facility] OR [address] [place] [address];  Service: Neurosurgery;  Laterality: Right;     Review of patient's allergies indicates:  No Known Allergies    Scheduled Medications:    amLODIPine  10 mg Per NG tube Daily    carvediloL  6.25 mg Per NG tube BID    cloNIDine  0.3 mg Per NG tube Q8H    dexAMETHasone  2 mg Per NG tube Q12H    enoxparin  40 mg Subcutaneous Q24H (prophylaxis, 1700)    famotidine  20 mg Per NG tube BID    folic acid  1 mg Per NG tube Daily    losartan  100 mg Per NG tube Daily    multivitamin  1 tablet Per NG tube Daily    OLANZapine  20 mg Per NG tube BID    sulfamethoxazole-trimethoprim 800-160mg  1 tablet Per NG tube Every Mon, Wed, Fri    thiamine  100 mg Per NG tube Daily       PRN Medications:   Current Facility-Administered Medications:     acetaminophen, 650 mg, Per NG tube, Q4H PRN    bisacodyL, 10 mg, Rectal, Daily PRN    hydrALAZINE, 10 mg, Intravenous, Q4H PRN    labetalol, 10 mg, Intravenous, Q4H PRN    magnesium oxide, 800 mg, Per NG tube, PRN    magnesium oxide, 800 mg, Per NG tube, PRN    ondansetron, 4 mg, Intravenous, Q4H PRN    potassium bicarbonate, 35 mEq, Per NG tube, PRN    potassium bicarbonate, 50 mEq, Per NG tube, PRN    potassium bicarbonate, 60 mEq, Per NG tube, PRN    potassium, sodium phosphates, 2 packet, Per NG tube, PRN    potassium, sodium phosphates, 2 packet, Per NG tube, PRN    potassium, sodium phosphates, 2 packet, Per NG tube, PRN    sodium chloride 0.9%, 10 mL, Intravenous, PRN    Family History    None       Tobacco Use    Smoking status: Former     Types: Cigarettes    Smokeless tobacco: Not on file   Substance and Sexual Activity    Alcohol use: Not Currently    Drug use: Not Currently     Types: Heroin     Sexual activity: Not on file     Review of Systems   Constitutional:  Positive for activity change.   Musculoskeletal:  Positive for gait problem.   Skin:  Positive for wound.   Neurological:  Positive for weakness.     Objective:     Vital Signs (Most Recent):  Temp: 97.4 °F (36.3 °C) (07/16/25 1136)  Pulse: 88 (07/16/25 1443)  Resp: 18 (07/16/25 1136)  BP: (!) 133/90 (07/16/25 1136)  SpO2: 97 % (07/16/25 1136)    Vital Signs (24h Range):  Temp:  [96.5 °F (35.8 °C)-97.8 °F (36.6 °C)] 97.4 °F (36.3 °C)  Pulse:  [79-90] 88  Resp:  [17-22] 18  SpO2:  [95 %-100 %] 97 %  BP: (130-154)/(80-92) 133/90     Body mass index is 23.59 kg/m².     Physical Exam  Vitals and nursing note reviewed.   Constitutional:       General: He is awake.   Pulmonary:      Effort: Pulmonary effort is normal. No respiratory distress.   Abdominal:      General: There is no distension.      Palpations: Abdomen is soft.   Musculoskeletal:      Comments: Moves all  extremities.    Neurological:      General: No focal deficit present.      Mental Status: He is alert.      GCS: GCS eye subscore is 4. GCS verbal subscore is 5. GCS motor subscore is 6.      Motor: Weakness present.      Coordination: Impaired rapid alternating movements.      Gait: Gait abnormal.   Psychiatric:         Mood and Affect: Mood normal.         Behavior: Behavior normal. Behavior is cooperative.               Diagnostic Results: Labs: Reviewed

## 2025-07-16 NOTE — HOSPITAL COURSE
Per chart review,    PT- 07/15    Functional Mobility:  Bed Mobility:     Rolling Right: minimum assistance  Scooting: moderate assistance  Supine to Sit: minimum assistance  Sit to Supine: moderate assistance     Transfers:     Sit to Stand:    Trial 1-2: minimum assistance and of 2 persons with hand-held assist  Trial 3: Moderate assistance x1 person with HHA     Gait: patient ambulated 2x 3' sidestepping with moderate assistance of 2 and HHA

## 2025-07-16 NOTE — PLAN OF CARE
Patient transferred to NPU for continued medical management.     CM/SW has been discussing discharge plan    Discharge Plan A: Rehab  Discharge Plan B: Home with family    with patient and Extended Emergency Contact Information  Primary Emergency Contact: Pablo Beaulieu  Mobile Phone: [phone]  Relation: Other  Preferred language: English   needed? No  Secondary Emergency Contact: Pepper Ramirez  Mobile Phone: [phone]  Relation: Other  Preferred language: English   needed? No.     Patient/Family preference is Rehab     Additional Info:     Pt would like to use [facility] Benefits.       Discharge Plan A and Plan B have been determined by review of patient's clinical status, future medical and therapeutic needs, and coverage/benefits for post-acute care in coordination with multidisciplinary team members.    Ele Richmond MSW, LCSW  [facility]  Case Management Dept.

## 2025-07-16 NOTE — ASSESSMENT & PLAN NOTE
Assessment  71yo M with generalized weakness, falls, and paucity of speech presenting with multiple ring enhancing lesions on brain CT now s/p R SOC for tumor resection on 7/7:    Imaging:  - CTH 7/1 showed multiple brain masses concerning for mets with surrounding edema, can't rule out abscesses. Mass in cerebellum with effacement of 4th ventricule outflow with concern for developing hydrocephalus  - MRI brain 7/1: non diagnostic due to motion  - CT CAP 7/1: spiculated R lung mass and lymph node adenopathy in chest and neck concerning for metastatic disease  - MRI Brain W/WO 7/2: multifocal enhancing lesions c/f metastatic disease, largest R cerebellum w/mass effect on 4th.  - Post op CTH/MRI 7/7: anticipate post op changes, hemostatic products left at superior/medial border of resection cavity  - CTH 7/11: no pseudomeningocele collection, persistent edema around L frontal met  - CTH 7/14: grossly stable ventricular caliber, no pseudomeningocele   - Cth 7/15: grossly stable s/p evd removal, no tract hemorrhage    Plan:  - admitted to   - EVD removed 7/14, site c/d/I, posterior incision w/o pseudomeningocele  - Dex weaned to 2 BID, continue GI ppx while on steroid. Further wean per Oncology  - No current concern for seizure activity, off AED.   - Onc following; will need outpatient rad onc consult. Appointment arranged for 7/23. Will try to arrange NSGY follow up for same day. Frozen in OR c/w metastatic adenocarcinoma  - PT/OT/SLP ongoing. MBSS yesterday.  - EM/SCD, ok for SQH given clinical stability, CBC WNL  - medical management per     Dispo: ongoing, pending therapies    NSGY will follow peripherally. All incisions closed with dissolvable suture. Posterior incision flat w/o fluctuance/drainage, c/d/i    Discussed with Dr. Larios and Dr. Mata by NSGY team

## 2025-07-16 NOTE — HPI
Per chart review, 69 y/o M presenting from [facility] for generalized weakness for about a week now. History obtained from rehab nurse. She reported that he got to their facility about a month ago and, at baseline, walks with a cane and is alert and oriented. His nurse noticed that he has seemed weaker over the past week and over the past 3 days has had increased balance disturbance and gait issues. On admit, CT head without contrast revealed multiple lesions of hypodensity with a hyperdense rim, notably in the left temporal lobe and right cerebellum. There were additional small hyperdensities in the right frontoparietal and left parasagittal parietal lobe. There is mass effect and partial effacement of the 4th ventricle secondary to the cerebellar lesion with developing hydrocephalus without MLS. CT of the chest, abdomen, and pelvis with IV contrast showed RUL mass with mediastinal adenopathy. MRI brain showed multifocal enhancing lesions c/f metastatic disease, largest R cerebellum w/mass effect on 4th. Pt is S/P R SOC for tumor resection on 7/7. Post op CTH/MRI was deemed to be stable as per NSGY.  Pt was placed on Dexamethasone tapering dose.  PM&R was consulted to evaluate pt for post acute placement recommendation.       Functional History: Patient lives alone.  Prior to admission, Pt was I .  DME: None.

## 2025-07-16 NOTE — NURSING TRANSFER
Nursing Transfer Note      7/15/2025   8:16 PM    Nurse giving handoff: Abhijit    Reason patient is being transferred: Stepdown     Transfer To:     Transfer via bed    Transfer with cardiac monitoring    Transported by RN     Transfer Vital Signs:  Blood Pressure:138/80  Heart Rate:87  O2:100  Temperature:97.8    Respirations:18    Telemetry: Rate 87, Rhythm NSR, and Telemetry  Notified  Order for Tele Monitor? Yes    Any special needs or follow-up needed: home w/ rehab    Patient belongings transferred with patient: Yes    Chart send with patient: Yes    Patient reassessed at: 7/15/2025, @ 2010    Upon arrival to floor: cardiac monitor applied, patient oriented to room, call bell in reach, and bed in lowest position

## 2025-07-17 PROBLEM — G93.6 VASOGENIC CEREBRAL EDEMA: Status: ACTIVE | Noted: 2025-07-17

## 2025-07-17 PROBLEM — G91.1 OBSTRUCTIVE HYDROCEPHALUS: Status: ACTIVE | Noted: 2025-07-17

## 2025-07-17 PROBLEM — C79.31 METASTASIS TO BRAIN: Status: ACTIVE | Noted: 2025-07-17

## 2025-07-17 PROBLEM — E87.1 HYPONATREMIA: Status: ACTIVE | Noted: 2025-07-17

## 2025-07-17 PROBLEM — R45.1 AGITATION: Status: ACTIVE | Noted: 2025-07-17

## 2025-07-17 PROBLEM — G93.5 BRAIN COMPRESSION: Status: ACTIVE | Noted: 2025-07-17

## 2025-07-17 PROBLEM — E87.0 HYPERNATREMIA: Status: ACTIVE | Noted: 2025-07-17

## 2025-07-17 PROBLEM — N17.9 AKI (ACUTE KIDNEY INJURY): Status: ACTIVE | Noted: 2025-07-17

## 2025-07-17 PROBLEM — G96.198 PSEUDOMENINGOCELE: Status: ACTIVE | Noted: 2025-07-17

## 2025-07-17 LAB
ABSOLUTE EOSINOPHIL (OHS): 0.05 K/UL
ABSOLUTE MONOCYTE (OHS): 0.54 K/UL (ref 0.3–1)
ABSOLUTE NEUTROPHIL COUNT (OHS): 3.39 K/UL (ref 1.8–7.7)
ALBUMIN SERPL BCP-MCNC: 3.1 G/DL (ref 3.5–5.2)
ALP SERPL-CCNC: 78 UNIT/L (ref 40–150)
ALT SERPL W/O P-5'-P-CCNC: 28 UNIT/L (ref 10–44)
ANION GAP (OHS): 9 MMOL/L (ref 8–16)
AST SERPL-CCNC: 37 UNIT/L (ref 11–45)
BASOPHILS # BLD AUTO: 0.06 K/UL
BASOPHILS NFR BLD AUTO: 1.1 %
BILIRUB SERPL-MCNC: 0.3 MG/DL (ref 0.1–1)
BUN SERPL-MCNC: 28 MG/DL (ref 8–23)
CALCIUM SERPL-MCNC: 8.6 MG/DL (ref 8.7–10.5)
CHLORIDE SERPL-SCNC: 108 MMOL/L (ref 95–110)
CO2 SERPL-SCNC: 22 MMOL/L (ref 23–29)
CREAT SERPL-MCNC: 0.9 MG/DL (ref 0.5–1.4)
ERYTHROCYTE [DISTWIDTH] IN BLOOD BY AUTOMATED COUNT: 13.2 % (ref 11.5–14.5)
GFR SERPLBLD CREATININE-BSD FMLA CKD-EPI: >60 ML/MIN/1.73/M2
GLUCOSE SERPL-MCNC: 92 MG/DL (ref 70–110)
HCT VFR BLD AUTO: 34 % (ref 40–54)
HGB BLD-MCNC: 11.2 GM/DL (ref 14–18)
IMM GRANULOCYTES # BLD AUTO: 0.02 K/UL (ref 0–0.04)
IMM GRANULOCYTES NFR BLD AUTO: 0.4 % (ref 0–0.5)
LYMPHOCYTES # BLD AUTO: 1.16 K/UL (ref 1–4.8)
MAGNESIUM SERPL-MCNC: 1.8 MG/DL (ref 1.6–2.6)
MCH RBC QN AUTO: 30.7 PG (ref 27–31)
MCHC RBC AUTO-ENTMCNC: 32.9 G/DL (ref 32–36)
MCV RBC AUTO: 93 FL (ref 82–98)
NUCLEATED RBC (/100WBC) (OHS): 0 /100 WBC
PHOSPHATE SERPL-MCNC: 3.1 MG/DL (ref 2.7–4.5)
PLATELET # BLD AUTO: 232 K/UL (ref 150–450)
PMV BLD AUTO: 10.4 FL (ref 9.2–12.9)
POTASSIUM SERPL-SCNC: 4.3 MMOL/L (ref 3.5–5.1)
PROT SERPL-MCNC: 6.5 GM/DL (ref 6–8.4)
RBC # BLD AUTO: 3.65 M/UL (ref 4.6–6.2)
RELATIVE EOSINOPHIL (OHS): 1 %
RELATIVE LYMPHOCYTE (OHS): 22.2 % (ref 18–48)
RELATIVE MONOCYTE (OHS): 10.3 % (ref 4–15)
RELATIVE NEUTROPHIL (OHS): 65 % (ref 38–73)
SODIUM SERPL-SCNC: 139 MMOL/L (ref 136–145)
WBC # BLD AUTO: 5.22 K/UL (ref 3.9–12.7)

## 2025-07-17 PROCEDURE — 63600175 PHARM REV CODE 636 W HCPCS: Performed by: STUDENT IN AN ORGANIZED HEALTH CARE EDUCATION/TRAINING PROGRAM

## 2025-07-17 PROCEDURE — 94761 N-INVAS EAR/PLS OXIMETRY MLT: CPT

## 2025-07-17 PROCEDURE — 25000003 PHARM REV CODE 250: Performed by: PHYSICIAN ASSISTANT

## 2025-07-17 PROCEDURE — 36415 COLL VENOUS BLD VENIPUNCTURE: CPT | Performed by: PHYSICIAN ASSISTANT

## 2025-07-17 PROCEDURE — 83735 ASSAY OF MAGNESIUM: CPT | Performed by: PHYSICIAN ASSISTANT

## 2025-07-17 PROCEDURE — 63600175 PHARM REV CODE 636 W HCPCS: Performed by: PHYSICIAN ASSISTANT

## 2025-07-17 PROCEDURE — 80053 COMPREHEN METABOLIC PANEL: CPT | Performed by: PHYSICIAN ASSISTANT

## 2025-07-17 PROCEDURE — 84100 ASSAY OF PHOSPHORUS: CPT | Performed by: PHYSICIAN ASSISTANT

## 2025-07-17 PROCEDURE — 92526 ORAL FUNCTION THERAPY: CPT

## 2025-07-17 PROCEDURE — 85025 COMPLETE CBC W/AUTO DIFF WBC: CPT | Performed by: PHYSICIAN ASSISTANT

## 2025-07-17 PROCEDURE — 11000001 HC ACUTE MED/SURG PRIVATE ROOM

## 2025-07-17 PROCEDURE — 97535 SELF CARE MNGMENT TRAINING: CPT

## 2025-07-17 RX ORDER — HALOPERIDOL LACTATE 5 MG/ML
5 INJECTION, SOLUTION INTRAMUSCULAR ONCE
Status: COMPLETED | OUTPATIENT
Start: 2025-07-17 | End: 2025-07-17

## 2025-07-17 RX ORDER — OLANZAPINE 10 MG/2ML
5 INJECTION, POWDER, FOR SOLUTION INTRAMUSCULAR ONCE AS NEEDED
Status: COMPLETED | OUTPATIENT
Start: 2025-07-17 | End: 2025-07-17

## 2025-07-17 RX ADMIN — CARVEDILOL 6.25 MG: 6.25 TABLET, FILM COATED ORAL at 09:07

## 2025-07-17 RX ADMIN — FAMOTIDINE 20 MG: 20 TABLET, FILM COATED ORAL at 09:07

## 2025-07-17 RX ADMIN — LOSARTAN POTASSIUM 100 MG: 50 TABLET, FILM COATED ORAL at 09:07

## 2025-07-17 RX ADMIN — OLANZAPINE 20 MG: 5 TABLET, FILM COATED ORAL at 09:07

## 2025-07-17 RX ADMIN — OLANZAPINE 5 MG: 10 INJECTION, POWDER, FOR SOLUTION INTRAMUSCULAR at 03:07

## 2025-07-17 RX ADMIN — DEXAMETHASONE 2 MG: 1 TABLET ORAL at 09:07

## 2025-07-17 RX ADMIN — DEXAMETHASONE 2 MG: 1 TABLET ORAL at 12:07

## 2025-07-17 RX ADMIN — ENOXAPARIN SODIUM 40 MG: 40 INJECTION SUBCUTANEOUS at 05:07

## 2025-07-17 RX ADMIN — ACETAMINOPHEN 650 MG: 325 TABLET ORAL at 12:07

## 2025-07-17 RX ADMIN — OLANZAPINE 5 MG: 10 INJECTION, POWDER, FOR SOLUTION INTRAMUSCULAR at 02:07

## 2025-07-17 RX ADMIN — THERA TABS 1 TABLET: TAB at 09:07

## 2025-07-17 RX ADMIN — CLONIDINE HYDROCHLORIDE 0.3 MG: 0.2 TABLET ORAL at 05:07

## 2025-07-17 RX ADMIN — FOLIC ACID 1 MG: 1 TABLET ORAL at 09:07

## 2025-07-17 RX ADMIN — CLONIDINE HYDROCHLORIDE 0.3 MG: 0.2 TABLET ORAL at 09:07

## 2025-07-17 RX ADMIN — HALOPERIDOL LACTATE 5 MG: 5 INJECTION, SOLUTION INTRAMUSCULAR at 11:07

## 2025-07-17 RX ADMIN — Medication 100 MG: at 09:07

## 2025-07-17 RX ADMIN — CLONIDINE HYDROCHLORIDE 0.3 MG: 0.2 TABLET ORAL at 03:07

## 2025-07-17 RX ADMIN — AMLODIPINE BESYLATE 10 MG: 10 TABLET ORAL at 09:07

## 2025-07-17 NOTE — ASSESSMENT & PLAN NOTE
CT head without contrast revealed multiple lesions of hypodensity with a hyperdense rim, notably in the left temporal lobe and right cerebellum.  additional small hyperdensities in the right frontoparietal and left parasagittal parietal lobe.  mass effect and partial effacement of the 4th ventricle secondary to the cerebellar lesion with developing hydrocephalus without MLS. CT of the chest, abdomen, and pelvis with IV contrast showed RUL mass with mediastinal adenopathy. Hep C and treponema antibodies were positive. Hep C PCR pending. Penicillin G was administered. A sepsis workup was completed and antibiotics were initiated. Intubated to get MRI. S/p EVD. Extubated 7/4/25 without difficulty.     s/p SOC craniotomy for tumor resection on 7/7  Dex 2 mg BID, no plans to taper further pending outpt f/u  Bactrim MWF for PJP ppx given plan for prolonged steroid taper  EVD removed 7/14  Zyprexa BID for agitation  Phenobarb taper to off given improved agitation   Clonidine 0.3 mg TID for agitation/opioid dependence/hypertension  Avoid versed for agitation  Tube feeds at goal, SLP following for swallow  Folate/thiamine/MV  SBP <160     Needs OP follow up with NSGY (continue decadron till then) and Radiation oncology team. Pathology is lung carcinoma but Tempus and PD-L1 testing are pending. on NGT feeds impact peptide. SLP following. on 4 point restraints.    CTHead 7/14 -Ventricles remain somewhat prominent but without overt hydrocephalus or significant change in size following EVD removal.Evolving recent postsurgical change in the posterior fossa. No new intracranial hemorrhage or major vascular distribution infarct. AAO to self. on  UE restraints.    SLP

## 2025-07-17 NOTE — PLAN OF CARE
Problem: Adult Inpatient Plan of Care  Goal: Patient-Specific Goal (Individualized)  Description: Pt will maintain sbp below 160  Outcome: Progressing  Flowsheets (Taken 7/17/2025 0401)  Individualized Care Needs: warm blankets  Anxieties, Fears or Concerns: none     Problem: Restraint, Nonviolent  Goal: Absence of Harm or Injury  Outcome: Progressing  Intervention: Implement Least Restrictive Safety Strategies  Flowsheets (Taken 7/17/2025 0401)  Less Restrictive Alternative: 1:1 observation maintained  Intervention: Protect Dignity, Rights and Personal Wellbeing  Flowsheets (Taken 7/17/2025 0401)  Trust Relationship/Rapport:   care explained   choices provided  Intervention: Protect Skin and Joint Integrity  Flowsheets (Taken 7/17/2025 0401)  Body Position: position changed independently  Skin Protection: incontinence pads utilized  Range of Motion: active ROM (range of motion) encouraged  Intervention: Education  Flowsheets (Taken 7/17/2025 0401)  Discontinuation Criteria: Absence of behavior that required restraint  Criteria Explained: Yes  Patient's Response: NL  Patient / Family Notification: Patient  Patient / Family Teaching: Need for restraint  Intervention: Restraint Monitoring Q2 hours w/Assessment for Injury  Flowsheets (Taken 7/17/2025 0401)  Observed Emotional State: agitated  Visual Check: AG  Circulation: NS  Range of Motion: D  Fluids: N  Food/Meal:   N   TPN  Elimination: UC  Pain Rating (0-10): Rest: 0  Comfort Measures: Repositioned  Assessed readiness for DC: Yes  Privacy Maintained: Yes  Vital Signs per MD order: Yes  Intervention: Restraint Injuries Monitor Q2 hours  Flowsheets (Taken 7/17/2025 0401)  Injuries Noted: None     Olanzapine given x 2 for agitation. Seizure precautions maintained. VSS. Bed in lowest position, side rails x 3, and call light in use.

## 2025-07-17 NOTE — NURSING
"@0330 Pt agitated and trying to climb out of bed. Team notified. Olanzapine given x 1. Pt verbally aggressive toward staff. Pt stated that "You are going to fall" to nurse @0405.  "

## 2025-07-17 NOTE — ASSESSMENT & PLAN NOTE
Was brought in from [facility], where he has been for the past month  Educate on cessation when appropriate  On phenobarbital for agitation, no signs of EtOH withdrawal -> phenobarb taper     7/16 to completed phenobarbital taper today

## 2025-07-17 NOTE — ASSESSMENT & PLAN NOTE
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

## 2025-07-17 NOTE — PROGRESS NOTES
Alex Henson - Neurosurgery ([facility])  [facility] Medicine  Progress Note    Patient Name: Goran Carlos  MRN: [id number]  Patient Class: IP- Inpatient   Admission Date: 7/1/2025  Length of Stay: 16 days  Attending Physician: Deng Gutiérrez MD  Primary Care Provider: No primary care provider on file.        Subjective     Principal Problem:Ataxia        HPI:  71 y/o M presenting from [facility] for generalized weakness for about a week now. History obtained from rehab nurse. She reported that he got to their facility about a month ago and, at baseline, walks with a cane and is alert and oriented. His nurse noticed that he has seemed weaker over the past week and over the past 3 days has had increased balance disturbance and gait issues. They also noticed that he has been speaking more softly in his speech is harder to understand. He has not had any infectious symptoms. He fell yesterday, unsure if he hit his head. CT head without contrast revealed multiple lesions of hypodensity with a hyperdense rim, notably in the left temporal lobe and right cerebellum. There were additional small hyperdensities in the right frontoparietal and left parasagittal parietal lobe. There is mass effect and partial effacement of the 4th ventricle secondary to the cerebellar lesion with developing hydrocephalus without MLS. CT of the chest, abdomen, and pelvis with IV contrast showed RUL mass with mediastinal adenopathy. Labs revealed Hep C and treponema antibodies were positive. Hep C PCR pending. Penicillin G was administered. A sepsis workup was completed and antibiotics were initiated. An MRI brain with and without contrast was ordered, but patient was unable to complete due to persistent movement after sedatives were administered. Prior to MRI, he was alert but drowsy. He was able to tell me his name, but told me he was 53 years old, the year was 2003, and he did not know the current place or reason for being here. He had generalized  weakness but was slightly more weak on the right side. He followed simple commands, but did not attempt to follow more complex commands. He is admitted to [facility] with NSGY consult.          Overview/Hospital Course:  71 y/o M presenting from [facility] for generalized weakness for about a week now. History obtained from rehab nurse. She reported that he got to their facility about a month ago and, at baseline, walks with a cane and is alert and oriented. His nurse noticed that he has seemed weaker over the past week and over the past 3 days has had increased balance disturbance and gait issues. They also noticed that he has been speaking more softly in his speech is harder to understand. He has not had any infectious symptoms. He fell yesterday, unsure if he hit his head. CT head without contrast revealed multiple lesions of hypodensity with a hyperdense rim, notably in the left temporal lobe and right cerebellum. There were additional small hyperdensities in the right frontoparietal and left parasagittal parietal lobe. There is mass effect and partial effacement of the 4th ventricle secondary to the cerebellar lesion with developing hydrocephalus without MLS. CT of the chest, abdomen, and pelvis with IV contrast showed RUL mass with mediastinal adenopathy. Labs revealed Hep C and treponema antibodies were positive. Hep C PCR pending. Penicillin G was administered. A sepsis workup was completed and antibiotics were initiated. An MRI brain with and without contrast was ordered, but patient was unable to complete due to persistent movement after sedatives were administered. Prior to MRI, he was alert but drowsy. He was able to tell me his name, but told me he was 53 years old, the year was 2003, and he did not know the current place or reason for being here. He had generalized weakness but was slightly more weak on the right side. He followed simple commands, but did not attempt to follow more complex commands. He is  admitted to [facility] with NSGY consult.       7/2/2025: MRI with significant motion artifact. Required presidex overnight secondary to agitation  7/3/2025: MRI favors neoplastic brain lesions. CSF studies pending. Started on Zyprexa and phenobarbital for agitation, R subclavian CVC placed for central access  7/4/2025: NAEON. Extubated without complications.  07/05/2025 NAEON. EVD @20. Adjusted oxycodone frequency q8. Increased zyprexa 20 BID. Precedex for agitation. Plan for biopsy Monday w/ NSGY.  07/06/2025 NAEON. Added on 100q4 FWF. Continues to require precedex for agitation. OR tomorrow.  07/07/2025: AF. SHARRON. Exam stable. OR today for SOC crani for tumor resection. ICPs 0-14, 44cc output. PRN versed given for agitation. Precedex at 1.2. Hydral prn x1 for SBP >160. Remains in restraints.  07/08/2025: AF. SHARRON. POD1 s/p SOC. CT/MRI from overnight reviewed. EVD open at 10, ICPs -2-14, output 33cc. Versed PRN x1 given for agitation. Agitation improved with Clonidine. Cardene at 12.5 for SBP <160. Hydralazine prn x1 given. Multiple loose BMs. Off propofol and ketamine. Extubated this morning.  07/09/2025: Tolerated extubation overnight, more awake and cooperative on exam this AM, off precedex gtt overnight w/ versed PRN x1 only. Maxed on cardene gtt, add losartan 50 mg qday, increase clonidine to 0.2 mg TID for BP control. EVD @ 10, plan for repeat head CT Friday w/ EVD wean over weekend per discussion w/ NSGY  07/10/2025: NAEON. Afebrile. Pt removed NGT overnight, replaced. No longer on cardene, required hydralazine x1 and labetalol x2 PRNs. Losartan increased to 100mg. EVD open at 10, will plan to keep and wean over weekend per NSGY. Steroid taper per NSGY beginning today.  07/11/2025 NAEON. Neuro exam slowly improving, weaning phenobarb. Remains hypertensive, increasing clonidine to 0.3 mg TID and adding amlodipine 10 mg qday. CT head stable, EVD clamped -> plan for repeat head CT Monday morning per  NSGY  07/12/2025 NAEON. Neuro exam stable, weaning phenobarb and dexamethasone.EVD clamped, CT Monday.  07/13/2025 NAEON, tolerating EVD clamp trial. Neuro exam continuing to slowly improved. Repeat head CT tomorrow.   07/14/2025 NAEON, repeat head CT overnight stable w/ increased hydrocephalus or evidence of encephalocele formation, plan to pull EVD today. Add coreg 6.25 mg BID for uptrending BP. Holding FWF given borderline low Na. Weaning dex per NSGY  7/15/2025 NAEO, continue phenobarb taper. SLP following, failed modified today. Rehab placement pending. Intermittently agitated but overall improved from last week.        7/16 Transfer to hospital medicine. admitted with generalized weakness, falls, and paucity of speech presenting with multiple ring enhancing lesions on brain CT - CTHead 7/1 showed multiple brain masses concerning for mets with surrounding edema, can't rule out abscesses. Mass in cerebellum with effacement of 4th ventricule outflow with concern for developing hydrocephalus. MRI brain 7/1: non diagnostic due to motion. CT CAP 7/1: spiculated Right lung mass and lymph node adenopathy in chest and neck concerning for metastatic disease. MRI Brain W/WO 7/2: multifocal enhancing lesions c/f metastatic disease, largest R cerebellum w/mass effect on 4th. now s/p R SOC for tumor resection on 7/7. Post op CTH/MRI 7/7: anticipate post operative changes, hemostatic products left at superior/medial border of resection cavity. EVD removed 7/14,  posterior incision without  pseudomeningocele. Dexamthasone weaned to 2 BID per NCC, continue GI ppx while on steroid. on bactrim for PCP prophylaxis.  Admitted form [facility], currently on phenobarb taper and scheduled zyprexa and clonidine for ETOH and heroin use. Pending rehab placement. Needs OP follow up with NSGY (continue decadron till then) and Radiation oncology team. Pathology is lung carcinoma but Tempus and PD-L1 testing are pending. on NGT feeds impact  peptide. SLP following. on 4 point restraints.  CTHead 7/14 -Ventricles remain somewhat prominent but without overt hydrocephalus or significant change in size following EVD removal.Evolving recent postsurgical change in the posterior fossa. No new intracranial hemorrhage or major vascular distribution infarct. AAO to self. on UE restraints.  MBS 7/16. SLP recs NPO. to complete phenobarbital taper today            Interval History: pt denies any pains. Afebrile. NGT in place.    Review of Systems  Objective:     Vital Signs (Most Recent):  Temp: 98.2 °F (36.8 °C) (07/17/25 0735)  Pulse: 81 (07/17/25 1120)  Resp: 18 (07/17/25 0735)  BP: (!) 144/87 (07/17/25 0918)  SpO2: 99 % (07/17/25 0735) Vital Signs (24h Range):  Temp:  [97.7 °F (36.5 °C)-98.2 °F (36.8 °C)] 98.2 °F (36.8 °C)  Pulse:  [74-88] 81  Resp:  [18] 18  SpO2:  [95 %-99 %] 99 %  BP: (124-158)/(85-94) 144/87     Weight: 81.1 kg (178 lb 12.7 oz)  Body mass index is 23.59 kg/m².    Intake/Output Summary (Last 24 hours) at 7/17/2025 1253  Last data filed at 7/17/2025 0400  Gross per 24 hour   Intake 670 ml   Output 1550 ml   Net -880 ml         Physical Exam      Awake alert resting in bed, no acute distress   NG tube in place   Unremarkable, somewhat diminished breath sounds bilaterally, unlabored breathing, on room air, no cyanosis   Heart sounds indicate a regular rate and rhythm   Awake alert, no acute distress   No facial droop, pupils equal bilaterally   5/5 fist  and hip flexor strength bilaterally   Not oriented to place nor time  No obvious upper nor lower extremity edema         Assessment & Plan  Ataxia    2/2 cerebellar lesion   PT/OT -> recommending acute rehab once medically appropriate for discharge         Polysubstance abuse     Was brought in from [facility], where he has been for the past month  Educate on cessation when appropriate  On phenobarbital for agitation, no signs of EtOH withdrawal -> phenobarb taper     7/16 to completed  phenobarbital taper today     Brain lesion     CT head without contrast revealed multiple lesions of hypodensity with a hyperdense rim, notably in the left temporal lobe and right cerebellum.  additional small hyperdensities in the right frontoparietal and left parasagittal parietal lobe.  mass effect and partial effacement of the 4th ventricle secondary to the cerebellar lesion with developing hydrocephalus without MLS. CT of the chest, abdomen, and pelvis with IV contrast showed RUL mass with mediastinal adenopathy. Hep C and treponema antibodies were positive. Hep C PCR pending. Penicillin G was administered. A sepsis workup was completed and antibiotics were initiated. Intubated to get MRI. S/p EVD. Extubated 7/4/25 without difficulty.     s/p SOC craniotomy for tumor resection on 7/7  Dex 2 mg BID, no plans to taper further pending outpt f/u  Bactrim MWF for PJP ppx given plan for prolonged steroid taper  EVD removed 7/14  Zyprexa BID for agitation  Phenobarb taper to off given improved agitation   Clonidine 0.3 mg TID for agitation/opioid dependence/hypertension  Avoid versed for agitation  Tube feeds at goal, SLP following for swallow  Folate/thiamine/MV  SBP <160     Needs OP follow up with NSGY (continue decadron till then) and Radiation oncology team. Pathology is lung carcinoma but Tempus and PD-L1 testing are pending. on NGT feeds impact peptide. SLP following. on 4 point restraints.    CTHead 7/14 -Ventricles remain somewhat prominent but without overt hydrocephalus or significant change in size following EVD removal.Evolving recent postsurgical change in the posterior fossa. No new intracranial hemorrhage or major vascular distribution infarct. AAO to self. on  UE restraints.    SLP          Gait disturbance     PT/OT eval - rehab     Hepatitis C antibody positive     PCR negative  No known history of Hep C per patient        Mass of upper lobe of right lung     Noted on CXR and CT chest with IV  contrast  Saturating well on RA; No PTX on CT-chest  No lung consolidations or obstructive process  No known history of cancer  Intra-op brain biopsy frozen pathology suggestive of metastatic adenocarcinoma, 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  Will need outpt rad onc and med onc f/u      Positive serology for syphilis  s/p ID eval -  Positive Treponema pallidum antibody. RPR 1:1 suggestive of false positive or past treated infection. Received penicillin G 2.4 million units x 1 on 7/1. CSF analysis not suggestive of neurosyphilis at this time . blood RPR and CSF VDRL 7/2 negative        Essential hypertension  Patient's blood pressure range in the last 24 hours was: BP  Min: 124/86  Max: 158/94.The patient's inpatient anti-hypertensive regimen is listed below:  Current Antihypertensives  losartan tablet 100 mg, Daily, Per NG tube  cloNIDine tablet 0.3 mg, Every 8 hours, Per NG tube  amLODIPine tablet 10 mg, Daily, Per NG tube  carvediloL tablet 6.25 mg, 2 times daily, Per NG tube      - Goal SBP<160         Encephalopathy, metabolic     See primary problem  Slowly improving   on clonidine 0.3mg TID and olanzapine 20mg BID    Anemia    Patient's with Normocytic anemia.. Hemoglobin stable. Etiology likely due to chronic disease .  Current CBC reviewed-    Recent Labs   Lab 07/15/25  0151 07/16/25  0440 07/17/25  0327   HGB 11.2* 11.0* 11.2*         Component Value Date/Time    MCV 93 07/17/2025 0327    RDW 13.2 07/17/2025 0327     Monitor CBC and transfuse if H/H drops below 7/21.    Dysphagia  SLP , possible peg given stalling progress  VTE Risk Mitigation (From admission, onward)           Ordered     enoxaparin injection 40 mg  Every 24 hours         07/15/25 1247     IP VTE LOW RISK PATIENT  Once         07/01/25 2049     Place sequential compression device  Until discontinued         07/01/25 2049                    Discharge Planning   CARA: 7/22/2025     Code Status: Full Code   Medical Readiness for Discharge Date:   Discharge  Plan A: Rehab   Discharge Delays: (!) Change in Medical Condition               Deng Gutiérrez MD  Department of Hospital Medicine   [facility] - Neurosurgery [facility])

## 2025-07-17 NOTE — CARE UPDATE
I have reviewed the chart of Goran Carlos who is hospitalized for the following:    Active Hospital Problems    Diagnosis    *Ataxia    Agitation     : PRN IV Zyprexa dose given for agitation overnight, in 4 point restraints/roll belt       Vasogenic cerebral edema     persistent vasogenic edema around resection cavity and L frontal lesion.   Nsgy  Evd  ICU stay  S/p resection      Pseudomeningocele    Brain compression     MRI Brain W/WO 7/2: multifocal enhancing lesions c/f metastatic disease, largest R cerebellum w/mass effect on 4th.   S/p resection  Close monitoring in icu  Stat cth with any acute changes      Obstructive hydrocephalus     obstructive hydrocephalus s/p EVD placement,       Metastasis to brain    Hypernatremia     Monitor with daily cmp      YOVANNY (acute kidney injury)     Baseline 0.7 peaked to 1.7  Avoid nephrotoxic meds  Renally dose meds  Monitor with daily cmp      Hyponatremia     Monitor with daily cmp      Encephalopathy, metabolic    Anemia    Dysphagia    Essential hypertension    Positive serology for syphilis    Polysubstance abuse    Brain lesion    Gait disturbance    Hepatitis C antibody positive    Mass of upper lobe of right lung        Kenyatta Severino NP  [facility]

## 2025-07-17 NOTE — PT/OT/SLP PROGRESS
Physical Therapy      Patient Name:  Goran Carlos   MRN:  [id number]    Patient not seen today (1447) secondary to patient soiled with stool. Nurse requesting return post pericare and bed yovany change. Will follow-up as appropriate.

## 2025-07-17 NOTE — SUBJECTIVE & OBJECTIVE
Interval History: pt denies any pains. Afebrile. NGT in place.    Review of Systems  Objective:     Vital Signs (Most Recent):  Temp: 98.2 °F (36.8 °C) (07/17/25 0735)  Pulse: 81 (07/17/25 1120)  Resp: 18 (07/17/25 0735)  BP: (!) 144/87 (07/17/25 0918)  SpO2: 99 % (07/17/25 0735) Vital Signs (24h Range):  Temp:  [97.7 °F (36.5 °C)-98.2 °F (36.8 °C)] 98.2 °F (36.8 °C)  Pulse:  [74-88] 81  Resp:  [18] 18  SpO2:  [95 %-99 %] 99 %  BP: (124-158)/(85-94) 144/87     Weight: 81.1 kg (178 lb 12.7 oz)  Body mass index is 23.59 kg/m².    Intake/Output Summary (Last 24 hours) at 7/17/2025 1253  Last data filed at 7/17/2025 0400  Gross per 24 hour   Intake 670 ml   Output 1550 ml   Net -880 ml         Physical Exam      Awake alert resting in bed, no acute distress   NG tube in place   Unremarkable, somewhat diminished breath sounds bilaterally, unlabored breathing, on room air, no cyanosis   Heart sounds indicate a regular rate and rhythm   Awake alert, no acute distress   No facial droop, pupils equal bilaterally   5/5 fist  and hip flexor strength bilaterally   Not oriented to place nor time  No obvious upper nor lower extremity edema

## 2025-07-17 NOTE — PLAN OF CARE
Problem: Adult Inpatient Plan of Care  Goal: Plan of Care Review  Outcome: Progressing  Goal: Patient-Specific Goal (Individualized)  Description: Pt will maintain sbp below 160  Outcome: Progressing  Goal: Absence of Hospital-Acquired Illness or Injury  Outcome: Progressing  Goal: Optimal Comfort and Wellbeing  Outcome: Progressing  Goal: Readiness for Transition of Care  Outcome: Progressing     Problem: Infection  Goal: Absence of Infection Signs and Symptoms  Outcome: Progressing     Problem: Skin Injury Risk Increased  Goal: Skin Health and Integrity  Outcome: Progressing     Problem: Delirium  Goal: Optimal Coping  Outcome: Progressing  Goal: Improved Behavioral Control  Outcome: Progressing  Goal: Improved Attention and Thought Clarity  Outcome: Progressing  Goal: Improved Sleep  Outcome: Progressing     Problem: Fall Injury Risk  Goal: Absence of Fall and Fall-Related Injury  Outcome: Progressing     Problem: Restraint, Nonviolent  Goal: Absence of Harm or Injury  Outcome: Progressing     Problem: Wound  Goal: Optimal Coping  Outcome: Progressing  Goal: Optimal Functional Ability  Outcome: Progressing  Goal: Absence of Infection Signs and Symptoms  Outcome: Progressing  Goal: Improved Oral Intake  Outcome: Progressing  Goal: Optimal Pain Control and Function  Outcome: Progressing  Goal: Skin Health and Integrity  Outcome: Progressing  Goal: Optimal Wound Healing  Outcome: Progressing     Problem: Acute Kidney Injury/Impairment  Goal: Fluid and Electrolyte Balance  Outcome: Progressing  Goal: Improved Oral Intake  Outcome: Progressing  Goal: Effective Renal Function  Outcome: Progressing

## 2025-07-17 NOTE — PLAN OF CARE
Met with pt/family to review discharge recommendation of rehab and is agreeable to plan    Patient/family provided list of facilities in-network with patient's payor plan. Providers that are owned, operated, or affiliated with [facility] are included on the list.     Notified that referral sent to below listed facilities from in-network list based on proximity to home/family support:   [facility]  2.  3.  4.  5. (can send more than 5)    Patient/family instructed to identify preference.    Preferred Facility: (if more than 1, listed in order of descending preference)  [facility]    If an additional preferred facility not listed above is identified, additional referral to be sent. If above facilities unable to accept, will send additional referrals to in-network providers.       Ophelia Nunez MSW, CSW

## 2025-07-17 NOTE — ASSESSMENT & PLAN NOTE
Patient's with Normocytic anemia.. Hemoglobin stable. Etiology likely due to chronic disease .  Current CBC reviewed-    Recent Labs   Lab 07/15/25  0151 07/16/25  0440 07/17/25  0327   HGB 11.2* 11.0* 11.2*         Component Value Date/Time    MCV 93 07/17/2025 0327    RDW 13.2 07/17/2025 0327     Monitor CBC and transfuse if H/H drops below 7/21.

## 2025-07-17 NOTE — PT/OT/SLP PROGRESS
"Speech Language Pathology Treatment    Patient Name:  Goran Carlos   MRN:  [id number]  Admitting Diagnosis: Ataxia    Recommendations:                 General Recommendations:  Dysphagia therapy, Speech/language therapy, and Cognitive-linguistic therapy  Diet recommendations:  NPO, Liquid Diet Level: NPO   Aspiration Precautions: Strict aspiration precautions   General Precautions: Standard, aspiration, fall  Communication strategies:  none  Discharge recommendations:  High Intensity Therapy   Barriers to Discharge:  Decreased Care Giver Support    Assessment:     Goran Carlos is a 69 y.o. male with an SLP diagnosis of Dysphagia, Dysarthria, and Cognitive-Linguistic Impairment.      Subjective     "We do need to work on it."     Pain/Comfort:  Pain Rating 1: 0/10  Pain Rating Post-Intervention 1: 0/10    Respiratory Status: Room air    Objective:     Has the patient been evaluated by SLP for swallowing?   Yes  Keep patient NPO? Yes     Pt seen bedside, alert and cooperative. Bilateral mitts and wrist restraints present.  Careful listening required 2/2 pt with low vocal intensity with intermittent wet vocal quality.  SLP reviewed results of modified barium swallow study, cont npo, aspiration risk, and dysphagia tx.  Pt in agreement.  He completed effortful swallow x10 using 1/2 tsps of puree to aid swallow initiation. Multiple, effortful swallows elicited with each presentation though throat clear/cough noted following 4/5 trials.  Pt also completed falsetto /i/ lingual protrusion/elevation given resistance x10 each given min cues.  Education provided re: cont npo and ongoing dysphagia excs to be completed outside of therapy time.  Pt verbalized understanding though will likely need reinforcement.     Goals:   Multidisciplinary Problems       SLP Goals          Problem: SLP    Goal Priority Disciplines Outcome   SLP Goal     SLP Progressing   Description: Goals due 7/16  1.  Pt. Will participate in ongoing " assessment of swallow to initiate least restrictive diet.                       Plan:     Patient to be seen:  4 x/week   Plan of Care expires:  08/06/25  Plan of Care reviewed with:  patient   SLP Follow-Up:  Yes       Time Tracking:     SLP Treatment Date:   07/17/25  Speech Start Time:  1017  Speech Stop Time:  1034     Speech Total Time (min):  17 min    Billable Minutes: Treatment Swallowing Dysfunction 9 and Self Care/Home Management Training 8    07/17/2025

## 2025-07-17 NOTE — PLAN OF CARE
[facility] fax all clinicals/worksheet to [facility] for review.     Will follow up.     Ophelia Nunez MSW, CSW

## 2025-07-18 PROBLEM — E87.0 HYPERNATREMIA: Status: RESOLVED | Noted: 2025-07-17 | Resolved: 2025-07-18

## 2025-07-18 PROBLEM — E87.1 HYPONATREMIA: Status: RESOLVED | Noted: 2025-07-17 | Resolved: 2025-07-18

## 2025-07-18 PROBLEM — N17.9 AKI (ACUTE KIDNEY INJURY): Status: RESOLVED | Noted: 2025-07-17 | Resolved: 2025-07-18

## 2025-07-18 LAB
ABSOLUTE EOSINOPHIL (OHS): 0.04 K/UL
ABSOLUTE MONOCYTE (OHS): 0.62 K/UL (ref 0.3–1)
ABSOLUTE NEUTROPHIL COUNT (OHS): 5.73 K/UL (ref 1.8–7.7)
ALBUMIN SERPL BCP-MCNC: 3.3 G/DL (ref 3.5–5.2)
ALP SERPL-CCNC: 85 UNIT/L (ref 40–150)
ALT SERPL W/O P-5'-P-CCNC: 26 UNIT/L (ref 10–44)
ANION GAP (OHS): 8 MMOL/L (ref 8–16)
AST SERPL-CCNC: 30 UNIT/L (ref 11–45)
BASOPHILS # BLD AUTO: 0.06 K/UL
BASOPHILS NFR BLD AUTO: 0.8 %
BILIRUB SERPL-MCNC: 0.2 MG/DL (ref 0.1–1)
BUN SERPL-MCNC: 27 MG/DL (ref 8–23)
CALCIUM SERPL-MCNC: 9 MG/DL (ref 8.7–10.5)
CHLORIDE SERPL-SCNC: 108 MMOL/L (ref 95–110)
CO2 SERPL-SCNC: 22 MMOL/L (ref 23–29)
CREAT SERPL-MCNC: 0.8 MG/DL (ref 0.5–1.4)
ERYTHROCYTE [DISTWIDTH] IN BLOOD BY AUTOMATED COUNT: 13.2 % (ref 11.5–14.5)
GFR SERPLBLD CREATININE-BSD FMLA CKD-EPI: >60 ML/MIN/1.73/M2
GLUCOSE SERPL-MCNC: 126 MG/DL (ref 70–110)
HCT VFR BLD AUTO: 33.5 % (ref 40–54)
HGB BLD-MCNC: 11 GM/DL (ref 14–18)
IMM GRANULOCYTES # BLD AUTO: 0.02 K/UL (ref 0–0.04)
IMM GRANULOCYTES NFR BLD AUTO: 0.3 % (ref 0–0.5)
LYMPHOCYTES # BLD AUTO: 0.94 K/UL (ref 1–4.8)
MAGNESIUM SERPL-MCNC: 1.8 MG/DL (ref 1.6–2.6)
MCH RBC QN AUTO: 30.7 PG (ref 27–31)
MCHC RBC AUTO-ENTMCNC: 32.8 G/DL (ref 32–36)
MCV RBC AUTO: 94 FL (ref 82–98)
NUCLEATED RBC (/100WBC) (OHS): 0 /100 WBC
PHOSPHATE SERPL-MCNC: 3.4 MG/DL (ref 2.7–4.5)
PLATELET # BLD AUTO: 231 K/UL (ref 150–450)
PMV BLD AUTO: 9.6 FL (ref 9.2–12.9)
POTASSIUM SERPL-SCNC: 4.3 MMOL/L (ref 3.5–5.1)
PROT SERPL-MCNC: 6.8 GM/DL (ref 6–8.4)
RBC # BLD AUTO: 3.58 M/UL (ref 4.6–6.2)
RELATIVE EOSINOPHIL (OHS): 0.5 %
RELATIVE LYMPHOCYTE (OHS): 12.7 % (ref 18–48)
RELATIVE MONOCYTE (OHS): 8.4 % (ref 4–15)
RELATIVE NEUTROPHIL (OHS): 77.3 % (ref 38–73)
SODIUM SERPL-SCNC: 138 MMOL/L (ref 136–145)
WBC # BLD AUTO: 7.41 K/UL (ref 3.9–12.7)

## 2025-07-18 PROCEDURE — 84460 ALANINE AMINO (ALT) (SGPT): CPT | Performed by: PHYSICIAN ASSISTANT

## 2025-07-18 PROCEDURE — 85025 COMPLETE CBC W/AUTO DIFF WBC: CPT | Performed by: PHYSICIAN ASSISTANT

## 2025-07-18 PROCEDURE — 25000003 PHARM REV CODE 250: Performed by: PHYSICIAN ASSISTANT

## 2025-07-18 PROCEDURE — 63600175 PHARM REV CODE 636 W HCPCS: Performed by: STUDENT IN AN ORGANIZED HEALTH CARE EDUCATION/TRAINING PROGRAM

## 2025-07-18 PROCEDURE — 92526 ORAL FUNCTION THERAPY: CPT

## 2025-07-18 PROCEDURE — 97535 SELF CARE MNGMENT TRAINING: CPT

## 2025-07-18 PROCEDURE — 63600175 PHARM REV CODE 636 W HCPCS: Performed by: HOSPITALIST

## 2025-07-18 PROCEDURE — 36415 COLL VENOUS BLD VENIPUNCTURE: CPT | Performed by: PHYSICIAN ASSISTANT

## 2025-07-18 PROCEDURE — 11000001 HC ACUTE MED/SURG PRIVATE ROOM

## 2025-07-18 PROCEDURE — 94761 N-INVAS EAR/PLS OXIMETRY MLT: CPT

## 2025-07-18 PROCEDURE — 97112 NEUROMUSCULAR REEDUCATION: CPT

## 2025-07-18 PROCEDURE — 63600175 PHARM REV CODE 636 W HCPCS: Performed by: PHYSICIAN ASSISTANT

## 2025-07-18 PROCEDURE — 83735 ASSAY OF MAGNESIUM: CPT | Performed by: PHYSICIAN ASSISTANT

## 2025-07-18 PROCEDURE — 84100 ASSAY OF PHOSPHORUS: CPT | Performed by: PHYSICIAN ASSISTANT

## 2025-07-18 PROCEDURE — 97112 NEUROMUSCULAR REEDUCATION: CPT | Mod: CQ

## 2025-07-18 PROCEDURE — 97530 THERAPEUTIC ACTIVITIES: CPT | Mod: CQ

## 2025-07-18 RX ORDER — HALOPERIDOL LACTATE 5 MG/ML
2 INJECTION, SOLUTION INTRAMUSCULAR EVERY 6 HOURS PRN
Status: DISCONTINUED | OUTPATIENT
Start: 2025-07-18 | End: 2025-07-25 | Stop reason: HOSPADM

## 2025-07-18 RX ORDER — HALOPERIDOL LACTATE 5 MG/ML
5 INJECTION, SOLUTION INTRAMUSCULAR ONCE
Status: COMPLETED | OUTPATIENT
Start: 2025-07-18 | End: 2025-07-18

## 2025-07-18 RX ADMIN — CLONIDINE HYDROCHLORIDE 0.3 MG: 0.2 TABLET ORAL at 05:07

## 2025-07-18 RX ADMIN — AMLODIPINE BESYLATE 10 MG: 10 TABLET ORAL at 08:07

## 2025-07-18 RX ADMIN — FAMOTIDINE 20 MG: 20 TABLET, FILM COATED ORAL at 08:07

## 2025-07-18 RX ADMIN — CARVEDILOL 6.25 MG: 6.25 TABLET, FILM COATED ORAL at 09:07

## 2025-07-18 RX ADMIN — Medication 100 MG: at 08:07

## 2025-07-18 RX ADMIN — THERA TABS 1 TABLET: TAB at 08:07

## 2025-07-18 RX ADMIN — CARVEDILOL 6.25 MG: 6.25 TABLET, FILM COATED ORAL at 08:07

## 2025-07-18 RX ADMIN — OLANZAPINE 20 MG: 5 TABLET, FILM COATED ORAL at 09:07

## 2025-07-18 RX ADMIN — ENOXAPARIN SODIUM 40 MG: 40 INJECTION SUBCUTANEOUS at 05:07

## 2025-07-18 RX ADMIN — DEXAMETHASONE 2 MG: 1 TABLET ORAL at 09:07

## 2025-07-18 RX ADMIN — SULFAMETHOXAZOLE AND TRIMETHOPRIM 1 TABLET: 800; 160 TABLET ORAL at 08:07

## 2025-07-18 RX ADMIN — LOSARTAN POTASSIUM 100 MG: 50 TABLET, FILM COATED ORAL at 08:07

## 2025-07-18 RX ADMIN — FAMOTIDINE 20 MG: 20 TABLET, FILM COATED ORAL at 09:07

## 2025-07-18 RX ADMIN — FOLIC ACID 1 MG: 1 TABLET ORAL at 08:07

## 2025-07-18 RX ADMIN — HALOPERIDOL LACTATE 2 MG: 5 INJECTION, SOLUTION INTRAMUSCULAR at 07:07

## 2025-07-18 RX ADMIN — OLANZAPINE 20 MG: 5 TABLET, FILM COATED ORAL at 08:07

## 2025-07-18 RX ADMIN — CLONIDINE HYDROCHLORIDE 0.3 MG: 0.2 TABLET ORAL at 01:07

## 2025-07-18 RX ADMIN — CLONIDINE HYDROCHLORIDE 0.3 MG: 0.2 TABLET ORAL at 09:07

## 2025-07-18 RX ADMIN — DEXAMETHASONE 2 MG: 1 TABLET ORAL at 08:07

## 2025-07-18 RX ADMIN — HALOPERIDOL LACTATE 5 MG: 5 INJECTION, SOLUTION INTRAMUSCULAR at 05:07

## 2025-07-18 NOTE — PT/OT/SLP PROGRESS
Physical Therapy Treatment    Patient Name:  Goran Carlos   MRN:  [id number]    Recommendations:     Discharge Recommendations: High Intensity Therapy  Discharge Equipment Recommendations: bedside commode, bath bench, wheelchair  Barriers to discharge: increased level of assist    Assessment:     Goran Carlos is a 69 y.o. male admitted with a medical diagnosis of Ataxia.  He presents with the following impairments/functional limitations: weakness, impaired endurance, impaired self care skills, impaired functional mobility, gait instability, impaired balance, impaired cognition, decreased upper extremity function, decreased lower extremity function, decreased safety awareness, decreased coordination, impaired fine motor. Pt agreeable to session. Pt presented with posterior and R lateral trunk leans sitting EOB requiring max A for balance. With heavy TC/VC cuing, pt able to shift trunk to center and hold with core and BUE reducing assist to CGA. Pt warranted reminders of trunk control d/t pt returning to max A occasionally. Pt would benefit from continued PT to improve functional independence.     Rehab Prognosis: Good; patient would benefit from acute skilled PT services to address these deficits and reach maximum level of function.    Recent Surgery: Procedure(s) (LRB):  CRANIOTOMY, SUBOCCIPITAL (Right) 11 Days Post-Op    Plan:     During this hospitalization, patient to be seen 4 x/week to address the identified rehab impairments via gait training, therapeutic activities, therapeutic exercises, neuromuscular re-education and progress toward the following goals:    Plan of Care Expires:  08/04/25    Subjective     Chief Complaint: tired  Patient/Family Comments/goals: wanted to walk again with the walker  Pain/Comfort:  Pain Rating 1: 0/10      Objective:     Communicated with nurse prior to session.  Patient found HOB elevated with bed alarm, Condom Catheter, NG tube, restraints, telemetry upon PT entry to room.      General Precautions: Standard, fall, aspiration  Orthopedic Precautions: N/A  Braces: N/A  Respiratory Status: Room air     Functional Mobility:  Bed Mobility:     Rolling L/R: CGA Cues for sequencing and body mechanics  Scooting EOB: mod A with solo hip scoot  Scooting EOB: min A with anterior trunk lean and B hip scoot  Scooting HOB: min A Pt independently scoot to HOB, minimal scoot, bed flat  Supine to Sit to R: mod A trunk and BLE  Sit to supine: min A VC/TC for lateral lean onto elbow  Balance: seated balance: max A <> CGA  Posterior lean, R lateral lean  VC for utilizing BUE, tighten core, increase ant trunk lean. Continuous cuing  Transfers:    Sit <> stand: 1x total A with RW  Educated on body mechanics and sequencing  Unable to full stand, bottom cleared bed      AM-PAC 6 CLICK MOBILITY  Turning over in bed (including adjusting bedclothes, sheets and blankets)?: 3  Sitting down on and standing up from a chair with arms (e.g., wheelchair, bedside commode, etc.): 2  Moving from lying on back to sitting on the side of the bed?: 2  Moving to and from a bed to a chair (including a wheelchair)?: 2  Need to walk in hospital room?: 1  Climbing 3-5 steps with a railing?: 1  Basic Mobility Total Score: 11       Treatment & Education:  Patient educated on importance of OOB activity for functional gains.  Patient educated on use of RW during transfers for increased safety.  Patient educated on general safety for decreased risk of falls.  Patient performed exercises for improved strength and endurance.  Patient asked if we were to walk again today while sitting EOB. Educated patient on sitting EOB balance would need to improve before attempting to stand for patient safety. Pt verbalized and demonstrated understanding with moments of improved trunk control.   All questions answered and patient verbalized understanding.    Patient left HOB elevated with all lines intact, call button in reach, bed alarm on, and  restraints reapplied at end of session..    GOALS:   Multidisciplinary Problems       Physical Therapy Goals          Problem: Physical Therapy    Goal Priority Disciplines Outcome Interventions   Physical Therapy Goal     PT, PT/OT Progressing    Description: Goals to be met by: 2025     Patient will increase functional independence with mobility by performin. Supine to sit with Stand-by Assistance  2. Sit to supine with Stand-by Assistance  3. Sit to stand transfer with Contact Guard Assistance  4. Bed to chair transfer with Minimal Assistance using Rolling Walker  5. Gait  x 10 feet with Minimal Assistance using Rolling Walker.   6. Sitting at edge of bed x5 minutes with Supervision                             Time Tracking:     PT Received On: 25  PT Start Time: 1449     PT Stop Time: 1527  PT Total Time (min): 38 min     Billable Minutes: Therapeutic Activity 12 and Neuromuscular Re-education 26    Treatment Type: Treatment  PT/PTA: PTA     Number of PTA visits since last PT visit: 2025

## 2025-07-18 NOTE — PT/OT/SLP PROGRESS
"Occupational Therapy   Treatment    Name: Goran Carlos  MRN: [id number]  Admitting Diagnosis:  Ataxia  11 Days Post-Op    Recommendations:     Discharge Recommendations: High Intensity Therapy  Discharge Equipment Recommendations:  bath bench, bedside commode  Barriers to discharge:  None    Assessment:     Goran Carlos is a 69 y.o. male with a medical diagnosis of Ataxia.  He presents with performance deficits affecting function are impaired endurance, impaired balance, impaired self care skills, impaired functional mobility, decreased lower extremity function, decreased upper extremity function, decreased coordination, impaired cognition.     Rehab Prognosis:  Good; patient would benefit from acute skilled OT services to address these deficits and reach maximum level of function.       Plan:     Patient to be seen 4 x/week to address the above listed problems via therapeutic exercises, therapeutic activities, self-care/home management, neuromuscular re-education, cognitive retraining  Plan of Care Expires: 08/05/25  Plan of Care Reviewed with: patient    Subjective     Patient:  "I've been doing okay."  Pain/Comfort:  Pain Rating 1: 0/10  Pain Rating Post-Intervention 1: 0/10    Objective:     Communicated with: Nurse prior to session.  Patient found supine with bed alarm, restraints, telemetry, SCD, NG tube ([employer] camera monitoring) upon OT entry to room.    General Precautions: Standard, aspiration, fall    Orthopedic Precautions:N/A  Braces: N/A  Respiratory Status: Room air     Occupational Performance:     Bed Mobility:    Patient completed Rolling/Turning to Left with  minimum assistance  Patient completed Rolling/Turning to Right with minimum assistance  Patient completed Supine to Sit with moderate assistance  Patient completed Sit to Supine with moderate assistance     Functional Mobility/Transfers:  Moderate assist with sit-stand from EOB    Activities of Daily Living:  Grooming: moderate assistance " while standing    [facility] 6 Click ADL: 11    Treatment & Education:  Patient alert and oriented x person. Able to follow 4/4 one step commands.  Patient attentive and interactive throughout the session. Family not present.      Patient left supine with all lines intact, call button in reach, and bed alarm on    GOALS:   Multidisciplinary Problems       Occupational Therapy Goals          Problem: Occupational Therapy    Goal Priority Disciplines Outcome Interventions   Occupational Therapy Goal     OT, PT/OT Progressing    Description: Goals set 7/8 with an expiration date, 8/5:  Patient will increase functional independence with ADLs by performing:    Patient will demonstrate rolling to the right with min assist.  Not met   Patient will demonstrate rolling to the left with min assist.   Not met  Patient will demonstrate supine -sit with min  assist.   Not met  Patient will demonstrate stand pivot transfers with mod assist.   Not met  Patient will demonstrate grooming while seated with min assist.   Not met  Patient will demonstrate upper body dressing with min assist while seated EOB.   Not met  Patient will demonstrate lower body dressing with mod assist while seated EOB.   Not met  Patient will demonstrate toileting with mod assist.   Not met  Patient will demonstrate bathing while seated EOB with mod assist.   Not met  Patient's family / caregiver will demonstrate independence and safety with assisting patient with self-care skills and functional mobility.     Not met  Patient's family / caregiver will demonstrate independence with providing ROM and changes in bed positioning.   Not met  Patient and/or patient's family will verbalize understanding of stroke prevention guidelines, personal risk factors and stroke warning signs via teachback method.  Not met     DME Justifications (see above for complete DME recommendations)    Bedside Commode- Patient has a mobility limitation that significantly impairs their  ability to participate in one or more mobility related activities of daily living, including toileting. This deficit can be resolved by using a bedside commode. Patient demonstrates mobility limitations that will cause them to be confined to one room at home without bathroom access for up to 30 days. Using a bedside commode will greatly improve the patient's ability to participate in MRADLs.                                               Time Tracking:     OT Date of Treatment: 07/18/25  OT Start Time: 0756  OT Stop Time: 0820  OT Total Time (min): 24 min    Billable Minutes:Self Care/Home Management 12  Neuromuscular Re-education 12    OT/DOTTIE: OT          7/18/2025

## 2025-07-18 NOTE — ASSESSMENT & PLAN NOTE
Patient's with Normocytic anemia.. Hemoglobin stable. Etiology likely due to chronic disease .  Current CBC reviewed-    Recent Labs   Lab 07/16/25  0440 07/17/25  0327 07/18/25  0928   HGB 11.0* 11.2* 11.0*         Component Value Date/Time    MCV 94 07/18/2025 0928    RDW 13.2 07/18/2025 0928     Monitor CBC and transfuse if H/H drops below 7/21.

## 2025-07-18 NOTE — ASSESSMENT & PLAN NOTE
Was brought in from [facility], where he has been for the past month  Educate on cessation when appropriate  On phenobarbital for agitation, no signs of EtOH withdrawal -> phenobarb taper     7/16 to completed phenobarbital taper today

## 2025-07-18 NOTE — PT/OT/SLP PROGRESS
Speech Language Pathology Treatment    Patient Name:  Goran Carlos   MRN:  [id number]  Admitting Diagnosis: Ataxia    Recommendations:                 General Recommendations:  Dysphagia therapy, Speech/language therapy, and Cognitive-linguistic therapy  Diet recommendations:  NPO, Liquid Diet Level: NPO   Aspiration Precautions: Strict aspiration precautions   General Precautions: Standard, aspiration, fall  Communication strategies:  none  Discharge recommendations:  High Intensity Therapy   Barriers to Discharge:  Decreased Care Giver Support    Assessment:     Goran Carlos is a 69 y.o. male with an SLP diagnosis of Dysphagia, Dysarthria, and Cognitive-Linguistic Impairment.      Subjective     Pt awake/alert, reclined in bed. NGT in place. No family present.     Pain/Comfort:  Pain Rating 1: 0/10  Pain Rating Post-Intervention 1: 0/10    Respiratory Status: Room air    Objective:     Has the patient been evaluated by SLP for swallowing?   Yes  Keep patient NPO? Yes     Pt seen bedside, alert, calm and cooperative. Careful listening required 2/2 pt with low vocal intensity and poor speech intelligibility. Noted wet vocal quality at baseline and weak, unproductive cough/throat clear.  Reviewed results of MBSS, diet recs, need for alternative means of nutrition, aspiration risk, and ongoing dysphagia tx.  He completed effortful swallow x 10 using ice chip x 4 and 1/2 tsps of puree x 6 to aid swallow initiation. Pt required constant cueing to initiate effortful swallow and double swallow pattern. He demonstrated increased vocal wetness and throat clearing on 50% of all trials. Pt unable to clear vocal wetness/congestion despite cued cough/throat clear and repeated swallows, therefore additional PO trials deferred 2/2 high aspiration risk.  Pt completed falsetto /i/ x 12 following clinician demonstration and prompting and  lingual protrusion to resistance x10 each given min cues. Noted perseveration across tasks  requiring redirection and frequent prompting required to complete each exercises correctly each rep.  Suspect poor carryover given cognitive deficits. Education provided re: role of SLP, diet recs, swallow precs, dysphagia exercises,  s/s aspiration and ongoing SLP POC.  Pt nodded in agreement though requires ongoing reinforcement. Discussed SLP impressions and POC with MD via secure chat pre and post session.     Goals:   Multidisciplinary Problems       SLP Goals          Problem: SLP    Goal Priority Disciplines Outcome   SLP Goal     SLP Progressing   Description: Goals due 7/16  1.  Pt. Will participate in ongoing assessment of swallow to initiate least restrictive diet.                     Plan:     Patient to be seen:  4 x/week   Plan of Care expires:  08/06/25  Plan of Care reviewed with:  patient   SLP Follow-Up:  Yes       Time Tracking:     SLP Treatment Date:   07/18/25  Speech Start Time:  0957  Speech Stop Time:  1015     Speech Total Time (min):  18 min    Billable Minutes: Treatment Swallowing Dysfunction 10 and Self Care/Home Management Training 8    07/18/2025

## 2025-07-18 NOTE — ASSESSMENT & PLAN NOTE
Patient's blood pressure range in the last 24 hours was: BP  Min: 111/80  Max: 147/89.The patient's inpatient anti-hypertensive regimen is listed below:  Current Antihypertensives  losartan tablet 100 mg, Daily, Per NG tube  cloNIDine tablet 0.3 mg, Every 8 hours, Per NG tube  amLODIPine tablet 10 mg, Daily, Per NG tube  carvediloL tablet 6.25 mg, 2 times daily, Per NG tube      - Goal SBP<160

## 2025-07-18 NOTE — PROGRESS NOTES
Alex Henson - Neurosurgery ([facility])  [facility]  Progress Note    Patient Name: Goran Carlos  MRN: [id number]  Patient Class: IP- Inpatient   Admission Date: 7/1/2025  Length of Stay: 17 days  Attending Physician: Deng Gutiérrez MD  Primary Care Provider: No primary care provider on file.        Subjective     Principal Problem:Ataxia        HPI:  71 y/o M presenting from [facility] for generalized weakness for about a week now. History obtained from rehab nurse. She reported that he got to their facility about a month ago and, at baseline, walks with a cane and is alert and oriented. His nurse noticed that he has seemed weaker over the past week and over the past 3 days has had increased balance disturbance and gait issues. They also noticed that he has been speaking more softly in his speech is harder to understand. He has not had any infectious symptoms. He fell yesterday, unsure if he hit his head. CT head without contrast revealed multiple lesions of hypodensity with a hyperdense rim, notably in the left temporal lobe and right cerebellum. There were additional small hyperdensities in the right frontoparietal and left parasagittal parietal lobe. There is mass effect and partial effacement of the 4th ventricle secondary to the cerebellar lesion with developing hydrocephalus without MLS. CT of the chest, abdomen, and pelvis with IV contrast showed RUL mass with mediastinal adenopathy. Labs revealed Hep C and treponema antibodies were positive. Hep C PCR pending. Penicillin G was administered. A sepsis workup was completed and antibiotics were initiated. An MRI brain with and without contrast was ordered, but patient was unable to complete due to persistent movement after sedatives were administered. Prior to MRI, he was alert but drowsy. He was able to tell me his name, but told me he was 53 years old, the year was 2003, and he did not know the current place or reason for being here. He had generalized  weakness but was slightly more weak on the right side. He followed simple commands, but did not attempt to follow more complex commands. He is admitted to [facility] with NSGY consult.          Overview/Hospital Course:  69 y/o M presenting from [facility] for generalized weakness and dysarthria. CT head without contrast revealed multiple lesions of hypodensity with a hyperdense rim, notably in the left temporal lobe and right cerebellum.  additional small hyperdensities in the right frontoparietal and left parasagittal parietal lobe. mass effect and partial effacement of the 4th ventricle secondary to the cerebellar lesion with developing hydrocephalus without MLS. CT of the chest, abdomen, and pelvis with IV contrast showed RUL mass with mediastinal adenopathy. Labs revealed Hep C and treponema antibodies were positive. Hep C PCR pending. Penicillin G was administered. A sepsis workup was completed and antibiotics were initiated. An MRI brain with and without contrast was ordered, but patient was unable to complete due to persistent movement after sedatives were administered. Prior to MRI, he was alert but drowsy. He was able to tell me his name, but told me he was 53 years old, the year was 2003, and he did not know the current place or reason for being here. He had generalized weakness but was slightly more weak on the right side. He followed simple commands, but did not attempt to follow more complex commands. MRI favors neoplastic brain lesions. CSF studies pending. 7/4/2025: Extubated without complications. On 7/7 underwent SOC crani for tumor resection. Started on steroids.        7/16 Transfer to hospital medicine. admitted with generalized weakness, falls, and paucity of speech presenting with multiple ring enhancing lesions on brain CT - CTHead 7/1 showed multiple brain masses concerning for mets with surrounding edema, can't rule out abscesses. Mass in cerebellum with effacement of 4th ventricule outflow with  concern for developing hydrocephalus. MRI brain 7/1: non diagnostic due to motion. CT CAP 7/1: spiculated Right lung mass and lymph node adenopathy in chest and neck concerning for metastatic disease. MRI Brain W/WO 7/2: multifocal enhancing lesions c/f metastatic disease, largest R cerebellum w/mass effect on 4th. now s/p R SOC for tumor resection on 7/7. Post op CTH/MRI 7/7: anticipate post operative changes, hemostatic products left at superior/medial border of resection cavity. EVD removed 7/14,  posterior incision without  pseudomeningocele. Dexamthasone weaned to 2 BID per NCC, continue GI ppx while on steroid. on bactrim for PCP prophylaxis.  Admitted form [facility], currently on phenobarb taper and scheduled zyprexa and clonidine for ETOH and heroin use. Pending rehab placement. Needs OP follow up with NSGY (continue decadron till then) and Radiation oncology team. Pathology is lung carcinoma but Tempus and PD-L1 testing are pending. on NGT feeds impact peptide. SLP following. on 4 point restraints.  CTHead 7/14 -Ventricles remain somewhat prominent but without overt hydrocephalus or significant change in size following EVD removal.Evolving recent postsurgical change in the posterior fossa. No new intracranial hemorrhage or major vascular distribution infarct. AAO to self. on UE restraints.  MBS 7/16.              Interval History: no acute issues overnight. No pain complaints; still awaiting further improvement with SLP.  Possible MBSS per SLP next week.     Review of Systems  Objective:     Vital Signs (Most Recent):  Temp: 98.7 °F (37.1 °C) (07/18/25 0936)  Pulse: 85 (07/18/25 0936)  Resp: 16 (07/18/25 0936)  BP: 111/80 (07/18/25 0936)  SpO2: 96 % (07/18/25 0936) Vital Signs (24h Range):  Temp:  [98.2 °F (36.8 °C)-98.7 °F (37.1 °C)] 98.7 °F (37.1 °C)  Pulse:  [74-92] 85  Resp:  [16-18] 16  SpO2:  [95 %-98 %] 96 %  BP: (111-147)/(78-89) 111/80     Weight: 81.1 kg (178 lb 12.7 oz)  Body mass index is 23.59  kg/m².    Intake/Output Summary (Last 24 hours) at 7/18/2025 1013  Last data filed at 7/18/2025 0600  Gross per 24 hour   Intake 820 ml   Output 500 ml   Net 320 ml         Physical Exam      Sleeping, awoken, hard to understand speech, mildly slurred;   clear lungs BL, unlabored breathing, on RA, no cyanosis  Hrt sounds RRR  AA, NAD  no facial or pupillary asymmetry noted, unchanged slurred speech  5/5 fist  and hip flexion BL  no obvious UE and LE edema  NGT in place  Oriented to self; not to place nor time      Assessment & Plan  Ataxia    2/2 cerebellar lesion   PT/OT -> recommending acute rehab once medically appropriate for discharge         Polysubstance abuse     Was brought in from [facility], where he has been for the past month  Educate on cessation when appropriate  On phenobarbital for agitation, no signs of EtOH withdrawal -> phenobarb taper     7/16 to completed phenobarbital taper today     Brain lesion     CT head without contrast revealed multiple lesions of hypodensity with a hyperdense rim, notably in the left temporal lobe and right cerebellum.  additional small hyperdensities in the right frontoparietal and left parasagittal parietal lobe.  mass effect and partial effacement of the 4th ventricle secondary to the cerebellar lesion with developing hydrocephalus without MLS. CT of the chest, abdomen, and pelvis with IV contrast showed RUL mass with mediastinal adenopathy. Hep C and treponema antibodies were positive. Hep C PCR pending. Penicillin G was administered. A sepsis workup was completed and antibiotics were initiated. Intubated to get MRI. S/p EVD. Extubated 7/4/25 without difficulty.     s/p SOC craniotomy for tumor resection on 7/7  Dex 2 mg BID, no plans to taper further pending outpt f/u  Bactrim MWF for PJP ppx given plan for prolonged steroid taper  EVD removed 7/14  Zyprexa BID for agitation  Phenobarb taper to off given improved agitation   Clonidine 0.3 mg TID for  agitation/opioid dependence/hypertension  Avoid versed for agitation  Tube feeds at goal, SLP following for swallow  Folate/thiamine/MV  SBP <160     Needs OP follow up with NSGY (continue decadron till then) and Radiation oncology team. Pathology is lung carcinoma but Tempus and PD-L1 testing are pending. on NGT feeds impact peptide. SLP following. on 4 point restraints.    CTHead 7/14 -Ventricles remain somewhat prominent but without overt hydrocephalus or significant change in size following EVD removal.Evolving recent postsurgical change in the posterior fossa. No new intracranial hemorrhage or major vascular distribution infarct. AAO to self. on  UE restraints.    SLP          Gait disturbance     PT/OT eval - rehab     Hepatitis C antibody positive     PCR negative  No known history of Hep C per patient        Mass of upper lobe of right lung     Noted on CXR and CT chest with IV contrast  Saturating well on RA; No PTX on CT-chest  No lung consolidations or obstructive process  No known history of cancer  Intra-op brain biopsy frozen pathology suggestive of metastatic adenocarcinoma, 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  Will need outpt rad onc and med onc f/u      Positive serology for syphilis  s/p ID eval -  Positive Treponema pallidum antibody. RPR 1:1 suggestive of false positive or past treated infection. Received penicillin G 2.4 million units x 1 on 7/1. CSF analysis not suggestive of neurosyphilis at this time . blood RPR and CSF VDRL 7/2 negative        Essential hypertension  Patient's blood pressure range in the last 24 hours was: BP  Min: 111/80  Max: 147/89.The patient's inpatient anti-hypertensive regimen is listed below:  Current Antihypertensives  losartan tablet 100 mg, Daily, Per NG tube  cloNIDine tablet 0.3 mg, Every 8 hours, Per NG tube  amLODIPine tablet 10 mg, Daily, Per NG tube  carvediloL tablet 6.25 mg, 2 times daily, Per NG tube      - Goal SBP<160         Encephalopathy, metabolic     See primary  problem  Slowly improving   on clonidine 0.3mg TID and olanzapine 20mg BID    Anemia    Patient's with Normocytic anemia.. Hemoglobin stable. Etiology likely due to chronic disease .  Current CBC reviewed-    Recent Labs   Lab 07/16/25  0440 07/17/25  0327 07/18/25  0928   HGB 11.0* 11.2* 11.0*         Component Value Date/Time    MCV 94 07/18/2025 0928    RDW 13.2 07/18/2025 0928     Monitor CBC and transfuse if H/H drops below 7/21.    Dysphagia  SLP , tube feeds for now; anticipate MBSS on 7/21  Agitation      Vasogenic cerebral edema      Pseudomeningocele      Brain compression      Obstructive hydrocephalus      Metastasis to brain  See above; on steroids    Cancer Staging   No matching staging information was found for the patient.      VTE Risk Mitigation (From admission, onward)           Ordered     enoxaparin injection 40 mg  Every 24 hours         07/15/25 1247     IP VTE LOW RISK PATIENT  Once         07/01/25 2049     Place sequential compression device  Until discontinued         07/01/25 2049                    Discharge Planning   CARA: 7/22/2025     Code Status: Full Code   Medical Readiness for Discharge Date:   Discharge Plan A: Rehab   Discharge Delays: (!) Change in Medical Condition             Deng Gutiérrez MD  Department of Hospital Medicine   [facility] - Neurosurgery ([facility])

## 2025-07-18 NOTE — SUBJECTIVE & OBJECTIVE
Interval History: no acute issues overnight. No pain complaints; still awaiting further improvement with SLP.  Possible MBSS per SLP next week.     Review of Systems  Objective:     Vital Signs (Most Recent):  Temp: 98.7 °F (37.1 °C) (07/18/25 0936)  Pulse: 85 (07/18/25 0936)  Resp: 16 (07/18/25 0936)  BP: 111/80 (07/18/25 0936)  SpO2: 96 % (07/18/25 0936) Vital Signs (24h Range):  Temp:  [98.2 °F (36.8 °C)-98.7 °F (37.1 °C)] 98.7 °F (37.1 °C)  Pulse:  [74-92] 85  Resp:  [16-18] 16  SpO2:  [95 %-98 %] 96 %  BP: (111-147)/(78-89) 111/80     Weight: 81.1 kg (178 lb 12.7 oz)  Body mass index is 23.59 kg/m².    Intake/Output Summary (Last 24 hours) at 7/18/2025 1013  Last data filed at 7/18/2025 0600  Gross per 24 hour   Intake 820 ml   Output 500 ml   Net 320 ml         Physical Exam      Sleeping, awoken, hard to understand speech, mildly slurred;   clear lungs BL, unlabored breathing, on RA, no cyanosis  Hrt sounds RRR  AA, NAD  no facial or pupillary asymmetry noted, no slurred speech  5/5 fist  and hip flexion BL  no obvious UE and LE edema  NGT in place  Oriented to self; not to place nor time

## 2025-07-18 NOTE — PLAN OF CARE
Problem: Adult Inpatient Plan of Care  Goal: Plan of Care Review  Outcome: Progressing  Goal: Patient-Specific Goal (Individualized)  Description: Pt will maintain sbp below 160  Outcome: Progressing  Goal: Absence of Hospital-Acquired Illness or Injury  Outcome: Progressing  Goal: Optimal Comfort and Wellbeing  Outcome: Progressing  Goal: Readiness for Transition of Care  Outcome: Progressing     Problem: Infection  Goal: Absence of Infection Signs and Symptoms  Outcome: Progressing     Problem: Skin Injury Risk Increased  Goal: Skin Health and Integrity  Outcome: Progressing     Problem: Delirium  Goal: Optimal Coping  Outcome: Progressing  Goal: Improved Behavioral Control  Outcome: Progressing  Goal: Improved Attention and Thought Clarity  Outcome: Progressing  Goal: Improved Sleep  Outcome: Progressing     Problem: Fall Injury Risk  Goal: Absence of Fall and Fall-Related Injury  Outcome: Progressing     Problem: Restraint, Nonviolent  Goal: Absence of Harm or Injury  Outcome: Progressing     Problem: Wound  Goal: Optimal Coping  Outcome: Progressing  Goal: Optimal Functional Ability  Outcome: Progressing  Goal: Absence of Infection Signs and Symptoms  Outcome: Progressing  Goal: Improved Oral Intake  Outcome: Progressing  Goal: Optimal Pain Control and Function  Outcome: Progressing  Goal: Skin Health and Integrity  Outcome: Progressing  Goal: Optimal Wound Healing  Outcome: Progressing     Problem: Acute Kidney Injury/Impairment  Goal: Fluid and Electrolyte Balance  Outcome: Progressing  Goal: Improved Oral Intake  Outcome: Progressing  Goal: Effective Renal Function  Outcome: Progressing     POC updated and reviewed with the patient at bedside. Questions regarding POC were encouraged and addressed. VSS, see flowsheets. Tele maintained per provider's order. Patient is AO x 2 at this time. No acute events noted during shift. Pain/Nausea management using PRN medications. See MAR for medication administration  details. Seizure, fall, and safety precautions maintained. No signs of injury noted over night. Upon exiting room, patient's bed locked in low position, side rails up x 3, bed alarm on, with call light within reach. Instructed patient to call staff for mobility, verbalized understanding.  No acute signs of distress noted at this time.

## 2025-07-18 NOTE — NURSING
@0500 Pt agitated, trying to climb out of bed, and pt removed ng tube. Team notified. Haloperidol given x 1. NG tube replaced and STAT X-ray ordered.

## 2025-07-18 NOTE — PLAN OF CARE
Problem: Adult Inpatient Plan of Care  Goal: Patient-Specific Goal (Individualized)  Description: Pt will maintain sbp below 160  Outcome: Progressing  Flowsheets (Taken 7/18/2025 0329)  Individualized Care Needs: warm blankets  Anxieties, Fears or Concerns: none     Problem: Fall Injury Risk  Goal: Absence of Fall and Fall-Related Injury  Outcome: Progressing  Intervention: Identify and Manage Contributors  Flowsheets (Taken 7/18/2025 0329)  Self-Care Promotion: independence encouraged  Medication Review/Management: medications reviewed  Intervention: Promote Injury-Free Environment  Flowsheets (Taken 7/18/2025 0329)  Safety Promotion/Fall Prevention: bed alarm set     Problem: Restraint, Nonviolent  Goal: Absence of Harm or Injury  Outcome: Progressing  Intervention: Implement Least Restrictive Safety Strategies  Flowsheets (Taken 7/18/2025 0329)  Less Restrictive Alternative: 1:1 observation maintained  Intervention: Protect Dignity, Rights and Personal Wellbeing  Flowsheets (Taken 7/18/2025 0329)  Trust Relationship/Rapport:   care explained   choices provided  Intervention: Protect Skin and Joint Integrity  Flowsheets (Taken 7/18/2025 0329)  Body Position: position changed independently  Range of Motion: active ROM (range of motion) encouraged  Intervention: Education  Flowsheets (Taken 7/18/2025 0329)  Discontinuation Criteria: Absence of behavior that required restraint  Criteria Explained: Yes  Patient's Response: NL  Patient / Family Notification: Patient  Patient / Family Teaching: Need for restraint  Intervention: Restraint Monitoring Q2 hours w/Assessment for Injury  Flowsheets (Taken 7/18/2025 0329)  Observed Emotional State: restless  Visual Check: SD  Circulation: NS  Range of Motion: D  Fluids: N  Food/Meal:   N   TPN  Elimination: UC  Pain Rating (0-10): Rest: 0  Comfort Measures: Repositioned  Assessed readiness for DC: Yes  Privacy Maintained: Yes  Vital Signs per MD order: Yes  Intervention:  Restraint Injuries Monitor Q2 hours  Flowsheets (Taken 7/18/2025 5021)  Injuries Noted: None     @2250 Pt agitated and trying to climb out of bed. Team notified. Haloperidol given x 1.    @0500 Pt agitated, trying to climb out of bed, and pt removed ng tube. Team notified. Haloperidol given x 1. NG tube replaced and STAT-xray ordered.    Seizure precautions maintained. VSS. Bed in lowest position, side rails x 3, and call light in use.

## 2025-07-18 NOTE — ASSESSMENT & PLAN NOTE
See above; on steroids    Cancer Staging   No matching staging information was found for the patient.

## 2025-07-19 LAB
ABSOLUTE EOSINOPHIL (OHS): 0.05 K/UL
ABSOLUTE MONOCYTE (OHS): 0.52 K/UL (ref 0.3–1)
ABSOLUTE NEUTROPHIL COUNT (OHS): 4 K/UL (ref 1.8–7.7)
ALBUMIN SERPL BCP-MCNC: 3.3 G/DL (ref 3.5–5.2)
ALP SERPL-CCNC: 85 UNIT/L (ref 40–150)
ALT SERPL W/O P-5'-P-CCNC: 22 UNIT/L (ref 10–44)
ANION GAP (OHS): 10 MMOL/L (ref 8–16)
AST SERPL-CCNC: 31 UNIT/L (ref 11–45)
BASOPHILS # BLD AUTO: 0.07 K/UL
BASOPHILS NFR BLD AUTO: 1.2 %
BILIRUB SERPL-MCNC: 0.2 MG/DL (ref 0.1–1)
BUN SERPL-MCNC: 32 MG/DL (ref 8–23)
CALCIUM SERPL-MCNC: 8.8 MG/DL (ref 8.7–10.5)
CHLORIDE SERPL-SCNC: 108 MMOL/L (ref 95–110)
CO2 SERPL-SCNC: 17 MMOL/L (ref 23–29)
CREAT SERPL-MCNC: 0.9 MG/DL (ref 0.5–1.4)
ERYTHROCYTE [DISTWIDTH] IN BLOOD BY AUTOMATED COUNT: 13.3 % (ref 11.5–14.5)
GFR SERPLBLD CREATININE-BSD FMLA CKD-EPI: >60 ML/MIN/1.73/M2
GLUCOSE SERPL-MCNC: 111 MG/DL (ref 70–110)
HCT VFR BLD AUTO: 32 % (ref 40–54)
HGB BLD-MCNC: 10.7 GM/DL (ref 14–18)
IMM GRANULOCYTES # BLD AUTO: 0.01 K/UL (ref 0–0.04)
IMM GRANULOCYTES NFR BLD AUTO: 0.2 % (ref 0–0.5)
LYMPHOCYTES # BLD AUTO: 1.11 K/UL (ref 1–4.8)
MAGNESIUM SERPL-MCNC: 1.8 MG/DL (ref 1.6–2.6)
MCH RBC QN AUTO: 31.2 PG (ref 27–31)
MCHC RBC AUTO-ENTMCNC: 33.4 G/DL (ref 32–36)
MCV RBC AUTO: 93 FL (ref 82–98)
NUCLEATED RBC (/100WBC) (OHS): 0 /100 WBC
PHOSPHATE SERPL-MCNC: 3.3 MG/DL (ref 2.7–4.5)
PLATELET # BLD AUTO: 227 K/UL (ref 150–450)
PMV BLD AUTO: 9.4 FL (ref 9.2–12.9)
POTASSIUM SERPL-SCNC: 4.4 MMOL/L (ref 3.5–5.1)
PROT SERPL-MCNC: 6.8 GM/DL (ref 6–8.4)
RBC # BLD AUTO: 3.43 M/UL (ref 4.6–6.2)
RELATIVE EOSINOPHIL (OHS): 0.9 %
RELATIVE LYMPHOCYTE (OHS): 19.3 % (ref 18–48)
RELATIVE MONOCYTE (OHS): 9 % (ref 4–15)
RELATIVE NEUTROPHIL (OHS): 69.4 % (ref 38–73)
SODIUM SERPL-SCNC: 135 MMOL/L (ref 136–145)
WBC # BLD AUTO: 5.76 K/UL (ref 3.9–12.7)

## 2025-07-19 PROCEDURE — 63600175 PHARM REV CODE 636 W HCPCS: Performed by: PHYSICIAN ASSISTANT

## 2025-07-19 PROCEDURE — 85025 COMPLETE CBC W/AUTO DIFF WBC: CPT | Performed by: PHYSICIAN ASSISTANT

## 2025-07-19 PROCEDURE — 11000001 HC ACUTE MED/SURG PRIVATE ROOM

## 2025-07-19 PROCEDURE — 25000003 PHARM REV CODE 250: Performed by: PHYSICIAN ASSISTANT

## 2025-07-19 PROCEDURE — 99223 1ST HOSP IP/OBS HIGH 75: CPT | Mod: GC,,, | Performed by: HOSPITALIST

## 2025-07-19 PROCEDURE — 80053 COMPREHEN METABOLIC PANEL: CPT | Performed by: PHYSICIAN ASSISTANT

## 2025-07-19 PROCEDURE — 83735 ASSAY OF MAGNESIUM: CPT | Performed by: PHYSICIAN ASSISTANT

## 2025-07-19 PROCEDURE — 94761 N-INVAS EAR/PLS OXIMETRY MLT: CPT

## 2025-07-19 PROCEDURE — 84100 ASSAY OF PHOSPHORUS: CPT | Performed by: PHYSICIAN ASSISTANT

## 2025-07-19 PROCEDURE — 63600175 PHARM REV CODE 636 W HCPCS: Performed by: HOSPITALIST

## 2025-07-19 PROCEDURE — 36415 COLL VENOUS BLD VENIPUNCTURE: CPT | Performed by: PHYSICIAN ASSISTANT

## 2025-07-19 RX ADMIN — CLONIDINE HYDROCHLORIDE 0.3 MG: 0.2 TABLET ORAL at 02:07

## 2025-07-19 RX ADMIN — AMLODIPINE BESYLATE 10 MG: 10 TABLET ORAL at 08:07

## 2025-07-19 RX ADMIN — Medication 100 MG: at 08:07

## 2025-07-19 RX ADMIN — FAMOTIDINE 20 MG: 20 TABLET, FILM COATED ORAL at 11:07

## 2025-07-19 RX ADMIN — DEXAMETHASONE 2 MG: 1 TABLET ORAL at 11:07

## 2025-07-19 RX ADMIN — LOSARTAN POTASSIUM 100 MG: 50 TABLET, FILM COATED ORAL at 08:07

## 2025-07-19 RX ADMIN — CLONIDINE HYDROCHLORIDE 0.3 MG: 0.2 TABLET ORAL at 11:07

## 2025-07-19 RX ADMIN — CARVEDILOL 6.25 MG: 6.25 TABLET, FILM COATED ORAL at 11:07

## 2025-07-19 RX ADMIN — OLANZAPINE 20 MG: 5 TABLET, FILM COATED ORAL at 08:07

## 2025-07-19 RX ADMIN — OLANZAPINE 20 MG: 5 TABLET, FILM COATED ORAL at 11:07

## 2025-07-19 RX ADMIN — ENOXAPARIN SODIUM 40 MG: 40 INJECTION SUBCUTANEOUS at 05:07

## 2025-07-19 RX ADMIN — FAMOTIDINE 20 MG: 20 TABLET, FILM COATED ORAL at 08:07

## 2025-07-19 RX ADMIN — CLONIDINE HYDROCHLORIDE 0.3 MG: 0.2 TABLET ORAL at 05:07

## 2025-07-19 RX ADMIN — FOLIC ACID 1 MG: 1 TABLET ORAL at 08:07

## 2025-07-19 RX ADMIN — DEXAMETHASONE 2 MG: 1 TABLET ORAL at 08:07

## 2025-07-19 RX ADMIN — THERA TABS 1 TABLET: TAB at 08:07

## 2025-07-19 RX ADMIN — HALOPERIDOL LACTATE 2 MG: 5 INJECTION, SOLUTION INTRAMUSCULAR at 02:07

## 2025-07-19 RX ADMIN — CARVEDILOL 6.25 MG: 6.25 TABLET, FILM COATED ORAL at 08:07

## 2025-07-19 NOTE — PLAN OF CARE
POC updated and reviewed with the patient at the bedside. Questions regarding POC were encouraged and addressed. VSS, see flowsheets. Tele maintained per provider's order. Patient is AOX 3 at this time. Seizure, fall, and safety precautions maintained, no signs of injury noted during shift. Patient repositioned independently in bed for comfort. Upon exiting room, patient's bed locked in low position, side rails up x 4, bed alarm set, with call light within reach. Instructed patient to call staff for mobility, verbalized understanding.     Patient alternately asks for water and cookies. PRN IV haloperidol given for restlessness. Placed on both hand mittens and right ankle soft restrainer as patient was noted to have removed NG tube the other night and was trying to get out of bed multiple times despite coaxing. Was able to calm down and sleep after additional restrainers were placed.    Problem: Adult Inpatient Plan of Care  Goal: Plan of Care Review  Outcome: Progressing  Goal: Patient-Specific Goal (Individualized)  Description: Pt will maintain sbp below 160  Outcome: Progressing  Goal: Absence of Hospital-Acquired Illness or Injury  Outcome: Progressing  Goal: Optimal Comfort and Wellbeing  Outcome: Progressing  Goal: Readiness for Transition of Care  Outcome: Progressing     Problem: Infection  Goal: Absence of Infection Signs and Symptoms  Outcome: Progressing     Problem: Skin Injury Risk Increased  Goal: Skin Health and Integrity  Outcome: Progressing     Problem: Delirium  Goal: Optimal Coping  Outcome: Progressing  Goal: Improved Behavioral Control  Outcome: Progressing  Goal: Improved Attention and Thought Clarity  Outcome: Progressing  Goal: Improved Sleep  Outcome: Progressing     Problem: Fall Injury Risk  Goal: Absence of Fall and Fall-Related Injury  Outcome: Progressing     Problem: Restraint, Nonviolent  Goal: Absence of Harm or Injury  Outcome: Progressing     Problem: Wound  Goal: Optimal  Coping  Outcome: Progressing  Goal: Optimal Functional Ability  Outcome: Progressing  Goal: Absence of Infection Signs and Symptoms  Outcome: Progressing  Goal: Improved Oral Intake  Outcome: Progressing  Goal: Optimal Pain Control and Function  Outcome: Progressing  Goal: Skin Health and Integrity  Outcome: Progressing  Goal: Optimal Wound Healing  Outcome: Progressing     Problem: Acute Kidney Injury/Impairment  Goal: Fluid and Electrolyte Balance  Outcome: Progressing  Goal: Improved Oral Intake  Outcome: Progressing  Goal: Effective Renal Function  Outcome: Progressing

## 2025-07-19 NOTE — HPI
oGran Carlos is a 69-year-old male with alcohol and polysubstance abuse [facility]25 to [facility] for generalized weakness, balance difficulties, and gait abnormalities. CT head revealed multiple brain masses concerning for metastatic disease with surrounding edema. A mass was present in the cerebellum with effacement of 4th ventricle outflow with concern for developing hydrocephalus. CT CAP 7/1 revealed a spiculated right lung mass and lymph node adenopathy in the chest and neck concerning for metastatic disease. The patient had an EVD placed 7/2 and underwent craniotomy and cerebellar mass resection on 7/7. 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 Oncology was re-consulted to discuss treatment options and prognosis in the setting of dysphagia.

## 2025-07-19 NOTE — PROGRESS NOTES
Alex Henson - Neurosurgery ([facility])  [facility] Medicine  Progress Note    Patient Name: Goran Carlos  MRN: [id number]  Patient Class: IP- Inpatient   Admission Date: 7/1/2025  Length of Stay: 18 days  Attending Physician: Deng Gutiérrez MD  Primary Care Provider: No primary care provider on file.        Subjective     Principal Problem:Ataxia        HPI:  69 y/o M presenting from [facility] for generalized weakness for about a week now. History obtained from rehab nurse. She reported that he got to their facility about a month ago and, at baseline, walks with a cane and is alert and oriented. His nurse noticed that he has seemed weaker over the past week and over the past 3 days has had increased balance disturbance and gait issues. They also noticed that he has been speaking more softly in his speech is harder to understand. He has not had any infectious symptoms. He fell yesterday, unsure if he hit his head. CT head without contrast revealed multiple lesions of hypodensity with a hyperdense rim, notably in the left temporal lobe and right cerebellum. There were additional small hyperdensities in the right frontoparietal and left parasagittal parietal lobe. There is mass effect and partial effacement of the 4th ventricle secondary to the cerebellar lesion with developing hydrocephalus without MLS. CT of the chest, abdomen, and pelvis with IV contrast showed RUL mass with mediastinal adenopathy. Labs revealed Hep C and treponema antibodies were positive. Hep C PCR pending. Penicillin G was administered. A sepsis workup was completed and antibiotics were initiated. An MRI brain with and without contrast was ordered, but patient was unable to complete due to persistent movement after sedatives were administered. Prior to MRI, he was alert but drowsy. He was able to tell me his name, but told me he was 53 years old, the year was 2003, and he did not know the current place or reason for being here. He had generalized  weakness but was slightly more weak on the right side. He followed simple commands, but did not attempt to follow more complex commands. He is admitted to [facility] with NSGY consult.          Overview/Hospital Course:  69 y/o M presenting from [facility] for generalized weakness and dysarthria. CT head without contrast revealed multiple lesions of hypodensity with a hyperdense rim, notably in the left temporal lobe and right cerebellum.  additional small hyperdensities in the right frontoparietal and left parasagittal parietal lobe. mass effect and partial effacement of the 4th ventricle secondary to the cerebellar lesion with developing hydrocephalus without MLS. CT of the chest, abdomen, and pelvis with IV contrast showed RUL mass with mediastinal adenopathy. Labs revealed Hep C and treponema antibodies were positive. Hep C PCR pending. Penicillin G was administered. A sepsis workup was completed and antibiotics were initiated. An MRI brain with and without contrast was ordered, but patient was unable to complete due to persistent movement after sedatives were administered. Prior to MRI, he was alert but drowsy. He was able to tell me his name, but told me he was 53 years old, the year was 2003, and he did not know the current place or reason for being here. He had generalized weakness but was slightly more weak on the right side. He followed simple commands, but did not attempt to follow more complex commands. MRI favors neoplastic brain lesions. CSF studies pending. 7/4/2025: Extubated without complications. On 7/7 underwent SOC crani for tumor resection. Started on steroids.        7/16 Transfer to hospital medicine. admitted with generalized weakness, falls, and paucity of speech presenting with multiple ring enhancing lesions on brain CT - CTHead 7/1 showed multiple brain masses concerning for mets with surrounding edema, can't rule out abscesses. Mass in cerebellum with effacement of 4th ventricule outflow with  concern for developing hydrocephalus. MRI brain 7/1: non diagnostic due to motion. CT CAP 7/1: spiculated Right lung mass and lymph node adenopathy in chest and neck concerning for metastatic disease. MRI Brain W/WO 7/2: multifocal enhancing lesions c/f metastatic disease, largest R cerebellum w/mass effect on 4th. now s/p R SOC for tumor resection on 7/7. Post op CTH/MRI 7/7: anticipate post operative changes, hemostatic products left at superior/medial border of resection cavity. EVD removed 7/14,  posterior incision without  pseudomeningocele. Dexamthasone weaned to 2 BID per NCC, continue GI ppx while on steroid. on bactrim for PCP prophylaxis.  Admitted form [facility], currently on phenobarb taper and scheduled zyprexa and clonidine for ETOH and heroin use. Pending rehab placement. Needs OP follow up with NSGY (continue decadron till then) and Radiation oncology team. Pathology is lung carcinoma but Tempus and PD-L1 testing are pending. on NGT feeds impact peptide. SLP following. on 4 point restraints.  CTHead 7/14 -Ventricles remain somewhat prominent but without overt hydrocephalus or significant change in size following EVD removal.Evolving recent postsurgical change in the posterior fossa. No new intracranial hemorrhage or major vascular distribution infarct. AAO to self. on UE restraints.  [facility] 7/16.              Interval History:  No reports of any chest pain no shortness a breath.  Patient confused today afebrile.    Review of Systems  Objective:     Vital Signs (Most Recent):  Temp: 98.9 °F (37.2 °C) (07/19/25 1134)  Pulse: 101 (07/19/25 1134)  Resp: 19 (07/19/25 1134)  BP: (!) 146/75 (07/19/25 1134)  SpO2: 100 % (07/19/25 1134) Vital Signs (24h Range):  Temp:  [97.7 °F (36.5 °C)-98.9 °F (37.2 °C)] 98.9 °F (37.2 °C)  Pulse:  [] 101  Resp:  [18-20] 19  SpO2:  [94 %-100 %] 100 %  BP: (114-148)/(69-85) 146/75     Weight: 81.1 kg (178 lb 12.7 oz)  Body mass index is 23.59 kg/m².    Intake/Output  Summary (Last 24 hours) at 7/19/2025 1522  Last data filed at 7/19/2025 1510  Gross per 24 hour   Intake 2055 ml   Output 1150 ml   Net 905 ml         Physical Exam      Clear lungs bilaterally, unlabored breathing, room air, no cyanosis   No facial droop, hard to understand speech just as yesterday   NG tube in place   Heart sounds indicate a regular rate and rhythm   5/5  UE strength and hip flexor strength bilaterally   No obvious upper nor lower extremity edema   Maintains good eye contact         Assessment & Plan  Ataxia    2/2 cerebellar lesion   PT/OT -> recommending acute rehab once medically appropriate for discharge         Polysubstance abuse     Was brought in from [facility], where he has been for the past month  Educate on cessation when appropriate  On phenobarbital for agitation, no signs of EtOH withdrawal -> phenobarb taper     7/16 to completed phenobarbital taper today     Brain lesion     CT head without contrast revealed multiple lesions of hypodensity with a hyperdense rim, notably in the left temporal lobe and right cerebellum.  additional small hyperdensities in the right frontoparietal and left parasagittal parietal lobe.  mass effect and partial effacement of the 4th ventricle secondary to the cerebellar lesion with developing hydrocephalus without MLS. CT of the chest, abdomen, and pelvis with IV contrast showed RUL mass with mediastinal adenopathy. Hep C and treponema antibodies were positive. Hep C PCR pending. Penicillin G was administered. A sepsis workup was completed and antibiotics were initiated. Intubated to get MRI. S/p EVD. Extubated 7/4/25 without difficulty.     s/p SOC craniotomy for tumor resection on 7/7  Dex 2 mg BID, no plans to taper further pending outpt f/u  Bactrim MWF for PJP ppx given plan for prolonged steroid taper  EVD removed 7/14  Zyprexa BID for agitation  Phenobarb taper to off given improved agitation   Clonidine 0.3 mg TID for agitation/opioid  dependence/hypertension  Avoid versed for agitation  Tube feeds at goal, SLP following for swallow  Folate/thiamine/MV  SBP <160     Needs OP follow up with NSGY (continue decadron till then) and Radiation oncology team. Pathology is lung carcinoma but Tempus and PD-L1 testing are pending. on NGT feeds impact peptide. SLP following. on 4 point restraints.    CTHead 7/14 -Ventricles remain somewhat prominent but without overt hydrocephalus or significant change in size following EVD removal.Evolving recent postsurgical change in the posterior fossa. No new intracranial hemorrhage or major vascular distribution infarct. AAO to self. on  UE restraints.    SLP          Gait disturbance     PT/OT eval - rehab     Hepatitis C antibody positive     PCR negative  No known history of Hep C per patient        Mass of upper lobe of right lung     Noted on CXR and CT chest with IV contrast  Saturating well on RA; No PTX on CT-chest  No lung consolidations or obstructive process  No known history of cancer  Intra-op brain biopsy frozen pathology suggestive of metastatic adenocarcinoma, 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  Will need outpt rad onc and med onc f/u      Positive serology for syphilis  s/p ID eval -  Positive Treponema pallidum antibody. RPR 1:1 suggestive of false positive or past treated infection. Received penicillin G 2.4 million units x 1 on 7/1. CSF analysis not suggestive of neurosyphilis at this time . blood RPR and CSF VDRL 7/2 negative        Essential hypertension  Patient's blood pressure range in the last 24 hours was: BP  Min: 114/74  Max: 148/75.The patient's inpatient anti-hypertensive regimen is listed below:  Current Antihypertensives  losartan tablet 100 mg, Daily, Per NG tube  cloNIDine tablet 0.3 mg, Every 8 hours, Per NG tube  amLODIPine tablet 10 mg, Daily, Per NG tube  carvediloL tablet 6.25 mg, 2 times daily, Per NG tube      - Goal SBP<160         Encephalopathy, metabolic     See primary problem  Slowly  improving   on clonidine 0.3mg TID and olanzapine 20mg BID    Anemia    Patient's with Normocytic anemia.. Hemoglobin stable. Etiology likely due to chronic disease .  Current CBC reviewed-    Recent Labs   Lab 07/17/25  0327 07/18/25  0928 07/19/25  0503   HGB 11.2* 11.0* 10.7*         Component Value Date/Time    MCV 93 07/19/2025 0503    RDW 13.3 07/19/2025 0503     Monitor CBC and transfuse if H/H drops below 7/21.    Dysphagia  SLP , tube feeds for now; anticipate MBSS on 7/21  Agitation      Vasogenic cerebral edema      Pseudomeningocele      Brain compression      Obstructive hydrocephalus      Metastasis to brain  See above; on steroids    Cancer Staging   No matching staging information was found for the patient.      VTE Risk Mitigation (From admission, onward)           Ordered     enoxaparin injection 40 mg  Every 24 hours         07/15/25 1247     IP VTE LOW RISK PATIENT  Once         07/01/25 2049     Place sequential compression device  Until discontinued         07/01/25 2049                    Discharge Planning   CARA: 7/22/2025     Code Status: Full Code   Medical Readiness for Discharge Date:   Discharge Plan A: Rehab   Discharge Delays: (!) Change in Medical Condition              Please place Justification for DME      Deng Gutiérrez MD  Department of Hospital Medicine   [facility] - Neurosurgery ([facility])

## 2025-07-19 NOTE — SUBJECTIVE & OBJECTIVE
Interval History:  No reports of any chest pain no shortness a breath.  Patient confused today afebrile.    Review of Systems  Objective:     Vital Signs (Most Recent):  Temp: 98.9 °F (37.2 °C) (07/19/25 1134)  Pulse: 101 (07/19/25 1134)  Resp: 19 (07/19/25 1134)  BP: (!) 146/75 (07/19/25 1134)  SpO2: 100 % (07/19/25 1134) Vital Signs (24h Range):  Temp:  [97.7 °F (36.5 °C)-98.9 °F (37.2 °C)] 98.9 °F (37.2 °C)  Pulse:  [] 101  Resp:  [18-20] 19  SpO2:  [94 %-100 %] 100 %  BP: (114-148)/(69-85) 146/75     Weight: 81.1 kg (178 lb 12.7 oz)  Body mass index is 23.59 kg/m².    Intake/Output Summary (Last 24 hours) at 7/19/2025 1522  Last data filed at 7/19/2025 1510  Gross per 24 hour   Intake 2055 ml   Output 1150 ml   Net 905 ml         Physical Exam      Clear lungs bilaterally, unlabored breathing, room air, no cyanosis   No facial droop, hard to understand speech just as yesterday   NG tube in place   Heart sounds indicate a regular rate and rhythm   5/5  UE strength and hip flexor strength bilaterally   No obvious upper nor lower extremity edema   Maintains good eye contact

## 2025-07-19 NOTE — CONSULTS
Alex Henson - Neurosurgery ([facility])  Hematology/Oncology  Consult Note    Patient Name: Goran Carlos  MRN: [id number]  Admission Date: 7/1/2025  Hospital Length of Stay: 18 days  Code Status: Full Code   Attending Provider: Deng Gutiérrez MD  Consulting Provider: Christ Solorzano MD  Primary Care Physician: No primary care provider on file.  Principal Problem:Ataxia    Inpatient consult to Hematology/Oncology  Consult performed by: Christ Solorzano MD  Consult ordered by: Deng Gutiérrez MD        Subjective:     HPI:  Goran Carlos is a 69-year-old male with alcohol and polysubstance abuse who presented from [facility] and was admitted 7/1/25 to [facility] for generalized weakness, balance difficulties, and gait abnormalities. CT head revealed multiple brain masses concerning for metastatic disease with surrounding edema. A mass was present in the cerebellum with effacement of 4th ventricle outflow with concern for developing hydrocephalus. CT CAP 7/1 revealed a spiculated right lung mass and lymph node adenopathy in the chest and neck concerning for metastatic disease. The patient had an EVD placed 7/2 and underwent craniotomy and cerebellar mass resection on 7/7. 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 Oncology was re-consulted to discuss treatment options and prognosis in the setting of dysphagia.    Oncology Treatment Plan:   [No matching plan found]    Medications:  Continuous Infusions:  Scheduled Meds:   amLODIPine  10 mg Per NG tube Daily    carvediloL  6.25 mg Per NG tube BID    cloNIDine  0.3 mg Per NG tube Q8H    dexAMETHasone  2 mg Per NG tube Q12H    enoxparin  40 mg Subcutaneous Q24H (prophylaxis, 1700)    famotidine  20 mg Per NG tube BID    folic acid  1 mg Per NG tube Daily    losartan  100 mg Per NG tube Daily    multivitamin  1 tablet Per NG tube Daily    OLANZapine  20 mg Per NG tube BID    sulfamethoxazole-trimethoprim 800-160mg  1 tablet Per NG  tube Every Mon, Wed, Fri    thiamine  100 mg Per NG tube Daily     PRN Meds:  Current Facility-Administered Medications:     acetaminophen, 650 mg, Per NG tube, Q4H PRN    bisacodyL, 10 mg, Rectal, Daily PRN    haloperidol lactate, 2 mg, Intravenous, Q6H PRN    ondansetron, 4 mg, Intravenous, Q4H PRN    sodium chloride 0.9%, 10 mL, Intravenous, PRN     Review of patient's allergies indicates:  No Known Allergies     Past Medical History:   Diagnosis Date    ETOH abuse     Gait disturbance     Heroin abuse      Past Surgical History:   Procedure Laterality Date    SUBOCCIPITAL CRANIOTOMY Right 7/7/2025    Procedure: CRANIOTOMY, SUBOCCIPITAL;  Surgeon: Blane Larios DO;  Location: [facility] OR [address] [place] [address];  Service: Neurosurgery;  Laterality: Right;     Family History    None       Tobacco Use    Smoking status: Former     Types: Cigarettes    Smokeless tobacco: Not on file   Substance and Sexual Activity    Alcohol use: Not Currently    Drug use: Not Currently     Types: Heroin    Sexual activity: Not on file       Review of Systems  Objective:     Vital Signs (Most Recent):  Temp: 98.9 °F (37.2 °C) (07/19/25 1134)  Pulse: 101 (07/19/25 1134)  Resp: 19 (07/19/25 1134)  BP: (!) 146/75 (07/19/25 1134)  SpO2: 100 % (07/19/25 1134) Vital Signs (24h Range):  Temp:  [97.7 °F (36.5 °C)-98.9 °F (37.2 °C)] 98.9 °F (37.2 °C)  Pulse:  [] 101  Resp:  [18-20] 19  SpO2:  [94 %-100 %] 100 %  BP: (114-148)/(69-85) 146/75     Weight: 81.1 kg (178 lb 12.7 oz)  Body mass index is 23.59 kg/m².  Body surface area is 2.04 meters squared.      Intake/Output Summary (Last 24 hours) at 7/19/2025 1655  Last data filed at 7/19/2025 1510  Gross per 24 hour   Intake 2055 ml   Output 1150 ml   Net 905 ml        Physical Exam  Vitals and nursing note reviewed.   Constitutional:       General: He is not in acute distress.     Appearance: He is ill-appearing.   HENT:      Nose:      Comments: NG tube in place  Eyes:      General: No scleral  icterus.     Conjunctiva/sclera: Conjunctivae normal.   Cardiovascular:      Rate and Rhythm: Normal rate and regular rhythm.      Pulses: Normal pulses.      Heart sounds: Normal heart sounds.   Pulmonary:      Effort: Pulmonary effort is normal.      Breath sounds: Normal breath sounds.   Abdominal:      General: There is no distension.      Tenderness: There is no abdominal tenderness. There is no guarding.   Musculoskeletal:      Right lower leg: No edema.      Left lower leg: No edema.      Comments: Bilateral secured mittens. Bilateral ankle restraints.   Skin:     General: Skin is warm and dry.   Neurological:      Mental Status: He is alert. He is disoriented.          Significant Labs:   CBC:   Recent Labs   Lab 07/18/25  0928 07/19/25  0503   WBC 7.41 5.76   HGB 11.0* 10.7*   HCT 33.5* 32.0*    227   , CMP:   Recent Labs   Lab 07/18/25  0928 07/19/25  0503    135*   K 4.3 4.4    108   CO2 22* 17*   * 111*   BUN 27* 32*   CREATININE 0.8 0.9   CALCIUM 9.0 8.8   PROT 6.8 6.8   ALBUMIN 3.3* 3.3*   BILITOT 0.2 0.2   ALKPHOS 85 85   AST 30 31   ALT 26 22   ANIONGAP 8 10   , and All pertinent labs from the last 24 hours have been reviewed.    Diagnostic Results:  I have reviewed and interpreted all pertinent imaging results/findings within the past 24 hours.  Assessment/Plan:     Metastasis to brain  69-year-old male with poorly-differentiated lung carcinoma metastatic to the brain. Oncology was re-consulted to discuss treatment options and prognosis in the setting of dysphagia.    We had a lengthy discussion with the patient's daughter, Luis, via telephone. While hopeful for recovery, the patient is not currently a candidate for systemic chemotherapy. We discussed his mental status as a barrier to treatment and that we are concerned that the underlying intracranial metastatic disease is driving his mental status changes. Explained that he would need to be far more functional in  order to receive systemic cancer-directed treatment. We also discussed the potential role for radiation, again stressing that his mental status is likely not congruent with the ability to receive this type of treatment. His polysubstance abuse further complicates the picture and is another unfortunate barrier. We recommended against placement of a PEG tube in this setting as it would serve to prolong life in a questionably meaningful manner. We briefly discussed hospice but did not get into the details of what this entails.    Recommendations:  - Consult Palliative Medicine  - Consult to Radiation Oncology  - Agree with repeat MBSS        Thank you for your consult. I will sign off. Please contact us if you have any additional questions.    Christ Solorzano MD  Hematology/Oncology  Alex Henson - Neurosurgery ([facility])

## 2025-07-19 NOTE — ASSESSMENT & PLAN NOTE
Patient's blood pressure range in the last 24 hours was: BP  Min: 114/74  Max: 148/75.The patient's inpatient anti-hypertensive regimen is listed below:  Current Antihypertensives  losartan tablet 100 mg, Daily, Per NG tube  cloNIDine tablet 0.3 mg, Every 8 hours, Per NG tube  amLODIPine tablet 10 mg, Daily, Per NG tube  carvediloL tablet 6.25 mg, 2 times daily, Per NG tube      - Goal SBP<160

## 2025-07-19 NOTE — ASSESSMENT & PLAN NOTE
Patient's with Normocytic anemia.. Hemoglobin stable. Etiology likely due to chronic disease .  Current CBC reviewed-    Recent Labs   Lab 07/17/25  0327 07/18/25  0928 07/19/25  0503   HGB 11.2* 11.0* 10.7*         Component Value Date/Time    MCV 93 07/19/2025 0503    RDW 13.3 07/19/2025 0503     Monitor CBC and transfuse if H/H drops below 7/21.

## 2025-07-19 NOTE — ASSESSMENT & PLAN NOTE
69-year-old male with poorly-differentiated lung carcinoma metastatic to the brain. Oncology was re-consulted to discuss treatment options and prognosis in the setting of dysphagia.    We had a lengthy discussion with the patient's daughter, Luis, via telephone. While hopeful for recovery, the patient is not currently a candidate for systemic chemotherapy. We discussed his mental status as a barrier to treatment and that we are concerned that the underlying intracranial metastatic disease is driving his mental status changes. Explained that he would need to be far more functional in order to receive systemic cancer-directed treatment. We also discussed the potential role for radiation, again stressing that his mental status is likely not congruent with the ability to receive this type of treatment. His polysubstance abuse further complicates the picture and is another unfortunate barrier. We recommended against placement of a PEG tube in this setting as it would serve to prolong life in a questionably meaningful manner. We briefly discussed hospice but did not get into the details of what this entails.    Recommendations:  - Consult Palliative Medicine  - Consult to Radiation Oncology  - Agree with repeat MBSS

## 2025-07-19 NOTE — SUBJECTIVE & OBJECTIVE
Oncology Treatment Plan:   [No matching plan found]    Medications:  Continuous Infusions:  Scheduled Meds:   amLODIPine  10 mg Per NG tube Daily    carvediloL  6.25 mg Per NG tube BID    cloNIDine  0.3 mg Per NG tube Q8H    dexAMETHasone  2 mg Per NG tube Q12H    enoxparin  40 mg Subcutaneous Q24H (prophylaxis, 1700)    famotidine  20 mg Per NG tube BID    folic acid  1 mg Per NG tube Daily    losartan  100 mg Per NG tube Daily    multivitamin  1 tablet Per NG tube Daily    OLANZapine  20 mg Per NG tube BID    sulfamethoxazole-trimethoprim 800-160mg  1 tablet Per NG tube Every Mon, Wed, Fri    thiamine  100 mg Per NG tube Daily     PRN Meds:  Current Facility-Administered Medications:     acetaminophen, 650 mg, Per NG tube, Q4H PRN    bisacodyL, 10 mg, Rectal, Daily PRN    haloperidol lactate, 2 mg, Intravenous, Q6H PRN    ondansetron, 4 mg, Intravenous, Q4H PRN    sodium chloride 0.9%, 10 mL, Intravenous, PRN     Review of patient's allergies indicates:  No Known Allergies     Past Medical History:   Diagnosis Date    ETOH abuse     Gait disturbance     Heroin abuse      Past Surgical History:   Procedure Laterality Date    SUBOCCIPITAL CRANIOTOMY Right 7/7/2025    Procedure: CRANIOTOMY, SUBOCCIPITAL;  Surgeon: Blane Larios DO;  [facility] OR [address] [place] [address];  Service: Neurosurgery;  Laterality: Right;     Family History    None       Tobacco Use    Smoking status: Former     Types: Cigarettes    Smokeless tobacco: Not on file   Substance and Sexual Activity    Alcohol use: Not Currently    Drug use: Not Currently     Types: Heroin    Sexual activity: Not on file       Review of Systems  Objective:     Vital Signs (Most Recent):  Temp: 98.9 °F (37.2 °C) (07/19/25 1134)  Pulse: 101 (07/19/25 1134)  Resp: 19 (07/19/25 1134)  BP: (!) 146/75 (07/19/25 1134)  SpO2: 100 % (07/19/25 1134) Vital Signs (24h Range):  Temp:  [97.7 °F (36.5 °C)-98.9 °F (37.2 °C)] 98.9 °F (37.2 °C)  Pulse:  [] 101  Resp:  [18-20]  19  SpO2:  [94 %-100 %] 100 %  BP: (114-148)/(69-85) 146/75     Weight: 81.1 kg (178 lb 12.7 oz)  Body mass index is 23.59 kg/m².  Body surface area is 2.04 meters squared.      Intake/Output Summary (Last 24 hours) at 7/19/2025 1655  Last data filed at 7/19/2025 1510  Gross per 24 hour   Intake 2055 ml   Output 1150 ml   Net 905 ml        Physical Exam  Vitals and nursing note reviewed.   Constitutional:       General: He is not in acute distress.     Appearance: He is ill-appearing.   HENT:      Nose:      Comments: NG tube in place  Eyes:      General: No scleral icterus.     Conjunctiva/sclera: Conjunctivae normal.   Cardiovascular:      Rate and Rhythm: Normal rate and regular rhythm.      Pulses: Normal pulses.      Heart sounds: Normal heart sounds.   Pulmonary:      Effort: Pulmonary effort is normal.      Breath sounds: Normal breath sounds.   Abdominal:      General: There is no distension.      Tenderness: There is no abdominal tenderness. There is no guarding.   Musculoskeletal:      Right lower leg: No edema.      Left lower leg: No edema.      Comments: Bilateral secured mittens. Bilateral ankle restraints.   Skin:     General: Skin is warm and dry.   Neurological:      Mental Status: He is alert. He is disoriented.          Significant Labs:   CBC:   Recent Labs   Lab 07/18/25  0928 07/19/25  0503   WBC 7.41 5.76   HGB 11.0* 10.7*   HCT 33.5* 32.0*    227   , CMP:   Recent Labs   Lab 07/18/25  0928 07/19/25  0503    135*   K 4.3 4.4    108   CO2 22* 17*   * 111*   BUN 27* 32*   CREATININE 0.8 0.9   CALCIUM 9.0 8.8   PROT 6.8 6.8   ALBUMIN 3.3* 3.3*   BILITOT 0.2 0.2   ALKPHOS 85 85   AST 30 31   ALT 26 22   ANIONGAP 8 10   , and All pertinent labs from the last 24 hours have been reviewed.    Diagnostic Results:  I have reviewed and interpreted all pertinent imaging results/findings within the past 24 hours.

## 2025-07-19 NOTE — PLAN OF CARE
Problem: Adult Inpatient Plan of Care  Goal: Plan of Care Review  Outcome: Progressing  Goal: Absence of Hospital-Acquired Illness or Injury  Outcome: Progressing     Problem: Skin Injury Risk Increased  Goal: Skin Health and Integrity  Outcome: Progressing     Problem: Delirium  Goal: Improved Behavioral Control  Outcome: Progressing  Goal: Improved Attention and Thought Clarity  Outcome: Progressing     Problem: Restraint, Nonviolent  Goal: Absence of Harm or Injury  Outcome: Progressing    POC updated and reviewed with the patient and family at bedside. Questions regarding POC were encouraged and addressed. VSS, see flowsheets. Tele maintained per provider's order. Patient is AO to self only at this time. Remains in bilateral SWR and right soft ankle restraint. Attempted to remove but pt immediately attempts to pull out NG tube. See MAR for medication administration details. Seizure, fall, and safety precautions maintained. No signs of injury noted. Upon exiting room, patient's bed locked in low position, side rails up and padded x 4, bed alarm on, with call light within reach. Instructed patient to call staff for mobility, verbalized understanding.  No acute signs of distress noted at this time.

## 2025-07-20 LAB
ABSOLUTE EOSINOPHIL (OHS): 0.05 K/UL
ABSOLUTE MONOCYTE (OHS): 0.53 K/UL (ref 0.3–1)
ABSOLUTE NEUTROPHIL COUNT (OHS): 5.19 K/UL (ref 1.8–7.7)
ALBUMIN SERPL BCP-MCNC: 3.4 G/DL (ref 3.5–5.2)
ALP SERPL-CCNC: 79 UNIT/L (ref 40–150)
ALT SERPL W/O P-5'-P-CCNC: 21 UNIT/L (ref 10–44)
ANION GAP (OHS): 9 MMOL/L (ref 8–16)
AST SERPL-CCNC: 26 UNIT/L (ref 11–45)
BACTERIA CSF CULT: NO GROWTH
BASOPHILS # BLD AUTO: 0.04 K/UL
BASOPHILS NFR BLD AUTO: 0.6 %
BILIRUB SERPL-MCNC: 0.3 MG/DL (ref 0.1–1)
BUN SERPL-MCNC: 34 MG/DL (ref 8–23)
CALCIUM SERPL-MCNC: 8.8 MG/DL (ref 8.7–10.5)
CHLORIDE SERPL-SCNC: 108 MMOL/L (ref 95–110)
CO2 SERPL-SCNC: 23 MMOL/L (ref 23–29)
CREAT SERPL-MCNC: 1 MG/DL (ref 0.5–1.4)
ERYTHROCYTE [DISTWIDTH] IN BLOOD BY AUTOMATED COUNT: 13.5 % (ref 11.5–14.5)
GFR SERPLBLD CREATININE-BSD FMLA CKD-EPI: >60 ML/MIN/1.73/M2
GLUCOSE SERPL-MCNC: 97 MG/DL (ref 70–110)
GRAM STN SPEC: NORMAL
HCT VFR BLD AUTO: 35.3 % (ref 40–54)
HGB BLD-MCNC: 11.8 GM/DL (ref 14–18)
IMM GRANULOCYTES # BLD AUTO: 0.02 K/UL (ref 0–0.04)
IMM GRANULOCYTES NFR BLD AUTO: 0.3 % (ref 0–0.5)
LYMPHOCYTES # BLD AUTO: 1.28 K/UL (ref 1–4.8)
MAGNESIUM SERPL-MCNC: 2 MG/DL (ref 1.6–2.6)
MCH RBC QN AUTO: 31.1 PG (ref 27–31)
MCHC RBC AUTO-ENTMCNC: 33.4 G/DL (ref 32–36)
MCV RBC AUTO: 93 FL (ref 82–98)
NUCLEATED RBC (/100WBC) (OHS): 0 /100 WBC
PHOSPHATE SERPL-MCNC: 3.8 MG/DL (ref 2.7–4.5)
PLATELET # BLD AUTO: 221 K/UL (ref 150–450)
PMV BLD AUTO: 9.5 FL (ref 9.2–12.9)
POTASSIUM SERPL-SCNC: 4.4 MMOL/L (ref 3.5–5.1)
PROT SERPL-MCNC: 7.1 GM/DL (ref 6–8.4)
RBC # BLD AUTO: 3.79 M/UL (ref 4.6–6.2)
RELATIVE EOSINOPHIL (OHS): 0.7 %
RELATIVE LYMPHOCYTE (OHS): 18 % (ref 18–48)
RELATIVE MONOCYTE (OHS): 7.5 % (ref 4–15)
RELATIVE NEUTROPHIL (OHS): 72.9 % (ref 38–73)
SODIUM SERPL-SCNC: 140 MMOL/L (ref 136–145)
WBC # BLD AUTO: 7.11 K/UL (ref 3.9–12.7)

## 2025-07-20 PROCEDURE — 25000003 PHARM REV CODE 250: Performed by: PHYSICIAN ASSISTANT

## 2025-07-20 PROCEDURE — 94799 UNLISTED PULMONARY SVC/PX: CPT

## 2025-07-20 PROCEDURE — 84100 ASSAY OF PHOSPHORUS: CPT | Performed by: PHYSICIAN ASSISTANT

## 2025-07-20 PROCEDURE — 99900035 HC TECH TIME PER 15 MIN (STAT)

## 2025-07-20 PROCEDURE — 94668 MNPJ CHEST WALL SBSQ: CPT

## 2025-07-20 PROCEDURE — 80053 COMPREHEN METABOLIC PANEL: CPT | Performed by: PHYSICIAN ASSISTANT

## 2025-07-20 PROCEDURE — 63600175 PHARM REV CODE 636 W HCPCS: Performed by: PHYSICIAN ASSISTANT

## 2025-07-20 PROCEDURE — 85025 COMPLETE CBC W/AUTO DIFF WBC: CPT | Performed by: PHYSICIAN ASSISTANT

## 2025-07-20 PROCEDURE — 36415 COLL VENOUS BLD VENIPUNCTURE: CPT | Performed by: PHYSICIAN ASSISTANT

## 2025-07-20 PROCEDURE — 94667 MNPJ CHEST WALL 1ST: CPT

## 2025-07-20 PROCEDURE — 63600175 PHARM REV CODE 636 W HCPCS: Performed by: HOSPITALIST

## 2025-07-20 PROCEDURE — 11000001 HC ACUTE MED/SURG PRIVATE ROOM

## 2025-07-20 PROCEDURE — 94761 N-INVAS EAR/PLS OXIMETRY MLT: CPT

## 2025-07-20 PROCEDURE — 83735 ASSAY OF MAGNESIUM: CPT | Performed by: PHYSICIAN ASSISTANT

## 2025-07-20 RX ADMIN — CARVEDILOL 6.25 MG: 6.25 TABLET, FILM COATED ORAL at 09:07

## 2025-07-20 RX ADMIN — THERA TABS 1 TABLET: TAB at 10:07

## 2025-07-20 RX ADMIN — CARVEDILOL 6.25 MG: 6.25 TABLET, FILM COATED ORAL at 10:07

## 2025-07-20 RX ADMIN — Medication 100 MG: at 10:07

## 2025-07-20 RX ADMIN — ENOXAPARIN SODIUM 40 MG: 40 INJECTION SUBCUTANEOUS at 05:07

## 2025-07-20 RX ADMIN — CLONIDINE HYDROCHLORIDE 0.3 MG: 0.2 TABLET ORAL at 09:07

## 2025-07-20 RX ADMIN — OLANZAPINE 20 MG: 5 TABLET, FILM COATED ORAL at 10:07

## 2025-07-20 RX ADMIN — HALOPERIDOL LACTATE 2 MG: 5 INJECTION, SOLUTION INTRAMUSCULAR at 12:07

## 2025-07-20 RX ADMIN — OLANZAPINE 20 MG: 5 TABLET, FILM COATED ORAL at 09:07

## 2025-07-20 RX ADMIN — DEXAMETHASONE 2 MG: 1 TABLET ORAL at 09:07

## 2025-07-20 RX ADMIN — FAMOTIDINE 20 MG: 20 TABLET, FILM COATED ORAL at 09:07

## 2025-07-20 RX ADMIN — CLONIDINE HYDROCHLORIDE 0.3 MG: 0.2 TABLET ORAL at 05:07

## 2025-07-20 RX ADMIN — LOSARTAN POTASSIUM 100 MG: 50 TABLET, FILM COATED ORAL at 10:07

## 2025-07-20 RX ADMIN — FAMOTIDINE 20 MG: 20 TABLET, FILM COATED ORAL at 10:07

## 2025-07-20 RX ADMIN — FOLIC ACID 1 MG: 1 TABLET ORAL at 10:07

## 2025-07-20 RX ADMIN — AMLODIPINE BESYLATE 10 MG: 10 TABLET ORAL at 10:07

## 2025-07-20 RX ADMIN — CLONIDINE HYDROCHLORIDE 0.3 MG: 0.2 TABLET ORAL at 01:07

## 2025-07-20 RX ADMIN — DEXAMETHASONE 2 MG: 1 TABLET ORAL at 12:07

## 2025-07-20 NOTE — PROGRESS NOTES
[facility] - Neurosurgery ([facility])  [facility] Medicine  Progress Note    Patient Name: Goran Carlos  MRN: [id number]  Patient Class: IP- Inpatient   Admission Date: 7/1/2025  Length of Stay: 19 days  Attending Physician: Deng Gutiérrez MD  Primary Care Provider: No primary care provider on file.        Subjective     Principal Problem:Ataxia        HPI:  71 [facility] about a [facility] now. History obtained from rehab nurse. She reported that he got to their facility about a month ago and, at baseline, walks with a cane and is alert and oriented. His nurse noticed that he has seemed weaker over the past week and over the past 3 days has had increased balance disturbance and gait issues. They also noticed that he has been speaking more softly in his speech is harder to understand. He has not had any infectious symptoms. He fell yesterday, unsure if he hit his head. CT head without contrast revealed multiple lesions of hypodensity with a hyperdense rim, notably in the left temporal lobe and right cerebellum. There were additional small hyperdensities in the right frontoparietal and left parasagittal parietal lobe. There is mass effect and partial effacement of the 4th ventricle secondary to the cerebellar lesion with developing hydrocephalus without MLS. CT of the chest, abdomen, and pelvis with IV contrast showed RUL mass with mediastinal adenopathy. Labs revealed Hep C and treponema antibodies were positive. Hep C PCR pending. Penicillin G was administered. A sepsis workup was completed and antibiotics were initiated. An MRI brain with and without contrast was ordered, but patient was unable to complete due to persistent movement after sedatives were administered. Prior to MRI, he was alert but drowsy. He was able to tell me his name, but told me he was 53 years old, the year was 2003, and he did not know the current place or reason for being here. He had generalized  weakness but was slightly more weak on the right side. He followed simple commands, but did not attempt to follow more complex commands. He is admitted to [facility] with NSGY consult.          Overview/Hospital Course:  69 y/o M presenting from [facility] for generalized weakness and dysarthria. CT head without contrast revealed multiple lesions of hypodensity with a hyperdense rim, notably in the left temporal lobe and right cerebellum.  additional small hyperdensities in the right frontoparietal and left parasagittal parietal lobe. mass effect and partial effacement of the 4th ventricle secondary to the cerebellar lesion with developing hydrocephalus without MLS. CT of the chest, abdomen, and pelvis with IV contrast showed RUL mass with mediastinal adenopathy. Labs revealed Hep C and treponema antibodies were positive. Hep C PCR pending. Penicillin G was administered. A sepsis workup was completed and antibiotics were initiated. An MRI brain with and without contrast was ordered, but patient was unable to complete due to persistent movement after sedatives were administered. Prior to MRI, he was alert but drowsy. He was able to tell me his name, but told me he was 53 years old, the year was 2003, and he did not know the current place or reason for being here. He had generalized weakness but was slightly more weak on the right side. He followed simple commands, but did not attempt to follow more complex commands. MRI favors neoplastic brain lesions. CSF studies pending. 7/4/2025: Extubated without complications. On 7/7 underwent SOC crani for tumor resection. Started on steroids.        7/16 Transfer to hospital medicine. admitted with generalized weakness, falls, and paucity of speech presenting with multiple ring enhancing lesions on brain CT - CTHead 7/1 showed multiple brain masses concerning for mets with surrounding edema, can't rule out abscesses. Mass in cerebellum with effacement of 4th ventricule outflow with  concern for developing hydrocephalus. MRI brain 7/1: non diagnostic due to motion. CT CAP 7/1: spiculated Right lung mass and lymph node adenopathy in chest and neck concerning for metastatic disease. MRI Brain W/WO 7/2: multifocal enhancing lesions c/f metastatic disease, largest R cerebellum w/mass effect on 4th. now s/p R SOC for tumor resection on 7/7. Post op CTH/MRI 7/7: anticipate post operative changes, hemostatic products left at superior/medial border of resection cavity. EVD removed 7/14,  posterior incision without  pseudomeningocele. Dexamthasone weaned to 2 BID per NCC, continue GI ppx while on steroid. on bactrim for PCP prophylaxis.  Admitted form [facility], currently on phenobarb taper and scheduled zyprexa and clonidine for ETOH and heroin use. Pending rehab placement. Needs OP follow up with NSGY (continue decadron till then) and Radiation oncology team. Pathology is lung carcinoma but Tempus and PD-L1 testing are pending. on NGT feeds impact peptide. SLP following. on 4 point restraints.  CTHead 7/14 -Ventricles remain somewhat prominent but without overt hydrocephalus or significant change in size following EVD removal.Evolving recent postsurgical change in the posterior fossa. No new intracranial hemorrhage or major vascular distribution infarct. AAO to self. on UE restraints.  [facility] 7/16.              Interval History: pt with no pain complaints.  Afebrile.    Review of Systems  Objective:     Vital Signs (Most Recent):  Temp: 98.2 °F (36.8 °C) (07/20/25 1151)  Pulse: 76 (07/20/25 1151)  Resp: 18 (07/20/25 1151)  BP: 127/78 (07/20/25 1151)  SpO2: 98 % (07/20/25 1151) Vital Signs (24h Range):  Temp:  [97.6 °F (36.4 °C)-99.4 °F (37.4 °C)] 98.2 °F (36.8 °C)  Pulse:  [75-96] 76  Resp:  [17-18] 18  SpO2:  [94 %-100 %] 98 %  BP: (118-136)/(59-79) 127/78     Weight: 81.1 kg (178 lb 12.7 oz)  Body mass index is 23.59 kg/m².    Intake/Output Summary (Last 24 hours) at 7/20/2025 5429  Last data filed at  7/20/2025 1016  Gross per 24 hour   Intake 340 ml   Output 600 ml   Net -260 ml         Physical Exam      Awake, alert, no acute distress, still has impaired slurred/garbled speech as yesterday   But making more sensible interactions today a bit, asks me how his condition is, I told him he has cancer that is metastatic to the brain.  Clear lungs bilaterally, unlabored breathing, on room air, no cyanosis   Pupils equal bilaterally   5/5 proximal upper extremity strength bilaterally  At least 4/5 hip flexor strength bilaterally  No obvious upper nor lower extremity edema  No facial droop; no pupillary asymmetry noted         Assessment & Plan  Ataxia    2/2 cerebellar lesion   PT/OT -> recommending acute rehab once medically appropriate for discharge         Polysubstance abuse     Was brought in from [facility], where he has been for the past month  Educate on cessation when appropriate  On phenobarbital for agitation, no signs of EtOH withdrawal -> phenobarb taper     7/16 to completed phenobarbital taper today     Brain lesion     CT head without contrast revealed multiple lesions of hypodensity with a hyperdense rim, notably in the left temporal lobe and right cerebellum.  additional small hyperdensities in the right frontoparietal and left parasagittal parietal lobe.  mass effect and partial effacement of the 4th ventricle secondary to the cerebellar lesion with developing hydrocephalus without MLS. CT of the chest, abdomen, and pelvis with IV contrast showed RUL mass with mediastinal adenopathy. Hep C and treponema antibodies were positive. Hep C PCR pending. Penicillin G was administered. A sepsis workup was completed and antibiotics were initiated. Intubated to get MRI. S/p EVD. Extubated 7/4/25 without difficulty.     s/p SOC craniotomy for tumor resection on 7/7  Dex 2 mg BID, no plans to taper further pending outpt f/u  Bactrim MWF for PJP ppx given plan for prolonged steroid taper  EVD removed  7/14  Zyprexa BID for agitation  Phenobarb taper to off given improved agitation   Clonidine 0.3 mg TID for agitation/opioid dependence/hypertension  Avoid versed for agitation  Tube feeds at goal, SLP following for swallow  Folate/thiamine/MV  SBP <160     Needs OP follow up with NSGY (continue decadron till then) and Radiation oncology team. Pathology is lung carcinoma but Tempus and PD-L1 testing are pending. on NGT feeds impact peptide. SLP following. on 4 point restraints.    CTHead 7/14 -Ventricles remain somewhat prominent but without overt hydrocephalus or significant change in size following EVD removal.Evolving recent postsurgical change in the posterior fossa. No new intracranial hemorrhage or major vascular distribution infarct. AAO to self. on  UE restraints.    SLP          Gait disturbance     PT/OT eval - rehab     Hepatitis C antibody positive     PCR negative  No known history of Hep C per patient        Mass of upper lobe of right lung     Noted on CXR and CT chest with IV contrast  Saturating well on RA; No PTX on CT-chest  No lung consolidations or obstructive process  No known history of cancer  Intra-op brain biopsy frozen pathology suggestive of metastatic adenocarcinoma, 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  Will need outpt rad onc and med onc f/u      Positive serology for syphilis  s/p ID eval -  Positive Treponema pallidum antibody. RPR 1:1 suggestive of false positive or past treated infection. Received penicillin G 2.4 million units x 1 on 7/1. CSF analysis not suggestive of neurosyphilis at this time . blood RPR and CSF VDRL 7/2 negative        Essential hypertension  Patient's blood pressure range in the last 24 hours was: BP  Min: 118/71  Max: 136/79.The patient's inpatient anti-hypertensive regimen is listed below:  Current Antihypertensives  losartan tablet 100 mg, Daily, Per NG tube  cloNIDine tablet 0.3 mg, Every 8 hours, Per NG tube  amLODIPine tablet 10 mg, Daily, Per NG tube  carvediloL tablet  6.25 mg, 2 times daily, Per NG tube      - Goal SBP<160         Encephalopathy, metabolic     See primary problem  Slowly improving   on clonidine 0.3mg TID and olanzapine 20mg BID    Anemia    Patient's with Normocytic anemia.. Hemoglobin stable. Etiology likely due to chronic disease .  Current CBC reviewed-    Recent Labs   Lab 07/18/25  0928 07/19/25  0503 07/20/25  0512   HGB 11.0* 10.7* 11.8*         Component Value Date/Time    MCV 93 07/20/2025 0512    RDW 13.5 07/20/2025 0512     Monitor CBC and transfuse if H/H drops below 7/21.    Dysphagia  SLP , tube feeds for now; anticipate MBSS on 7/21  Agitation      Vasogenic cerebral edema      Pseudomeningocele      Brain compression      Obstructive hydrocephalus      Metastasis to brain  See above; on steroids    Cancer Staging   No matching staging information was found for the patient.      VTE Risk Mitigation (From admission, onward)           Ordered     enoxaparin injection 40 mg  Every 24 hours         07/15/25 1247     IP VTE LOW RISK PATIENT  Once         07/01/25 2049     Place sequential compression device  Until discontinued         07/01/25 2049                    Discharge Planning   CARA: 7/22/2025     Code Status: Full Code   Medical Readiness for Discharge Date:   Discharge Plan A: Rehab   Discharge Delays: (!) Change in Medical Condition              Please place Justification for DME      Deng Gutiérrez MD  Department of Hospital Medicine   [facility] - Neurosurgery ([facility])

## 2025-07-20 NOTE — PLAN OF CARE
Problem: Adult Inpatient Plan of Care  Goal: Plan of Care Review  Outcome: Progressing  Goal: Readiness for Transition of Care  Outcome: Progressing    Problem: Infection  Goal: Absence of Infection Signs and Symptoms  Outcome: Progressing     Problem: Skin Injury Risk Increased  Goal: Skin Health and Integrity  Outcome: Progressing     Problem: Delirium  Goal: Optimal Coping  Outcome: Progressing  Goal: Improved Behavioral Control  Outcome: Progressing     Problem: Fall Injury Risk  Goal: Absence of Fall and Fall-Related Injury  Outcome: Progressing     Problem: Restraint, Nonviolent  Goal: Absence of Harm or Injury  Outcome: Progressing     Problem: Wound  Goal: Skin Health and Integrity  Outcome: Progressing     Problem: Coping Ineffective  Goal: Effective Coping  Outcome: Progressing

## 2025-07-20 NOTE — SUBJECTIVE & OBJECTIVE
Interval History: pt with no pain complaints.  Afebrile.    Review of Systems  Objective:     Vital Signs (Most Recent):  Temp: 98.2 °F (36.8 °C) (07/20/25 1151)  Pulse: 76 (07/20/25 1151)  Resp: 18 (07/20/25 1151)  BP: 127/78 (07/20/25 1151)  SpO2: 98 % (07/20/25 1151) Vital Signs (24h Range):  Temp:  [97.6 °F (36.4 °C)-99.4 °F (37.4 °C)] 98.2 °F (36.8 °C)  Pulse:  [75-96] 76  Resp:  [17-18] 18  SpO2:  [94 %-100 %] 98 %  BP: (118-136)/(59-79) 127/78     Weight: 81.1 kg (178 lb 12.7 oz)  Body mass index is 23.59 kg/m².    Intake/Output Summary (Last 24 hours) at 7/20/2025 1309  Last data filed at 7/20/2025 1016  Gross per 24 hour   Intake 340 ml   Output 600 ml   Net -260 ml         Physical Exam      Awake, alert, no acute distress, still has impaired slurred/garbled speech as yesterday   But making more sensible interactions today a bit, asks me how his condition is, I told him he has cancer that is metastatic to the brain.  Clear lungs bilaterally, unlabored breathing, on room air, no cyanosis   Pupils equal bilaterally   5/5 proximal upper extremity strength bilaterally  At least 4/5 hip flexor strength bilaterally  No obvious upper nor lower extremity edema  No facial droop; no pupillary asymmetry noted

## 2025-07-20 NOTE — ASSESSMENT & PLAN NOTE
Patient's blood pressure range in the last 24 hours was: BP  Min: 118/71  Max: 136/79.The patient's inpatient anti-hypertensive regimen is listed below:  Current Antihypertensives  losartan tablet 100 mg, Daily, Per NG tube  cloNIDine tablet 0.3 mg, Every 8 hours, Per NG tube  amLODIPine tablet 10 mg, Daily, Per NG tube  carvediloL tablet 6.25 mg, 2 times daily, Per NG tube      - Goal SBP<160

## 2025-07-20 NOTE — PLAN OF CARE
Problem: Adult Inpatient Plan of Care  Goal: Plan of Care Review  Outcome: Progressing     Problem: Skin Injury Risk Increased  Goal: Skin Health and Integrity  Outcome: Progressing      Problem: Delirium  Goal: Optimal Coping  Outcome: Not Progressing  Goal: Improved Behavioral Control  Outcome: Not Progressing  Goal: Improved Attention and Thought Clarity  Outcome: Not Progressing

## 2025-07-20 NOTE — ASSESSMENT & PLAN NOTE
Was brought in from [facility], where he has been for the past month  Educate on cessation when appropriate  On phenobarbital for agitation, no signs of EtOH withdrawal -> phenobarb taper     7/16 to completed phenobarbital taper today

## 2025-07-20 NOTE — PT/OT/SLP PROGRESS
"Physical Therapy      Patient Name:  Goran Carlos   MRN:  [id number]    Patient not seen today secondary to  (Pt's nurse (Geovani) reports "He's been agitated and is currently on restraints"). Will follow-up on next scheduled visit as appropriate.    "

## 2025-07-20 NOTE — ASSESSMENT & PLAN NOTE
Patient's with Normocytic anemia.. Hemoglobin stable. Etiology likely due to chronic disease .  Current CBC reviewed-    Recent Labs   Lab 07/18/25  0928 07/19/25  0503 07/20/25  0512   HGB 11.0* 10.7* 11.8*         Component Value Date/Time    MCV 93 07/20/2025 0512    RDW 13.5 07/20/2025 0512     Monitor CBC and transfuse if H/H drops below 7/21.

## 2025-07-21 PROBLEM — Z71.89 ADVANCE CARE PLANNING: Status: ACTIVE | Noted: 2025-07-21

## 2025-07-21 PROBLEM — Z51.5 PALLIATIVE CARE ENCOUNTER: Status: ACTIVE | Noted: 2025-07-21

## 2025-07-21 PROBLEM — R53.81 DEBILITY: Status: ACTIVE | Noted: 2025-07-21

## 2025-07-21 LAB
ABSOLUTE EOSINOPHIL (OHS): 0.12 K/UL
ABSOLUTE MONOCYTE (OHS): 0.33 K/UL (ref 0.3–1)
ABSOLUTE NEUTROPHIL COUNT (OHS): 3.21 K/UL (ref 1.8–7.7)
ALBUMIN SERPL BCP-MCNC: 3.4 G/DL (ref 3.5–5.2)
ALP SERPL-CCNC: 85 UNIT/L (ref 40–150)
ALT SERPL W/O P-5'-P-CCNC: 20 UNIT/L (ref 10–44)
ANION GAP (OHS): 8 MMOL/L (ref 8–16)
AST SERPL-CCNC: 24 UNIT/L (ref 11–45)
BASOPHILS # BLD AUTO: 0.05 K/UL
BASOPHILS NFR BLD AUTO: 1.1 %
BILIRUB SERPL-MCNC: 0.2 MG/DL (ref 0.1–1)
BUN SERPL-MCNC: 35 MG/DL (ref 8–23)
CALCIUM SERPL-MCNC: 8.9 MG/DL (ref 8.7–10.5)
CHLORIDE SERPL-SCNC: 107 MMOL/L (ref 95–110)
CO2 SERPL-SCNC: 24 MMOL/L (ref 23–29)
CREAT SERPL-MCNC: 0.7 MG/DL (ref 0.5–1.4)
ERYTHROCYTE [DISTWIDTH] IN BLOOD BY AUTOMATED COUNT: 13.3 % (ref 11.5–14.5)
GFR SERPLBLD CREATININE-BSD FMLA CKD-EPI: >60 ML/MIN/1.73/M2
GLUCOSE SERPL-MCNC: 110 MG/DL (ref 70–110)
HCT VFR BLD AUTO: 35.3 % (ref 40–54)
HGB BLD-MCNC: 11.5 GM/DL (ref 14–18)
IMM GRANULOCYTES # BLD AUTO: 0.01 K/UL (ref 0–0.04)
IMM GRANULOCYTES NFR BLD AUTO: 0.2 % (ref 0–0.5)
LYMPHOCYTES # BLD AUTO: 0.87 K/UL (ref 1–4.8)
MAGNESIUM SERPL-MCNC: 2 MG/DL (ref 1.6–2.6)
MCH RBC QN AUTO: 30.7 PG (ref 27–31)
MCHC RBC AUTO-ENTMCNC: 32.6 G/DL (ref 32–36)
MCV RBC AUTO: 94 FL (ref 82–98)
NUCLEATED RBC (/100WBC) (OHS): 0 /100 WBC
PHOSPHATE SERPL-MCNC: 3.5 MG/DL (ref 2.7–4.5)
PLATELET # BLD AUTO: 239 K/UL (ref 150–450)
PMV BLD AUTO: 9.6 FL (ref 9.2–12.9)
POTASSIUM SERPL-SCNC: 3.8 MMOL/L (ref 3.5–5.1)
PROT SERPL-MCNC: 6.9 GM/DL (ref 6–8.4)
RBC # BLD AUTO: 3.74 M/UL (ref 4.6–6.2)
RELATIVE EOSINOPHIL (OHS): 2.6 %
RELATIVE LYMPHOCYTE (OHS): 19 % (ref 18–48)
RELATIVE MONOCYTE (OHS): 7.2 % (ref 4–15)
RELATIVE NEUTROPHIL (OHS): 69.9 % (ref 38–73)
SODIUM SERPL-SCNC: 139 MMOL/L (ref 136–145)
WBC # BLD AUTO: 4.59 K/UL (ref 3.9–12.7)

## 2025-07-21 PROCEDURE — 80053 COMPREHEN METABOLIC PANEL: CPT | Performed by: PHYSICIAN ASSISTANT

## 2025-07-21 PROCEDURE — 25000003 PHARM REV CODE 250: Performed by: PHYSICIAN ASSISTANT

## 2025-07-21 PROCEDURE — 99223 1ST HOSP IP/OBS HIGH 75: CPT | Mod: ,,, | Performed by: STUDENT IN AN ORGANIZED HEALTH CARE EDUCATION/TRAINING PROGRAM

## 2025-07-21 PROCEDURE — 94761 N-INVAS EAR/PLS OXIMETRY MLT: CPT

## 2025-07-21 PROCEDURE — 63600175 PHARM REV CODE 636 W HCPCS: Performed by: PHYSICIAN ASSISTANT

## 2025-07-21 PROCEDURE — 84100 ASSAY OF PHOSPHORUS: CPT | Performed by: PHYSICIAN ASSISTANT

## 2025-07-21 PROCEDURE — 11000001 HC ACUTE MED/SURG PRIVATE ROOM

## 2025-07-21 PROCEDURE — 83735 ASSAY OF MAGNESIUM: CPT | Performed by: PHYSICIAN ASSISTANT

## 2025-07-21 PROCEDURE — 99223 1ST HOSP IP/OBS HIGH 75: CPT | Mod: 25,,, | Performed by: CLINICAL NURSE SPECIALIST

## 2025-07-21 PROCEDURE — 92526 ORAL FUNCTION THERAPY: CPT

## 2025-07-21 PROCEDURE — 97535 SELF CARE MNGMENT TRAINING: CPT

## 2025-07-21 PROCEDURE — 97112 NEUROMUSCULAR REEDUCATION: CPT

## 2025-07-21 PROCEDURE — 99900035 HC TECH TIME PER 15 MIN (STAT)

## 2025-07-21 PROCEDURE — 94668 MNPJ CHEST WALL SBSQ: CPT

## 2025-07-21 PROCEDURE — 85025 COMPLETE CBC W/AUTO DIFF WBC: CPT | Performed by: PHYSICIAN ASSISTANT

## 2025-07-21 PROCEDURE — 99497 ADVNCD CARE PLAN 30 MIN: CPT | Mod: 25,,, | Performed by: CLINICAL NURSE SPECIALIST

## 2025-07-21 PROCEDURE — 36415 COLL VENOUS BLD VENIPUNCTURE: CPT | Performed by: PHYSICIAN ASSISTANT

## 2025-07-21 RX ADMIN — Medication 100 MG: at 08:07

## 2025-07-21 RX ADMIN — LOSARTAN POTASSIUM 100 MG: 50 TABLET, FILM COATED ORAL at 08:07

## 2025-07-21 RX ADMIN — FOLIC ACID 1 MG: 1 TABLET ORAL at 08:07

## 2025-07-21 RX ADMIN — AMLODIPINE BESYLATE 10 MG: 10 TABLET ORAL at 08:07

## 2025-07-21 RX ADMIN — DEXAMETHASONE 2 MG: 1 TABLET ORAL at 08:07

## 2025-07-21 RX ADMIN — CARVEDILOL 6.25 MG: 6.25 TABLET, FILM COATED ORAL at 08:07

## 2025-07-21 RX ADMIN — ENOXAPARIN SODIUM 40 MG: 40 INJECTION SUBCUTANEOUS at 04:07

## 2025-07-21 RX ADMIN — FAMOTIDINE 20 MG: 20 TABLET, FILM COATED ORAL at 08:07

## 2025-07-21 RX ADMIN — CLONIDINE HYDROCHLORIDE 0.3 MG: 0.2 TABLET ORAL at 05:07

## 2025-07-21 RX ADMIN — OLANZAPINE 20 MG: 5 TABLET, FILM COATED ORAL at 08:07

## 2025-07-21 RX ADMIN — THERA TABS 1 TABLET: TAB at 08:07

## 2025-07-21 RX ADMIN — CLONIDINE HYDROCHLORIDE 0.3 MG: 0.2 TABLET ORAL at 09:07

## 2025-07-21 RX ADMIN — SULFAMETHOXAZOLE AND TRIMETHOPRIM 1 TABLET: 800; 160 TABLET ORAL at 08:07

## 2025-07-21 RX ADMIN — CARVEDILOL 6.25 MG: 6.25 TABLET, FILM COATED ORAL at 09:07

## 2025-07-21 RX ADMIN — CLONIDINE HYDROCHLORIDE 0.3 MG: 0.2 TABLET ORAL at 02:07

## 2025-07-21 NOTE — ACP (ADVANCE CARE PLANNING)
Advance Care Planning     Date: 07/21/2025    Today a voluntary meeting took place: other (conference room) telephone    Patient Participation: Patient is unable to participate     Attendees (Name and  Relationship to patient): Legal surrogate decision-maker: khanh Andrews     Staff attendees (Name and  Role): Attending MD Gutiérrez    ACP Conversation (General): Understanding of current condition guarded prognosis with cerebral mets    Code Status: daughter to confer with family for final decision; full code for now          Length of ACP   conversation in minutes: 5 minutes

## 2025-07-21 NOTE — HPI
Pt is a 69 y/o M presenting from [facility] for generalized weakness for about a week now. History obtained from rehab nurse. She reported that he got to their facility about a month ago and, at baseline, walks with a cane and is alert and oriented. His nurse noticed that he has seemed weaker over the past week and over the past 3 days has had increased balance disturbance and gait issues. They also noticed that he has been speaking more softly in his speech is harder to understand. He has not had any infectious symptoms. He fell yesterday, unsure if he hit his head. CT head without contrast revealed multiple lesions of hypodensity with a hyperdense rim, notably in the left temporal lobe and right cerebellum. There were additional small hyperdensities in the right frontoparietal and left parasagittal parietal lobe. There is mass effect and partial effacement of the 4th ventricle secondary to the cerebellar lesion with developing hydrocephalus without MLS. CT of the chest, abdomen, and pelvis with IV contrast showed RUL mass with mediastinal adenopathy. Labs revealed Hep C and treponema antibodies were positive. Hep C PCR pending. Penicillin G was administered. A sepsis workup was completed and antibiotics were initiated. An MRI brain with and without contrast was ordered, but patient was unable to complete due to persistent movement after sedatives were administered. Prior to MRI, he was alert but drowsy. He was able to tell me his name, but told me he was 53 years old, the year was 2003, and he did not know the current place or reason for being here. He had generalized weakness but was slightly more weak on the right side. He followed simple commands, but did not attempt to follow more complex commands. He is admitted to [facility] with NSGY consult.    Pt has NG tube in place, with soft restraint.

## 2025-07-21 NOTE — SUBJECTIVE & OBJECTIVE
Interval History:  Afebrile.  No acute issues overnight.  Speech therapy reports is still having trouble managing his secretions and demonstrating overt s/sx of aspiration with minimal PO trials of ice chips and puree despite cues strategies, unable to appropriately go through/participate with and MBSS.    Review of Systems  Objective:     Vital Signs (Most Recent):  Temp: 97.6 °F (36.4 °C) (07/21/25 1155)  Pulse: 63 (07/21/25 1155)  Resp: 18 (07/21/25 1155)  BP: 125/77 (07/21/25 1155)  SpO2: 95 % (07/21/25 1155) Vital Signs (24h Range):  Temp:  [97.3 °F (36.3 °C)-98.6 °F (37 °C)] 97.6 °F (36.4 °C)  Pulse:  [63-82] 63  Resp:  [18] 18  SpO2:  [95 %-98 %] 95 %  BP: (114-162)/(69-89) 125/77     Weight: 81.1 kg (178 lb 12.7 oz)  Body mass index is 23.59 kg/m².    Intake/Output Summary (Last 24 hours) at 7/21/2025 1319  Last data filed at 7/21/2025 0200  Gross per 24 hour   Intake --   Output 450 ml   Net -450 ml         Physical Exam      Sleeping, awoken, appears quite drowsy   NG tube in place   Appears slowed with mentation/encephalopathic   Not maintain good eye contact   Does move all 4 extremities but appears very fatigued, drowsy, tired   Speech is garbled/slurred, no facial droop   Clear lungs bilaterally, unlabored breathing, on room air, no cyanosis   Heart sounds indicate a regular rate and rhythm   Upper extremities in restraints   No obvious upper no lower extremity edema

## 2025-07-21 NOTE — PLAN OF CARE
Met with pt daughter to review discharge recommendation of Rehab and is agreeable to plan    Patient/family provided list of facilities in-network with patient's payor plan. Providers that are owned, operated, or affiliated with [facility] are included on the list.     Notified that referral sent to below listed facilities from in-network list based on proximity to home/family support:   [facility]    Patient/family instructed to identify preference.    Preferred Facility: (if more than 1, listed in order of descending preference)  [facility]      If an additional preferred facility not listed above is identified, additional referral to be sent. If above facilities unable to accept, will send additional referrals to in-network providers.       Ophelia Nunez MSW, CSW

## 2025-07-21 NOTE — ASSESSMENT & PLAN NOTE
Patient's blood pressure range in the last 24 hours was: BP  Min: 114/69  Max: 162/89.The patient's inpatient anti-hypertensive regimen is listed below:  Current Antihypertensives  losartan tablet 100 mg, Daily, Per NG tube  cloNIDine tablet 0.3 mg, Every 8 hours, Per NG tube  amLODIPine tablet 10 mg, Daily, Per NG tube  carvediloL tablet 6.25 mg, 2 times daily, Per NG tube      - Goal SBP<160

## 2025-07-21 NOTE — PLAN OF CARE
Advance Care Planning   Alex kelli  Neurosurgery ([facility])  Palliative Care   Psychosocial Assessment    Patient Name: Goran Carlos  MRN: [id number]  Admission Date: 7/1/2025  Hospital Length of Stay: 20 days  Code Status: Full Code   Attending Provider: Deng Gutiérrez MD  Palliative Care Provider:   Primary Care Physician: No primary care provider on file.  Principal Problem:Ataxia    Reason for Referral: assistance with clarification of goals of care  Consult Order Date:   Primary CM/SW:    Present during Interview: patient, relative(s), past medical records, and ER records.      Primary Language:English   Needed: no      Past Medical Situation:   PMH:   Past Medical History:   Diagnosis Date    ETOH abuse     Gait disturbance     Heroin abuse      Mental Health/Substance Use History: per chart review, pt has hx of ETOH abuse, IVDU (fentanyl, cocain, heroin)  Risk of Abuse, neglect or exploitation: none identified   Current or Previous Trauma and/or evidence of PTSD: none disclosed   Non-traditional Health practices: none disclosed     Understanding of diagnosis and prognosis: pt encephalopathic   Experience/Comfort level with health care system: no issues identified     Patients Mental Status: pt encephalopathic     Socio-Economic Factors/Resources:  Address: [address] [place] [address]  [place] [id number]  Phone Number: [phone] (home)     Marital Status: Single  Household composition: lives alone  Children: 3, one dtr local, one dtr in [place], son in [place]. Local cousins involved in care as well.    Patient/Family perceptions about Caregiving Needs; availability and capacity: family aware of increased cg needs.    Family Structure, Dynamics/Relationships: pt has good relationship with family.    Patterns/Styles of Communication and Decision-making in the Family: pt's dtr making decisions on pt's behalf    Patient/Family Coping: appropriate     Activities of Daily Living:  independent prior to admission   Support Systems-Family & Community (Home Health, HME etc): pt was at [facility] prior to admission    Transportation:  no    Work/Education History: retired  Self-Care Activities/Hobbies: not discussed today      [facility]: [facility]    Financial Resources:Medicare      Advanced Care Planning & Legal Concerns:   Advanced Directives/Living Will: no  LaPOST/POLST: no   Planning:  no    Medical Power of : no     Oral/Written Declaration: no  Witnesses:   Surrogate Decision Maker:     Emergency Contacts:    Spirituality, Culture & Coping Mechanisms:  F- Eel and Belief: Other     I - Importance: not discussed today     C - Community/Culture Values: not discussed today     A - Address in Care: not discussed today      Goals/Hopes/Expectations: adequate care  Fears/Anxiety/Concerns: decline        Preferences about EOL Environment: (own bed, family nearby, pets, music, etc): TBD      Complicated Bereavement Risk Assessment Tool (CBRAT)  Reference:  [facility] Clinical Practice Group (May 2016). Bereavement Risk Screening and Management Guidelines.  Retrieved from: http://www.grpcc.com.au/wp-content/uploads//HSWTS-Ygwfgcgbbbj-Obyeilygn-and-Management-Guideline-2016.pdf      Bereaved Client Characteristics   Under 18      no  Was a Twin   no  Young Spouse   no  Elderly Spouse    no  Isolated    no  Lacks Meaningful Social Support   no  Dissatisfied with help available during illness   no  New to Financial [place] no  New to Decision-Making   no    Illness  Inherited Disorder   no  Stigmatized Disease in the family/community   yes  Lengthy/Burdensome   yes     Bereaved Client's History of Loss   Cumulative Multiple Losses   no  Previous Mental Health Illnesses   no  Current Mental Health Illness   no  Other Significant Health Issues   no   Migrant/Refugee   no Will the Death be:  Sudden or  Unexpected   no  Traumatic Circumstances Associated with Death   no  Significant Cultural/Social Burdens as a result of Death   no   Relationship with   Profound Lifelong Partner   no  Highly Dependent    no  Antagonistic   no  Ambivalent   no  Deeply Connected   yes  Culturally Defined   yes   Risk Factors Scores  0-2  Low  3-5  Moderate  5+  High  All persons scoring moderate to high presume to be at risk**    (** It is acknowledged that protective factors and resilience may outweigh apparent risk factors.      Total Risk Factors Score:   moderate     Advance Care Planning     Date: 2025    [facility]  I engaged the family in a voluntary conversation about advance care planning and we specifically addressed what the goals of care would be moving forward, in light of the patient's change in clinical status, specifically ataxia.  We did not specifically address the patient's likely prognosis, which is guarded.  We explored the patient's values and preferences for future care.  The family endorses that what is most important right now is to focus on remaining as independent as possible and symptom/pain control while determining if pt is eligible for gamma knife therapy.    Accordingly, we have decided that the best plan to meet the patient's goals includes continuing with treatment    A total of 25 min was spent on advance care planning, goals of care discussion, emotional support, formulating and communicating prognosis and exploring burden/benefit of various approaches of treatment. This discussion occurred on a fully voluntary basis with the verbal consent of the patient and/or family.         Discharge Planning Needs/Plan of Care:     INES, along with AZIZA PONCE, visited pt at bedside. Pt asleep, in no obvious distress, with restraints and mitts in place. Called pt's dtr Luis who states that she lives in [place], but is up to date on pt's condition. Dtr relays that pt had been living alone in his apartment  for many years without issue, but does not know when he went to rehab. Dtr feels as though pt knew he was sick prior to going to rehab, but did not tell family. Dtr thinks that pt's goals would align with DNR, but wants to discuss with other family members and learn if pt is eligible for gamma knife before making any firm decisions. WCTF.     Eunice Salas, LAURENW-BACS, ACHP-  Department of Palliative Medicine

## 2025-07-21 NOTE — ASSESSMENT & PLAN NOTE
See primary problem  Slowly improving   on clonidine 0.3mg TID and olanzapine 20mg BID  Encephalopathic likely from brain mets  Unable to progress with SLP; NPO; with NGT  Repeat head CT ordered by HERI on 7/21

## 2025-07-21 NOTE — PLAN OF CARE
BISI spoke with pt daughter Luis [phone] in regards to discharge planning. Pt daughter is agreeable to rehab, but also unsure if rehab will be the plan. Pt daughter stated she is awaiting to speak with MD for final decision. [facility] is following pt.    Will follow up.       Alex Henson - Neurosurgery ([facility])  Discharge Reassessment    Primary Care Provider: No primary care provider on file.    Expected Discharge Date: 7/25/2025    Reassessment (most recent)       Discharge Reassessment - 07/21/25 1059          Discharge Reassessment    Assessment Type Discharge Planning Reassessment     Did the patient's condition or plan change since previous assessment? No     Discharge Plan discussed with: Adult children     Name(s) and Number(s) Luis Carlos (Daughter)  [phone]     Communicated CARA with patient/caregiver Yes     Discharge Plan A Rehab     Discharge Plan B Home     DME Needed Upon Discharge  other (see comments)     Transition of Care Barriers Other (see comments)     Why the patient remains in the hospital Requires continued medical care        Post-Acute Status    Post-Acute Authorization Placement     Post-Acute Placement Status Referrals Sent     Hospital Resources/Appts/Education Provided Appointments scheduled and added to AVS     Discharge Delays None known at this time                       Discharge Plan A and Plan B have been determined by review of patient's clinical status, future medical and therapeutic needs, and coverage/benefits for post-acute care in coordination with multidisciplinary team members.    Ophelia Nunez MSW, CSW

## 2025-07-21 NOTE — ASSESSMENT & PLAN NOTE
Patient's with Normocytic anemia.. Hemoglobin stable. Etiology likely due to chronic disease .  Current CBC reviewed-    Recent Labs   Lab 07/19/25  0503 07/20/25  0512 07/21/25  0626   HGB 10.7* 11.8* 11.5*         Component Value Date/Time    MCV 94 07/21/2025 0626    RDW 13.3 07/21/2025 0626     Monitor CBC and transfuse if H/H drops below 7/21.

## 2025-07-21 NOTE — PT/OT/SLP PROGRESS
Speech Language Pathology Treatment    Patient Name:  Goran Carlos   MRN:  [id number]  Admitting Diagnosis: Ataxia    Recommendations:                 General Recommendations:  Dysphagia therapy, Speech/language therapy, and Cognitive-linguistic therapy  Diet recommendations:  NPO, Liquid Diet Level: NPO   Aspiration Precautions: Strict aspiration precautions, frequent oral care   General Precautions: Standard, aspiration, fall  Communication strategies:  none  Discharge recommendations:  TBD  Barriers to Discharge:  Decreased Care Giver Support    Assessment:     Goran Carlos is a 69 y.o. male with an SLP diagnosis of Dysphagia, Dysarthria, and Cognitive-Linguistic Impairment.      Subjective     Spoke with nursing prior to session. Pt asleep in bed, easily roused and agreeable to SLP session. NGT remains intact. Bilateral restraints and mitts in place.     Pain/Comfort:  Pain Rating 1: 0/10  Pain Rating Post-Intervention 1: 0/10    Respiratory Status: Room air    Objective:     Has the patient been evaluated by SLP for swallowing?   Yes  Keep patient NPO? Yes     Pt seen bedside for ongoing dysphagia therapy, alert, calm and cooperative when roused. Noted wet vocal quality at baseline and decreased cough strength. HOB elevated pt cued to utilize deep spit and throat clear to clear audible congestion, cleared for oral cavity with use of bedside suction. Oral care provided with swabs dipped in mouthwash and bedside suction. Following oral care, worked on intake of ice chip x 3 and 1/2 tsp puree x 3. Pt requiring constant cues to implement effortful swallow and double swallow patterns each time. Noted delayed swallow trigger and decreased hyolaryngeal elevation/excursion to palpation with ice chip trials and immediate wet vocal quality. Pt demonstrating cough response following all trials, therefore additional PO trials deferred 2/2 high aspiration risk. Provided additional gentle oral care prior to SLP exit.   Education provided re: role of SLP, diet recs, swallow precs, dysphagia exercises, secretion management, oral care, alternative means of nutrition, s/s aspiration and ongoing SLP POC.  Pt nodded in agreement though requires ongoing reinforcement. Discussed SLP impressions and POC with MD via secure chat pre and post session. MBSS orders discontinues as pt not appropriate for repeat study at this time. Would recommend consideration for long term means of nutrition at this time. SLP will continue to follow.     Goals:   Multidisciplinary Problems       SLP Goals          Problem: SLP    Goal Priority Disciplines Outcome   SLP Goal     SLP Progressing   Description: Goals due 7/16  1.  Pt. Will participate in ongoing assessment of swallow to initiate least restrictive diet.                     Plan:     Patient to be seen:  4 x/week   Plan of Care expires:  08/06/25  Plan of Care reviewed with:  patient   SLP Follow-Up:  Yes       Time Tracking:     SLP Treatment Date:   07/21/25  Speech Start Time:  0838  Speech Stop Time:  0853     Speech Total Time (min):  15 min    Billable Minutes: Treatment Swallowing Dysfunction 7 and Self Care/Home Management Training 8    07/21/2025

## 2025-07-21 NOTE — CONSULTS
Alex Henson - Neurosurgery ([facility])  Palliative Medicine  Consult Note    Patient Name: Goran Carlos  MRN: [id number]  Admission Date: 7/1/2025  Hospital Length of Stay: 20 days  Code Status: Full Code   Attending Provider: Deng Gutiérrez MD  Consulting Provider: MEET Rahman  Primary Care Physician: No primary care provider on file.  Principal Problem:Ataxia    Patient information was obtained from relative(s) and primary team.      Inpatient consult to Palliative Care  Consult performed by: Riri Roger CNS  Consult ordered by: Deng Gutiérrez MD        Assessment/Plan:     Palliative Care  Palliative care encounter  Impression: Pt is a 68 y/o male transferred from [facility] for generalized weakness for about a week now. History obtained from rehab nurse. She reported that he got to their facility about a month ago and, at baseline, walks with a cane and is alert and oriented. His nurse noticed that he has seemed weaker over the past week and over the past 3 days has had increased balance disturbance and gait issues. Pt has multiple head lesions and RUL mass. Pt is NPO, NG tube in place. Pt is sleeping. Soft restraints on.     Reason for consult: ACP. Spoke to Dr. Gutiérrez.     GOC/ACP:   Called and spoke to pt's daughter, Luis who lives in [place]. Prior to admit, pt was living alone in an apartment. Daughter is aware of pt's medical issues. We discussed pt not being candidate for chemo etc. Daughter reports pt is a  [facility] Daughter asked about VA benefits. We discussed pt's medical issues at this time and that he has declined sharply. Daughter would like to see if Pall[facility] is an [facility]. We discussed hospice care. Daughter seemed open to hospice but wanted to speak to family and also see how pt did on his swallow study.     Daughter, Luis will be visiting at end of week.     Code status discussed. Luis open to DNR will  speak to rest of family before making a decision.     Symptom management:     Dyshagia:   Pt is NPO.   NG tube in place.   MBSS order pending.     Debility:   Bedside nurse assisting with repositioning.     Plan:   Will continue to follow.   Spoke to Dr. Gutiérrez.         Thank you for your consult. I will follow-up with patient. Please contact us if you have any additional questions.    Subjective:     HPI:   Pt is a 71 y/o M presenting from [facility] for generalized weakness for about a week now. History obtained from rehab nurse. She reported that he got to their facility about a month ago and, at baseline, walks with a cane and is alert and oriented. His nurse noticed that he has seemed weaker over the past week and over the past 3 days has had increased balance disturbance and gait issues. They also noticed that he has been speaking more softly in his speech is harder to understand. He has not had any infectious symptoms. He fell yesterday, unsure if he hit his head. CT head without contrast revealed multiple lesions of hypodensity with a hyperdense rim, notably in the left temporal lobe and right cerebellum. There were additional small hyperdensities in the right frontoparietal and left parasagittal parietal lobe. There is mass effect and partial effacement of the 4th ventricle secondary to the cerebellar lesion with developing hydrocephalus without MLS. CT of the chest, abdomen, and pelvis with IV contrast showed RUL mass with mediastinal adenopathy. Labs revealed Hep C and treponema antibodies were positive. Hep C PCR pending. Penicillin G was administered. A sepsis workup was completed and antibiotics were initiated. An MRI brain with and without contrast was ordered, but patient was unable to complete due to persistent movement after sedatives were administered. Prior to MRI, he was alert but drowsy. He was able to tell me his name, but told me he was 53 years old, the year was 2003, and he did not know  the current place or reason for being here. He had generalized weakness but was slightly more weak on the right side. He followed simple commands, but did not attempt to follow more complex commands. He is admitted to [facility] with NSGY consult.    Pt has NG tube in place, with soft restraint.           Hospital Course:  No notes on file    Interval History: Spoke to daughterLuis who asked about possible [facility] placement on Palliative floor in hospital.     Past Medical History:   Diagnosis Date    ETOH abuse     Gait disturbance     Heroin abuse        Past Surgical History:   Procedure Laterality Date    SUBOCCIPITAL CRANIOTOMY Right 7/7/2025    Procedure: CRANIOTOMY, SUBOCCIPITAL;  Surgeon: Blane Larios DO;  Location: [facility] OR [address] [place] [address];  Service: Neurosurgery;  Laterality: Right;       Review of patient's allergies indicates:  No Known Allergies    Medications:  Continuous Infusions:  Scheduled Meds:   amLODIPine  10 mg Per NG tube Daily    carvediloL  6.25 mg Per NG tube BID    cloNIDine  0.3 mg Per NG tube Q8H    dexAMETHasone  2 mg Per NG tube Q12H    enoxparin  40 mg Subcutaneous Q24H (prophylaxis, 1700)    famotidine  20 mg Per NG tube BID    folic acid  1 mg Per NG tube Daily    losartan  100 mg Per NG tube Daily    multivitamin  1 tablet Per NG tube Daily    OLANZapine  20 mg Per NG tube BID    sulfamethoxazole-trimethoprim 800-160mg  1 tablet Per NG tube Every Mon, Wed, Fri    thiamine  100 mg Per NG tube Daily     PRN Meds:  Current Facility-Administered Medications:     acetaminophen, 650 mg, Per NG tube, Q4H PRN    bisacodyL, 10 mg, Rectal, Daily PRN    haloperidol lactate, 2 mg, Intravenous, Q6H PRN    ondansetron, 4 mg, Intravenous, Q4H PRN    sodium chloride 0.9%, 10 mL, Intravenous, PRN    Family History    None       Tobacco Use    Smoking status: Former     Types: Cigarettes    Smokeless tobacco: Not on file   Substance and Sexual Activity    Alcohol use: Not Currently    Drug use: Not Currently      Types: Heroin    Sexual activity: Not on file       Review of Systems   Unable to perform ROS: Acuity of condition     Objective:     Vital Signs (Most Recent):  Temp: 97.3 °F (36.3 °C) (07/21/25 0714)  Pulse: 78 (07/21/25 1122)  Resp: 18 (07/21/25 0756)  BP: 132/85 (07/21/25 0930)  SpO2: 98 % (07/21/25 0756) Vital Signs (24h Range):  Temp:  [97.3 °F (36.3 °C)-98.6 °F (37 °C)] 97.3 °F (36.3 °C)  Pulse:  [70-82] 78  Resp:  [18] 18  SpO2:  [96 %-98 %] 98 %  BP: (114-162)/(69-89) 132/85     Weight: 81.1 kg (178 lb 12.7 oz)  Body mass index is 23.59 kg/m².       Physical Exam  Constitutional:       General: He is sleeping. He is in acute distress.   HENT:      Nose:      Comments: NG tube in place to nare.   Pulmonary:      Effort: Pulmonary effort is normal. No respiratory distress.   Skin:     General: Skin is warm and dry.   Neurological:      Comments: sleeping            Review of Symptoms      Symptom Assessment (ESAS 0-10 Scale)  Unable to complete assessment due to Acuity of condition         Pain Assessment in Advanced Demential Scale (PAINAD)   Breathing - Independent of vocalization:  0  Negative vocalization:  0  Facial expression:  0  Body language:  0  Consolability:  0  Total:  0    Performance Status:  20    Living Arrangements:  Lives alone    Psychosocial/Cultural:   See Palliative Psychosocial Note: Yes  **Primary  to Follow**  Palliative Care  Consult: Yes        Advance Care Planning  Advance Directives:   Living Will: No    Do Not Resuscitate Status: No    Medical Power of : No    Agent's Name:  Pt's adult children.    Decision Making:  Family answered questions  Goals of Care: The patient and family endorses that what is most important right now is to focus on quality of life with improvement in condition.     Accordingly, we have decided that the best plan to meet the patient's goals includes continuing with treatment         Significant Labs: All pertinent  "labs within the past 24 hours have been reviewed.  CBC:   Recent Labs   Lab 07/21/25  0626   WBC 4.59   HGB 11.5*   HCT 35.3*   MCV 94        BMP:  Recent Labs   Lab 07/21/25  0626         K 3.8      CO2 24   BUN 35*   CREATININE 0.7   CALCIUM 8.9   MG 2.0     LFT:  Lab Results   Component Value Date    AST 24 07/21/2025    ALKPHOS 85 07/21/2025    BILITOT 0.2 07/21/2025     Albumin:   Albumin   Date Value Ref Range Status   07/21/2025 3.4 (L) 3.5 - 5.2 g/dL Final     Protein:   Protein Total   Date Value Ref Range Status   07/21/2025 6.9 6.0 - 8.4 gm/dL Final     Lactic acid:   No results found for: "LACTATE"    Significant Imaging: I have reviewed all pertinent imaging results/findings within the past 24 hours.      20 minutes of ACP completed.       Riri Roger, CNS  Palliative Medicine  [facility] - Neurosurgery ([facility])              "

## 2025-07-21 NOTE — PROGRESS NOTES
Alex Henson - Neurosurgery ([facility])  [facility] Medicine  Progress Note    Patient Name: Goran Carlos  MRN: [id number]  Patient Class: IP- Inpatient   Admission Date: 7/1/2025  Length of Stay: 20 days  Attending Physician: Deng Gutiérrez MD  Primary Care Provider: No primary care provider on file.        Subjective     Principal Problem:Ataxia        HPI:  69 y/o M presenting from [facility] for generalized weakness for about a week now. History obtained from rehab nurse. She reported that he got to their facility about a month ago and, at baseline, walks with a cane and is alert and oriented. His nurse noticed that he has seemed weaker over the past week and over the past 3 days has had increased balance disturbance and gait issues. They also noticed that he has been speaking more softly in his speech is harder to understand. He has not had any infectious symptoms. He fell yesterday, unsure if he hit his head. CT head without contrast revealed multiple lesions of hypodensity with a hyperdense rim, notably in the left temporal lobe and right cerebellum. There were additional small hyperdensities in the right frontoparietal and left parasagittal parietal lobe. There is mass effect and partial effacement of the 4th ventricle secondary to the cerebellar lesion with developing hydrocephalus without MLS. CT of the chest, abdomen, and pelvis with IV contrast showed RUL mass with mediastinal adenopathy. Labs revealed Hep C and treponema antibodies were positive. Hep C PCR pending. Penicillin G was administered. A sepsis workup was completed and antibiotics were initiated. An MRI brain with and without contrast was ordered, but patient was unable to complete due to persistent movement after sedatives were administered. Prior to MRI, he was alert but drowsy. He was able to tell me his name, but told me he was 53 years old, the year was 2003, and he did not know the current place or reason for being here. He had generalized  weakness but was slightly more weak on the right side. He followed simple commands, but did not attempt to follow more complex commands. He is admitted to [facility] with NSGY consult.          Overview/Hospital Course:  69 y/o M presenting from [facility] for generalized weakness and dysarthria. CT head without contrast revealed multiple lesions of hypodensity with a hyperdense rim, notably in the left temporal lobe and right cerebellum.  additional small hyperdensities in the right frontoparietal and left parasagittal parietal lobe. mass effect and partial effacement of the 4th ventricle secondary to the cerebellar lesion with developing hydrocephalus without MLS. CT of the chest, abdomen, and pelvis with IV contrast showed RUL mass with mediastinal adenopathy. Labs revealed Hep C and treponema antibodies were positive. Hep C PCR pending. Penicillin G was administered. A sepsis workup was completed and antibiotics were initiated. An MRI brain with and without contrast was ordered, but patient was unable to complete due to persistent movement after sedatives were administered. Prior to MRI, he was alert but drowsy. He was able to tell me his name, but told me he was 53 years old, the year was 2003, and he did not know the current place or reason for being here. He had generalized weakness but was slightly more weak on the right side. He followed simple commands, but did not attempt to follow more complex commands. MRI favors neoplastic brain lesions. CSF studies pending. 7/4/2025: Extubated without complications. On 7/7 underwent SOC crani for tumor resection. Started on steroids.        7/16 Transfer to hospital medicine. admitted with generalized weakness, falls, and paucity of speech presenting with multiple ring enhancing lesions on brain CT - CTHead 7/1 showed multiple brain masses concerning for mets with surrounding edema, can't rule out abscesses. Mass in cerebellum with effacement of 4th ventricule outflow with  concern for developing hydrocephalus. MRI brain 7/1: non diagnostic due to motion. CT CAP 7/1: spiculated Right lung mass and lymph node adenopathy in chest and neck concerning for metastatic disease. MRI Brain W/WO 7/2: multifocal enhancing lesions c/f metastatic disease, largest R cerebellum w/mass effect on 4th. now s/p R SOC for tumor resection on 7/7. Post op CTH/MRI 7/7: anticipate post operative changes, hemostatic products left at superior/medial border of resection cavity. EVD removed 7/14,  posterior incision without  pseudomeningocele. Dexamthasone weaned to 2 BID per NCC, continue GI ppx while on steroid. on bactrim for PCP prophylaxis.  Admitted form [facility], currently on phenobarb taper and scheduled zyprexa and clonidine for ETOH and heroin use. Pending rehab placement. Needs OP follow up with NSGY (continue decadron till then) and Radiation oncology team. Pathology is lung carcinoma but Tempus and PD-L1 testing are pending. on NGT feeds impact peptide. SLP following. on 4 point restraints.  CTHead 7/14 -Ventricles remain somewhat prominent but without overt hydrocephalus or significant change in size following EVD removal.Evolving recent postsurgical change in the posterior fossa. No new intracranial hemorrhage or major vascular distribution infarct. AAO to self. on UE restraints.  MBS 7/16.              Interval History:  Afebrile.  No acute issues overnight.  Speech therapy reports is still having trouble managing his secretions and demonstrating overt s/sx of aspiration with minimal PO trials of ice chips and puree despite cues strategies, unable to appropriately go through/participate with and MBSS.    Review of Systems  Objective:     Vital Signs (Most Recent):  Temp: 97.6 °F (36.4 °C) (07/21/25 1155)  Pulse: 63 (07/21/25 1155)  Resp: 18 (07/21/25 1155)  BP: 125/77 (07/21/25 1155)  SpO2: 95 % (07/21/25 1155) Vital Signs (24h Range):  Temp:  [97.3 °F (36.3 °C)-98.6 °F (37 °C)] 97.6 °F (36.4  °C)  Pulse:  [63-82] 63  Resp:  [18] 18  SpO2:  [95 %-98 %] 95 %  BP: (114-162)/(69-89) 125/77     Weight: 81.1 kg (178 lb 12.7 oz)  Body mass index is 23.59 kg/m².    Intake/Output Summary (Last 24 hours) at 7/21/2025 1319  Last data filed at 7/21/2025 0200  Gross per 24 hour   Intake --   Output 450 ml   Net -450 ml         Physical Exam      Sleeping, awoken, appears quite drowsy   NG tube in place   Appears slowed with mentation/encephalopathic   Not maintain good eye contact   Does move all 4 extremities but appears very fatigued, drowsy, tired   Speech is garbled/slurred, no facial droop   Clear lungs bilaterally, unlabored breathing, on room air, no cyanosis   Heart sounds indicate a regular rate and rhythm   Upper extremities in restraints   No obvious upper no lower extremity edema            Assessment & Plan  Ataxia    2/2 cerebellar lesion   PT/OT -> recommending acute rehab once medically appropriate for discharge         Polysubstance abuse     Was brought in from [facility], where he has been for the past month  Educate on cessation when appropriate  On phenobarbital for agitation, no signs of EtOH withdrawal -> phenobarb taper     7/16 to completed phenobarbital taper today     Brain lesion     CT head without contrast revealed multiple lesions of hypodensity with a hyperdense rim, notably in the left temporal lobe and right cerebellum.  additional small hyperdensities in the right frontoparietal and left parasagittal parietal lobe.  mass effect and partial effacement of the 4th ventricle secondary to the cerebellar lesion with developing hydrocephalus without MLS. CT of the chest, abdomen, and pelvis with IV contrast showed RUL mass with mediastinal adenopathy. Hep C and treponema antibodies were positive. Hep C PCR pending. Penicillin G was administered. A sepsis workup was completed and antibiotics were initiated. Intubated to get MRI. S/p EVD. Extubated 7/4/25 without difficulty.     s/p SOC  craniotomy for tumor resection on 7/7  Dex 2 mg BID, no plans to taper further pending outpt f/u  Bactrim MWF for PJP ppx given plan for prolonged steroid taper  EVD removed 7/14  Zyprexa BID for agitation  Phenobarb taper to off given improved agitation   Clonidine 0.3 mg TID for agitation/opioid dependence/hypertension  Avoid versed for agitation  Tube feeds at goal, SLP following for swallow  Folate/thiamine/MV  SBP <160     Needs OP follow up with NSGY (continue decadron till then) and Radiation oncology team. Pathology is lung carcinoma but Tempus and PD-L1 testing are pending. on NGT feeds impact peptide. SLP following. on 4 point restraints.    CTHead 7/14 -Ventricles remain somewhat prominent but without overt hydrocephalus or significant change in size following EVD removal.Evolving recent postsurgical change in the posterior fossa. No new intracranial hemorrhage or major vascular distribution infarct. AAO to self. on  UE restraints.    SLP          Gait disturbance     PT/OT eval - rehab     Hepatitis C antibody positive     PCR negative  No known history of Hep C per patient        Mass of upper lobe of right lung     Noted on CXR and CT chest with IV contrast  Saturating well on RA; No PTX on CT-chest  No lung consolidations or obstructive process  No known history of cancer  Intra-op brain biopsy frozen pathology suggestive of metastatic adenocarcinoma, 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  Will need outpt rad onc and med onc f/u      Positive serology for syphilis  s/p ID eval -  Positive Treponema pallidum antibody. RPR 1:1 suggestive of false positive or past treated infection. Received penicillin G 2.4 million units x 1 on 7/1. CSF analysis not suggestive of neurosyphilis at this time . blood RPR and CSF VDRL 7/2 negative        Essential hypertension  Patient's blood pressure range in the last 24 hours was: BP  Min: 114/69  Max: 162/89.The patient's inpatient anti-hypertensive regimen is listed below:  Current  Antihypertensives  losartan tablet 100 mg, Daily, Per NG tube  cloNIDine tablet 0.3 mg, Every 8 hours, Per NG tube  amLODIPine tablet 10 mg, Daily, Per NG tube  carvediloL tablet 6.25 mg, 2 times daily, Per NG tube      - Goal SBP<160         Encephalopathy, metabolic     See primary problem  Slowly improving   on clonidine 0.3mg TID and olanzapine 20mg BID  Encephalopathic likely from brain mets  Unable to progress with SLP; NPO; with NGT  Repeat head CT ordered by NSGY on 7/21  Anemia    Patient's with Normocytic anemia.. Hemoglobin stable. Etiology likely due to chronic disease .  Current CBC reviewed-    Recent Labs   Lab 07/19/25  0503 07/20/25  0512 07/21/25  0626   HGB 10.7* 11.8* 11.5*         Component Value Date/Time    MCV 94 07/21/2025 0626    RDW 13.3 07/21/2025 0626     Monitor CBC and transfuse if H/H drops below 7/21.    Dysphagia  SLP , tube feeds for now; cancelled MBSS due to overt aspiration presentation  Agitation      Vasogenic cerebral edema      Pseudomeningocele      Brain compression      Obstructive hydrocephalus      Metastasis to brain  See above; on steroids    Cancer Staging   No matching staging information was found for the patient.      Palliative care encounter  PC consulted; they will discuss with family hospice     Advance care planning      Debility  Acute; see above        VTE Risk Mitigation (From admission, onward)           Ordered     enoxaparin injection 40 mg  Every 24 hours         07/15/25 1247     IP VTE LOW RISK PATIENT  Once         07/01/25 2049     Place sequential compression device  Until discontinued         07/01/25 2049                    Discharge Planning   CARA: 7/25/2025     Code Status: Full Code   Medical Readiness for Discharge Date:   Discharge Plan A: Rehab   Discharge Delays: None known at this time             Deng Gutiérrez MD  Department of Hospital Medicine   [facility] - Neurosurgery ([facility])

## 2025-07-21 NOTE — PT/OT/SLP PROGRESS
Occupational Therapy Treatment Note   Date: 8/5/2022  Name: Cosmo Beckett  Clinic Number: [id number]  Age: 4 y.o. 0 m.o.    Therapy Diagnosis:   Encounter Diagnosis   Name Primary?    Sensory processing difficulty Yes     Physician: Eugene Stewart    Physician Orders: evaluate and treat, pediatric program   Medical Diagnosis: F84.0 (ICD-10-CM) - Autism spectrum disorder with accompanying language impairment, requiring substantial support (level 2)    Evaluation Date: 2/25/2022    Insurance Authorization Expiration: 1/21/2022 to 8/26/2022  Plan of Care Certification Period: 2/25/2022 - 8/25/2022     Visit # / Visits authorized: 17 / 19  Time In: 1:45  Time Out: 2:30  Total Billable Time: 45 minutes    Precautions:  Standard, possible allergy to eggs per mother report. Mother reported patient got a rash on his hands when he was a lot younger after eating eggs and she doesn't know if it was an egg allergy or not but she hasn't given him any more eggs since.     Subjective   Patient's mother brought Cosmo to therapy today.  Patient / caregiver reports: No new occupational therapy concerns, patient now allowing mom to brush his teeth, mom would like patient to begin to eat a larger variety of foods.      Response to previous treatment: good with no adverse response    Pain: Child too young to understand and rate pain levels. No pain behaviors or report of pain.     Objective   Patient being seen by Occupational Therapy for habituation of age appropriate developmental self-help, fine motor, and visual motor skills through the use of guided and targeted skilled therapeutic activities. Skilled activities focussed on facilitation of fine motor / visual motor skills development needed for age appropriate self-help skills as well as learning and future functional life skills such as handwriting and communication. Also focus on sensory processing for self-regulation during transitions and social skills development.  "Occupational Therapy   Treatment    Name: Goran Carlos  MRN: [id number]  Admitting Diagnosis:  Ataxia  14 Days Post-Op    Recommendations:     Discharge Recommendations: High Intensity Therapy  Discharge Equipment Recommendations:  bath bench, bedside commode  Barriers to discharge:  None    Assessment:     Goran Carlos is a 69 y.o. male with a medical diagnosis of Ataxia.  He presents with performance deficits affecting function are weakness, impaired balance, impaired sensation, impaired self care skills, impaired functional mobility, decreased coordination, decreased upper extremity function, decreased lower extremity function.     Rehab Prognosis:  Good; patient would benefit from acute skilled OT services to address these deficits and reach maximum level of function.       Plan:     Patient to be seen 4 x/week to address the above listed problems via cognitive retraining, neuromuscular re-education, therapeutic exercises, therapeutic activities, self-care/home management  Plan of Care Expires: 08/05/25  Plan of Care Reviewed with: patient    Subjective   Patient:  "I'm cold."  Pain/Comfort:  Pain Rating 1: 0/10  Pain Rating Post-Intervention 1: 0/10    Objective:     Communicated with: Nurse prior to session.  Patient found supine with bed alarm, Condom Catheter, NG tube, peripheral IV, restraints, telemetry (me[employer] camera monitoring system) upon OT entry to room.    General Precautions: Standard, aspiration, fall, NPO    Orthopedic Precautions:N/A  Braces: N/A  Respiratory Status: Room air     Occupational Performance:     Bed Mobility:    Patient completed Rolling/Turning to Left with  minimum assistance  Patient completed Supine to Sit with moderate assistance  Patient completed Sit to Supine with moderate assistance     Functional Mobility/Transfers:  Patient completed Sit <> Stand Transfer with moderate assistance  with  no assistive device     Activities of Daily Living:  Grooming: minimum assistance "     Cosmo participated in dynamic functional therapeutic activities to improve developmental functional performance for 45 minutes, including the following skilled interventions:  Sensory processing activities and oral tactile sensitization processing activities to facilitate improved tolerance to healthier option foods:   - Began desensitization of non-preferred food with fruit loops cereal. Patient's mom reported she would like him to eat cereal to give her another breakfast choice for patient. Patient with good improvement with tolerance to bowl of cereal bowl next to him today compared to previous sessions. Patient with no adverse reactions to seeing bowl besides pushing it away a couple of times. Patient also initiated picking up a fruit loop 2 times and gave fruit loop to mom. Patient's mom was able to touch fruit loop to his lips with the only adverse reaction being patient batting fruit loop away. Patient also watched therapist stir fruit loops with chewy tube and patient allowed chewy tube to touch his teeth after with no adverse reactions.   Sensory Processing and Self-Regulation:   - Patient with good initiation of self-regulation sensory skills with crawling around room and under chairs at the beginning of session after focusing for 45 minutes straight with speech therapy. After ~ 5 minutes of this activity patient was directed and transitioned away from this sensory activity to developmental play with ball and lego blocks without adverse reactions.    Fine motor / visual motor developmental skills facilitation:    - Pre-writing with vertical dry erase board with patient independently drawing small vertical lines holding marker with a digital pad pronated grasp. Allowed hand over hand tactile assistance for digital pad neutral supinated grasp to imitated circles.   Social Skills Developmental Facilitation:    - Reciprocal play with tossing ball with maximum visual and verbal cues with patient  while seated EOB  Upper Body Dressing: moderate assistance while seated EOB  Lower Body Dressing: maximal assistance while seated EOB    AMPA 6 Click ADL: 11    Treatment & Education:Patient education provided on role of OT.  Continued education, patient/ family training recommended.  Patient able to follow simple one step commands.  Patient attentive and interactive throughout the session.  White board updated in patient's room.  OT asked if there were any other questions; patient/ family had no further questions.    Session was conducted separately from PT session to give more opportunities to mobilize throughout the day.     Patient left supine with all lines intact, call button in reach, and bed alarm on    GOALS:   Multidisciplinary Problems       Occupational Therapy Goals          Problem: Occupational Therapy    Goal Priority Disciplines Outcome Interventions   Occupational Therapy Goal     OT, PT/OT Progressing    Description: Goals set 7/8 with an expiration date, 8/5:  Patient will increase functional independence with ADLs by performing:    Patient will demonstrate rolling to the right with min assist.  Not met   Patient will demonstrate rolling to the left with min assist.   Not met  Patient will demonstrate supine -sit with min  assist.   Not met  Patient will demonstrate stand pivot transfers with mod assist.   Not met  Patient will demonstrate grooming while seated with min assist.   Not met  Patient will demonstrate upper body dressing with min assist while seated EOB.   Not met  Patient will demonstrate lower body dressing with mod assist while seated EOB.   Not met  Patient will demonstrate toileting with mod assist.   Not met  Patient will demonstrate bathing while seated EOB with mod assist.   Not met  Patient's family / caregiver will demonstrate independence and safety with assisting patient with self-care skills and functional mobility.     Not met  Patient's family / caregiver will  "independently handing the ball to different people by the end of activity.     Formal Testing:   PDMS- 2 completed 2/25/2022     Home Exercises and Education Provided     Education provided:   - Caregiver educated on current performance and plan of care. Caregiver verbalized understanding.    Assessment   Patient with improved oral defensiveness with now allowing mom to brush his teeth. Continues with some food aversions with mom reporting he "mainly just wants to eat junk" and she would like him to have more choices. Patient with improved tolerance to non-preferred food in room as noted above in treatment section as well as chewy tube in mouth after in bowl of cereal - see above. Improved tolerance to non-preferred task with therapist lead activity of pre-writing drawing. Patient did not attend to this activity as well as to preferred activities, however patient did attend for brief periods without any adverse behavior reactions. Patient has met 50% of short term goals and progressing towards all others. Patient would continue to benefit from skilled occupational therapy services to address the deficits listed in the problem list on initial evaluation, provide patient/family education, and to maximize patient's level of independence in the home, school, and community environment with age appropriate developmental skills.     Cosmo is progressing well towards his goals and there are no updates to goals at this time.     Patient prognosis is Good.  Anticipated barriers to occupational therapy: none at this time  Patient's spiritual, cultural and educational needs considered and pt agreeable to plan of care and goals.    Goals:  Short term goals to be completed within ~ 3 months by 5/25/2022:  Goals Written on 2/25/2022  1. Patient to demonstrate improved oral motor sensory processing by tolerating nuk brush with apple sauce or another smooth food in mouth for 10 seconds with no adverse reactions. (ongoing - " demonstrate independence with providing ROM and changes in bed positioning.   Not met  Patient and/or patient's family will verbalize understanding of stroke prevention guidelines, personal risk factors and stroke warning signs via teachback method.  Not met     DME Justifications (see above for complete DME recommendations)    Bedside Commode- Patient has a mobility limitation that significantly impairs their ability to participate in one or more mobility related activities of daily living, including toileting. This deficit can be resolved by using a bedside commode. Patient demonstrates mobility limitations that will cause them to be confined to one room at home without bathroom access for up to 30 days. Using a bedside commode will greatly improve the patient's ability to participate in MRADLs.                                               Time Tracking:     OT Date of Treatment: 07/21/25  OT Start Time: 0551  OT Stop Time: 0615  OT Total Time (min): 24 min    Billable Minutes:Self Care/Home Management 14  Neuromuscular Re-education 10    OT/DOTTIE: OT          7/21/2025   progressing)    2. Patient to demonstrate improved tolerance to different foods by playing with one food of mother's choice with hands with no adverse reactions and tolerate touching fingers with food on them to lips two times in one session with only minimum adverse reaction such as facial grimacing. (ongoing - progressing)    3. Patient to demonstrate improved age appropriate play skills by improved tolerance to directed developmental age appropriate play by following direction of play from therapist with one age appropriate activity for 1 minute with tolerance to any redirection throughout. (goal met 5/13/2022)     4. Patient to demonstrate improved age appropriate social skills by tolerating parallel play with therapist for 2 minutes with redirection required 4 to 5 times throughout activity. (goal met 5/13/2022 )     Long term goals to be completed within ~ 6 months by 8/25/2022:  Goals Written on 2/25/2022  1. Patient to demonstrate improved oral motor sensory processing by tolerating tooth brush in mouth with minimum amount of toothpaste for 15 seconds with no adverse reactions. (ongoing - progressing)  2. Patient to demonstrate improved tolerance to different foods by playing with one food of mother's choice with hands with no adverse reactions and tolerating food touching lips two times in one session with only minimum adverse reaction such as facial grimacing. (ongoing - progressing)  3. Patient to demonstrate improved age appropriate play skills by improved tolerance to directed developmental age appropriate play by following direction of play from therapist with one age appropriate activity for 3 minutes with tolerance to any redirection needed throughout. (ongoing - progressing)   4. Patient to demonstrate improved age appropriate social skills by tolerating reciprocal play with therapist for 30 seconds with redirection required 2 times throughout activity.  (ongoing - progressing)     Plan    Occupational therapy services will be provided 1/week through direct intervention, parent education and home programming. Therapy will be discontinued when child has met all goals, is not making progress, parent discontinues therapy, and/or for any other applicable reasons    COLEEN Hall, GENT

## 2025-07-21 NOTE — CONSULTS
[facility]    Referring provider: Deng Gutiérrez MD    Assessment:  Goran Carlos is a 69 y.o. 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   [facility]. Family unsure which modalities he received.   ECOG: (4) Completely disabled, unable to carry out self-care and confined to bed or chair        Plan:  Treatment options were discussed with the patient including radiotherapy (notably whole brain radiation, SRS/SRT) and best supportive care. At this time he remains encephalopathic. Will defer to primary and neuro on etiologies, but suspect multifactorial.   We discussed the goals of treatment to be palliative. He has met with med onc, who has not recommended chemotherapy given performance status. The other issue remains his dysphagia, with high aspiration risk.   The risks, benefits, scheduling, alternatives to and rationale of radiation therapy were explained in detail.    I have concerns about his ability to safely tolerate mask immobilization. Given ECOG 4 and encephalopathy, I do not favor whole brain and I discussed the lack of OS benefit with this modality. If his performance status were to improve, notably encephalopathy and swallow function, and he were a candidate for systemic therapy, then I think SRT may offer a benefit. However, if remains ECOG 4, I favor best supportive care. There is no literature supporting the use of SRS prior to hospice and QUARTZ trial has shown no meaningful benefit to WBRT as opposed to best supportive care.   All questions were answered. Patient and family were in agreement with above plan.       Oncologic History:  He has a history of lymphoma, treated with chemotherapy +/- radiotherapy. He was referred to [facility] from [facility] for generalized weakness for about a week duration. This weakness  progressed and he had a fall.   7/1/25: CT head with Rounded lesions throughout the brain concerning for metastatic lesions versus intracranial abscesses. Effacement of the 4th ventricle by the right cerebellar lesion with enlargement of the temporal horns of the lateral ventricles concerning for early hydrocephalus.   7/1/25: MRI brain with terminated due to patient intolerance.   7/2/25: MRI brain with at least scattered enhancing lesions throughout the brain parenchyma largest in the right cerebellum. Lesion in brainstem.   7/2/25: ventriculostomy  7/3/25: CT chest with Spiculated right upper lobe mass measuring up to 3.2 cm, concerning for primary lung cancer. Enlarged right hilar and mediastinal lymph nodes, suspicious for metastatic disease.  7/7/25: Suboccipital craniotomy for resection of cerebellar tumor  Path: right cerebellum with metastatic poorly-differentiated lung carcinoma with neuroendocrine features   Comment: This immunoprofile was congruent with a metastasis from a primary lung carcinoma with poorly-differentiated rare squamous and patchy neuroendocrine features.   7/8/25: MRI brain s/p POD1 s/p suboccipital cranitomy for mass resection (radiographic GTR). right frontal ventricular catheter placement.   7/15/25: MBSS with Laryngeal penetration varying consistencies.   Speech: moderate to severe pharyngeal dysphagia. NPO recommended.   7/15/25: EVD removal        Possibility of pregnancy: N/A  History of prior irradiation: unknown  History of prior systemic anti-cancer therapy: Yes - for lymphoma per pt and family  History of collagen vascular disease: No  Implanted electronic device (pacer/defib/nerve stimulator): No     History of Present Illness:  Goran Carlos presents today to discuss the role of radiotherapy.    Her chart review, his hospital course c/b obstructive hydrocephalus s/p EVD placement, vasogenic cerebral edema, severe agitation requiring multi-modal management. He transferred to  "hospital medicine on 7/16.    He has been evaluated by palliative care and medical oncology. He has remained on decadron, thiamine. Per family mentation has slowly improved. He denies any complaints. He is accompanied by his niece (a RN navigator at [facility]) in person, and his daughter, Luis Carlos by phone.     Review of Systems:  ROS unable to obtain 2/2 encephalopathy    Social History:  Social History[1]    Past Medical History:  Past Medical History:   Diagnosis Date    ETOH abuse     Gait disturbance     Heroin abuse        Past Surgical History:   Procedure Laterality Date    SUBOCCIPITAL CRANIOTOMY Right 7/7/2025    Procedure: CRANIOTOMY, SUBOCCIPITAL;  Surgeon: Blane Larios DO;  Location: [facility] OR [address] [place] [address];  Service: Neurosurgery;  Laterality: Right;         Medications:  Medications Ordered Prior to Encounter[2]    Allergies:  Review of patient's allergies indicates:  No Known Allergies    Exam:  Vitals:    07/21/25 1122 07/21/25 1155 07/21/25 1525 07/21/25 1555   BP:  125/77  123/79   BP Location:  Right arm  Right arm   Patient Position:  Lying  Lying   Pulse: 78 63 80 80   Resp:  18  18   Temp:  97.6 °F (36.4 °C)  97.4 °F (36.3 °C)   TempSrc:  Oral  Oral   SpO2:  95%  99%   Weight:       Height:         Constitutional: Pleasant 69 y.o. male in no acute distress.  Wearing soft restraints.   HEENT: NG tube in place. No appreciated facial asymmetry. Healing incisions noted on frontal sclp  Cardiovascular: Upper extremities warm to touch  Lungs: No audible wheezing.  Normal effort.   Musculoskeletal: No gross MSK deformities.  Skin: No rashes appreciated.  Psych/ neuro: Alert. Oriented to person. Year is "1998". [facility]"      Data Review:  Information obtained from Goran Carlos and via chart review.     MRI brain from 7/8/25 and prior MRI were reviewed. Noting intracranial lesions with vasogenic edema. S/p resection of R Cerebellar lesion.         Travis Noe MD  Radiation " Oncology               [1]   Social History  Tobacco Use    Smoking status: Former     Types: Cigarettes   Substance Use Topics    Alcohol use: Not Currently    Drug use: Not Currently     Types: Heroin   [2]   No current facility-administered medications on file prior to encounter.     No current outpatient medications on file prior to encounter.

## 2025-07-21 NOTE — PLAN OF CARE
Problem: Adult Inpatient Plan of Care  Goal: Plan of Care Review  Outcome: Progressing  Goal: Readiness for Transition of Care  Outcome: Progressing     Problem: Skin Injury Risk Increased  Goal: Skin Health and Integrity  Outcome: Progressing     Problem: Delirium  Goal: Improved Behavioral Control  Outcome: Progressing     Problem: Restraint, Nonviolent  Goal: Absence of Harm or Injury  Outcome: Progressing     Problem: Wound  Goal: Optimal Functional Ability  Outcome: Progressing  Goal: Skin Health and Integrity  Outcome: Progressing     Problem: Coping Ineffective  Goal: Effective Coping  Outcome: Progressing

## 2025-07-21 NOTE — ASSESSMENT & PLAN NOTE
Impression: Pt is a 70 y/o male transferred from [facility] for generalized weakness for about a week now. History obtained from rehab nurse. She reported that he got to their facility about a month ago and, at baseline, walks with a cane and is alert and oriented. His nurse noticed that he has seemed weaker over the past week and over the past 3 days has had increased balance disturbance and gait issues. Pt has multiple head lesions and RUL mass. Pt is NPO, NG tube in place. Pt is sleeping. Soft restraints on.     Reason for consult: ACP. Spoke to Dr. Gutiérrez.     GOC/ACP:   Called and spoke to pt's daughter, Luis who lives in [place]. Prior to admit, pt was living alone in an apartment. Daughter is aware of pt's medical issues. We discussed pt not being candidate for chemo etc. Daughter reports pt is a  [facility]. Daughter asked about [facility] benefits. We discussed pt's medical issues at this time and that he has declined sharply. Daughter would like to see if Pall[facility] is an [facility]. We discussed hospice care. Daughter seemed open to hospice but wanted to speak to family and also see how pt did on his swallow study.     DaughterLuis will be visiting at end of week.     Code status discussed. Luis open to DNR will speak to rest of family before making a decision.     Symptom management:     Dyshagia:   Pt is NPO.   NG tube in place.   MBSS order pending.     Debility:   Bedside nurse assisting with repositioning.     Plan:   Will continue to follow.   Spoke to Dr. Gutiérrez.

## 2025-07-21 NOTE — ASSESSMENT & PLAN NOTE
Was brought in from [facility], where he has been for the past month  Educate on cessation when appropriate  On phenobarbital for agitation, no signs of EtOH withdrawal -> phenobarb taper     7/16 to completed phenobarbital taper today

## 2025-07-21 NOTE — SUBJECTIVE & OBJECTIVE
Interval History: Spoke to daughter, Luis who asked about possible [facility] placement on Palliative floor in hospital.     Past Medical History:   Diagnosis Date    ETOH abuse     Gait disturbance     Heroin abuse        Past Surgical History:   Procedure Laterality Date    SUBOCCIPITAL CRANIOTOMY Right 7/7/2025    Procedure: CRANIOTOMY, SUBOCCIPITAL;  Surgeon: Blane Larios DO;  Location: [facility] OR [address] [place] [address];  Service: Neurosurgery;  Laterality: Right;       Review of patient's allergies indicates:  No Known Allergies    Medications:  Continuous Infusions:  Scheduled Meds:   amLODIPine  10 mg Per NG tube Daily    carvediloL  6.25 mg Per NG tube BID    cloNIDine  0.3 mg Per NG tube Q8H    dexAMETHasone  2 mg Per NG tube Q12H    enoxparin  40 mg Subcutaneous Q24H (prophylaxis, 1700)    famotidine  20 mg Per NG tube BID    folic acid  1 mg Per NG tube Daily    losartan  100 mg Per NG tube Daily    multivitamin  1 tablet Per NG tube Daily    OLANZapine  20 mg Per NG tube BID    sulfamethoxazole-trimethoprim 800-160mg  1 tablet Per NG tube Every Mon, Wed, Fri    thiamine  100 mg Per NG tube Daily     PRN Meds:  Current Facility-Administered Medications:     acetaminophen, 650 mg, Per NG tube, Q4H PRN    bisacodyL, 10 mg, Rectal, Daily PRN    haloperidol lactate, 2 mg, Intravenous, Q6H PRN    ondansetron, 4 mg, Intravenous, Q4H PRN    sodium chloride 0.9%, 10 mL, Intravenous, PRN    Family History    None       Tobacco Use    Smoking status: Former     Types: Cigarettes    Smokeless tobacco: Not on file   Substance and Sexual Activity    Alcohol use: Not Currently    Drug use: Not Currently     Types: Heroin    Sexual activity: Not on file       Review of Systems   Unable to perform ROS: Acuity of condition     Objective:     Vital Signs (Most Recent):  Temp: 97.3 °F (36.3 °C) (07/21/25 0714)  Pulse: 78 (07/21/25 1122)  Resp: 18 (07/21/25 0756)  BP: 132/85 (07/21/25 0930)  SpO2: 98 % (07/21/25 0756) Vital Signs (24h  Range):  Temp:  [97.3 °F (36.3 °C)-98.6 °F (37 °C)] 97.3 °F (36.3 °C)  Pulse:  [70-82] 78  Resp:  [18] 18  SpO2:  [96 %-98 %] 98 %  BP: (114-162)/(69-89) 132/85     Weight: 81.1 kg (178 lb 12.7 oz)  Body mass index is 23.59 kg/m².       Physical Exam  Constitutional:       General: He is sleeping. He is in acute distress.   HENT:      Nose:      Comments: NG tube in place to nare.   Pulmonary:      Effort: Pulmonary effort is normal. No respiratory distress.   Skin:     General: Skin is warm and dry.   Neurological:      Comments: sleeping            Review of Symptoms      Symptom Assessment (ESAS 0-10 Scale)  Unable to complete assessment due to Acuity of condition         Pain Assessment in Advanced Demential Scale (PAINAD)   Breathing - Independent of vocalization:  0  Negative vocalization:  0  Facial expression:  0  Body language:  0  Consolability:  0  Total:  0    Performance Status:  20    Living Arrangements:  Lives alone    Psychosocial/Cultural:   See Palliative Psychosocial Note: Yes  **Primary  to Follow**  Palliative Care  Consult: Yes        Advance Care Planning   Advance Directives:   Living Will: No    Do Not Resuscitate Status: No    Medical Power of : No    Agent's Name:  Pt's adult children.    Decision Making:  Family answered questions  Goals of Care: The patient and family endorses that what is most important right now is to focus on quality of life with improvement in condition.     Accordingly, we have decided that the best plan to meet the patient's goals includes continuing with treatment         Significant Labs: All pertinent labs within the past 24 hours have been reviewed.  CBC:   Recent Labs   Lab 07/21/25  0626   WBC 4.59   HGB 11.5*   HCT 35.3*   MCV 94        BMP:  Recent Labs   Lab 07/21/25 0626         K 3.8      CO2 24   BUN 35*   CREATININE 0.7   CALCIUM 8.9   MG 2.0     LFT:  Lab Results   Component Value Date     "AST 24 07/21/2025    ALKPHOS 85 07/21/2025    BILITOT 0.2 07/21/2025     Albumin:   Albumin   Date Value Ref Range Status   07/21/2025 3.4 (L) 3.5 - 5.2 g/dL Final     Protein:   Protein Total   Date Value Ref Range Status   07/21/2025 6.9 6.0 - 8.4 gm/dL Final     Lactic acid:   No results found for: "LACTATE"    Significant Imaging: I have reviewed all pertinent imaging results/findings within the past 24 hours.    "

## 2025-07-22 ENCOUNTER — TUMOR BOARD CONFERENCE (OUTPATIENT)
Dept: NEUROSURGERY | Facility: CLINIC | Age: 70
End: 2025-07-22
Payer: MEDICARE

## 2025-07-22 PROBLEM — C79.31 SECONDARY MALIGNANT NEOPLASM OF BRAIN: Status: ACTIVE | Noted: 2025-07-22

## 2025-07-22 LAB
ABSOLUTE EOSINOPHIL (OHS): 0.08 K/UL
ABSOLUTE MONOCYTE (OHS): 0.38 K/UL (ref 0.3–1)
ABSOLUTE NEUTROPHIL COUNT (OHS): 3.49 K/UL (ref 1.8–7.7)
ALBUMIN SERPL BCP-MCNC: 3.6 G/DL (ref 3.5–5.2)
ALP SERPL-CCNC: 88 UNIT/L (ref 40–150)
ALT SERPL W/O P-5'-P-CCNC: 26 UNIT/L (ref 10–44)
ANION GAP (OHS): 10 MMOL/L (ref 8–16)
AST SERPL-CCNC: 28 UNIT/L (ref 11–45)
BASOPHILS # BLD AUTO: 0.03 K/UL
BASOPHILS NFR BLD AUTO: 0.6 %
BILIRUB SERPL-MCNC: 0.2 MG/DL (ref 0.1–1)
BUN SERPL-MCNC: 34 MG/DL (ref 8–23)
CALCIUM SERPL-MCNC: 9.2 MG/DL (ref 8.7–10.5)
CHLORIDE SERPL-SCNC: 109 MMOL/L (ref 95–110)
CO2 SERPL-SCNC: 23 MMOL/L (ref 23–29)
CREAT SERPL-MCNC: 0.9 MG/DL (ref 0.5–1.4)
ERYTHROCYTE [DISTWIDTH] IN BLOOD BY AUTOMATED COUNT: 13.8 % (ref 11.5–14.5)
GFR SERPLBLD CREATININE-BSD FMLA CKD-EPI: >60 ML/MIN/1.73/M2
GLUCOSE SERPL-MCNC: 105 MG/DL (ref 70–110)
HCT VFR BLD AUTO: 32.8 % (ref 40–54)
HGB BLD-MCNC: 10.9 GM/DL (ref 14–18)
IMM GRANULOCYTES # BLD AUTO: 0.01 K/UL (ref 0–0.04)
IMM GRANULOCYTES NFR BLD AUTO: 0.2 % (ref 0–0.5)
LYMPHOCYTES # BLD AUTO: 0.9 K/UL (ref 1–4.8)
MAGNESIUM SERPL-MCNC: 2 MG/DL (ref 1.6–2.6)
MCH RBC QN AUTO: 31.3 PG (ref 27–31)
MCHC RBC AUTO-ENTMCNC: 33.2 G/DL (ref 32–36)
MCV RBC AUTO: 94 FL (ref 82–98)
NUCLEATED RBC (/100WBC) (OHS): 0 /100 WBC
PHOSPHATE SERPL-MCNC: 4 MG/DL (ref 2.7–4.5)
PLATELET # BLD AUTO: 224 K/UL (ref 150–450)
PMV BLD AUTO: 9.5 FL (ref 9.2–12.9)
POTASSIUM SERPL-SCNC: 4.1 MMOL/L (ref 3.5–5.1)
PROT SERPL-MCNC: 7.4 GM/DL (ref 6–8.4)
RBC # BLD AUTO: 3.48 M/UL (ref 4.6–6.2)
RELATIVE EOSINOPHIL (OHS): 1.6 %
RELATIVE LYMPHOCYTE (OHS): 18.4 % (ref 18–48)
RELATIVE MONOCYTE (OHS): 7.8 % (ref 4–15)
RELATIVE NEUTROPHIL (OHS): 71.4 % (ref 38–73)
SODIUM SERPL-SCNC: 142 MMOL/L (ref 136–145)
WBC # BLD AUTO: 4.89 K/UL (ref 3.9–12.7)

## 2025-07-22 PROCEDURE — 25000003 PHARM REV CODE 250: Performed by: PHYSICIAN ASSISTANT

## 2025-07-22 PROCEDURE — 63600175 PHARM REV CODE 636 W HCPCS: Performed by: HOSPITALIST

## 2025-07-22 PROCEDURE — 97530 THERAPEUTIC ACTIVITIES: CPT | Mod: CQ

## 2025-07-22 PROCEDURE — 25000003 PHARM REV CODE 250: Performed by: HOSPITALIST

## 2025-07-22 PROCEDURE — 94668 MNPJ CHEST WALL SBSQ: CPT

## 2025-07-22 PROCEDURE — 84100 ASSAY OF PHOSPHORUS: CPT | Performed by: PHYSICIAN ASSISTANT

## 2025-07-22 PROCEDURE — 99497 ADVNCD CARE PLAN 30 MIN: CPT | Mod: 25,,, | Performed by: CLINICAL NURSE SPECIALIST

## 2025-07-22 PROCEDURE — 11000001 HC ACUTE MED/SURG PRIVATE ROOM

## 2025-07-22 PROCEDURE — 92526 ORAL FUNCTION THERAPY: CPT

## 2025-07-22 PROCEDURE — 97110 THERAPEUTIC EXERCISES: CPT | Mod: CQ

## 2025-07-22 PROCEDURE — 97116 GAIT TRAINING THERAPY: CPT | Mod: CQ

## 2025-07-22 PROCEDURE — 63600175 PHARM REV CODE 636 W HCPCS: Performed by: PHYSICIAN ASSISTANT

## 2025-07-22 PROCEDURE — 94761 N-INVAS EAR/PLS OXIMETRY MLT: CPT

## 2025-07-22 PROCEDURE — 99233 SBSQ HOSP IP/OBS HIGH 50: CPT | Mod: 25,,, | Performed by: CLINICAL NURSE SPECIALIST

## 2025-07-22 PROCEDURE — 83735 ASSAY OF MAGNESIUM: CPT | Performed by: PHYSICIAN ASSISTANT

## 2025-07-22 PROCEDURE — 36415 COLL VENOUS BLD VENIPUNCTURE: CPT | Performed by: PHYSICIAN ASSISTANT

## 2025-07-22 PROCEDURE — 97535 SELF CARE MNGMENT TRAINING: CPT

## 2025-07-22 PROCEDURE — 99900035 HC TECH TIME PER 15 MIN (STAT)

## 2025-07-22 PROCEDURE — 36415 COLL VENOUS BLD VENIPUNCTURE: CPT | Performed by: HOSPITALIST

## 2025-07-22 PROCEDURE — 84155 ASSAY OF PROTEIN SERUM: CPT | Performed by: PHYSICIAN ASSISTANT

## 2025-07-22 PROCEDURE — 85025 COMPLETE CBC W/AUTO DIFF WBC: CPT | Performed by: HOSPITALIST

## 2025-07-22 RX ORDER — THIAMINE HCL 100 MG
100 TABLET ORAL DAILY
Status: DISCONTINUED | OUTPATIENT
Start: 2025-07-23 | End: 2025-07-25 | Stop reason: HOSPADM

## 2025-07-22 RX ORDER — CLONIDINE HYDROCHLORIDE 0.1 MG/1
0.3 TABLET ORAL EVERY 8 HOURS
Status: DISCONTINUED | OUTPATIENT
Start: 2025-07-22 | End: 2025-07-23

## 2025-07-22 RX ORDER — CARVEDILOL 6.25 MG/1
6.25 TABLET ORAL 2 TIMES DAILY
Status: DISCONTINUED | OUTPATIENT
Start: 2025-07-22 | End: 2025-07-23

## 2025-07-22 RX ORDER — LOSARTAN POTASSIUM 50 MG/1
100 TABLET ORAL DAILY
Status: DISCONTINUED | OUTPATIENT
Start: 2025-07-23 | End: 2025-07-23

## 2025-07-22 RX ORDER — FAMOTIDINE 20 MG/1
20 TABLET, FILM COATED ORAL 2 TIMES DAILY
Status: DISCONTINUED | OUTPATIENT
Start: 2025-07-22 | End: 2025-07-25 | Stop reason: HOSPADM

## 2025-07-22 RX ORDER — OLANZAPINE 5 MG/1
20 TABLET, FILM COATED ORAL 2 TIMES DAILY
Status: DISCONTINUED | OUTPATIENT
Start: 2025-07-22 | End: 2025-07-25 | Stop reason: HOSPADM

## 2025-07-22 RX ORDER — SULFAMETHOXAZOLE AND TRIMETHOPRIM 800; 160 MG/1; MG/1
1 TABLET ORAL
Status: DISCONTINUED | OUTPATIENT
Start: 2025-07-23 | End: 2025-07-25 | Stop reason: HOSPADM

## 2025-07-22 RX ORDER — AMLODIPINE BESYLATE 10 MG/1
10 TABLET ORAL DAILY
Status: DISCONTINUED | OUTPATIENT
Start: 2025-07-23 | End: 2025-07-23

## 2025-07-22 RX ORDER — ACETAMINOPHEN 325 MG/1
650 TABLET ORAL EVERY 4 HOURS PRN
Status: DISCONTINUED | OUTPATIENT
Start: 2025-07-22 | End: 2025-07-25 | Stop reason: HOSPADM

## 2025-07-22 RX ORDER — DEXAMETHASONE 1 MG/1
2 TABLET ORAL EVERY 12 HOURS
Status: DISCONTINUED | OUTPATIENT
Start: 2025-07-22 | End: 2025-07-25 | Stop reason: HOSPADM

## 2025-07-22 RX ORDER — FOLIC ACID 1 MG/1
1 TABLET ORAL DAILY
Status: DISCONTINUED | OUTPATIENT
Start: 2025-07-23 | End: 2025-07-25 | Stop reason: HOSPADM

## 2025-07-22 RX ADMIN — HALOPERIDOL LACTATE 2 MG: 5 INJECTION, SOLUTION INTRAMUSCULAR at 02:07

## 2025-07-22 RX ADMIN — OLANZAPINE 20 MG: 5 TABLET, FILM COATED ORAL at 09:07

## 2025-07-22 RX ADMIN — CLONIDINE HYDROCHLORIDE 0.3 MG: 0.2 TABLET ORAL at 05:07

## 2025-07-22 RX ADMIN — DEXAMETHASONE 2 MG: 1 TABLET ORAL at 10:07

## 2025-07-22 RX ADMIN — AMLODIPINE BESYLATE 10 MG: 10 TABLET ORAL at 10:07

## 2025-07-22 RX ADMIN — CARVEDILOL 6.25 MG: 6.25 TABLET, FILM COATED ORAL at 10:07

## 2025-07-22 RX ADMIN — OLANZAPINE 20 MG: 5 TABLET, FILM COATED ORAL at 10:07

## 2025-07-22 RX ADMIN — Medication 100 MG: at 10:07

## 2025-07-22 RX ADMIN — ENOXAPARIN SODIUM 40 MG: 40 INJECTION SUBCUTANEOUS at 05:07

## 2025-07-22 RX ADMIN — FAMOTIDINE 20 MG: 20 TABLET, FILM COATED ORAL at 09:07

## 2025-07-22 RX ADMIN — FOLIC ACID 1 MG: 1 TABLET ORAL at 10:07

## 2025-07-22 RX ADMIN — DEXAMETHASONE 2 MG: 1 TABLET ORAL at 09:07

## 2025-07-22 RX ADMIN — CLONIDINE HYDROCHLORIDE 0.3 MG: 0.1 TABLET ORAL at 09:07

## 2025-07-22 RX ADMIN — LOSARTAN POTASSIUM 100 MG: 50 TABLET, FILM COATED ORAL at 10:07

## 2025-07-22 RX ADMIN — CLONIDINE HYDROCHLORIDE 0.3 MG: 0.1 TABLET ORAL at 03:07

## 2025-07-22 RX ADMIN — CARVEDILOL 6.25 MG: 6.25 TABLET, FILM COATED ORAL at 09:07

## 2025-07-22 RX ADMIN — FAMOTIDINE 20 MG: 20 TABLET, FILM COATED ORAL at 10:07

## 2025-07-22 RX ADMIN — THERA TABS 1 TABLET: TAB at 10:07

## 2025-07-22 NOTE — PLAN OF CARE
Recommendations  --Continue Impact Peptide @ 55 mL/hr to provide 1320 mL total volume, 1980 kcal, 185 g CHO, 124 g protein, 0 g fiber, 1019 mL free water, 132% DRI         --160 mL flush q4 hrs to provide additional 960 mL free water (Total 1979 mL)     -- Bolus TF RECS: Isosource 1.5, bolus 6 cartons/day to provide 2250 kcals, 102 g protein, 1146 kcals, FWF per MD     --Nursing: please continue to document rate and formula on flowsheet    --RD to monitor weight, rate, tolerance    Goal #1: Provide greater than or equal to 75% nutritional needs via nutrition support by RD follow up  Nutrition Goal Status #1: goal met  Communication of RD Recs:  (POC)

## 2025-07-22 NOTE — SUBJECTIVE & OBJECTIVE
Interval History: Spoke to daughter, Luis who asked about possible VA placement on Palliative floor in hospital.     Past Medical History:   Diagnosis Date    ETOH abuse     Gait disturbance     Heroin abuse        Past Surgical History:   Procedure Laterality Date    SUBOCCIPITAL CRANIOTOMY Right 7/7/2025    Procedure: CRANIOTOMY, SUBOCCIPITAL;  Surgeon: Blane Larios DO;  Location: [facility] OR [address] [place] [address];  Service: Neurosurgery;  Laterality: Right;       Review of patient's allergies indicates:  No Known Allergies    Medications:  Continuous Infusions:  Scheduled Meds:   amLODIPine  10 mg Per NG tube Daily    carvediloL  6.25 mg Per NG tube BID    cloNIDine  0.3 mg Per NG tube Q8H    dexAMETHasone  2 mg Per NG tube Q12H    enoxparin  40 mg Subcutaneous Q24H (prophylaxis, 1700)    famotidine  20 mg Per NG tube BID    folic acid  1 mg Per NG tube Daily    losartan  100 mg Per NG tube Daily    multivitamin  1 tablet Per NG tube Daily    OLANZapine  20 mg Per NG tube BID    sulfamethoxazole-trimethoprim 800-160mg  1 tablet Per NG tube Every Mon, Wed, Fri    thiamine  100 mg Per NG tube Daily     PRN Meds:  Current Facility-Administered Medications:     acetaminophen, 650 mg, Per NG tube, Q4H PRN    bisacodyL, 10 mg, Rectal, Daily PRN    haloperidol lactate, 2 mg, Intravenous, Q6H PRN    ondansetron, 4 mg, Intravenous, Q4H PRN    sodium chloride 0.9%, 10 mL, Intravenous, PRN    Family History    None       Tobacco Use    Smoking status: Former     Types: Cigarettes    Smokeless tobacco: Not on file   Substance and Sexual Activity    Alcohol use: Not Currently    Drug use: Not Currently     Types: Heroin    Sexual activity: Not on file       Review of Systems   Unable to perform ROS: Acuity of condition     Objective:     Vital Signs (Most Recent):  Temp: 98 °F (36.7 °C) (07/22/25 0800)  Pulse: 83 (07/22/25 1001)  Resp: 18 (07/22/25 0800)  BP: 130/79 (07/22/25 1002)  SpO2: 96 % (07/22/25 0800) Vital Signs (24h  Range):  Temp:  [97.4 °F (36.3 °C)-99.2 °F (37.3 °C)] 98 °F (36.7 °C)  Pulse:  [63-94] 83  Resp:  [18-19] 18  SpO2:  [95 %-99 %] 96 %  BP: (123-148)/(75-85) 130/79     Weight: 81.1 kg (178 lb 12.7 oz)  Body mass index is 23.59 kg/m².       Physical Exam  Constitutional:       General: He is in acute distress.   HENT:      Nose:      Comments: NG tube in place to nare.   Pulmonary:      Effort: Pulmonary effort is normal. No respiratory distress.   Skin:     General: Skin is warm and dry.   Neurological:      Mental Status: He is alert.      Comments: Pt oriented to person, place, and situation   Psychiatric:         Behavior: Behavior is cooperative.            Review of Symptoms      Symptom Assessment (ESAS 0-10 Scale)  Unable to complete assessment due to Acuity of condition         Pain Assessment in Advanced Demential Scale (PAINAD)   Breathing - Independent of vocalization:  0  Negative vocalization:  0  Facial expression:  0  Body language:  0  Consolability:  0  Total:  0    Performance Status:  20    Living Arrangements:  Lives alone    Psychosocial/Cultural:   See Palliative Psychosocial Note: Yes  **Primary  to Follow**  Palliative Care  Consult: Yes        Advance Care Planning   Advance Directives:   Living Will: No    Do Not Resuscitate Status: No    Medical Power of : No    Agent's Name:  Pt's adult children.    Decision Making:  Family answered questions  Goals of Care: The patient and family endorses that what is most important right now is to focus on quality of life with improvement in condition.     Accordingly, we have decided that the best plan to meet the patient's goals includes continuing with treatment         Significant Labs: All pertinent labs within the past 24 hours have been reviewed.  CBC:   Recent Labs   Lab 07/22/25  0638   WBC 4.89   HGB 10.9*   HCT 32.8*   MCV 94        BMP:  Recent Labs   Lab 07/22/25  0535         K 4.1   CL  "109   CO2 23   BUN 34*   CREATININE 0.9   CALCIUM 9.2   MG 2.0     LFT:  Lab Results   Component Value Date    AST 28 07/22/2025    ALKPHOS 88 07/22/2025    BILITOT 0.2 07/22/2025     Albumin:   Albumin   Date Value Ref Range Status   07/22/2025 3.6 3.5 - 5.2 g/dL Final     Protein:   Protein Total   Date Value Ref Range Status   07/22/2025 7.4 6.0 - 8.4 gm/dL Final     Lactic acid:   No results found for: "LACTATE"    Significant Imaging: I have reviewed all pertinent imaging results/findings within the past 24 hours.    "

## 2025-07-22 NOTE — ASSESSMENT & PLAN NOTE
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

## 2025-07-22 NOTE — PT/OT/SLP PROGRESS
Speech Language Pathology Treatment    Patient Name:  Goran Carlos   MRN:  [id number]  Admitting Diagnosis: Ataxia    Recommendations:                 General Recommendations:  Dysphagia therapy  Diet recommendations:    NPO, Liquid Diet Level: NPO, pleasure feeds of purees and thin liquids   Aspiration Precautions: Strict aspiration precautions   General Precautions: Standard, aspiration, fall, NPO  Communication strategies:  none  Discharge recommendations:   (tbd)   Barriers to Discharge:  Inaccessible Home Environment    Assessment:     Goran Carlos is a 69 y.o. male with an SLP diagnosis of Dysphagia.  NG tube was removed this AM. If family and pt choose to accept the risk for aspiration/aspiration related complications the safest diet would be purees and thin liquids    Subjective     Spoke with RN prior to session    Pain/Comfort:  Pain Rating 1: 0/10    Respiratory Status: Room air    Objective:     Has the patient been evaluated by SLP for swallowing?   Yes  Keep patient NPO? Yes   Current Respiratory Status:    Room air     Pt was seen for ongoing dysphagia tx. Wet vocal quality was noted prior to PO intake, weak cough was noted when attempting to clear secretions. He took tsps of puree and multiple cup edge sips of water. Noted increasing wet vocal quality as trials progressed. Coughing noted at the end of the session. Discussed impressions with MD who stated that NG tube was to be removed and pt would be started on a pleasure feed diet, discussed safest diet recs with MD. Should pt transition to comfort measures, further SLP intervention note warranted.     Goals:   Multidisciplinary Problems       SLP Goals          Problem: SLP    Goal Priority Disciplines Outcome   SLP Goal     SLP Progressing   Description: Goals due 7/16  1.  Pt. Will participate in ongoing assessment of swallow to initiate least restrictive diet.                       Plan:     Patient to be seen:  4 x/week   Plan of Care expires:   08/06/25  Plan of Care reviewed with:  patient   SLP Follow-Up:  Yes       Time Tracking:     SLP Treatment Date:   07/22/25  Speech Start Time:  0930  Speech Stop Time:  0945     Speech Total Time (min):  15 min    Billable Minutes: Treatment Swallowing Dysfunction 7 and Self Care/Home Management Training 8    07/22/2025

## 2025-07-22 NOTE — PROGRESS NOTES
[facility] - Neurosurgery ([facility])  [facility] Medicine  Progress Note    Patient Name: Goran Carlos  MRN: [id number]  Patient Class: IP- Inpatient   Admission Date: 7/1/2025  Length of Stay: 21 days  Attending Physician: Deng Gutiérrez MD  Primary Care Provider: No primary care provider on file.        Subjective     Principal Problem:Ataxia        HPI:  71 y/o M presenting from [facility] for generalized weakness for about a week now. History obtained from rehab nurse. She reported that he got to their facility about a month ago and, at baseline, walks with a cane and is alert and oriented. His nurse noticed that he has seemed weaker over the past week and over the past 3 days has had increased balance disturbance and gait issues. They also noticed that he has been speaking more softly in his speech is harder to understand. He has not had any infectious symptoms. He fell yesterday, unsure if he hit his head. CT head without contrast revealed multiple lesions of hypodensity with a hyperdense rim, notably in the left temporal lobe and right cerebellum. There were additional small hyperdensities in the right frontoparietal and left parasagittal parietal lobe. There is mass effect and partial effacement of the 4th ventricle secondary to the cerebellar lesion with developing hydrocephalus without MLS. CT of the chest, abdomen, and pelvis with IV contrast showed RUL mass with mediastinal adenopathy. Labs revealed Hep C and treponema antibodies were positive. Hep C PCR pending. Penicillin G was administered. A sepsis workup was completed and antibiotics were initiated. An MRI brain with and without contrast was ordered, but patient was unable to complete due to persistent movement after sedatives were administered. Prior to MRI, he was alert but drowsy. He was able to tell me his name, but told me he was 53 years old, the year was 2003, and he did not know the current place or reason for being here. He had generalized  weakness but was slightly more weak on the right side. He followed simple commands, but did not attempt to follow more complex commands. He is admitted to [facility] with NSGY consult.          Overview/Hospital Course:  71 y/o M presenting from [facility] for generalized weakness and dysarthria. CT head without contrast revealed multiple lesions of hypodensity with a hyperdense rim, notably in the left temporal lobe and right cerebellum.  additional small hyperdensities in the right frontoparietal and left parasagittal parietal lobe. mass effect and partial effacement of the 4th ventricle secondary to the cerebellar lesion with developing hydrocephalus without MLS. CT of the chest, abdomen, and pelvis with IV contrast showed RUL mass with mediastinal adenopathy. Labs revealed Hep C and treponema antibodies were positive. Hep C PCR pending. Penicillin G was administered. A sepsis workup was completed and antibiotics were initiated. An MRI brain with and without contrast was ordered, but patient was unable to complete due to persistent movement after sedatives were administered. Prior to MRI, he was alert but drowsy. He was able to tell me his name, but told me he was 53 years old, the year was 2003, and he did not know the current place or reason for being here. He had generalized weakness but was slightly more weak on the right side. He followed simple commands, but did not attempt to follow more complex commands. MRI favors neoplastic brain lesions. CSF studies pending. 7/4/2025: Extubated without complications. On 7/7 underwent SOC crani for tumor resection. Started on steroids.        7/16 Transfer to hospital medicine. admitted with generalized weakness, falls, and paucity of speech presenting with multiple ring enhancing lesions on brain CT - CTHead 7/1 showed multiple brain masses concerning for mets with surrounding edema, can't rule out abscesses. Mass in cerebellum with effacement of 4th ventricule outflow with  concern for developing hydrocephalus. MRI brain 7/1: non diagnostic due to motion. CT CAP 7/1: spiculated Right lung mass and lymph node adenopathy in chest and neck concerning for metastatic disease. MRI Brain W/WO 7/2: multifocal enhancing lesions c/f metastatic disease, largest R cerebellum w/mass effect on 4th. now s/p R SOC for tumor resection on 7/7. Post op CTH/MRI 7/7: anticipate post operative changes, hemostatic products left at superior/medial border of resection cavity. EVD removed 7/14,  posterior incision without  pseudomeningocele. Dexamthasone weaned to 2 BID per NCC, continue GI ppx while on steroid. on bactrim for PCP prophylaxis.  Admitted form [facility], currently on phenobarb taper and scheduled zyprexa and clonidine for ETOH and heroin use. Pending rehab placement. Needs OP follow up with NSGY (continue decadron till then) and Radiation oncology team. Pathology is lung carcinoma but [facility] and PD-L1 testing are pending. on NGT feeds impact peptide. SLP following. on 4 point restraints.  CTHead 7/14 -Ventricles remain somewhat prominent but without overt hydrocephalus or significant change in size following EVD removal.Evolving recent postsurgical change in the posterior fossa. No new intracranial hemorrhage or major vascular distribution infarct. AAO to self. on UE restraints.  [facility] 7/16.              Interval History:  Patient with no significant pain complaints.  Discussed with palliative Care who affirmed that patient and daughter agreeable to DNR.  Afebrile.  Ordered NG tube to be removed this morning.    Review of Systems  Objective:     Vital Signs (Most Recent):  Temp: 98 °F (36.7 °C) (07/22/25 0800)  Pulse: 85 (07/22/25 1214)  Resp: 19 (07/22/25 1214)  BP: 130/79 (07/22/25 1002)  SpO2: 96 % (07/22/25 1214) Vital Signs (24h Range):  Temp:  [97.4 °F (36.3 °C)-99.2 °F (37.3 °C)] 98 °F (36.7 °C)  Pulse:  [77-94] 85  Resp:  [18-19] 19  SpO2:  [96 %-99 %] 96 %  BP: (123-148)/(75-85) 130/79      Weight: 81.1 kg (178 lb 12.7 oz)  Body mass index is 23.59 kg/m².    Intake/Output Summary (Last 24 hours) at 7/22/2025 1314  Last data filed at 7/22/2025 0605  Gross per 24 hour   Intake 1500 ml   Output 700 ml   Net 800 ml         Physical Exam      Clear lungs bilaterally, unlabored breathing, on room air, no cyanosis   Heart sounds indicate a regular rate and rhythm   Awake alert, no acute distress   No facial droop, no slurred speech   Maintains good eye contact, speech a bit more clear today   No obvious upper no lower extremity edema         Assessment & Plan  Ataxia    2/2 cerebellar lesion   PT/OT -> recommending acute rehab once medically appropriate for discharge         Polysubstance abuse     Was brought in from [facility], where he has been for the past month  Educate on cessation when appropriate  On phenobarbital for agitation, no signs of EtOH withdrawal -> phenobarb taper     7/16 to completed phenobarbital taper       Brain lesion     CT head without contrast revealed multiple lesions of hypodensity with a hyperdense rim, notably in the left temporal lobe and right cerebellum.  additional small hyperdensities in the right frontoparietal and left parasagittal parietal lobe.  mass effect and partial effacement of the 4th ventricle secondary to the cerebellar lesion with developing hydrocephalus without MLS. CT of the chest, abdomen, and pelvis with IV contrast showed RUL mass with mediastinal adenopathy. Hep C and treponema antibodies were positive. Hep C PCR pending. Penicillin G was administered. A sepsis workup was completed and antibiotics were initiated. Intubated to get MRI. S/p EVD. Extubated 7/4/25 without difficulty.     s/p SOC craniotomy for tumor resection on 7/7  Dex 2 mg BID, no plans to taper further pending outpt f/u  Bactrim MWF for PJP ppx given plan for prolonged steroid taper  EVD removed 7/14  Zyprexa BID for agitation  Phenobarb taper to off given improved agitation    Clonidine 0.3 mg TID for agitation/opioid dependence/hypertension  Avoid versed for agitation  Tube feeds at goal, SLP following for swallow  Folate/thiamine/MV  SBP <160     Needs OP follow up with NSGY (continue decadron till then) and Radiation oncology team. Pathology is lung carcinoma but [facility] and PD-L1 testing are pending. on NGT feeds impact peptide. SLP following. on 4 point restraints.    CTHead 7/14 -Ventricles remain somewhat prominent but without overt hydrocephalus or significant change in size following EVD removal.Evolving recent postsurgical change in the posterior fossa. No new intracranial hemorrhage or major vascular distribution infarct. AAO to self. on  UE restraints.    SLP          Gait disturbance     PT/OT eval - rehab     Hepatitis C antibody positive     PCR negative  No known history of Hep C per patient        Mass of upper lobe of right lung     Noted on CXR and CT chest with IV contrast  Saturating well on RA; No PTX on CT-chest  No lung consolidations or obstructive process  No known history of cancer  Intra-op brain biopsy frozen pathology suggestive of metastatic adenocarcinoma, 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  Will need outpt rad onc and med onc f/u if pursuing aggressive care     Positive serology for syphilis  s/p ID eval -  Positive Treponema pallidum antibody. RPR 1:1 suggestive of false positive or past treated infection. Received penicillin G 2.4 million units x 1 on 7/1. CSF analysis not suggestive of neurosyphilis at this time . blood RPR and CSF VDRL 7/2 negative        Essential hypertension  Patient's blood pressure range in the last 24 hours was: BP  Min: 123/79  Max: 148/85.The patient's inpatient anti-hypertensive regimen is listed below:  Current Antihypertensives  losartan tablet 100 mg, Daily, Oral  cloNIDine tablet 0.3 mg, Every 8 hours, Oral  carvediloL tablet 6.25 mg, 2 times daily, Oral  amLODIPine tablet 10 mg, Daily, Oral      - Goal SBP<160         Encephalopathy,  metabolic     See primary problem  Slowly improving   on clonidine 0.3mg TID and olanzapine 20mg BID  Encephalopathic likely from brain mets  Unable to progress with SLP; NPO; with NGT  Repeat head CT ordered by NSGY on 7/21 - stable  Anemia    Patient's with Normocytic anemia.. Hemoglobin stable. Etiology likely due to chronic disease .  Current CBC reviewed-    Recent Labs   Lab 07/20/25  0512 07/21/25  0626 07/22/25  0638   HGB 11.8* 11.5* 10.9*         Component Value Date/Time    MCV 94 07/22/2025 0638    RDW 13.8 07/22/2025 0638     Monitor CBC and transfuse if H/H drops below 7/21.    Dysphagia  SLP , tube feeds for now; cancelled MBSS due to overt aspiration presentation  Pleasure feed diet; NGT ordered to be removed  Agitation  Stable on olanzapine    Vasogenic cerebral edema  decadron    Pseudomeningocele      Brain compression      Obstructive hydrocephalus      Metastasis to brain  See above; on steroids    Cancer Staging   No matching staging information was found for the patient.      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  - PD-L1 testing and [employer] NGS have been ordered and results will be issued     in a separate reports in the EPIC-MEDIA section      VTE Risk Mitigation (From admission, onward)           Ordered     enoxaparin injection 40 mg  Every 24 hours         07/15/25 1247     IP VTE LOW RISK PATIENT  Once         07/01/25 2049     Place sequential compression device  Until discontinued         07/01/25 2049                    Discharge Planning   CARA: 7/25/2025     Code Status: DNR   Medical Readiness for Discharge Date: 7/22/2025  Discharge Plan A: Inpatient Hospice   Discharge Delays: None known at this time                 Deng Gutiérrez MD  Department of Hospital  Medicine   [facility] Henson - Neurosurgery ([facility])

## 2025-07-22 NOTE — ASSESSMENT & PLAN NOTE
See primary problem  Slowly improving   on clonidine 0.3mg TID and olanzapine 20mg BID  Encephalopathic likely from brain mets  Unable to progress with SLP; NPO; with NGT  Repeat head CT ordered by HERI on 7/21 - stable

## 2025-07-22 NOTE — ASSESSMENT & PLAN NOTE
Noted on CXR and CT chest with IV contrast  Saturating well on RA; No PTX on CT-chest  No lung consolidations or obstructive process  No known history of cancer  Intra-op brain biopsy frozen pathology suggestive of metastatic adenocarcinoma, 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  Will need outpt rad onc and med onc f/u if pursuing aggressive care

## 2025-07-22 NOTE — PROGRESS NOTES
"Alex Henson - Neurosurgery ([facility])  Palliative Medicine  Progress Note    Patient Name: Goran Carlos  MRN: [id number]  Admission Date: 7/1/2025  Hospital Length of Stay: 21 days  Code Status: Full Code   Attending Provider: Deng Gutiérrez MD  Consulting Provider: Riri Roger CNS  Primary Care Physician: No primary care provider on file.  Principal Problem:Ataxia    Patient information was obtained from patient, relative(s), and primary team.      Assessment/Plan:     Palliative Care  Palliative care encounter  Impression: Pt is a 68 y/o male transferred from [facility] for generalized weakness for about a week now. History obtained from rehab nurse. She reported that he got to their facility about a month ago and, at baseline, walks with a cane and is alert and oriented. His nurse noticed that he has seemed weaker over the past week and over the past 3 days has had increased balance disturbance and gait issues. Pt has multiple head lesions and RUL mass. Pt is NPO, NG tube in place. Pt  alert, oriented to person, place.     Reason for consult: ACP. Spoke to Dr. Gutiérrez.     GOC/ACP:     Today: Met with pt who is alert and able to answer questions appropriately at this time. We discussed his wishes at EOL including code status. Per pt, he does not want CPR or be put on vent. Pt reports, "Let me go at that time." Called pt's daughter Luis on speaker with pt at bedside. Pt's was able to verbalize his wishes at EOL and wants to be DNR. Daughter reports in agreement with pt and wants to support his wishes.     Pt/daughter are seeing [facility]  seeing if option.     7/21/25  Called and spoke to pt's daughter, Luis who lives in [place]. Prior to admit, pt was living alone in an apartment. Daughter is aware of pt's medical issues. We discussed pt not being candidate for chemo etc. Daughter reports pt is a  who [facility]. Daughter asked " about [facility] benefits. We discussed pt's medical issues at this time and that he has declined sharply. Daughter would like to see if [facility] is an [facility]. We discussed hospice care. Daughter seemed open to hospice but wanted to speak to family and also see how pt did on his swallow study.     Daughter, Luis will be visiting at end of week.     Code status discussed. Luis open to DNR will speak to rest of family before making a decision.     Symptom management:     Dyshagia:   Pt is NPO.   NG tube in place.       Debility:   Bedside nurse assisting with repositioning.     Plan:   Will continue to follow.   Spoke to Dr. Gutiérrez.         I will follow-up with patient. Please contact us if you have any additional questions.    Subjective:     Chief Complaint:   Chief Complaint   Patient presents with    Weakness     Generalized weakness for about a week, detoxing for approx 1 month from heroin and alcohol.        HPI:   Pt is a 69 y/o M presenting from [facility] for generalized weakness for about a week now. History obtained from rehab nurse. She reported that he got to their facility about a month ago and, at baseline, walks with a cane and is alert and oriented. His nurse noticed that he has seemed weaker over the past week and over the past 3 days has had increased balance disturbance and gait issues. They also noticed that he has been speaking more softly in his speech is harder to understand. He has not had any infectious symptoms. He fell yesterday, unsure if he hit his head. CT head without contrast revealed multiple lesions of hypodensity with a hyperdense rim, notably in the left temporal lobe and right cerebellum. There were additional small hyperdensities in the right frontoparietal and left parasagittal parietal lobe. There is mass effect and partial effacement of the 4th ventricle secondary to the cerebellar lesion with developing hydrocephalus without MLS. CT of the chest, abdomen,  and pelvis with IV contrast showed RUL mass with mediastinal adenopathy. Labs revealed Hep C and treponema antibodies were positive. Hep C PCR pending. Penicillin G was administered. A sepsis workup was completed and antibiotics were initiated. An MRI brain with and without contrast was ordered, but patient was unable to complete due to persistent movement after sedatives were administered. Prior to MRI, he was alert but drowsy. He was able to tell me his name, but told me he was 53 years old, the year was 2003, and he did not know the current place or reason for being here. He had generalized weakness but was slightly more weak on the right side. He followed simple commands, but did not attempt to follow more complex commands. He is admitted to [facility] with NSGY consult.    Pt has NG tube in place, with soft restraint.           Hospital Course:  No notes on file    Interval History: Spoke to daughterLuis who asked about possible [facility] placement on Palliative floor in hospital.     Past Medical History:   Diagnosis Date    ETOH abuse     Gait disturbance     Heroin abuse        Past Surgical History:   Procedure Laterality Date    SUBOCCIPITAL CRANIOTOMY Right 7/7/2025    Procedure: CRANIOTOMY, SUBOCCIPITAL;  Surgeon: Blane Larios DO;  Location: [facility] OR [address] [place] [address];  Service: Neurosurgery;  Laterality: Right;       Review of patient's allergies indicates:  No Known Allergies    Medications:  Continuous Infusions:  Scheduled Meds:   amLODIPine  10 mg Per NG tube Daily    carvediloL  6.25 mg Per NG tube BID    cloNIDine  0.3 mg Per NG tube Q8H    dexAMETHasone  2 mg Per NG tube Q12H    enoxparin  40 mg Subcutaneous Q24H (prophylaxis, 1700)    famotidine  20 mg Per NG tube BID    folic acid  1 mg Per NG tube Daily    losartan  100 mg Per NG tube Daily    multivitamin  1 tablet Per NG tube Daily    OLANZapine  20 mg Per NG tube BID    sulfamethoxazole-trimethoprim 800-160mg  1 tablet Per NG tube Every Mon, Wed, Fri     thiamine  100 mg Per NG tube Daily     PRN Meds:  Current Facility-Administered Medications:     acetaminophen, 650 mg, Per NG tube, Q4H PRN    bisacodyL, 10 mg, Rectal, Daily PRN    haloperidol lactate, 2 mg, Intravenous, Q6H PRN    ondansetron, 4 mg, Intravenous, Q4H PRN    sodium chloride 0.9%, 10 mL, Intravenous, PRN    Family History    None       Tobacco Use    Smoking status: Former     Types: Cigarettes    Smokeless tobacco: Not on file   Substance and Sexual Activity    Alcohol use: Not Currently    Drug use: Not Currently     Types: Heroin    Sexual activity: Not on file       Review of Systems   Unable to perform ROS: Acuity of condition     Objective:     Vital Signs (Most Recent):  Temp: 98 °F (36.7 °C) (07/22/25 0800)  Pulse: 83 (07/22/25 1001)  Resp: 18 (07/22/25 0800)  BP: 130/79 (07/22/25 1002)  SpO2: 96 % (07/22/25 0800) Vital Signs (24h Range):  Temp:  [97.4 °F (36.3 °C)-99.2 °F (37.3 °C)] 98 °F (36.7 °C)  Pulse:  [63-94] 83  Resp:  [18-19] 18  SpO2:  [95 %-99 %] 96 %  BP: (123-148)/(75-85) 130/79     Weight: 81.1 kg (178 lb 12.7 oz)  Body mass index is 23.59 kg/m².       Physical Exam  Constitutional:       General: He is in acute distress.   HENT:      Nose:      Comments: NG tube in place to nare.   Pulmonary:      Effort: Pulmonary effort is normal. No respiratory distress.   Skin:     General: Skin is warm and dry.   Neurological:      Mental Status: He is alert.      Comments: Pt oriented to person, place, and situation   Psychiatric:         Behavior: Behavior is cooperative.            Review of Symptoms      Symptom Assessment (ESAS 0-10 Scale)  Unable to complete assessment due to Acuity of condition         Pain Assessment in Advanced Demential Scale (PAINAD)   Breathing - Independent of vocalization:  0  Negative vocalization:  0  Facial expression:  0  Body language:  0  Consolability:  0  Total:  0    Performance Status:  20    Living Arrangements:  Lives  "alone    Psychosocial/Cultural:   See Palliative Psychosocial Note: Yes  **Primary  to Follow**  Palliative Care  Consult: Yes        Advance Care Planning  Advance Directives:   Living Will: No    Do Not Resuscitate Status: No    Medical Power of : No    Agent's Name:  Pt's adult children.    Decision Making:  Family answered questions  Goals of Care: The patient and family endorses that what is most important right now is to focus on quality of life with improvement in condition.     Accordingly, we have decided that the best plan to meet the patient's goals includes continuing with treatment         Significant Labs: All pertinent labs within the past 24 hours have been reviewed.  CBC:   Recent Labs   Lab 07/22/25  0638   WBC 4.89   HGB 10.9*   HCT 32.8*   MCV 94        BMP:  Recent Labs   Lab 07/22/25  0535         K 4.1      CO2 23   BUN 34*   CREATININE 0.9   CALCIUM 9.2   MG 2.0     LFT:  Lab Results   Component Value Date    AST 28 07/22/2025    ALKPHOS 88 07/22/2025    BILITOT 0.2 07/22/2025     Albumin:   Albumin   Date Value Ref Range Status   07/22/2025 3.6 3.5 - 5.2 g/dL Final     Protein:   Protein Total   Date Value Ref Range Status   07/22/2025 7.4 6.0 - 8.4 gm/dL Final     Lactic acid:   No results found for: "LACTATE"    Significant Imaging: I have reviewed all pertinent imaging results/findings within the past 24 hours.    20 minutes of ACP completed.     Riri Roger, CNS  Palliative Medicine  [facility] - Neurosurgery ([facility])                "

## 2025-07-22 NOTE — ASSESSMENT & PLAN NOTE
Was brought in from [facility], where he has been for the past month  Educate on cessation when appropriate  On phenobarbital for agitation, no signs of EtOH withdrawal -> phenobarb taper     7/16 to completed phenobarbital taper

## 2025-07-22 NOTE — PROGRESS NOTES
[facility]   NEURO-ONCOLOGICAL MULTIDISCIPLINARY TUMOR BOARD  PATIENT REVIEW FORM  ____________________________________________________________  Blane GANN, attest that this documentation has been prepared under the direction and in the presence of Claudia Fuentes PA-C.     PATIENT ID: Goran Carlos is a 69 y.o. male  DATE: 07/22/2025    This patient's relevant clinical data was reviewed before the multidisciplinary tumor board in order to establish an optimum treatment plan in this patient. The patient's clinical data were presented at our Multi-Disciplinary Tumor Board which included representatives of Neurosurgery, Neuro-Radiology, Neuro-Pathology, Radiation Oncology, Medical Oncology, Palliative Medicine, and Endocrinology.     PRESENTER:   Dr. Larios    PATIENT SUMMARY:   70 y/o M brought to ED from [facility] (hx of heroin and ETOH) for progressive weakness, gait disturbance, confusion, and decreased volume when speaking. Imaging revealed multiple lesions w/ mass effect. Cerebellar lesion causing partial effacement of the 4th  ventricle with developing hydrocephalus. CT CAP showed RUL mass with mediastinal adenopathy. 7/7: Suboccipital Crani.      Additionally, we reviewed previous medical and familial history of present illness, and recent lab results along with all available histopathologic and imaging studies.    IMAGING:   Pre op MRI brain w/wo contrast (7/2/2025):   Scattered enhancing lesions throughout the brain parenchyma largest in the right cerebellum corresponds to lesion seen on prior CT primarily concerning for metastatic disease clinical correlation advised.  Mass effects and partial compression of 4th ventricle again seen with stable configuration of ventricles slightly decompressed from right frontal ventricular catheter placement.  Confluent patchy T2 FLAIR signal abnormality throughout the supratentorial white matter and lesser degree within the cerebellum which may be in  part related to intra-axial masses however concerning for superimposed vasculopathy.  There are several scattered small to punctate foci of susceptibility within the cerebral hemispheres concentrated in the parietal and occipital lobes raising concern for possible underlying amyloid angiopathy with right parasagittal parietal superficial siderosis  There is a small probable recent hemorrhage within the right parietal subcortical white matter.      Post op MRI Brain W WO Contrast (7/8/2025):  New baseline examination performed postoperative day 1 status post right suboccipital craniotomy for recent mass resection and right frontal ventricular catheter placement.  There appears to be gross total resection of the dominant right cerebellar mass.  Compared to postoperative head CT performed 07/07/2025, 17:20 hours, similar perioperative hemorrhage and vasogenic edema with crowding of posterior fossa and mass-effect upon the 4th ventricle when compared with same day CT of the head.  Small focus of diffusion restriction within the right cerebellum without definite ADC correlate, suspicious for small acute infarct.  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 Consideration for palliative consult.   Patient to be schedule for post operative visits following a discharge    National site-specific guidelines were discussed with respect to the case.     Tumor board is a meeting of clinicians from various specialty areas who evaluate and discuss patients for whom a multidisciplinary approach is being considered. Final determinations in the plan of care are those of  the provider(s). The responsibility for follow up recommendations given during tumor board is that of the provider.     CONSULT NEEDED:     [] Neurosurgery    [] Hem/Onc    [] Rad/Onc         [] Endocrinology     [] Neuro-ophthalmology    [] Palliative Medicine      []  Other:       PRESENTATION AT CANCER CONFERENCE:         [] Prospective    [x] Retrospective     [x] Follow-Up          [] Eligible for clinical trial/Clinical trial status:

## 2025-07-22 NOTE — PLAN OF CARE
Problem: Adult Inpatient Plan of Care  Goal: Plan of Care Review  Outcome: Progressing  Goal: Patient-Specific Goal (Individualized)  Description: Pt will maintain sbp below 160  Outcome: Progressing  Goal: Absence of Hospital-Acquired Illness or Injury  Outcome: Progressing  Goal: Optimal Comfort and Wellbeing  Outcome: Progressing  Goal: Readiness for Transition of Care  Outcome: Progressing     Problem: Infection  Goal: Absence of Infection Signs and Symptoms  Outcome: Progressing     Problem: Skin Injury Risk Increased  Goal: Skin Health and Integrity  Outcome: Progressing     Problem: Delirium  Goal: Optimal Coping  Outcome: Progressing  Goal: Improved Behavioral Control  Outcome: Progressing  Goal: Improved Attention and Thought Clarity  Outcome: Progressing  Goal: Improved Sleep  Outcome: Progressing     Problem: Fall Injury Risk  Goal: Absence of Fall and Fall-Related Injury  Outcome: Progressing     Problem: Restraint, Nonviolent  Goal: Absence of Harm or Injury  Outcome: Progressing     Problem: Wound  Goal: Optimal Coping  Outcome: Progressing  Goal: Optimal Functional Ability  Outcome: Progressing  Goal: Absence of Infection Signs and Symptoms  Outcome: Progressing  Goal: Improved Oral Intake  Outcome: Progressing  Goal: Optimal Pain Control and Function  Outcome: Progressing  Goal: Skin Health and Integrity  Outcome: Progressing  Goal: Optimal Wound Healing  Outcome: Progressing     Problem: Acute Kidney Injury/Impairment  Goal: Fluid and Electrolyte Balance  Outcome: Progressing  Goal: Improved Oral Intake  Outcome: Progressing  Goal: Effective Renal Function  Outcome: Progressing     Problem: Coping Ineffective  Goal: Effective Coping  Outcome: Progressing

## 2025-07-22 NOTE — PLAN OF CARE
Pt dispo plan as change due to medical changes. SW spoke with pt daughter Luis and medical team and they both agree with palliative[facility].  SW filled out [facility] worksheet and faxed to appropriate number for review.     Will follow up.       Alex Henson - Neurosurgery (Hospital)  Discharge Reassessment    Primary Care Provider: No primary care provider on file.    Expected Discharge Date: 7/25/2025    Reassessment (most recent)       Discharge Reassessment - 07/22/25 1215          Discharge Reassessment    Assessment Type Discharge Planning Reassessment     Did the patient's condition or plan change since previous assessment? Yes     Discharge Plan discussed with: Adult children     Name(s) and Number(s) Luis Carlos (Daughter)  [phone]     Communicated CARA with patient/caregiver Yes     Discharge Plan A Inpatient Hospice     Discharge Plan B Home with family     DME Needed Upon Discharge  other (see comments)     Transition of Care Barriers None     Why the patient remains in the hospital Requires continued medical care        Post-Acute Status    Post-Acute Authorization Placement     Post-Acute Placement Status Referrals Sent     Hospital Resources/Appts/Education Provided Appointments scheduled and added to AVS     Discharge Delays None known at this time                       Discharge Plan A and Plan B have been determined by review of patient's clinical status, future medical and therapeutic needs, and coverage/benefits for post-acute care in coordination with multidisciplinary team members.    Ophelia Nunez MSW, CSW

## 2025-07-22 NOTE — ASSESSMENT & PLAN NOTE
"Impression: Pt is a 68 y/o male transferred from [facility] for generalized weakness for about a week now. History obtained from rehab nurse. She reported that he got to their facility about a month ago and, at baseline, walks with a cane and is alert and oriented. His nurse noticed that he has seemed weaker over the past week and over the past 3 days has had increased balance disturbance and gait issues. Pt has multiple head lesions and RUL mass. Pt is NPO, NG tube in place. Pt  alert, oriented to person, place.     Reason for consult: ACP. Spoke to Dr. Gutiérrez.     GOC/ACP:     Today: Met with pt who is alert and able to answer questions appropriately at this time. We discussed his wishes at EOL including code status. Per pt, he does not want CPR or be put on vent. Pt reports, "Let me go at that time." Called pt's daughter Luis on speaker with pt at bedside. Pt's was mark to verbalize his wishes at EOL and wants to be DNR. Daughter reports in agreement with pt and wants to support his wishes.     Pt/daughter are seeing of [facility]  seeing if option.     7/21/25  Called and spoke to pt's daughter, Luis who lives in [place]. Prior to admit, pt was living alone in an apartment. Daughter is aware of pt's medical issues. We discussed pt not being candidate for chemo etc. Daughter reports pt is a [place] who [facility]. Daughter asked about [facility] benefits. We discussed pt's medical issues at this time and that he has declined sharply. Daughter would like to see if [facility] is an option. We discussed hospice care. Daughter seemed open to hospice but wanted to speak to family and also see how pt did on his swallow study.     Daughter, Luis will be visiting at end of week.     Code status discussed. Luis open to DNR will speak to rest of family before making a decision.     Symptom management:     Dyshagia:   Pt is NPO.   NG tube " in place.       Debility:   Bedside nurse assisting with repositioning.     Plan:   Will continue to follow.   Spoke to Dr. Gutiérrez.

## 2025-07-22 NOTE — PROGRESS NOTES
"Alex Henson - Neurosurgery ([facility])  Adult Nutrition  Progress Note    SUMMARY     Recommendations  --Continue Impact Peptide @ 55 mL/hr to provide 1320 mL total volume, 1980 kcal, 185 g CHO, 124 g protein, 0 g fiber, 1019 mL free water, 132% DRI         --160 mL flush q4 hrs to provide additional 960 mL free water (Total 1979 mL)     -- Bolus TF RECS: Isosource 1.5, bolus 6 cartons/day to provide 2250 kcals, 102 g protein, 1146 kcals, FWF per MD     --Nursing: please continue to document rate and formula on flowsheet    --RD to monitor weight, rate, tolerance    Goal #1: Provide greater than or equal to 75% nutritional needs via nutrition support by RD follow up  Nutrition Goal Status #1: goal met  Communication of RD Recs:  (POC)    Nutrition Discharge Planning    Nutrition Discharge Planning: Too early to determine, pending clinical course    Reason for Assessment    Reason For Assessment: RD follow-up  Diagnosis:  (Ataxia)  General Information Comments: RD f/u. Tube feeding infusing at 55 mL/hr via NG/OG. No weight loss noted. RD to f/u and monitor.    Nutrition/Diet History    Spiritual, Cultural Beliefs, Mandaen Practices, Values that Affect Care: no  Food Allergies: NKFA  Factors Affecting Nutritional Intake: NPO, on mechanical ventilation    Anthropometrics    Height: 6' 1" (185.4 cm)  Height (inches): 73 in  Height Method: Estimated  Weight: 81.1 kg (178 lb 12.7 oz)  Weight (lb): 178.79 lb  Weight Method: Bed Scale  Ideal Body Weight (IBW), Male: 184 lb  % Ideal Body Weight, Male (lb): 97.17 %  BMI (Calculated): 23.6  BMI Grade: 18.5-24.9 - normal       Lab/Procedures/Meds    Pertinent Labs Reviewed: reviewed  Pertinent Labs Comments: BUN 34 (H)  Pertinent Medications Reviewed: reviewed  Pertinent Medications Comments: Carvedilol, enoxaparin, famotidine, folic acid, mvi, thiamine    Estimated/Assessed Needs    Weight Used For Calorie Calculations: 81.1 kg (178 lb 12.7 oz)  Energy Calorie Requirements " (kcal): 2037 kcal/day (x 1.25 AF)  Energy Need Method: [place]  Protein Requirements:  g/day (1.2-2.0 g/kg)  Weight Used For Protein Calculations: 81.1 kg (178 lb 12.7 oz)     Estimated Fluid Requirement Method: RDA Method  RDA Method (mL): 2037         Nutrition Prescription Ordered    Current Diet Order: NPO  Current Nutrition Support Formula Ordered: Impact Peptide 1.5  Current Nutrition Support Rate Ordered: 55 (ml)  Current Nutrition Support Frequency Ordered: x 24 hours    Evaluation of Received Nutrient/Fluid Intake    Enteral Calories (kcal): 1980  Enteral Protein (gm): 124  Enteral (Free Water) Fluid (mL): 1019  Other Calories (kcal): 610  Total Calories (kcal): 1690  % Kcal Needs: 97  % Protein Needs: 100  I/O: +7.6 L since 7/8  Energy Calories Required: meeting needs  Protein Required: meeting needs  Fluid Required:  (Per MD)  Comments: LBM 7/20  Tolerance: tolerating  % Intake of Estimated Energy Needs: 75 - 100 %  % Meal Intake: NPO    PES Statement  Inadequate enteral nutrition infusion related to  (Infusion volume not reached) as evidenced by  (Inadequate EN volume compared to estimated requirements)  Status: Resolved    Nutrition Risk    Level of Risk/Frequency of Follow-up: high     Monitor and Evaluation    Monitor and Evaluation: Enteral and parenteral nutrition administration, Weight, Electrolyte and renal panel, Gastrointestinal profile, Glucose/endocrine profile, Nutrition focused physical findings, Lipid profile, Inflammatory profile, Skin     Nutrition Follow-Up    RD Follow-up?: Yes

## 2025-07-22 NOTE — NURSING
POC reviewed and updated with the patient. Questions regarding POC were encouraged and addressed with the patient.  VSS, see flow-sheets. Tele maintained per provider's order. Patient is AO X 3 at this time. Interruptions minimized to promote sleep. Seizure, fall, and safety precautions maintained, no signs of injury noted during shift.  Upon exiting room, patient's bed locked in low position, side rails up x 4, bed alarm set, with call light within reach. Instructed patient to call staff for assistance, verbalized understanding.  No acute signs of distress noted at this time.    No observed episode of seizure during shift. Noted patient's restlessness and repeated attempts to get out of bed. PRN IV Haloperidol given per MAR. Kept on restrainers to prevent medical device dislodgement and promote patient's safety. Requested for water and cookies despite continuous NPO reinforcement.

## 2025-07-22 NOTE — ASSESSMENT & PLAN NOTE
SLP , tube feeds for now; cancelled MBSS due to overt aspiration presentation  Pleasure feed diet; NGT ordered to be removed

## 2025-07-22 NOTE — SUBJECTIVE & OBJECTIVE
Interval History:  Patient with no significant pain complaints.  Discussed with palliative Care who affirmed that patient and daughter agreeable to DNR.  Afebrile.  Ordered NG tube to be removed this morning.    Review of Systems  Objective:     Vital Signs (Most Recent):  Temp: 98 °F (36.7 °C) (07/22/25 0800)  Pulse: 85 (07/22/25 1214)  Resp: 19 (07/22/25 1214)  BP: 130/79 (07/22/25 1002)  SpO2: 96 % (07/22/25 1214) Vital Signs (24h Range):  Temp:  [97.4 °F (36.3 °C)-99.2 °F (37.3 °C)] 98 °F (36.7 °C)  Pulse:  [77-94] 85  Resp:  [18-19] 19  SpO2:  [96 %-99 %] 96 %  BP: (123-148)/(75-85) 130/79     Weight: 81.1 kg (178 lb 12.7 oz)  Body mass index is 23.59 kg/m².    Intake/Output Summary (Last 24 hours) at 7/22/2025 1314  Last data filed at 7/22/2025 0605  Gross per 24 hour   Intake 1500 ml   Output 700 ml   Net 800 ml         Physical Exam      Clear lungs bilaterally, unlabored breathing, on room air, no cyanosis   Heart sounds indicate a regular rate and rhythm   Awake alert, no acute distress   No facial droop, no slurred speech   Maintains good eye contact, speech a bit more clear today   No obvious upper no lower extremity edema

## 2025-07-22 NOTE — PT/OT/SLP PROGRESS
Physical Therapy Treatment    Patient Name:  Goran Carlos   MRN:  [id number]    Recommendations:     Discharge Recommendations: High Intensity Therapy  Discharge Equipment Recommendations: bedside commode, bath bench, wheelchair  Barriers to discharge: none    Assessment:     Goran Carlos is a 69 y.o. male admitted with a medical diagnosis of Ataxia.  He presents with the following impairments/functional limitations: weakness, impaired endurance, impaired self care skills, impaired functional mobility, gait instability, impaired balance, impaired cognition, decreased upper extremity function, decreased lower extremity function, decreased safety awareness, impaired coordination, impaired fine motor. Pt agreeable and more motivated to session. More alert and improving functionally. Improved command follow responding appropriately to cuing, though, during standing and ambulation required continuous cuing as patient would return to poor posture (see below for deviations). Sitting balance progressed CGA during static and dynamic activity, and sit to stand progressed to min A with RW as compared to previous session. Pt would benefit from continued PT to improve functional independence.     Rehab Prognosis: Good; patient would benefit from acute skilled PT services to address these deficits and reach maximum level of function.    Recent Surgery: Procedure(s) (LRB):  CRANIOTOMY, SUBOCCIPITAL (Right) 15 Days Post-Op    Plan:     During this hospitalization, patient to be seen 4 x/week to address the identified rehab impairments via gait training, therapeutic activities, therapeutic exercises, neuromuscular re-education and progress toward the following goals:    Plan of Care Expires:  08/04/25    Subjective     Chief Complaint: none stated  Patient/Family Comments/goals: reports feeling stronger today  Pain/Comfort:  Pain Rating 1: 0/10      Objective:     Communicated with nurse prior to session.  Patient found HOB  "elevated with bed alarm, telemetry (Avasyt monitoring) upon PT entry to room.     General Precautions: Standard, fall, aspiration  Orthopedic Precautions: N/A  Braces: N/A  Respiratory Status: Room air     Functional Mobility:  Bed Mobility:     Scooting EOB: CGA  Supine to Sit: CGA extra time  Transfers:    Sit <> stand: 5 x  1st: no AD HHA min A x 2  2nd: no HHA min A x 1   3-5: with RW min A x 1   Decreased B knee extension, decreased hip extension, head down, decreased glute activation  Improved posture with VC/TC and B knee blocking L>R  Bed to chair: with RW min A  Step-to transfer, B knee blocking, RW management, VC for sequencing  Balance: Static seated balance: CGA  Dynamic seated balance: CGA  Performing EOB therex, minor posterior sway with marches, no LOB  Gait: 1 trails: 6 steps to chair with RW min A  Deviations: poor posture, decreased knee extension, decreased hip extension, decreased cervical extension  Improvement with VC/TC, cues given with each step    AM-PAC 6 CLICK MOBILITY  Turning over in bed (including adjusting bedclothes, sheets and blankets)?: 3  Sitting down on and standing up from a chair with arms (e.g., wheelchair, bedside commode, etc.): 3  Moving from lying on back to sitting on the side of the bed?: 2  Moving to and from a bed to a chair (including a wheelchair)?: 3  Need to walk in hospital room?: 1  Climbing 3-5 steps with a railing?: 1  Basic Mobility Total Score: 13       Treatment & Education:  Patient educated on importance of OOB activity for functional gains.  Patient educated on use of RW during transfers and ambulation for increased safety.  Patient educated on general safety for decreased risk of falls.  Patient performed exercises for improved strength and endurance.  Oriented to name and birth date. Not oriented to year or month "April 2026". Not oriented to location, given prompts, remain non oriented to location.  Session performed with assist from rehab tech (Cem) " under the supervision and guidance of licensed PTA.  All questions answered and patient verbalized understanding.    Patient left up in chair with all lines intact, call button in reach, chair alarm on, and nurse notified..    GOALS:   Multidisciplinary Problems       Physical Therapy Goals          Problem: Physical Therapy    Goal Priority Disciplines Outcome Interventions   Physical Therapy Goal     PT, PT/OT Progressing    Description: Goals to be met by: 2025     Patient will increase functional independence with mobility by performin. Supine to sit with Stand-by Assistance  2. Sit to supine with Stand-by Assistance  3. Sit to stand transfer with Contact Guard Assistance  4. Bed to chair transfer with Minimal Assistance using Rolling Walker  5. Gait  x 10 feet with Minimal Assistance using Rolling Walker.   6. Sitting at edge of bed x5 minutes with Supervision                           Time Tracking:     PT Received On: 25  PT Start Time: 1050     PT Stop Time: 1120  PT Total Time (min): 30 min     Billable Minutes: Gait Training 10 and Therapeutic Exercise 20    Treatment Type: Treatment  PT/PTA: PTA     Number of PTA visits since last PT visit: 2     2025

## 2025-07-22 NOTE — ASSESSMENT & PLAN NOTE
Patient's blood pressure range in the last 24 hours was: BP  Min: 123/79  Max: 148/85.The patient's inpatient anti-hypertensive regimen is listed below:  Current Antihypertensives  losartan tablet 100 mg, Daily, Oral  cloNIDine tablet 0.3 mg, Every 8 hours, Oral  carvediloL tablet 6.25 mg, 2 times daily, Oral  amLODIPine tablet 10 mg, Daily, Oral      - Goal SBP<160

## 2025-07-22 NOTE — ASSESSMENT & PLAN NOTE
Patient's with Normocytic anemia.. Hemoglobin stable. Etiology likely due to chronic disease .  Current CBC reviewed-    Recent Labs   Lab 07/20/25  0512 07/21/25  0626 07/22/25  0638   HGB 11.8* 11.5* 10.9*         Component Value Date/Time    MCV 94 07/22/2025 0638    RDW 13.8 07/22/2025 0638     Monitor CBC and transfuse if H/H drops below 7/21.

## 2025-07-23 LAB
ABSOLUTE EOSINOPHIL (OHS): 0.07 K/UL
ABSOLUTE MONOCYTE (OHS): 0.29 K/UL (ref 0.3–1)
ABSOLUTE NEUTROPHIL COUNT (OHS): 3.64 K/UL (ref 1.8–7.7)
ALBUMIN SERPL BCP-MCNC: 3.3 G/DL (ref 3.5–5.2)
ALP SERPL-CCNC: 83 UNIT/L (ref 40–150)
ALT SERPL W/O P-5'-P-CCNC: 27 UNIT/L (ref 10–44)
ANION GAP (OHS): 8 MMOL/L (ref 8–16)
AST SERPL-CCNC: 30 UNIT/L (ref 11–45)
BASOPHILS # BLD AUTO: 0.04 K/UL
BASOPHILS NFR BLD AUTO: 0.8 %
BILIRUB SERPL-MCNC: 0.4 MG/DL (ref 0.1–1)
BUN SERPL-MCNC: 31 MG/DL (ref 8–23)
CALCIUM SERPL-MCNC: 8.6 MG/DL (ref 8.7–10.5)
CHLORIDE SERPL-SCNC: 108 MMOL/L (ref 95–110)
CO2 SERPL-SCNC: 22 MMOL/L (ref 23–29)
CREAT SERPL-MCNC: 0.8 MG/DL (ref 0.5–1.4)
ERYTHROCYTE [DISTWIDTH] IN BLOOD BY AUTOMATED COUNT: 13.7 % (ref 11.5–14.5)
GFR SERPLBLD CREATININE-BSD FMLA CKD-EPI: >60 ML/MIN/1.73/M2
GLUCOSE SERPL-MCNC: 84 MG/DL (ref 70–110)
HCT VFR BLD AUTO: 31.3 % (ref 40–54)
HGB BLD-MCNC: 10.7 GM/DL (ref 14–18)
IMM GRANULOCYTES # BLD AUTO: 0.01 K/UL (ref 0–0.04)
IMM GRANULOCYTES NFR BLD AUTO: 0.2 % (ref 0–0.5)
LYMPHOCYTES # BLD AUTO: 1.15 K/UL (ref 1–4.8)
MAGNESIUM SERPL-MCNC: 1.9 MG/DL (ref 1.6–2.6)
MCH RBC QN AUTO: 31.4 PG (ref 27–31)
MCHC RBC AUTO-ENTMCNC: 34.2 G/DL (ref 32–36)
MCV RBC AUTO: 92 FL (ref 82–98)
NUCLEATED RBC (/100WBC) (OHS): 0 /100 WBC
PHOSPHATE SERPL-MCNC: 2.9 MG/DL (ref 2.7–4.5)
PLATELET # BLD AUTO: 223 K/UL (ref 150–450)
PMV BLD AUTO: 9.5 FL (ref 9.2–12.9)
POTASSIUM SERPL-SCNC: 4 MMOL/L (ref 3.5–5.1)
PROT SERPL-MCNC: 6.7 GM/DL (ref 6–8.4)
RBC # BLD AUTO: 3.41 M/UL (ref 4.6–6.2)
RELATIVE EOSINOPHIL (OHS): 1.3 %
RELATIVE LYMPHOCYTE (OHS): 22.1 % (ref 18–48)
RELATIVE MONOCYTE (OHS): 5.6 % (ref 4–15)
RELATIVE NEUTROPHIL (OHS): 70 % (ref 38–73)
SODIUM SERPL-SCNC: 138 MMOL/L (ref 136–145)
WBC # BLD AUTO: 5.2 K/UL (ref 3.9–12.7)

## 2025-07-23 PROCEDURE — 11000001 HC ACUTE MED/SURG PRIVATE ROOM

## 2025-07-23 PROCEDURE — 36415 COLL VENOUS BLD VENIPUNCTURE: CPT | Performed by: PHYSICIAN ASSISTANT

## 2025-07-23 PROCEDURE — 84100 ASSAY OF PHOSPHORUS: CPT | Performed by: PHYSICIAN ASSISTANT

## 2025-07-23 PROCEDURE — 83735 ASSAY OF MAGNESIUM: CPT | Performed by: PHYSICIAN ASSISTANT

## 2025-07-23 PROCEDURE — 97112 NEUROMUSCULAR REEDUCATION: CPT

## 2025-07-23 PROCEDURE — 25000003 PHARM REV CODE 250: Performed by: HOSPITALIST

## 2025-07-23 PROCEDURE — 63600175 PHARM REV CODE 636 W HCPCS: Performed by: HOSPITALIST

## 2025-07-23 PROCEDURE — 94668 MNPJ CHEST WALL SBSQ: CPT

## 2025-07-23 PROCEDURE — 99233 SBSQ HOSP IP/OBS HIGH 50: CPT | Mod: ,,, | Performed by: CLINICAL NURSE SPECIALIST

## 2025-07-23 PROCEDURE — 63600175 PHARM REV CODE 636 W HCPCS: Performed by: PHYSICIAN ASSISTANT

## 2025-07-23 PROCEDURE — 80053 COMPREHEN METABOLIC PANEL: CPT | Performed by: PHYSICIAN ASSISTANT

## 2025-07-23 PROCEDURE — 97535 SELF CARE MNGMENT TRAINING: CPT

## 2025-07-23 PROCEDURE — 85025 COMPLETE CBC W/AUTO DIFF WBC: CPT | Performed by: PHYSICIAN ASSISTANT

## 2025-07-23 PROCEDURE — 94761 N-INVAS EAR/PLS OXIMETRY MLT: CPT

## 2025-07-23 RX ORDER — DIVALPROEX SODIUM 250 MG/1
250 TABLET, DELAYED RELEASE ORAL EVERY 8 HOURS
Status: DISCONTINUED | OUTPATIENT
Start: 2025-07-23 | End: 2025-07-25 | Stop reason: HOSPADM

## 2025-07-23 RX ADMIN — DEXAMETHASONE 2 MG: 1 TABLET ORAL at 09:07

## 2025-07-23 RX ADMIN — CLONIDINE HYDROCHLORIDE 0.3 MG: 0.1 TABLET ORAL at 06:07

## 2025-07-23 RX ADMIN — CARVEDILOL 6.25 MG: 6.25 TABLET, FILM COATED ORAL at 09:07

## 2025-07-23 RX ADMIN — HALOPERIDOL LACTATE 2 MG: 5 INJECTION, SOLUTION INTRAMUSCULAR at 02:07

## 2025-07-23 RX ADMIN — CLONIDINE HYDROCHLORIDE 0.3 MG: 0.1 TABLET ORAL at 02:07

## 2025-07-23 RX ADMIN — THERA TABS 1 TABLET: TAB at 09:07

## 2025-07-23 RX ADMIN — SULFAMETHOXAZOLE AND TRIMETHOPRIM 1 TABLET: 800; 160 TABLET ORAL at 09:07

## 2025-07-23 RX ADMIN — ENOXAPARIN SODIUM 40 MG: 40 INJECTION SUBCUTANEOUS at 04:07

## 2025-07-23 RX ADMIN — FOLIC ACID 1 MG: 1 TABLET ORAL at 09:07

## 2025-07-23 RX ADMIN — FAMOTIDINE 20 MG: 20 TABLET, FILM COATED ORAL at 09:07

## 2025-07-23 RX ADMIN — AMLODIPINE BESYLATE 10 MG: 10 TABLET ORAL at 09:07

## 2025-07-23 RX ADMIN — LOSARTAN POTASSIUM 100 MG: 50 TABLET, FILM COATED ORAL at 09:07

## 2025-07-23 RX ADMIN — DIVALPROEX SODIUM 250 MG: 250 TABLET, DELAYED RELEASE ORAL at 08:07

## 2025-07-23 RX ADMIN — FAMOTIDINE 20 MG: 20 TABLET, FILM COATED ORAL at 08:07

## 2025-07-23 RX ADMIN — OLANZAPINE 20 MG: 5 TABLET, FILM COATED ORAL at 08:07

## 2025-07-23 RX ADMIN — DIVALPROEX SODIUM 250 MG: 250 TABLET, DELAYED RELEASE ORAL at 02:07

## 2025-07-23 RX ADMIN — DEXAMETHASONE 2 MG: 1 TABLET ORAL at 08:07

## 2025-07-23 RX ADMIN — Medication 100 MG: at 09:07

## 2025-07-23 NOTE — ASSESSMENT & PLAN NOTE
Patient's with Normocytic anemia.. Hemoglobin stable. Etiology likely due to chronic disease .  Current CBC reviewed-    Recent Labs   Lab 07/21/25  0626 07/22/25  0638 07/23/25  0636   HGB 11.5* 10.9* 10.7*         Component Value Date/Time    MCV 92 07/23/2025 0636    RDW 13.7 07/23/2025 0636     Monitor CBC and transfuse if H/H drops below 7/21.

## 2025-07-23 NOTE — PROGRESS NOTES
"[facility] - Neurosurgery ([facility])  Palliative Medicine  Progress Note    Patient Name: Goran Carlos  MRN: [id number]  Admission Date: 7/1/2025  Hospital Length of Stay: 22 days  Code Status: DNR   Attending Provider: Deng Gutiérrez MD  Consulting Provider: MEET Rahman  Primary Care Physician: No primary care provider on file.  Principal Problem:Ataxia    Patient information was obtained from patient and primary team.      Assessment/Plan:     Palliative Care  Palliative care encounter  Impression: Pt is a 68 y/o male transferred from [facility] for generalized weakness for about a week now. History obtained from rehab nurse. She reported that he got to their facility about a month ago and, at baseline, walks with a cane and is alert and oriented. His nurse noticed that he has seemed weaker over the past week and over the past 3 days has had increased balance disturbance and gait issues. Pt has multiple head lesions and RUL mass. Pt is NPO, NG tube in place. Pt  alert, oriented to person, place.     Reason for consult: ACP. Spoke to Dr. Gutiérrez.     GOC/ACP:   Today: [facility]. Pt has been out of restraints since yesterday. Pt working with PT.     Pal care will sign off. Please re-consult if needed.     7/22/25: Met with pt who is alert and able to answer questions appropriately at this time. We discussed his wishes at EOL including code status. Per pt, he does not want CPR or be put on vent. Pt reports, "Let me go at that time." Called pt's daughter Luis on speaker with pt at bedside. Pt's was mark to verbalize his wishes at EOL and wants to be DNR. Daughter reports in agreement with pt and wants to support his wishes.     Pt/daughter are seeing of [facility].  seeing if option.     7/21/25  Called and spoke to pt's daughter, Luis who lives in [place]. Prior to admit, pt was living alone in an apartment. " Daughter is aware of pt's medical issues. We discussed pt not being candidate for chemo etc. Daughter reports pt is a  who [facility]. Daughter asked about [facility] benefits. We discussed pt's medical issues at this time and that he has declined sharply. Daughter would like to see if [facility] is an [facility]. We discussed hospice care. Daughter seemed open to hospice but wanted to speak to family and also see how pt did on his swallow study.     Daughter, Luis will be visiting at end of week.     Code status discussed. Luis open to DNR will speak to rest of family before making a decision.     Symptom management:     Dyshagia:   Pt is NPO.   NG tube in place.       Debility:   Bedside nurse assisting with repositioning.     Plan:   Will continue to follow.   Spoke to Dr. Gutiérrez.         I will sign off. Please contact us if you have any additional questions.    Subjective:     Chief Complaint:   Chief Complaint   Patient presents with    Weakness     Generalized weakness for about a week, detoxing for approx 1 month from heroin and alcohol.        HPI:   Pt is a 69 y/o M presenting from [facility] for generalized weakness for about a week now. History obtained from rehab nurse. She reported that he got to their facility about a month ago and, at baseline, walks with a cane and is alert and oriented. His nurse noticed that he has seemed weaker over the past week and over the past 3 days has had increased balance disturbance and gait issues. They also noticed that he has been speaking more softly in his speech is harder to understand. He has not had any infectious symptoms. He fell yesterday, unsure if he hit his head. CT head without contrast revealed multiple lesions of hypodensity with a hyperdense rim, notably in the left temporal lobe and right cerebellum. There were additional small hyperdensities in the right frontoparietal and left parasagittal parietal lobe.  There is mass effect and partial effacement of the 4th ventricle secondary to the cerebellar lesion with developing hydrocephalus without MLS. CT of the chest, abdomen, and pelvis with IV contrast showed RUL mass with mediastinal adenopathy. Labs revealed Hep C and treponema antibodies were positive. Hep C PCR pending. Penicillin G was administered. A sepsis workup was completed and antibiotics were initiated. An MRI brain with and without contrast was ordered, but patient was unable to complete due to persistent movement after sedatives were administered. Prior to MRI, he was alert but drowsy. He was able to tell me his name, but told me he was 53 years old, the year was 2003, and he did not know the current place or reason for being here. He had generalized weakness but was slightly more weak on the right side. He followed simple commands, but did not attempt to follow more complex commands. He is admitted to [facility] with NSGY consult.    Pt has NG tube in place, with soft restraint.           Hospital Course:  No notes on file    Interval History: [facility]. Pt out of restraints since yesterday.     Past Medical History:   Diagnosis Date    ETOH abuse     Gait disturbance     Heroin abuse        Past Surgical History:   Procedure Laterality Date    SUBOCCIPITAL CRANIOTOMY Right 7/7/2025    Procedure: CRANIOTOMY, SUBOCCIPITAL;  Surgeon: Blane Larios DO;  Location: [facility] OR [address] [place] [address];  Service: Neurosurgery;  Laterality: Right;       Review of patient's allergies indicates:  No Known Allergies    Medications:  Continuous Infusions:  Scheduled Meds:   amLODIPine  10 mg Oral Daily    carvediloL  6.25 mg Oral BID    cloNIDine  0.3 mg Oral Q8H    dexAMETHasone  2 mg Oral Q12H    enoxparin  40 mg Subcutaneous Q24H (prophylaxis, 1700)    famotidine  20 mg Oral BID    folic acid  1 mg Oral Daily    losartan  100 mg Oral Daily    multivitamin  1 tablet Oral Daily    OLANZapine  20  mg Oral BID    sulfamethoxazole-trimethoprim 800-160mg  1 tablet Oral Every Mon, Wed, Fri    thiamine  100 mg Oral Daily     PRN Meds:  Current Facility-Administered Medications:     acetaminophen, 650 mg, Oral, Q4H PRN    bisacodyL, 10 mg, Rectal, Daily PRN    haloperidol lactate, 2 mg, Intravenous, Q6H PRN    ondansetron, 4 mg, Intravenous, Q4H PRN    sodium chloride 0.9%, 10 mL, Intravenous, PRN    Family History    None       Tobacco Use    Smoking status: Former     Types: Cigarettes    Smokeless tobacco: Not on file   Substance and Sexual Activity    Alcohol use: Not Currently    Drug use: Not Currently     Types: Heroin    Sexual activity: Not on file       Review of Systems   Unable to perform ROS: Acuity of condition     Objective:     Vital Signs (Most Recent):  Temp: 98 °F (36.7 °C) (07/23/25 0802)  Pulse: 76 (07/23/25 1106)  Resp: 18 (07/23/25 0802)  BP: 117/71 (07/23/25 0802)  SpO2: 98 % (07/23/25 0802) Vital Signs (24h Range):  Temp:  [97.6 °F (36.4 °C)-98.1 °F (36.7 °C)] 98 °F (36.7 °C)  Pulse:  [65-91] 76  Resp:  [18-19] 18  SpO2:  [96 %-100 %] 98 %  BP: (117-136)/(71-81) 117/71     Weight: 81.1 kg (178 lb 12.7 oz)  Body mass index is 23.59 kg/m².       Physical Exam  Constitutional:       General: He is in acute distress.   HENT:      Nose:      Comments: NG tube in place to nare.   Pulmonary:      Effort: Pulmonary effort is normal. No respiratory distress.   Skin:     General: Skin is warm and dry.   Neurological:      Mental Status: He is alert.      Comments: Pt oriented to person, place, and situation   Psychiatric:         Behavior: Behavior is cooperative.            Review of Symptoms      Symptom Assessment (ESAS 0-10 Scale)  Unable to complete assessment due to Acuity of condition         Pain Assessment in Advanced Demential Scale (PAINAD)   Breathing - Independent of vocalization:  0  Negative vocalization:  0  Facial expression:  0  Body language:  0  Consolability:  0  Total:   "0    Performance Status:  20    Living Arrangements:  Lives alone    Psychosocial/Cultural:   See Palliative Psychosocial Note: Yes  **Primary  to Follow**  Palliative Care  Consult: Yes        Advance Care Planning  Advance Directives:   Living Will: No    Do Not Resuscitate Status: No    Medical Power of : No    Agent's Name:  Pt's adult children.    Decision Making:  Family answered questions  Goals of Care: The patient and family endorses that what is most important right now is to focus on quality of life with improvement in condition.     Accordingly, we have decided that the best plan to meet the patient's goals includes continuing with treatment         Significant Labs: All pertinent labs within the past 24 hours have been reviewed.  CBC:   Recent Labs   Lab 07/23/25  0636   WBC 5.20   HGB 10.7*   HCT 31.3*   MCV 92        BMP:  Recent Labs   Lab 07/23/25  0636   GLU 84      K 4.0      CO2 22*   BUN 31*   CREATININE 0.8   CALCIUM 8.6*   MG 1.9     LFT:  Lab Results   Component Value Date    AST 30 07/23/2025    ALKPHOS 83 07/23/2025    BILITOT 0.4 07/23/2025     Albumin:   Albumin   Date Value Ref Range Status   07/23/2025 3.3 (L) 3.5 - 5.2 g/dL Final     Protein:   Protein Total   Date Value Ref Range Status   07/23/2025 6.7 6.0 - 8.4 gm/dL Final     Lactic acid:   No results found for: "LACTATE"    Significant Imaging: I have reviewed all pertinent imaging results/findings within the past 24 hours.    > 50% of  55 min visit spent in chart review, face to face discussion of goals of care,  symptom assessment, coordination of care and emotional support     Riri Roger, CNS  Palliative Medicine  [facility] ([facility])                "

## 2025-07-23 NOTE — PLAN OF CARE
Problem: Adult Inpatient Plan of Care  Goal: Plan of Care Review  Outcome: Progressing  Goal: Optimal Comfort and Wellbeing  Outcome: Progressing  Goal: Readiness for Transition of Care  Outcome: Progressing     Problem: Delirium  Goal: Optimal Coping  Outcome: Progressing  Goal: Improved Attention and Thought Clarity  Outcome: Progressing     Problem: Fall Injury Risk  Goal: Absence of Fall and Fall-Related Injury  Outcome: Progressing     Problem: Restraint, Nonviolent  Goal: Absence of Harm or Injury  Outcome: Progressing

## 2025-07-23 NOTE — PLAN OF CARE
Spoke with pt daughter to review discharge recommendation of inpatient hospice/palliative and is agreeable to plan    Patient/family provided list of facilities in-network with patient's payor plan. Providers that are owned, operated, or affiliated with [facility] are included on the list.     Notified that referral sent to below listed facilities from in-network list based on proximity to home/family support:   [facility]    Patient/family instructed to identify preference.    Preferred Facility: (if more than 1, listed in order of descending preference)  [facility]    If an additional preferred facility not listed above is identified, additional referral to be sent. If above facilities unable to accept, will send additional referrals to in-network providers.       Ophelia Nunez MSW, CSW

## 2025-07-23 NOTE — PROGRESS NOTES
Alex Henson - Neurosurgery ([facility])  [facility] Medicine  Progress Note    Patient Name: Goran Carlos  MRN: [id number]  Patient Class: IP- Inpatient   Admission Date: 7/1/2025  Length of Stay: 22 days  Attending Physician: Deng Gutiérrez MD  Primary Care Provider: No primary care provider on file.        Subjective     Principal Problem:Ataxia        HPI:  69 y/o M presenting from [facility] for generalized weakness for about a week now. History obtained from rehab nurse. She reported that he got to their facility about a month ago and, at baseline, walks with a cane and is alert and oriented. His nurse noticed that he has seemed weaker over the past week and over the past 3 days has had increased balance disturbance and gait issues. They also noticed that he has been speaking more softly in his speech is harder to understand. He has not had any infectious symptoms. He fell yesterday, unsure if he hit his head. CT head without contrast revealed multiple lesions of hypodensity with a hyperdense rim, notably in the left temporal lobe and right cerebellum. There were additional small hyperdensities in the right frontoparietal and left parasagittal parietal lobe. There is mass effect and partial effacement of the 4th ventricle secondary to the cerebellar lesion with developing hydrocephalus without MLS. CT of the chest, abdomen, and pelvis with IV contrast showed RUL mass with mediastinal adenopathy. Labs revealed Hep C and treponema antibodies were positive. Hep C PCR pending. Penicillin G was administered. A sepsis workup was completed and antibiotics were initiated. An MRI brain with and without contrast was ordered, but patient was unable to complete due to persistent movement after sedatives were administered. Prior to MRI, he was alert but drowsy. He was able to tell me his name, but told me he was 53 years old, the year was 2003, and he did not know the current place or reason for being here. He had generalized  weakness but was slightly more weak on the right side. He followed simple commands, but did not attempt to follow more complex commands. He is admitted to [facility] with NSGY consult.          Overview/Hospital Course:  71 y/o M presenting from [facility] for generalized weakness and dysarthria. CT head without contrast revealed multiple lesions of hypodensity with a hyperdense rim, notably in the left temporal lobe and right cerebellum.  additional small hyperdensities in the right frontoparietal and left parasagittal parietal lobe. mass effect and partial effacement of the 4th ventricle secondary to the cerebellar lesion with developing hydrocephalus without MLS. CT of the chest, abdomen, and pelvis with IV contrast showed RUL mass with mediastinal adenopathy. Labs revealed Hep C and treponema antibodies were positive. Hep C PCR pending. Penicillin G was administered. A sepsis workup was completed and antibiotics were initiated. An MRI brain with and without contrast was ordered, but patient was unable to complete due to persistent movement after sedatives were administered. Prior to MRI, he was alert but drowsy. He was able to tell me his name, but told me he was 53 years old, the year was 2003, and he did not know the current place or reason for being here. He had generalized weakness but was slightly more weak on the right side. He followed simple commands, but did not attempt to follow more complex commands. MRI favors neoplastic brain lesions. CSF studies pending. 7/4/2025: Extubated without complications. On 7/7 underwent SOC crani for tumor resection. Started on steroids.        7/16 Transfer to hospital medicine. admitted with generalized weakness, falls, and paucity of speech presenting with multiple ring enhancing lesions on brain CT - CTHead 7/1 showed multiple brain masses concerning for mets with surrounding edema, can't rule out abscesses. Mass in cerebellum with effacement of 4th ventricule outflow with  concern for developing hydrocephalus. MRI brain 7/1: non diagnostic due to motion. CT CAP 7/1: spiculated Right lung mass and lymph node adenopathy in chest and neck concerning for metastatic disease. MRI Brain W/WO 7/2: multifocal enhancing lesions c/f metastatic disease, largest R cerebellum w/mass effect on 4th. now s/p R SOC for tumor resection on 7/7. Post op CTH/MRI 7/7: anticipate post operative changes, hemostatic products left at superior/medial border of resection cavity. EVD removed 7/14,  posterior incision without  pseudomeningocele. Dexamthasone weaned to 2 BID per NCC, continue GI ppx while on steroid. on bactrim for PCP prophylaxis.  Admitted form [facility], currently on phenobarb taper and scheduled zyprexa and clonidine for ETOH and heroin use. Pending rehab placement. Needs OP follow up with NSGY (continue decadron till then) and Radiation oncology team. Pathology is lung carcinoma but Tempus and PD-L1 testing are pending. on NGT feeds impact peptide. SLP following. on 4 point restraints.  CTHead 7/14 -Ventricles remain somewhat prominent but without overt hydrocephalus or significant change in size following EVD removal.Evolving recent postsurgical change in the posterior fossa. No new intracranial hemorrhage or major vascular distribution infarct. AAO to self. on UE restraints.  [facility] [place].              Interval History:  [facility]  Denies any acute pain complaints.  Afebrile.    Review of Systems  Objective:     Vital Signs (Most Recent):  Temp: 97.8 °F (36.6 °C) (07/23/25 1205)  Pulse: 74 (07/23/25 1205)  Resp: 18 (07/23/25 1205)  BP: (!) 80/57 (07/23/25 1205)  SpO2: 99 % (07/23/25 1205) Vital Signs (24h Range):  Temp:  [97.6 °F (36.4 °C)-98.1 °F (36.7 °C)] 97.8 °F (36.6 °C)  Pulse:  [65-87] 74  Resp:  [18] 18  SpO2:  [97 %-100 %] 99 %  BP: ()/(57-81) 80/57     Weight: 81.1 kg (178 lb 12.7 oz)  Body mass index is 23.59 kg/m².    Intake/Output Summary (Last  24 hours) at 7/23/2025 1454  Last data filed at 7/23/2025 0800  Gross per 24 hour   Intake 200 ml   Output 450 ml   Net -250 ml         Physical Exam      Patient is seen, a bit drowsy but awakens/perked up when spoken to   No facial droop, speech similarly garbled as yesterday   On room air, no cyanosis   Clear lungs bilaterally, unlabored breathing, on room air, no cyanosis   Heart sounds indicate a regular rate and rhythm   Moves all 4 extremities, follows motor commands   No obvious upper nor lower extremity edema         Assessment & Plan  Ataxia    2/2 cerebellar lesion   PT/OT -> recommending acute rehab once medically appropriate for discharge         Polysubstance abuse     Was brought in from [facility], where he has been for the past month  Educate on cessation when appropriate  On phenobarbital for agitation, no signs of EtOH withdrawal -> phenobarb taper     7/16 to completed phenobarbital taper       Brain lesion     CT head without contrast revealed multiple lesions of hypodensity with a hyperdense rim, notably in the left temporal lobe and right cerebellum.  additional small hyperdensities in the right frontoparietal and left parasagittal parietal lobe.  mass effect and partial effacement of the 4th ventricle secondary to the cerebellar lesion with developing hydrocephalus without MLS. CT of the chest, abdomen, and pelvis with IV contrast showed RUL mass with mediastinal adenopathy. Hep C and treponema antibodies were positive. Hep C PCR pending. Penicillin G was administered. A sepsis workup was completed and antibiotics were initiated. Intubated to get MRI. S/p EVD. Extubated 7/4/25 without difficulty.     s/p SOC craniotomy for tumor resection on 7/7  Dex 2 mg BID, no plans to taper further pending outpt f/u  Bactrim MWF for PJP ppx given plan for prolonged steroid taper  EVD removed 7/14  Zyprexa BID for agitation  Phenobarb taper to off given improved agitation   Clonidine 0.3 mg TID for  agitation/opioid dependence/hypertension  Avoid versed for agitation  Tube feeds at goal, SLP following for swallow  Folate/thiamine/MV  SBP <160     Needs OP follow up with NSGY (continue decadron till then) and Radiation oncology team. Pathology is lung carcinoma but Tempus and PD-L1 testing are pending. on NGT feeds impact peptide. SLP following. on 4 point restraints.    CTHead 7/14 -Ventricles remain somewhat prominent but without overt hydrocephalus or significant change in size following EVD removal.Evolving recent postsurgical change in the posterior fossa. No new intracranial hemorrhage or major vascular distribution infarct.         Gait disturbance     PT/OT eval - rehab     Hepatitis C antibody positive     PCR negative  No known history of Hep C per patient        Mass of upper lobe of right lung     Noted on CXR and CT chest with IV contrast  Saturating well on RA; No PTX on CT-chest  No lung consolidations or obstructive process  No known history of cancer  Intra-op brain biopsy frozen pathology suggestive of metastatic adenocarcinoma, 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  Will need outpt rad onc and med onc f/u if pursuing aggressive care     Positive serology for syphilis  s/p ID eval -  Positive Treponema pallidum antibody. RPR 1:1 suggestive of false positive or past treated infection. Received penicillin G 2.4 million units x 1 on 7/1. CSF analysis not suggestive of neurosyphilis at this time . blood RPR and CSF VDRL 7/2 negative        Essential hypertension  Stopping BP meds due to low-normal numbers         - Goal SBP<160         Encephalopathy, metabolic     See primary problem  Slowly improving   on clonidine 0.3mg TID and olanzapine 20mg BID  Encephalopathic likely from brain mets  Unable to progress with SLP; NPO; with NGT  Repeat head CT ordered by NSGY on 7/21 - stable  Anemia    Patient's with Normocytic anemia.. Hemoglobin stable. Etiology likely due to chronic disease .  Current CBC reviewed-     Recent Labs   Lab 07/21/25  0626 07/22/25  0638 07/23/25  0636   HGB 11.5* 10.9* 10.7*         Component Value Date/Time    MCV 92 07/23/2025 0636    RDW 13.7 07/23/2025 0636     Monitor CBC and transfuse if H/H drops below 7/21.    Dysphagia  SLP , tube feeds for now; cancelled MBSS due to overt aspiration presentation  Pleasure feed diet; NGT ordered to be removed  Agitation  Stable on olanzapine    Vasogenic cerebral edema  decadron    Pseudomeningocele      Brain compression      Obstructive hydrocephalus      Metastasis to brain  See above; on steroids    Cancer Staging   No matching staging information was found for the patient.      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  - PD-L1 testing and Tempus NGS have been ordered and results will be issued     in a separate reports in the EPIC-MEDIA section      VTE Risk Mitigation (From admission, onward)           Ordered     enoxaparin injection 40 mg  Every 24 hours         07/15/25 1247     IP VTE LOW RISK PATIENT  Once         07/01/25 2049     Place sequential compression device  Until discontinued         07/01/25 2049                    Discharge Planning   CARA: 7/25/2025     Code Status: DNR   Medical Readiness for Discharge Date: 7/23/2025  Discharge Plan A: Inpatient Hospice   Discharge Delays: None known at this time              Please place Justification for DME      Deng Gutiérrez MD  Department of Hospital Medicine   [facility] - Neurosurgery ([facility])

## 2025-07-23 NOTE — SUBJECTIVE & OBJECTIVE
Interval History:  [facility].  Denies any acute pain complaints.  Afebrile.    Review of Systems  Objective:     Vital Signs (Most Recent):  Temp: 97.8 °F (36.6 °C) (07/23/25 1205)  Pulse: 74 (07/23/25 1205)  Resp: 18 (07/23/25 1205)  BP: (!) 80/57 (07/23/25 1205)  SpO2: 99 % (07/23/25 1205) Vital Signs (24h Range):  Temp:  [97.6 °F (36.4 °C)-98.1 °F (36.7 °C)] 97.8 °F (36.6 °C)  Pulse:  [65-87] 74  Resp:  [18] 18  SpO2:  [97 %-100 %] 99 %  BP: ()/(57-81) 80/57     Weight: 81.1 kg (178 lb 12.7 oz)  Body mass index is 23.59 kg/m².    Intake/Output Summary (Last 24 hours) at 7/23/2025 1454  Last data filed at 7/23/2025 0800  Gross per 24 hour   Intake 200 ml   Output 450 ml   Net -250 ml         Physical Exam      Patient is seen, a bit drowsy but awakens/perked up when spoken to   No facial droop, speech similarly garbled as yesterday   On room air, no cyanosis   Clear lungs bilaterally, unlabored breathing, on room air, no cyanosis   Heart sounds indicate a regular rate and rhythm   Moves all 4 extremities, follows motor commands   No obvious upper nor lower extremity edema

## 2025-07-23 NOTE — PT/OT/SLP PROGRESS
"Occupational Therapy   Treatment    Name: Goran Carlos  MRN: [id number]  Admitting Diagnosis:  Ataxia  16 Days Post-Op    Recommendations:     Discharge Recommendations: Hospice  Discharge Equipment Recommendations:  bath bench, bedside commode  Barriers to discharge:  None    Assessment:     Goran Carlos is a 69 y.o. male with a medical diagnosis of Ataxia.  He presents with performance deficits affecting function are weakness, impaired balance, impaired endurance, impaired self care skills, impaired functional mobility.     Rehab Prognosis:  Good; patient would benefit from acute skilled OT services to address these deficits and reach maximum level of function.       Plan:     Patient to be seen 4 x/week to address the above listed problems via cognitive retraining, neuromuscular re-education, therapeutic exercises, therapeutic activities, self-care/home management  Plan of Care Expires: 08/05/25  Plan of Care Reviewed with: patient    Subjective     Patient:  "I feel better sitting up than laying down.  I'll really feel better if I can get outside."  Pain/Comfort:  Pain Rating 1: 0/10  Pain Rating Post-Intervention 1: 0/10    Objective:     Communicated with: Nurse prior to session.  Patient found supine with bed alarm, telemetry, peripheral IV, pulse ox (continuous) (ARX camera monitoring) upon OT entry to room.    General Precautions: Standard, aspiration, fall    Orthopedic Precautions:N/A  Braces: N/A  Respiratory Status: Room air     Occupational Performance:     Bed Mobility:    Patient completed Rolling/Turning to Left with  supervision  Patient completed Rolling/Turning to Right with supervision  Patient completed Supine to Sit with stand by assistance  Patient completed Sit to Supine with stand by assistance     Functional Mobility/Transfers:  Patient completed Sit <> Stand Transfer with contact guard assistance  with  no assistive device   Min assist with side steps along the EOB; patient education " provided on form    Activities of Daily Living:  Lower Body Dressing: minimum assistance for standing balance    [facility] 6 Click ADL: 18    Treatment & Education:  Patient alert and oriented x person and place.  Able to follow 4/4 one step commands.  Patient attentive and interactive throughout the session.  SBA with postural control while seated EOB; Min assist for standing balance.    Patient left supine with all lines intact, call button in reach, and bed alarm on    GOALS:   Multidisciplinary Problems       Occupational Therapy Goals          Problem: Occupational Therapy    Goal Priority Disciplines Outcome Interventions   Occupational Therapy Goal     OT, PT/OT Progressing    Description: Goals set 7/8 with an expiration date, 8/5:  Patient will increase functional independence with ADLs by performing:    Patient will demonstrate rolling to the right with min assist.  Not met   Patient will demonstrate rolling to the left with min assist.   Not met  Patient will demonstrate supine -sit with min  assist.   Not met  Patient will demonstrate stand pivot transfers with mod assist.   Not met  Patient will demonstrate grooming while seated with min assist.   Not met  Patient will demonstrate upper body dressing with min assist while seated EOB.   Not met  Patient will demonstrate lower body dressing with mod assist while seated EOB.   Not met  Patient will demonstrate toileting with mod assist.   Not met  Patient will demonstrate bathing while seated EOB with mod assist.   Not met  Patient's family / caregiver will demonstrate independence and safety with assisting patient with self-care skills and functional mobility.     Not met  Patient's family / caregiver will demonstrate independence with providing ROM and changes in bed positioning.   Not met  Patient and/or patient's family will verbalize understanding of stroke prevention guidelines, personal risk factors and stroke warning signs via teachback method.  Not  met     DME Justifications (see above for complete DME recommendations)    Bedside Commode- Patient has a mobility limitation that significantly impairs their ability to participate in one or more mobility related activities of daily living, including toileting. This deficit can be resolved by using a bedside commode. Patient demonstrates mobility limitations that will cause them to be confined to one room at home without bathroom access for up to 30 days. Using a bedside commode will greatly improve the patient's ability to participate in MRADLs.                                             Time Tracking:     OT Date of Treatment: 07/23/25  OT Start Time: 0958  OT Stop Time: 1022  OT Total Time (min): 24 min    Billable Minutes:Self Care/Home Management 12  Neuromuscular Re-education 12    OT/DOTTIE: OT          7/23/2025

## 2025-07-23 NOTE — ASSESSMENT & PLAN NOTE
CT head without contrast revealed multiple lesions of hypodensity with a hyperdense rim, notably in the left temporal lobe and right cerebellum.  additional small hyperdensities in the right frontoparietal and left parasagittal parietal lobe.  mass effect and partial effacement of the 4th ventricle secondary to the cerebellar lesion with developing hydrocephalus without MLS. CT of the chest, abdomen, and pelvis with IV contrast showed RUL mass with mediastinal adenopathy. Hep C and treponema antibodies were positive. Hep C PCR pending. Penicillin G was administered. A sepsis workup was completed and antibiotics were initiated. Intubated to get MRI. S/p EVD. Extubated 7/4/25 without difficulty.     s/p SOC craniotomy for tumor resection on 7/7  Dex 2 mg BID, no plans to taper further pending outpt f/u  Bactrim MWF for PJP ppx given plan for prolonged steroid taper  EVD removed 7/14  Zyprexa BID for agitation  Phenobarb taper to off given improved agitation   Clonidine 0.3 mg TID for agitation/opioid dependence/hypertension  Avoid versed for agitation  Tube feeds at goal, SLP following for swallow  Folate/thiamine/MV  SBP <160     Needs OP follow up with NSGY (continue decadron till then) and Radiation oncology team. Pathology is lung carcinoma but Tempus and PD-L1 testing are pending. on NGT feeds impact peptide. SLP following. on 4 point restraints.    CTHead 7/14 -Ventricles remain somewhat prominent but without overt hydrocephalus or significant change in size following EVD removal.Evolving recent postsurgical change in the posterior fossa. No new intracranial hemorrhage or major vascular distribution infarct.

## 2025-07-23 NOTE — ASSESSMENT & PLAN NOTE
Was brought in from [facility], where he has been for the past month  Educate on cessation when appropriate  On phenobarbital for agitation, no signs of EtOH withdrawal -> phenobarb taper     7/16 to completed phenobarbital taper

## 2025-07-23 NOTE — PT/OT/SLP PROGRESS
"Physical Therapy Treatment    Patient Name:  Goran Carlos   MRN:  [id number]    Recommendations:     Discharge Recommendations: High Intensity Therapy  Discharge Equipment Recommendations: bedside commode, bath bench, wheelchair  Barriers to discharge: None    Assessment:     Goran Carlos is a 69 y.o. male admitted with a medical diagnosis of Ataxia.  He presents with the following impairments/functional limitations: weakness, impaired endurance, impaired functional mobility, gait instability, impaired balance, impaired cognition, decreased coordination, decreased lower extremity function, decreased safety awareness. Pt agreeable to session. Functional strength and endurance improving. Pt remains fall risk for OOB activity d/t to poor posture during standing and ambulation, poor coordination and decreased safety awareness. Pt would benefit from continued PT to improve functional independence.     Rehab Prognosis: Good; patient would benefit from acute skilled PT services to address these deficits and reach maximum level of function.    Recent Surgery: Procedure(s) (LRB):  CRANIOTOMY, SUBOCCIPITAL (Right) 16 Days Post-Op    Plan:     During this hospitalization, patient to be seen 4 x/week to address the identified rehab impairments via gait training, therapeutic activities, therapeutic exercises, neuromuscular re-education and progress toward the following goals:    Plan of Care Expires:  08/04/25    Subjective     Chief Complaint: tired  Patient/Family Comments/goals: "I feel so so." A few yawns during session.  Pain/Comfort:  Pain Rating 1: 0/10      Objective:     Communicated with nurse prior to session.  Patient found HOB elevated with bed alarm, telemetry, Condom Catheter (Avasyst) upon PT entry to room.     General Precautions: Standard, fall, aspiration  Orthopedic Precautions: N/A  Braces: N/A  Respiratory Status: Room air     Functional Mobility:  Bed Mobility:      Scooting EOB: CGA  Supine to Sit: min " "A  Transfers:    Sit <> stand: 5 trails from bed with RW  1st: min A  2-5: CGA  VCs for hand placement  Balance: Static seated balance SBA  Gait: 1 trails: 10ft with RW min A  Deviations: fwd flexed trunk, decreased cervical extension, downward gaze, decreased step length, decreased step height, slow miquel, increased RW distance, narrow JOE  Continuous VC/TC cues for posture, sequencing and RW management    AM-PAC 6 CLICK MOBILITY  Turning over in bed (including adjusting bedclothes, sheets and blankets)?: 4  Sitting down on and standing up from a chair with arms (e.g., wheelchair, bedside commode, etc.): 3  Moving from lying on back to sitting on the side of the bed?: 3  Moving to and from a bed to a chair (including a wheelchair)?: 3  Need to walk in hospital room?: 3  Climbing 3-5 steps with a railing?: 1  Basic Mobility Total Score: 17       Treatment & Education:  Patient educated on importance of OOB activity for functional gains.  Patient educated on use of RW during transfers and ambulation for increased safety. Hand placement, sequencing and RW distance.  Patient educated on general safety for decreased risk of falls.  Patient performed exercises for improved strength and endurance.  Patient oriented to name and birth date. States location as medical center. "1976".  CM entered room updating [facility].  Condom catheter disconnected from patient while performing sit to stands. Nurse notified.   All questions answered and patient verbalized understanding.      Patient left up in chair with all lines intact, call button in reach, chair alarm on, and nurse notified..    GOALS:   Multidisciplinary Problems       Physical Therapy Goals          Problem: Physical Therapy    Goal Priority Disciplines Outcome Interventions   Physical Therapy Goal     PT, PT/OT Progressing    Description: Goals to be met by: 7/24/2025     Patient will increase " functional independence with mobility by performin. Supine to sit with Stand-by Assistance  2. Sit to supine with Stand-by Assistance  3. Sit to stand transfer with Contact Guard Assistance  4. Bed to chair transfer with Minimal Assistance using Rolling Walker  5. Gait  x 10 feet with Minimal Assistance using Rolling Walker.   6. Sitting at edge of bed x5 minutes with Supervision                           Time Tracking:     PT Received On: 25  PT Start Time: 1057     PT Stop Time: 1123  PT Total Time (min): 26 min     Billable Minutes: Gait Training 10 and Therapeutic Activity 16    Treatment Type: Treatment  PT/PTA: PTA     Number of PTA visits since last PT visit: 3     2025

## 2025-07-23 NOTE — PLAN OF CARE
Problem: Adult Inpatient Plan of Care  Goal: Plan of Care Review  Outcome: Progressing  Goal: Optimal Comfort and Wellbeing  Outcome: Progressing  Goal: Readiness for Transition of Care  Outcome: Progressing     Problem: Skin Injury Risk Increased  Goal: Skin Health and Integrity  Outcome: Progressing     Problem: Delirium  Goal: Improved Behavioral Control  Outcome: Progressing  Goal: Improved Attention and Thought Clarity  Outcome: Progressing  Goal: Improved Sleep  Outcome: Progressing     Problem: Fall Injury Risk  Goal: Absence of Fall and Fall-Related Injury  Outcome: Progressing     Problem: Restraint, Nonviolent  Goal: Absence of Harm or Injury  Outcome: Progressing

## 2025-07-23 NOTE — NURSING
Pt is cooperative.follows commands.explained the pt risk of fall and encouraged to press the call button for help. Removed restrains.

## 2025-07-23 NOTE — ASSESSMENT & PLAN NOTE
"Impression: Pt is a 68 y/o male transferred from [facility] for generalized weakness for about a week now. History obtained from rehab nurse. She reported that he got to their facility about a month ago and, at baseline, walks with a cane and is alert and oriented. His nurse noticed that he has seemed weaker over the past week and over the past 3 days has had increased balance disturbance and gait issues. Pt has multiple head lesions and RUL mass. Pt is NPO, NG tube in place. Pt  alert, oriented to person, place.     Reason for consult: ACP. Spoke to Dr. Gutiérrez.     GOC/ACP:   Today: [facility]. Pt has been out of restraints since yesterday. Pt working with PT.     Pal care will sign off. Please re-consult if needed.     7/22/25: Met with pt who is alert and able to answer questions appropriately at this time. We discussed his wishes at EOL including code status. Per pt, he does not want CPR or be put on vent. Pt reports, "Let me go at that time." Called pt's daughter Luis on speaker with pt at bedside. Pt's was mark to verbalize his wishes at EOL and wants to be DNR. Daughter reports in agreement with pt and wants to support his wishes.     Pt/daughter are seeing of pt can [facility].  seeing if option.     7/21/25  Called and spoke to pt's daughter, Luis who lives in [place]. Prior to admit, pt was living alone in an apartment. Daughter is aware of pt's medical issues. We discussed pt not being candidate for chemo etc. Daughter reports pt is a [place] who [facility]. Daughter asked about VA benefits. We discussed pt's medical issues at this time and that he has declined sharply. Daughter would like to see if [facility] floor [facility] is an option. We discussed hospice care. Daughter seemed open to hospice but wanted to speak to family and also see how pt did on his swallow study.     Daughter, Luis will " be visiting at end of week.     Code status discussed. Luis open to DNR will speak to rest of family before making a decision.     Symptom management:     Dyshagia:   Pt is NPO.   NG tube in place.       Debility:   Bedside nurse assisting with repositioning.     Plan:   Will continue to follow.   Spoke to Dr. Gutiérrez.

## 2025-07-23 NOTE — SUBJECTIVE & OBJECTIVE
Interval History: [facility] Friday. Pt out of restraints since yesterday.     Past Medical History:   Diagnosis Date    ETOH abuse     Gait disturbance     Heroin abuse        Past Surgical History:   Procedure Laterality Date    SUBOCCIPITAL CRANIOTOMY Right 7/7/2025    Procedure: CRANIOTOMY, SUBOCCIPITAL;  Surgeon: Blane Larios DO;  Location: [facility] OR [address] [place] [address];  Service: Neurosurgery;  Laterality: Right;       Review of patient's allergies indicates:  No Known Allergies    Medications:  Continuous Infusions:  Scheduled Meds:   amLODIPine  10 mg Oral Daily    carvediloL  6.25 mg Oral BID    cloNIDine  0.3 mg Oral Q8H    dexAMETHasone  2 mg Oral Q12H    enoxparin  40 mg Subcutaneous Q24H (prophylaxis, 1700)    famotidine  20 mg Oral BID    folic acid  1 mg Oral Daily    losartan  100 mg Oral Daily    multivitamin  1 tablet Oral Daily    OLANZapine  20 mg Oral BID    sulfamethoxazole-trimethoprim 800-160mg  1 tablet Oral Every Mon, Wed, Fri    thiamine  100 mg Oral Daily     PRN Meds:  Current Facility-Administered Medications:     acetaminophen, 650 mg, Oral, Q4H PRN    bisacodyL, 10 mg, Rectal, Daily PRN    haloperidol lactate, 2 mg, Intravenous, Q6H PRN    ondansetron, 4 mg, Intravenous, Q4H PRN    sodium chloride 0.9%, 10 mL, Intravenous, PRN    Family History    None       Tobacco Use    Smoking status: Former     Types: Cigarettes    Smokeless tobacco: Not on file   Substance and Sexual Activity    Alcohol use: Not Currently    Drug use: Not Currently     Types: Heroin    Sexual activity: Not on file       Review of Systems   Unable to perform ROS: Acuity of condition     Objective:     Vital Signs (Most Recent):  Temp: 98 °F (36.7 °C) (07/23/25 0802)  Pulse: 76 (07/23/25 1106)  Resp: 18 (07/23/25 0802)  BP: 117/71 (07/23/25 0802)  SpO2: 98 % (07/23/25 0802) Vital Signs (24h Range):  Temp:  [97.6 °F (36.4 °C)-98.1 °F (36.7 °C)] 98 °F (36.7 °C)  Pulse:  [65-91] 76  Resp:   [18-19] 18  SpO2:  [96 %-100 %] 98 %  BP: (117-136)/(71-81) 117/71     Weight: 81.1 kg (178 lb 12.7 oz)  Body mass index is 23.59 kg/m².       Physical Exam  Constitutional:       General: He is in acute distress.   HENT:      Nose:      Comments: NG tube in place to nare.   Pulmonary:      Effort: Pulmonary effort is normal. No respiratory distress.   Skin:     General: Skin is warm and dry.   Neurological:      Mental Status: He is alert.      Comments: Pt oriented to person, place, and situation   Psychiatric:         Behavior: Behavior is cooperative.            Review of Symptoms      Symptom Assessment (ESAS 0-10 Scale)  Unable to complete assessment due to Acuity of condition         Pain Assessment in Advanced Demential Scale (PAINAD)   Breathing - Independent of vocalization:  0  Negative vocalization:  0  Facial expression:  0  Body language:  0  Consolability:  0  Total:  0    Performance Status:  20    Living Arrangements:  Lives alone    Psychosocial/Cultural:   See Palliative Psychosocial Note: Yes  **Primary  to Follow**  Palliative Care  Consult: Yes        Advance Care Planning   Advance Directives:   Living Will: No    Do Not Resuscitate Status: No    Medical Power of : No    Agent's Name:  Pt's adult children.    Decision Making:  Family answered questions  Goals of Care: The patient and family endorses that what is most important right now is to focus on quality of life with improvement in condition.     Accordingly, we have decided that the best plan to meet the patient's goals includes continuing with treatment         Significant Labs: All pertinent labs within the past 24 hours have been reviewed.  CBC:   Recent Labs   Lab 07/23/25  0636   WBC 5.20   HGB 10.7*   HCT 31.3*   MCV 92        BMP:  Recent Labs   Lab 07/23/25  0636   GLU 84      K 4.0      CO2 22*   BUN 31*   CREATININE 0.8   CALCIUM 8.6*   MG 1.9     LFT:  Lab Results  "  Component Value Date    AST 30 07/23/2025    ALKPHOS 83 07/23/2025    BILITOT 0.4 07/23/2025     Albumin:   Albumin   Date Value Ref Range Status   07/23/2025 3.3 (L) 3.5 - 5.2 g/dL Final     Protein:   Protein Total   Date Value Ref Range Status   07/23/2025 6.7 6.0 - 8.4 gm/dL Final     Lactic acid:   No results found for: "LACTATE"    Significant Imaging: I have reviewed all pertinent imaging results/findings within the past 24 hours.    "

## 2025-07-24 ENCOUNTER — TELEPHONE (OUTPATIENT)
Dept: HEMATOLOGY/ONCOLOGY | Facility: CLINIC | Age: 70
End: 2025-07-24
Payer: MEDICARE

## 2025-07-24 LAB
ABSOLUTE EOSINOPHIL (OHS): 0.08 K/UL
ABSOLUTE MONOCYTE (OHS): 0.32 K/UL (ref 0.3–1)
ABSOLUTE NEUTROPHIL COUNT (OHS): 3.31 K/UL (ref 1.8–7.7)
ALBUMIN SERPL BCP-MCNC: 3.6 G/DL (ref 3.5–5.2)
ALP SERPL-CCNC: 93 UNIT/L (ref 40–150)
ALT SERPL W/O P-5'-P-CCNC: 35 UNIT/L (ref 10–44)
ANION GAP (OHS): 9 MMOL/L (ref 8–16)
AST SERPL-CCNC: 40 UNIT/L (ref 11–45)
BASOPHILS # BLD AUTO: 0.06 K/UL
BASOPHILS NFR BLD AUTO: 1.2 %
BILIRUB SERPL-MCNC: 0.4 MG/DL (ref 0.1–1)
BUN SERPL-MCNC: 22 MG/DL (ref 8–23)
CALCIUM SERPL-MCNC: 8.9 MG/DL (ref 8.7–10.5)
CHLORIDE SERPL-SCNC: 106 MMOL/L (ref 95–110)
CO2 SERPL-SCNC: 23 MMOL/L (ref 23–29)
CREAT SERPL-MCNC: 1 MG/DL (ref 0.5–1.4)
ERYTHROCYTE [DISTWIDTH] IN BLOOD BY AUTOMATED COUNT: 13.4 % (ref 11.5–14.5)
GFR SERPLBLD CREATININE-BSD FMLA CKD-EPI: >60 ML/MIN/1.73/M2
GLUCOSE SERPL-MCNC: 91 MG/DL (ref 70–110)
HCT VFR BLD AUTO: 35.1 % (ref 40–54)
HGB BLD-MCNC: 11.6 GM/DL (ref 14–18)
IMM GRANULOCYTES # BLD AUTO: 0.01 K/UL (ref 0–0.04)
IMM GRANULOCYTES NFR BLD AUTO: 0.2 % (ref 0–0.5)
LYMPHOCYTES # BLD AUTO: 1.03 K/UL (ref 1–4.8)
MAGNESIUM SERPL-MCNC: 2 MG/DL (ref 1.6–2.6)
MCH RBC QN AUTO: 31.1 PG (ref 27–31)
MCHC RBC AUTO-ENTMCNC: 33 G/DL (ref 32–36)
MCV RBC AUTO: 94 FL (ref 82–98)
NUCLEATED RBC (/100WBC) (OHS): 0 /100 WBC
PHOSPHATE SERPL-MCNC: 2.5 MG/DL (ref 2.7–4.5)
PLATELET # BLD AUTO: 226 K/UL (ref 150–450)
PMV BLD AUTO: 9.6 FL (ref 9.2–12.9)
POTASSIUM SERPL-SCNC: 3.9 MMOL/L (ref 3.5–5.1)
PROT SERPL-MCNC: 7.3 GM/DL (ref 6–8.4)
RBC # BLD AUTO: 3.73 M/UL (ref 4.6–6.2)
RELATIVE EOSINOPHIL (OHS): 1.7 %
RELATIVE LYMPHOCYTE (OHS): 21.4 % (ref 18–48)
RELATIVE MONOCYTE (OHS): 6.7 % (ref 4–15)
RELATIVE NEUTROPHIL (OHS): 68.8 % (ref 38–73)
SODIUM SERPL-SCNC: 138 MMOL/L (ref 136–145)
WBC # BLD AUTO: 4.81 K/UL (ref 3.9–12.7)

## 2025-07-24 PROCEDURE — 63600175 PHARM REV CODE 636 W HCPCS: Performed by: PHYSICIAN ASSISTANT

## 2025-07-24 PROCEDURE — 36415 COLL VENOUS BLD VENIPUNCTURE: CPT | Performed by: PHYSICIAN ASSISTANT

## 2025-07-24 PROCEDURE — 82040 ASSAY OF SERUM ALBUMIN: CPT | Performed by: PHYSICIAN ASSISTANT

## 2025-07-24 PROCEDURE — 97530 THERAPEUTIC ACTIVITIES: CPT | Mod: CQ

## 2025-07-24 PROCEDURE — 99900035 HC TECH TIME PER 15 MIN (STAT)

## 2025-07-24 PROCEDURE — 83735 ASSAY OF MAGNESIUM: CPT | Performed by: PHYSICIAN ASSISTANT

## 2025-07-24 PROCEDURE — 11000001 HC ACUTE MED/SURG PRIVATE ROOM

## 2025-07-24 PROCEDURE — 85025 COMPLETE CBC W/AUTO DIFF WBC: CPT | Performed by: PHYSICIAN ASSISTANT

## 2025-07-24 PROCEDURE — 94668 MNPJ CHEST WALL SBSQ: CPT

## 2025-07-24 PROCEDURE — 27000646 HC AEROBIKA DEVICE

## 2025-07-24 PROCEDURE — 94761 N-INVAS EAR/PLS OXIMETRY MLT: CPT

## 2025-07-24 PROCEDURE — 63600175 PHARM REV CODE 636 W HCPCS: Performed by: HOSPITALIST

## 2025-07-24 PROCEDURE — 94664 DEMO&/EVAL PT USE INHALER: CPT

## 2025-07-24 PROCEDURE — 84100 ASSAY OF PHOSPHORUS: CPT | Performed by: PHYSICIAN ASSISTANT

## 2025-07-24 PROCEDURE — 25000003 PHARM REV CODE 250: Performed by: HOSPITALIST

## 2025-07-24 RX ADMIN — DIVALPROEX SODIUM 250 MG: 250 TABLET, DELAYED RELEASE ORAL at 08:07

## 2025-07-24 RX ADMIN — DEXAMETHASONE 2 MG: 1 TABLET ORAL at 09:07

## 2025-07-24 RX ADMIN — DIVALPROEX SODIUM 250 MG: 250 TABLET, DELAYED RELEASE ORAL at 05:07

## 2025-07-24 RX ADMIN — FAMOTIDINE 20 MG: 20 TABLET, FILM COATED ORAL at 09:07

## 2025-07-24 RX ADMIN — HALOPERIDOL LACTATE 2 MG: 5 INJECTION, SOLUTION INTRAMUSCULAR at 01:07

## 2025-07-24 RX ADMIN — Medication 100 MG: at 09:07

## 2025-07-24 RX ADMIN — ENOXAPARIN SODIUM 40 MG: 40 INJECTION SUBCUTANEOUS at 04:07

## 2025-07-24 RX ADMIN — OLANZAPINE 20 MG: 5 TABLET, FILM COATED ORAL at 08:07

## 2025-07-24 RX ADMIN — THERA TABS 1 TABLET: TAB at 09:07

## 2025-07-24 RX ADMIN — ACETAMINOPHEN 650 MG: 325 TABLET ORAL at 11:07

## 2025-07-24 RX ADMIN — FOLIC ACID 1 MG: 1 TABLET ORAL at 09:07

## 2025-07-24 RX ADMIN — DIVALPROEX SODIUM 250 MG: 250 TABLET, DELAYED RELEASE ORAL at 02:07

## 2025-07-24 RX ADMIN — FAMOTIDINE 20 MG: 20 TABLET, FILM COATED ORAL at 08:07

## 2025-07-24 RX ADMIN — HALOPERIDOL LACTATE 2 MG: 5 INJECTION, SOLUTION INTRAMUSCULAR at 04:07

## 2025-07-24 RX ADMIN — OLANZAPINE 20 MG: 5 TABLET, FILM COATED ORAL at 09:07

## 2025-07-24 RX ADMIN — DEXAMETHASONE 2 MG: 1 TABLET ORAL at 08:07

## 2025-07-24 RX ADMIN — HALOPERIDOL LACTATE 2 MG: 5 INJECTION, SOLUTION INTRAMUSCULAR at 11:07

## 2025-07-24 NOTE — CARE UPDATE
I have reviewed the chart of Goran Carlos who is hospitalized for the following:    Active Hospital Problems    Diagnosis    *Ataxia    Secondary malignant neoplasm of brain    Palliative care encounter    Advance care planning    Debility    Agitation     : PRN IV Zyprexa dose given for agitation overnight, in 4 point restraints/roll belt       Vasogenic cerebral edema     persistent vasogenic edema around resection cavity and L frontal lesion.   Nsgy  Evd  ICU stay  S/p resection      Pseudomeningocele    Brain compression     MRI Brain W/WO 7/2: multifocal enhancing lesions c/f metastatic disease, largest R cerebellum w/mass effect on 4th.   S/p resection  Close monitoring in icu  Stat cth with any acute changes      Obstructive hydrocephalus     obstructive hydrocephalus s/p EVD placement,       Metastasis to brain    Encephalopathy, metabolic    Anemia    Dysphagia    Essential hypertension    Positive serology for syphilis    Polysubstance abuse    Brain lesion    Gait disturbance    Hepatitis C antibody positive    Mass of upper lobe of right lung        Kenyatta Severino NP  [facility] Based [facility]

## 2025-07-24 NOTE — ASSESSMENT & PLAN NOTE
See primary problem  Slowly improving   on clonidine 0.3mg TID and olanzapine 20mg BID  Encephalopathic likely from brain mets  Unable to progress with SLP; NPO; with NGT  Repeat head CT ordered by NSGY on 7/21 - stable    Stable on olanzapine

## 2025-07-24 NOTE — ASSESSMENT & PLAN NOTE
PC consulted; DNR by patient and family; No PEG tube   Decided on hospice. [facility]

## 2025-07-24 NOTE — TELEPHONE ENCOUNTER
Spoke with Luis(daughter) regarding appt scheduled for Mr Carlos on 7/25/25 with Dr Mondragon. Patient is still admitted. Message routed to navigator to reschedule post discharge.

## 2025-07-24 NOTE — PLAN OF CARE
Problem: Adult Inpatient Plan of Care  Goal: Plan of Care Review  Outcome: Progressing  Goal: Patient-Specific Goal (Individualized)  Description: Pt will maintain sbp below 160  Outcome: Progressing  Goal: Absence of Hospital-Acquired Illness or Injury  Outcome: Progressing  Goal: Optimal Comfort and Wellbeing  Outcome: Progressing  Goal: Readiness for Transition of Care  Outcome: Progressing     Problem: Infection  Goal: Absence of Infection Signs and Symptoms  Outcome: Progressing     Problem: Skin Injury Risk Increased  Goal: Skin Health and Integrity  Outcome: Progressing     Problem: Delirium  Goal: Optimal Coping  Outcome: Progressing  Goal: Improved Behavioral Control  Outcome: Progressing  Goal: Improved Attention and Thought Clarity  Outcome: Progressing  Goal: Improved Sleep  Outcome: Progressing     Problem: Fall Injury Risk  Goal: Absence of Fall and Fall-Related Injury  Outcome: Progressing     Problem: Restraint, Nonviolent  Goal: Absence of Harm or Injury  Outcome: Progressing     Problem: Wound  Goal: Optimal Coping  Outcome: Progressing  Goal: Optimal Functional Ability  Outcome: Progressing  Goal: Absence of Infection Signs and Symptoms  Outcome: Progressing  Goal: Improved Oral Intake  Outcome: Progressing  Goal: Optimal Pain Control and Function  Outcome: Progressing  Goal: Skin Health and Integrity  Outcome: Progressing  Goal: Optimal Wound Healing  Outcome: Progressing     Problem: Acute Kidney Injury/Impairment  Goal: Fluid and Electrolyte Balance  Outcome: Progressing  Goal: Improved Oral Intake  Outcome: Progressing  Goal: Effective Renal Function  Outcome: Progressing     Problem: Coping Ineffective  Goal: Effective Coping  Outcome: Progressing     Problem: Hospitalized Older Adult  Goal: Optimal Cognitive Function  Outcome: Progressing  Goal: Effective Bowel Elimination  Outcome: Progressing  Goal: Optimal Coping  Outcome: Progressing  Goal: Fluid and Electrolyte Balance  Outcome:  Progressing  Goal: Optimal Functional Ability  Outcome: Progressing  Goal: Improved Oral Intake  Outcome: Progressing  Goal: Adequate Sleep/Rest  Outcome: Progressing  Goal: Effective Urinary Elimination  Outcome: Progressing             POC updated and reviewed with the patient at the bedside. Questions regarding POC were encouraged and addressed. VSS, see flowsheets. Tele maintained per provider's order. Patient is AOX 1 at this time. No acute events noted during shift. Fall and safety precautions maintained, no signs of injury noted during shift. Patient repositioned independently in bed for comfort. Upon exiting room, patient's bed locked in low position, side rails up x 3, bed alarm on, with call light within reach. Instructed patient to call staff for mobility, verbalized understanding. No acute signs of distress noted at this time. POC ongoing.

## 2025-07-24 NOTE — ASSESSMENT & PLAN NOTE
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  additional small hyperdensities in the right frontoparietal and left parasagittal parietal lobe.  mass effect and partial effacement of the 4th ventricle secondary to the cerebellar lesion with developing hydrocephalus without MLS. CT of the chest, abdomen, and pelvis with IV contrast showed RUL mass with mediastinal adenopathy. Hep C and treponema antibodies were positive. Hep C PCR pending. Penicillin G was administered. A sepsis workup was completed and antibiotics were initiated. Intubated to get MRI. S/p EVD. Extubated 7/4/25 without difficulty.     s/p SOC craniotomy for tumor resection on 7/7  Dex 2 mg BID, no plans to taper further pending outpt f/u  Bactrim MWF for PJP ppx given plan for prolonged steroid taper  EVD removed 7/14  Zyprexa BID for agitation  Phenobarb taper to off given improved agitation   Clonidine 0.3 mg TID for agitation/opioid dependence/hypertension  Avoid versed for agitation  Tube feeds at goal, SLP following for swallow  Folate/thiamine/MV  SBP <160   Pathology is lung carcinoma but [facility] and PD-L1 testing are pending. on NGT feeds impact peptide. SLP following. on 4 point restraints.    CTHead 7/14 -Ventricles remain somewhat prominent but without overt hydrocephalus or significant change in size following EVD removal.Evolving recent postsurgical change in the posterior fossa. No new intracranial hemorrhage or major vascular distribution infarct.     [facility].

## 2025-07-24 NOTE — CHAPLAIN
"Palliative Care        Patient: Goran Carlos  MRN: [id number]  : 1955  Age: 69 y.o.  Hospital Length of Stay: 23  Code Status: Code Status Discussion Note  Attending Provider: Cristine Jara MD  Principal Problem: Ataxia     What prompted this initial visit?:   Visited pall med pt per request of pall med provider; pall med officially signed off yesterday because GOC are clear- 10 min visit     Who was present during my visit:   Pt was alone, welcomed me in     Non-clinical observations of patient/family or room:  Pt awake, communicative, but difficult to understand, slurred/mumbling; TV was so  loud too and couldn't find the remote to mute. Pt stated he's not in pain and he did not appear to be in any acute physical distress.     Feelings (Emotions/Fears/Experiences/Coping):   Provided compassionate presence and referenced pall med provider and previous [facility] chaplains who have visited in the past. Pt said he's "feeling okay today". Pt said he's tired of the hospital and that he wants to go home, but something [facility] the [facility] and [facility].    Reminded pt he's loved and we're doing all we can to keep him comfortable in preparation for the next phase of his care.    Offered to do/get anything for pt and he politely declined, but appreciated the offer.      Family/Friends (Support, Community, Culture):  Did not discuss this visit     Ele (Beliefs/Meaning/Philosophy/Distress):   EPIC says "other" for Moravian. I asked pt if he adheres to any ele tradition or Yazidism belief systems and he said he's [employer] and was just baptized 2 years ago. Commended pt in his spiritual growth; pt said his ele is very important to him. Normalized his feelings and stated I bet there are a lot of people praying for him and he said yes.    Wrapped up conversation quicker than I'd like, but the TV was so loud and distracting. Pt appreciated the visit.     Lord, in your mercy.     Josselin,       Rev. Swartz " JOSE Caal MDiv, [facility]  Board Certified Palliative Care   Palliative Medicine Department, 9th Floor [facility] [place]    Office: [phone] ([phone])  ** If you call and I don't answer, please leave a voicemail because vmail is forwarded to me    Email: [email or web address]  Work hours: [id number] [phone]  ** Evening, overnight and weekends, please dial [phone] which is carried by an in-house Spiritual Care  at all hours.     The care I provide is shaped by the Code of Ethics of the [employer] Professional Chaplains

## 2025-07-24 NOTE — ASSESSMENT & PLAN NOTE
Patient's with Normocytic anemia.. Hemoglobin stable. Etiology likely due to chronic disease .  Current CBC reviewed-    Recent Labs   Lab 07/22/25  0638 07/23/25  0636 07/24/25  0951   HGB 10.9* 10.7* 11.6*         Component Value Date/Time    MCV 94 07/24/2025 0951    RDW 13.4 07/24/2025 0951     Monitor CBC and transfuse if H/H drops below 7/21.

## 2025-07-24 NOTE — PT/OT/SLP PROGRESS
"Physical Therapy Treatment    Patient Name:  Goran Carlos   MRN:  [id number]    Recommendations:     Discharge Recommendations: High Intensity Therapy  Discharge Equipment Recommendations: bedside commode, bath bench, wheelchair  Barriers to discharge: None    Assessment:     Goran Carlos is a 69 y.o. male admitted with a medical diagnosis of Ataxia.  He presents with the following impairments/functional limitations: weakness, impaired endurance, impaired functional mobility, gait instability, impaired balance, decreased coordination, decreased lower extremity function, decreased safety awareness, impaired coordination. Pt agreeable to session. Pt is progressing well toward POC goals and alert this session. This day pt is SBA for bed mobility and sitting EOB, CGA for sit to stands, and ambulating min A with RW. Focus on improving posture during standing activities. Pt able to shift trunk and head up to come to full upright, however, gradually returns to fwd flexed position within 3 seconds. Posture slightly improved with HHA assist vs with RW. Pt would benefit from continued PT to improve functional independence.     Rehab Prognosis: Good; patient would benefit from acute skilled PT services to address these deficits and reach maximum level of function.    Recent Surgery: Procedure(s) (LRB):  CRANIOTOMY, SUBOCCIPITAL (Right) 17 Days Post-Op    Plan:     During this hospitalization, patient to be seen 4 x/week to address the identified rehab impairments via gait training, therapeutic activities, therapeutic exercises, neuromuscular re-education and progress toward the following goals:    Plan of Care Expires:  08/04/25    Subjective     Chief Complaint: a little cold  Patient/Family Comments/goals: "Can you get me some cookies?" Oatmeal is his favorite.  Pain/Comfort:  Pain Rating 1: 0/10      Objective:     Communicated with nurse prior to session.  Patient found HOB elevated with bed alarm, telemetry (Avasyst) " upon PT entry to room.     General Precautions: Standard, fall, aspiration  Orthopedic Precautions: N/A  Braces: N/A  Respiratory Status: Room air     Functional Mobility:  Bed Mobility:     Scooting EOB: SBA  Supine to Sit: SBA  Transfers:    Sit <> stand: RW CGA  Bed to chair: HHA CGA step-to transfer  Balance: Static seated balance: SBA  Static standing balance: CGA  Assist in standing d/t to poor posture  Dynamic standing balance:  15 reps of posture adjust and hold  ~3 sec hold prompting consistent TC/VC     AM-PAC 6 CLICK MOBILITY  Turning over in bed (including adjusting bedclothes, sheets and blankets)?: 4  Sitting down on and standing up from a chair with arms (e.g., wheelchair, bedside commode, etc.): 3  Moving from lying on back to sitting on the side of the bed?: 4  Moving to and from a bed to a chair (including a wheelchair)?: 3  Need to walk in hospital room?: 3  Climbing 3-5 steps with a railing?: 1  Basic Mobility Total Score: 18       Treatment & Education:  Patient educated on importance of OOB activity for functional gains.  Patient educated on use of RW during transfers and ambulation for increased safety.  Pt educated on importance of posture for increased safety during ambulation.  Pt educated on being minded of body position and recognizing the body shift (increasing elbow flexion, increasing cervical flexion, and increasing downward gaze) while in standing/ambulating and adjusting to upright posture accordingly.  Patient educated on general safety for decreased risk of falls.  Patient performed exercises for improved strength and endurance.  All questions answered and patient verbalized understanding.    Patient left up in chair with all lines intact, call button in reach, chair alarm on, nurse notified, and patient eating lunch..    GOALS:   Multidisciplinary Problems       Physical Therapy Goals          Problem: Physical Therapy    Goal Priority Disciplines Outcome Interventions   Physical  Therapy Goal     PT, PT/OT Progressing    Description: Goals to be met by: 2025     Patient will increase functional independence with mobility by performin. Supine to sit with Stand-by Assistance  2. Sit to supine with Stand-by Assistance  3. Sit to stand transfer with Contact Guard Assistance  4. Bed to chair transfer with Minimal Assistance using Rolling Walker  5. Gait  x 10 feet with Minimal Assistance using Rolling Walker.   6. Sitting at edge of bed x5 minutes with Supervision                           Time Tracking:     PT Received On: 25  PT Start Time: 1205     PT Stop Time: 1227  PT Total Time (min): 22 min     Billable Minutes: Therapeutic Activity 22    Treatment Type: Treatment  PT/PTA: PTA     Number of PTA visits since last PT visit: 2025

## 2025-07-24 NOTE — PLAN OF CARE
[facility] Pt will be discharging on 7/25/25 at 9am.         Alex Henson - Neurosurgery ([facility])  Discharge Reassessment    Primary Care Provider: No primary care provider on file.    Expected Discharge Date: 7/25/2025    Reassessment (most recent)       Discharge Reassessment - 07/24/25 1059          Discharge Reassessment    Assessment Type Discharge Planning Reassessment     Did the patient's condition or plan change since previous assessment? No     Discharge Plan discussed with: Adult children     Name(s) and Number(s) Luis Carlos (Daughter)  [phone]     Communicated CARA with patient/caregiver Yes     Discharge Plan A Inpatient Hospice     Discharge Plan B Inpatient Hospice     DME Needed Upon Discharge  other (see comments)     Transition of Care Barriers Other (see comments)     Why the patient remains in the hospital Requires continued medical care        Post-Acute Status    Post-Acute Authorization Placement     Post-Acute Placement Status Set-up Complete/Auth obtained     Discharge Delays None known at this time                       Discharge Plan A and Plan B have been determined by review of patient's clinical status, future medical and therapeutic needs, and coverage/benefits for post-acute care in coordination with multidisciplinary team members.    Ophelia Nunez MSW, CSW

## 2025-07-24 NOTE — PLAN OF CARE
Problem: Adult Inpatient Plan of Care  Goal: Plan of Care Review  Outcome: Progressing     Problem: Adult Inpatient Plan of Care  Goal: Patient-Specific Goal (Individualized)  Description: Pt will maintain sbp below 160  Outcome: Progressing     Problem: Adult Inpatient Plan of Care  Goal: Absence of Hospital-Acquired Illness or Injury  Outcome: Progressing     Problem: Adult Inpatient Plan of Care  Goal: Optimal Comfort and Wellbeing  Outcome: Progressing     Problem: Adult Inpatient Plan of Care  Goal: Readiness for Transition of Care  Outcome: Progressing     Problem: Delirium  Goal: Improved Behavioral Control  Outcome: Progressing     Problem: Delirium  Goal: Optimal Coping  Outcome: Progressing

## 2025-07-24 NOTE — PROGRESS NOTES
[facility] - Neurosurgery ([facility])  [facility] Medicine  Progress Note    Patient Name: Goran Carlos  MRN: [id number]  Patient Class: IP- Inpatient   Admission Date: 7/1/2025  Length of Stay: 23 days  Attending Physician: Cristine Jara MD  Primary Care Provider: No primary care provider on file.    Principal Problem:Ataxia    Interval history: No new complaints    PEx  Temp:  [97 °F (36.1 °C)-98.4 °F (36.9 °C)]   Pulse:  []   Resp:  [16-18]   BP: (116-138)/(71-81)   SpO2:  [81 %-99 %]     Intake/Output Summary (Last 24 hours) at 7/24/2025 1324  Last data filed at 7/24/2025 0642  Gross per 24 hour   Intake 400 ml   Output 260 ml   Net 140 ml       General Appearance: no acute distress, WD, ill -appearing  Heart: regular rate and rhythm, no heave  Respiratory: Normal respiratory effort, symmetric excursion, bilateral vesicular breath sounds   Abdomen: Soft, non-tender; bowel sounds active  Skin: intact, no rash, no ulcers  Neurologic:  No focal numbness or weakness  Mental status: Alert, oriented x 4, affect appropriate    Recent Labs   Lab 07/22/25  0638 07/23/25  0636 07/24/25  0951   WBC 4.89 5.20 4.81   HGB 10.9* 10.7* 11.6*   HCT 32.8* 31.3* 35.1*    223 226     Recent Labs   Lab 07/22/25  0535 07/23/25  0636 07/24/25  0951    138 138   K 4.1 4.0 3.9    108 106   CO2 23 22* 23   BUN 34* 31* 22   CREATININE 0.9 0.8 1.0    84 91   CALCIUM 9.2 8.6* 8.9   MG 2.0 1.9 2.0   PHOS 4.0 2.9 2.5*     Recent Labs   Lab 07/22/25  0535 07/23/25  0636 07/24/25  0951   ALKPHOS 88 83 93   ALT 26 27 35   AST 28 30 40   ALBUMIN 3.6 3.3* 3.6   PROT 7.4 6.7 7.3   BILITOT 0.2 0.4 0.4        Scheduled Meds:   dexAMETHasone  2 mg Oral Q12H    divalproex  250 mg Oral Q8H    enoxparin  40 mg Subcutaneous Q24H (prophylaxis, 1700)    famotidine  20 mg Oral BID    folic acid  1 mg Oral Daily    multivitamin  1 tablet Oral Daily    OLANZapine  20 mg Oral BID    sulfamethoxazole-trimethoprim 800-160mg  1 tablet  Oral Every Mon, Wed, Fri    thiamine  100 mg Oral Daily     Continuous Infusions:  As Needed:    Current Facility-Administered Medications:     acetaminophen, 650 mg, Oral, Q4H PRN    bisacodyL, 10 mg, Rectal, Daily PRN    haloperidol lactate, 2 mg, Intravenous, Q6H PRN    ondansetron, 4 mg, Intravenous, Q4H PRN    sodium chloride 0.9%, 10 mL, Intravenous, PRN]    Assessment & Plan  Metastasis to brain  Brain compression  Obstructive hydrocephalus  Pseudomeningocele  Secondary malignant neoplasm of brain  Brain lesion  See above; on steroids      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  additional small hyperdensities in the right frontoparietal and left parasagittal parietal lobe.  mass effect and partial effacement of the 4th ventricle secondary to the cerebellar lesion with developing hydrocephalus without MLS. CT of the chest, abdomen, and pelvis with IV contrast showed RUL mass with mediastinal adenopathy. Hep C and treponema antibodies were positive. Hep C PCR pending. Penicillin G was administered. A sepsis workup was completed and antibiotics were initiated. Intubated to get MRI. S/p EVD. Extubated 7/4/25 without difficulty.     s/p SOC craniotomy for tumor resection on 7/7  Dex 2 mg BID, no plans to taper further pending outpt f/u  Bactrim MWF for PJP ppx given plan for prolonged steroid taper  EVD removed 7/14  Zyprexa BID for agitation  Phenobarb taper to off given improved agitation   Clonidine 0.3 mg TID for agitation/opioid dependence/hypertension  Avoid versed for agitation  Tube feeds at goal, SLP following for swallow  Folate/thiamine/MV  SBP <160   Pathology is lung carcinoma but [facility]  and PD-L1 testing are pending. on NGT feeds impact peptide. SLP following. on 4 point restraints.    CTHead 7/14 -Ventricles remain somewhat prominent but without overt hydrocephalus or significant change in size following EVD removal.Evolving recent postsurgical change in the posterior fossa. No new intracranial hemorrhage or major vascular distribution infarct.     [facility]  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   PT/OT -> recommending acute rehab once medically appropriate for discharge   Polysubstance abuse  Was brought in from [facility], where he has been for the past month  Educate on cessation when appropriate  On phenobarbital for agitation, no signs of EtOH withdrawal -> phenobarb taper     7/16 to completed phenobarbital taper    Gait disturbance     PT/OT eval - rehab     Hepatitis C antibody positive     PCR negative  No known history of Hep C per patient        Mass of upper lobe of right lung     Noted on CXR and CT chest with IV contrast  Saturating well on RA; No PTX on CT-chest  No lung consolidations or obstructive process  No known history of cancer  Intra-op brain biopsy frozen pathology suggestive of metastatic adenocarcinoma, 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    Positive serology for syphilis  s/p ID eval -  Positive Treponema pallidum antibody. RPR 1:1 suggestive of false positive or past treated infection. Received penicillin G 2.4 million units x 1 on 7/1. CSF analysis not suggestive of neurosyphilis at this time . blood RPR and CSF VDRL 7/2 negative  Essential hypertension  Stopping BP meds due to low-normal numbers       - Goal SBP<160         Encephalopathy, metabolic  Agitation     See primary problem  Slowly improving   on clonidine 0.3mg TID and olanzapine 20mg BID  Encephalopathic likely from brain mets  Unable to progress with SLP; NPO; with NGT  Repeat head CT ordered by HERI on 7/21 - stable    Stable on olanzapine  Anemia    Patient's with Normocytic anemia.. Hemoglobin stable.  Etiology likely due to chronic disease .  Current CBC reviewed-    Recent Labs   Lab 07/22/25  0638 07/23/25  0636 07/24/25  0951   HGB 10.9* 10.7* 11.6*         Component Value Date/Time    MCV 94 07/24/2025 0951    RDW 13.4 07/24/2025 0951     Monitor CBC and transfuse if H/H drops below 7/21.    Dysphagia  SLP , tube feeds for now; cancelled MBSS due to overt aspiration presentation  Pleasure feed diet; NGT ordered to be removed  Vasogenic cerebral edema  decadron    Palliative care encounter  Advance care planning  PC consulted; DNR by patient and family; No PEG tube   Decided [facility] hospice. [facility]  Debility  Acute; see above    VTE Risk Mitigation (From admission, onward)           Ordered     enoxaparin injection 40 mg  Every 24 hours         07/15/25 1247     IP VTE LOW RISK PATIENT  Once         07/01/25 2049     Place sequential compression device  Until discontinued         07/01/25 2049                  Discharge Planning   CARA: 7/25/2025     Code Status: DNR   Medical Readiness for Discharge Date: 7/23/2025  Discharge Plan A: Inpatient Hospice   Discharge Delays: None known at this time    Cristine Jara MD  Department of Hospital Medicine   [facility] - Neurosurgery ([facility])

## 2025-07-24 NOTE — ASSESSMENT & PLAN NOTE
Noted on CXR and CT chest with IV contrast  Saturating well on RA; No PTX on CT-chest  No lung consolidations or obstructive process  No known history of cancer  Intra-op brain biopsy frozen pathology suggestive of metastatic adenocarcinoma, 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

## 2025-07-25 VITALS
WEIGHT: 178.81 LBS | HEART RATE: 91 BPM | SYSTOLIC BLOOD PRESSURE: 128 MMHG | TEMPERATURE: 98 F | DIASTOLIC BLOOD PRESSURE: 80 MMHG | HEIGHT: 73 IN | RESPIRATION RATE: 18 BRPM | BODY MASS INDEX: 23.7 KG/M2 | OXYGEN SATURATION: 99 %

## 2025-07-25 PROBLEM — Z51.5 COMFORT MEASURES ONLY STATUS: Status: ACTIVE | Noted: 2025-07-25

## 2025-07-25 PROCEDURE — 63600175 PHARM REV CODE 636 W HCPCS: Performed by: HOSPITALIST

## 2025-07-25 PROCEDURE — 97535 SELF CARE MNGMENT TRAINING: CPT

## 2025-07-25 PROCEDURE — 25000003 PHARM REV CODE 250: Performed by: HOSPITALIST

## 2025-07-25 RX ORDER — ACETAMINOPHEN 325 MG/1
650 TABLET ORAL EVERY 4 HOURS PRN
Start: 2025-07-25

## 2025-07-25 RX ORDER — FAMOTIDINE 20 MG/1
20 TABLET, FILM COATED ORAL 2 TIMES DAILY
Start: 2025-07-25 | End: 2026-07-25

## 2025-07-25 RX ORDER — HALOPERIDOL LACTATE 5 MG/ML
2 INJECTION, SOLUTION INTRAMUSCULAR EVERY 6 HOURS PRN
Start: 2025-07-25 | End: 2026-07-25

## 2025-07-25 RX ORDER — DEXAMETHASONE 2 MG/1
2 TABLET ORAL EVERY 12 HOURS
Start: 2025-07-25 | End: 2025-08-04

## 2025-07-25 RX ORDER — BISACODYL 10 MG/1
10 SUPPOSITORY RECTAL DAILY PRN
Start: 2025-07-25

## 2025-07-25 RX ORDER — DIVALPROEX SODIUM 250 MG/1
250 TABLET, DELAYED RELEASE ORAL EVERY 8 HOURS
Start: 2025-07-25 | End: 2026-07-25

## 2025-07-25 RX ORDER — OLANZAPINE 20 MG/1
20 TABLET, FILM COATED ORAL 2 TIMES DAILY
Start: 2025-07-25 | End: 2026-07-25

## 2025-07-25 RX ORDER — LANOLIN ALCOHOL/MO/W.PET/CERES
100 CREAM (GRAM) TOPICAL DAILY
Start: 2025-07-26

## 2025-07-25 RX ORDER — SULFAMETHOXAZOLE AND TRIMETHOPRIM 800; 160 MG/1; MG/1
1 TABLET ORAL
Start: 2025-07-28

## 2025-07-25 RX ADMIN — SULFAMETHOXAZOLE AND TRIMETHOPRIM 1 TABLET: 800; 160 TABLET ORAL at 08:07

## 2025-07-25 RX ADMIN — THERA TABS 1 TABLET: TAB at 08:07

## 2025-07-25 RX ADMIN — DEXAMETHASONE 2 MG: 1 TABLET ORAL at 08:07

## 2025-07-25 RX ADMIN — Medication 100 MG: at 08:07

## 2025-07-25 RX ADMIN — OLANZAPINE 20 MG: 5 TABLET, FILM COATED ORAL at 08:07

## 2025-07-25 RX ADMIN — FOLIC ACID 1 MG: 1 TABLET ORAL at 08:07

## 2025-07-25 RX ADMIN — DIVALPROEX SODIUM 250 MG: 250 TABLET, DELAYED RELEASE ORAL at 06:07

## 2025-07-25 RX ADMIN — FAMOTIDINE 20 MG: 20 TABLET, FILM COATED ORAL at 08:07

## 2025-07-25 NOTE — ASSESSMENT & PLAN NOTE
Was brought in from [facility], where he has been for the past month  Educate on cessation when appropriate  On phenobarbital for agitation, no signs of EtOH withdrawal -> phenobarb taper     7/16 to completed phenobarbital taper

## 2025-07-25 NOTE — PLAN OF CARE
[facility] - Neurosurgery (Hospital)  Discharge Final Note    Primary Care Provider: No primary care provider on file.    Expected Discharge Date: 7/25/2025    [facility].     Patient's bedside nurse and pt/pt daughter Luis notified of the above.    Discharge Plan A and Plan B have been determined by review of patient's clinical status, future medical and therapeutic needs, and coverage/benefits for post-acute care in coordination with multidisciplinary team members.        Final Discharge Note (most recent)       Final Note - 07/25/25 1041          Final Note    Assessment Type Final Discharge Note     Anticipated Discharge Disposition Hospice/Medical Facility     What phone number can be called within the next 1-3 days to see how you are doing after discharge? [phone]     Hospital Resources/Appts/Education Provided Appointments scheduled and added to AVS        Post-Acute Status    Post-Acute Authorization Placement     Post-Acute Placement Status Set-up Complete/Auth obtained     Discharge Delays None known at this time                         No future appointments.    CHW scheduled post-discharge follow-up appointment and information added to AVS.     Ophelia Nunez MSW, CSW

## 2025-07-25 NOTE — NURSING
Report was called to Carlee at the [facility] room [id number]. Pt left by [facility].

## 2025-07-25 NOTE — NURSING
Provider placed order for discharge. Patient to be  discharged to [facility].   Transport set up via Discharge transportation: EMS transportation/[facility].  Orders given to EMS for [facility] transport.  Patient understood he was leaving and [facility].  Tele-box removed from patient, cleaned and returned to unit secretary per unit protocol. All personal belongings at the bedside placed in patient belongings bag and given to patient. All PIVs/equipment removed from patient and documented in chart per policy. VSS, see flowsheets for details. No acute signs of distress noted upon leaving the floor. AVS confirmation completed and unit secretary notified of patient's departure.

## 2025-07-25 NOTE — PLAN OF CARE
Problem: Adult Inpatient Plan of Care  Goal: Plan of Care Review  Outcome: Progressing     Problem: Adult Inpatient Plan of Care  Goal: Patient-Specific Goal (Individualized)  Description: Pt will maintain sbp below 160  Outcome: Progressing     Problem: Adult Inpatient Plan of Care  Goal: Absence of Hospital-Acquired Illness or Injury  Outcome: Progressing     Problem: Adult Inpatient Plan of Care  Goal: Optimal Comfort and Wellbeing  Outcome: Progressing     Problem: Adult Inpatient Plan of Care  Goal: Readiness for Transition of Care  Outcome: Progressing     Problem: Infection  Goal: Absence of Infection Signs and Symptoms  Outcome: Progressing     Problem: Skin Injury Risk Increased  Goal: Skin Health and Integrity  Outcome: Progressing     Problem: Delirium  Goal: Optimal Coping  Outcome: Progressing   POC reviewed and updated with the patient. Questions regarding POC were encouraged and addressed with the patient.SHARRON. Patient is AO X 4 at this time. Fall/safety precautions maintained, no signs of injury noted during shift. Upon exiting room, patient's bed locked in low position, side rails up x 3, bed alarm set, with call light within reach. Instructed patient to call staff for assistance, verbalized understanding.  No acute signs of distress noted at this time.   Consent (Ear)/Introductory Paragraph: The rationale for Mohs was explained to the patient and consent was obtained. The risks, benefits and alternatives to therapy were discussed in detail. Specifically, the risks of ear deformity, infection, scarring, bleeding, prolonged wound healing, incomplete removal, allergy to anesthesia, nerve injury and recurrence were addressed. Prior to the procedure, the treatment site was clearly identified and confirmed by the patient. All components of Universal Protocol/PAUSE Rule completed.

## 2025-07-25 NOTE — DISCHARGE SUMMARY
Alex Henson - Neurosurgery ([facility])  [facility] Medicine  Discharge Summary      Patient Name: Goran Carlos  MRN: [id number]  DEN: [id number]  Patient Class: IP- Inpatient  Admission Date: 7/1/2025  Hospital Length of Stay: 24 days  Discharge Date and Time: No discharge date for patient encounter.  Attending Physician: Cristine Jara MD   Discharging Provider: Vanessa Mast MD  Primary Care Provider: No primary care provider on file.  [facility] [place] [facility] [facility] Vanessa Mast MD  [facility] Team[place] [facility]    HPI:   69 y/o M presenting from [facility] for generalized weakness for about a week now. History obtained from rehab nurse. She reported that he got to their facility about a month ago and, at baseline, walks with a cane and is alert and oriented. His nurse noticed that he has seemed weaker over the past week and over the past 3 days has had increased balance disturbance and gait issues. They also noticed that he has been speaking more softly in his speech is harder to understand. He has not had any infectious symptoms. He fell yesterday, unsure if he hit his head. CT head without contrast revealed multiple lesions of hypodensity with a hyperdense rim, notably in the left temporal lobe and right cerebellum. There were additional small hyperdensities in the right frontoparietal and left parasagittal parietal lobe. There is mass effect and partial effacement of the 4th ventricle secondary to the cerebellar lesion with developing hydrocephalus without MLS. CT of the chest, abdomen, and pelvis with IV contrast showed RUL mass with mediastinal adenopathy. Labs revealed Hep C and treponema antibodies were positive. Hep C PCR pending. Penicillin G was administered. A sepsis workup was completed and antibiotics were initiated. An MRI brain with and without contrast was ordered, but patient was unable to complete due to persistent movement after sedatives were administered. Prior to MRI, he was  alert but drowsy. He was able to tell me his name, but told me he was 53 years old, the year was 2003, and he did not know the current place or reason for being here. He had generalized weakness but was slightly more weak on the right side. He followed simple commands, but did not attempt to follow more complex commands. He is admitted to [facility] with NSGY consult.       Procedure(s) (LRB):  CRANIOTOMY, SUBOCCIPITAL (Right)      Hospital Course:   CTHead 7/1 showed multiple brain masses concerning for mets with surrounding edema, can't rule out abscesses. Mass in cerebellum with effacement of 4th ventricule outflow with concern for developing hydrocephalus. MRI brain with and without favors neoplastic brain lesions. Required precedex for agitation and started on zyprexa and phenobarb  for the associated ETOH withdrawal. 7/4/2025: Extubated without complications. On 7/7 underwent SOC crani for tumor resection. Started on steroids. Patient tolerated phenbarb taper. He required scheduled zyprexa and clonidine for delirium and associated withdrawal symptoms from ETOH and heroin use. Brain pathology returned poorly differentiated lung carcinoma. Extubated 7/9 after craniotomy.  7/14 EVD removed. CTHead 7/14 -Ventricles remain somewhat prominent but without overt hydrocephalus or significant change in size following EVD removal.Evolving recent postsurgical change in the posterior fossa. No new intracranial hemorrhage or major vascular distribution infarct.  Mental status did not improve. He was not able to give his name, unsafe to eat, and required four point restraints to keep his NG tube and lines in. Oncology consulted to discuss treatment options and prognosis. He was not a candidate for systemic chemotherapy or radiation due to his encephalopathy. They recommended hospice as treatment option. Radiation oncology saw him as well and felt that he did not have the fuinctional or mental capabilty to go through radaition.  Palliative care consulted and family agreed [facility] During the remainder of his stay, patient's agitation improved when NG tube and restraints were removed. His ability to converse coherently afterward improved but waxed and waned.      Physical exam:  Patient seen on day of discharge. Alert, denies pain or dyspnea. Vitals viewed.  Lungs clear, RRR, no edema, alert and awake.    Goals of Care Treatment Preferences:  Code Status: DNR    Health care agent: Pt's adult children.  Health care agent number: No value filed.          What is most important right now is to focus on remaining as independent as possible, symptom/pain control.  Accordingly, we have decided that the best plan to meet the patient's goals includes continuing with treatment.         Consults:   Consults (From admission, onward)          Status Ordering Provider     Inpatient consult to Radiation Oncology  Once        Provider:  (Not yet assigned)    Completed RADHIKA BISHOP     Inpatient consult to Palliative Care  Once        Provider:  (Not yet assigned)    Completed RADHIKA BISHOP     Inpatient consult to Hematology/Oncology  Once        Provider:  (Not yet assigned)    Completed RADHIKA BISHOP     Inpatient consult to Physical Medicine Rehab  Once        Provider:  Mimi Ramos MD    Completed SOTO CRUZ     Inpatient consult to Hematology/Oncology  Once        Provider:  (Not yet assigned)    Completed KIM REYES     Inpatient consult to Registered Dietitian/Nutritionist  Once        Provider:  (Not yet assigned)    Completed DARCIE LAZCANO     Inpatient consult to Infectious Diseases  Once        Provider:  (Not yet assigned)    Completed BHASKAR SERVIN     Inpatient consult to Physical Medicine Rehab  Once        Provider:  (Not yet assigned)    Completed BHASKAR SERVIN     IP consult to case management/social work  Once        Provider:  (Not yet assigned)    Acknowledged  CINDY RIVAS     Inpatient consult to Neurosurgery  Once        Provider:  (Not yet assigned)    Completed CRISPIN SHAY            Assessment & Plan  Metastasis to brain  Brain compression  Obstructive hydrocephalus  Pseudomeningocele  Secondary malignant neoplasm of brain  Brain lesion  See above; on steroids      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  additional small hyperdensities in the right frontoparietal and left parasagittal parietal lobe.  mass effect and partial effacement of the 4th ventricle secondary to the cerebellar lesion with developing hydrocephalus without MLS. CT of the chest, abdomen, and pelvis with IV contrast showed RUL mass with mediastinal adenopathy. Hep C and treponema antibodies were positive. Hep C PCR pending. Penicillin G was administered. A sepsis workup was completed and antibiotics were initiated. Intubated to get MRI. S/p EVD. Extubated 7/4/25 without difficulty.     s/p SOC craniotomy for tumor resection on 7/7  Dex 2 mg BID, no plans to taper further pending outpt f/u  Bactrim MWF for PJP ppx given plan for prolonged steroid taper  EVD removed 7/14  Zyprexa BID for agitation  Phenobarb taper to off given improved agitation   Clonidine 0.3 mg TID for agitation/opioid dependence/hypertension  Avoid versed for agitation  Tube feeds at goal, SLP following for swallow  Folate/thiamine/MV  SBP <160   Pathology is lung carcinoma but [employer] and PD-L1 testing are pending. on NGT feeds impact peptide. SLP following. on 4 point restraints.    CTHead 7/14 -Ventricles remain somewhat prominent but without overt hydrocephalus or significant change in size  following EVD removal.Evolving recent postsurgical change in the posterior fossa. No new intracranial hemorrhage or major vascular distribution infarct.     [facility]  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   PT/OT -> recommending acute rehab once medically appropriate for discharge   Polysubstance abuse  Was brought in from [place], where he has been for the past month  Educate on cessation when appropriate  On phenobarbital for agitation, no signs of EtOH withdrawal -> phenobarb taper     7/16 to completed phenobarbital taper    Gait disturbance     PT/OT eval - rehab     Hepatitis C antibody positive     PCR negative  No known history of Hep C per patient        Mass of upper lobe of right lung     Noted on CXR and CT chest with IV contrast  Saturating well on RA; No PTX on CT-chest  No lung consolidations or obstructive process  No known history of cancer  Intra-op brain biopsy frozen pathology suggestive of metastatic adenocarcinoma, 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    Positive serology for syphilis  s/p ID eval -  Positive Treponema pallidum antibody. RPR 1:1 suggestive of false positive or past treated infection. Received penicillin G 2.4 million units x 1 on 7/1. CSF analysis not suggestive of neurosyphilis at this time . blood RPR and CSF VDRL 7/2 negative  Essential hypertension  Stopping BP meds due to low-normal numbers       - Goal SBP<160         Encephalopathy, metabolic  Agitation     See primary problem  Slowly improving   on clonidine 0.3mg TID and olanzapine 20mg BID  Encephalopathic likely from brain mets  Unable to progress with SLP; NPO; with NGT  Repeat head CT ordered by NSGY on 7/21 - stable    Stable on olanzapine  Anemia    Patient's with Normocytic anemia.. Hemoglobin stable. Etiology likely due to chronic disease .  Current CBC reviewed-    Recent Labs   Lab 07/22/25  0638 07/23/25  0636 07/24/25  0951   HGB 10.9* 10.7* 11.6*         Component Value Date/Time    MCV 94 07/24/2025  0951    RDW 13.4 07/24/2025 0951     Monitor CBC and transfuse if H/H drops below 7/21.    Dysphagia  SLP , tube feeds for now; cancelled MBSS due to overt aspiration presentation  Pleasure feed diet; NGT ordered to be removed  Vasogenic cerebral edema  decadron    Palliative care encounter  Advance care planning  PC consulted; DNR by patient and family; No PEG tube   Decided [facility]. [facility]  Debility  Acute; see above    Comfort measures only status      Final Active Diagnoses:    Diagnosis Date Noted POA    PRINCIPAL PROBLEM:  Ataxia [R27.0] 07/01/2025 Yes    Comfort measures only status [Z51.5] 07/25/2025 Not Applicable    Secondary malignant neoplasm of brain [C79.31] 07/22/2025 Yes    Palliative care encounter [Z51.5] 07/21/2025 Not Applicable    Advance care planning [Z71.89] 07/21/2025 Not Applicable    Debility [R53.81] 07/21/2025 Yes    Agitation [R45.1] 07/17/2025 Yes    Vasogenic cerebral edema [G93.6] 07/17/2025 Yes    Pseudomeningocele [G96.198] 07/17/2025 Yes    Brain compression [G93.5] 07/17/2025 Yes    Obstructive hydrocephalus [G91.1] 07/17/2025 Yes    Metastasis to brain [C79.31] 07/17/2025 Yes    Encephalopathy, metabolic [G93.41] 07/15/2025 Yes    Anemia [D64.9] 07/15/2025 Yes    Dysphagia [R13.10] 07/15/2025 Yes    Essential hypertension [I10] 07/09/2025 Yes    Positive serology for syphilis [A53.0] 07/02/2025 Yes    Polysubstance abuse [F19.10] 07/01/2025 Yes    Brain lesion [G93.9] 07/01/2025 Yes    Gait disturbance [R26.9] 07/01/2025 Yes    Hepatitis C antibody positive [R76.8] 07/01/2025 Yes    Mass of upper lobe of right lung [R91.8] 07/01/2025 Yes      Problems Resolved During this Admission:    Diagnosis Date Noted Date Resolved POA    Hypernatremia [E87.0] 07/17/2025 07/18/2025 No    YOVANNY (acute kidney injury) [N17.9] 07/17/2025 07/18/2025 No    Hyponatremia [E87.1] 07/17/2025 07/18/2025 Yes    AMS (altered mental status) [R41.82] 07/01/2025 07/15/2025 Yes        Discharged Condition: poor    Disposition: Home or Self Care    Follow Up:    Patient Instructions:   No discharge procedures on file.    Significant Diagnostic Studies: N/A    Pending Diagnostic Studies:       None           Medications:  Reconciled Home Medications:      Medication List        START taking these medications      acetaminophen 325 MG tablet  Commonly known as: TYLENOL  Take 2 tablets (650 mg total) by mouth every 4 (four) hours as needed.     bisacodyL 10 mg Supp  Commonly known as: DULCOLAX  Place 1 suppository (10 mg total) rectally daily as needed.     dexAMETHasone 2 MG tablet  Commonly known as: DECADRON  Take 1 tablet (2 mg total) by mouth every 12 (twelve) hours. for 10 days     divalproex 250 MG EC tablet  Commonly known as: DEPAKOTE  Take 1 tablet (250 mg total) by mouth every 8 (eight) hours.     famotidine 20 MG tablet  Commonly known as: PEPCID  Take 1 tablet (20 mg total) by mouth 2 (two) times daily.     haloperidol lactate 5 mg/mL injection  Commonly known as: HALDOL  Inject 0.4 mLs (2 mg total) into the vein every 6 (six) hours as needed.     multivitamin Tab  Take 1 tablet by mouth once daily.  Start taking on: July 26, 2025     OLANZapine 20 MG tablet  Commonly known as: ZyPREXA  Take 1 tablet (20 mg total) by mouth 2 (two) times daily.     sulfamethoxazole-trimethoprim 800-160mg 800-160 mg Tab  Commonly known as: BACTRIM DS  Take 1 tablet by mouth every Mon, Wed, Fri.  Start taking on: July 28, 2025     thiamine 100 MG tablet  Take 1 tablet (100 mg total) by mouth once daily.  Start taking on: July 26, 2025              Indwelling Lines/Drains at time of discharge:   Lines/Drains/Airways       None                       Time spent on the discharge of patient: 36 minutes         Vanessa Mast MD  Department of Hospital Medicine  [facility])

## 2025-07-25 NOTE — PT/OT/SLP PROGRESS
"Occupational Therapy   Treatment    Name: Goran Carlos  MRN: [id number]  Admitting Diagnosis:  Ataxia  18 Days Post-Op    Recommendations:     Discharge Recommendations:  (plans for hospice)  Discharge Equipment Recommendations:  bath bench, bedside commode  Barriers to discharge:  None    Assessment:     Goran Carlos is a 69 y.o. male with a medical diagnosis of Ataxia.  He presents with performance deficits affecting function are impaired self care skills, impaired balance, impaired endurance, impaired functional mobility.     Rehab Prognosis:  Fair; patient would benefit from acute skilled OT services to address these deficits and reach maximum level of function.       Plan:     Patient to be seen 3 x/week to address the above listed problems via neuromuscular re-education, therapeutic exercises, therapeutic activities, self-care/home management  Plan of Care Expires: 08/05/25  Plan of Care Reviewed with: patient    Subjective     Patient:  "I'm not so good.  My stomach is hurting."  Pain/Comfort:  Pain Rating 1: 10/10  Location 1: abdomen  Pain Addressed 1: Reposition  Pain Rating Post-Intervention 1: 10/10    Objective:     Communicated with: Nurse prior to session.  Patient found left sidelying with bed alarm, telemetry ([employer] camera monitoring) upon OT entry to room.    General Precautions: Standard, aspiration, fall    Orthopedic Precautions:N/A  Braces: N/A  Respiratory Status: Room air     Occupational Performance:     Bed Mobility:    Patient completed Rolling/Turning to Left with  supervision  Patient completed Rolling/Turning to Right with supervision  Patient completed Supine to Sit with stand by assistance  Patient completed Sit to Supine with stand by assistance     Functional Mobility/Transfers:  Patient completed Sit <> Stand Transfer with contact guard assistance  with  no assistive device   Min assist with side steps along the EOB    Activities of Daily Living:  Grooming: minimum assistance " while standing    [facility] 6 Click ADL: 18    Treatment & Education:  Patient alert and oriented x person and place.  Able to follow 4/4 one step commands.  Patient attentive and interactive throughout the session.  Limited ADL participation 2* stomach pain.  Family not present.    Patient left supine with all lines intact and call button in reach    GOALS:   Multidisciplinary Problems       Occupational Therapy Goals          Problem: Occupational Therapy    Goal Priority Disciplines Outcome Interventions   Occupational Therapy Goal     OT, PT/OT Progressing    Description: Goals set 7/8 with an expiration date, 8/5:  Patient will increase functional independence with ADLs by performing:    Patient will demonstrate rolling to the right with min assist.  Not met   Patient will demonstrate rolling to the left with min assist.   Not met  Patient will demonstrate supine -sit with min  assist.   Not met  Patient will demonstrate stand pivot transfers with mod assist.   Not met  Patient will demonstrate grooming while seated with min assist.   Not met  Patient will demonstrate upper body dressing with min assist while seated EOB.   Not met  Patient will demonstrate lower body dressing with mod assist while seated EOB.   Not met  Patient will demonstrate toileting with mod assist.   Not met  Patient will demonstrate bathing while seated EOB with mod assist.   Not met  Patient's family / caregiver will demonstrate independence and safety with assisting patient with self-care skills and functional mobility.     Not met  Patient's family / caregiver will demonstrate independence with providing ROM and changes in bed positioning.   Not met  Patient and/or patient's family will verbalize understanding of stroke prevention guidelines, personal risk factors and stroke warning signs via teachback method.  Not met     DME Justifications (see above for complete DME recommendations)    Bedside Commode- Patient has a mobility limitation  that significantly impairs their ability to participate in one or more mobility related activities of daily living, including toileting. This deficit can be resolved by using a bedside commode. Patient demonstrates mobility limitations that will cause them to be confined to one room at home without bathroom access for up to 30 days. Using a bedside commode will greatly improve the patient's ability to participate in MRADLs.                                             Time Tracking:     OT Date of Treatment: 07/25/25  OT Start Time: 0643  OT Stop Time: 0655  OT Total Time (min): 12 min    Billable Minutes:Self Care/Home Management 12    OT/DOTTIE: OT          7/25/2025

## 2025-07-28 ENCOUNTER — TELEPHONE (OUTPATIENT)
Dept: NEUROSURGERY | Facility: CLINIC | Age: 70
End: 2025-07-28
Payer: MEDICARE

## 2025-07-28 NOTE — TELEPHONE ENCOUNTER
Spoke with patient's daughter in regards to scheduling post-op appt from surgery. [facility] No appt needed at this time.     ----- Message -----  From: Shruthi Patten MD  Sent: 7/16/2025   7:23 AM CDT  To: [facility] Staff    Needs follow up with Ric, could we set up for same day as Dr. Don?

## 2025-07-29 ENCOUNTER — TELEPHONE (OUTPATIENT)
Dept: HEMATOLOGY/ONCOLOGY | Facility: CLINIC | Age: 70
End: 2025-07-29
Payer: MEDICARE

## 2025-07-31 LAB — MYCOBACTERIUM SPEC QL CULT: NORMAL

## 2025-08-27 ENCOUNTER — TELEPHONE (OUTPATIENT)
Dept: ONCOLOGY | Facility: HOSPITAL | Age: 70
End: 2025-08-27
Payer: MEDICARE

## (undated) DEVICE — DRAPE STERI-DRAPE 1000 17X11IN

## (undated) DEVICE — Device

## (undated) DEVICE — ELECTRODE BLADE INSULATED 1 IN

## (undated) DEVICE — VYCOR BRAIN ACCESS SYSTEM

## (undated) DEVICE — TRAY NEURO OMC

## (undated) DEVICE — DRESSING SURGICAL 1X1

## (undated) DEVICE — DRAPE STERI INSTRUMENT 1018

## (undated) DEVICE — SUT 4/0 18IN NUROLON BLK B

## (undated) DEVICE — ELECTRODE REM PLYHSV RETURN 9

## (undated) DEVICE — DRAPE T THYROID STERILE

## (undated) DEVICE — STAPLER SKIN PROXIMATE WIDE

## (undated) DEVICE — PINS SKULL ADULT MAYFIELD
Type: IMPLANTABLE DEVICE | Site: CRANIAL | Status: NON-FUNCTIONAL
Removed: 2025-07-07

## (undated) DEVICE — TIP SONAPET STRAIGHT LG DIA

## (undated) DEVICE — SOL LR INJ BAG 1000ML

## (undated) DEVICE — CARTRIDGE OIL

## (undated) DEVICE — DURASEAL

## (undated) DEVICE — FORCEP SPETZLER SLM .5 TIP 9IN

## (undated) DEVICE — NDL N SERIES MICRO-DISSECTION

## (undated) DEVICE — SYR 10CC LUER LOCK

## (undated) DEVICE — DRESSING SURGICAL 1X3

## (undated) DEVICE — SPONGE PATTY SURGICAL .5X3IN

## (undated) DEVICE — DRAPE CRANIOTOMY T SURG STRL

## (undated) DEVICE — SET SONAPET TUBING W/EXT

## (undated) DEVICE — TIP SONAPET STRAIGHT

## (undated) DEVICE — DRAPE OPMI STERILE

## (undated) DEVICE — SEALANT ADHERUS AUTOSPRY DURAL

## (undated) DEVICE — CORD BIPOLAR 12 FOOT

## (undated) DEVICE — ELECTRODE MEGADYNE RETURN DUAL

## (undated) DEVICE — ROUTER STRAIGHT 1.1 X6.0MM

## (undated) DEVICE — HEMOSTAT SURGICEL 4X8IN

## (undated) DEVICE — DIFFUSER

## (undated) DEVICE — COTTON BALLS 1/2IN

## (undated) DEVICE — PAPER 8-1/2 X 11 GRAY #67

## (undated) DEVICE — DURAPREP SURG SCRUB 26ML

## (undated) DEVICE — DRESSING SURGICAL 3/4X3/4

## (undated) DEVICE — ROUTER TAPERED 2.3MM

## (undated) DEVICE — KIT SURGIFLO HEMOSTATIC MATRIX

## (undated) DEVICE — TRAY CATH 1-LYR URIMTR 16FR

## (undated) DEVICE — DRESSING SURGICAL 1/2X1/2

## (undated) DEVICE — DRAPE THREE-QTR REINF 53X77IN

## (undated) DEVICE — DRAPE INCISE IOBAN 2 23X17IN

## (undated) DEVICE — SPONGE COTTON TRAY 4X4IN

## (undated) DEVICE — SPONGE NEURO 1/4X1/4

## (undated) DEVICE — DISPOSABLE BIPOLAR

## (undated) DEVICE — CONTAINER SPECIMEN OR STER 4OZ

## (undated) DEVICE — SYR BULB EAR/ULCER STER 3OZ

## (undated) DEVICE — PENCIL ROCKER SWITCH 10FT CORD

## (undated) DEVICE — PAD CURAD NONADH 3X4IN

## (undated) DEVICE — MARKER SKIN RULER STERILE

## (undated) DEVICE — TIP SONAPET BARACUDA

## (undated) DEVICE — HOOK LONE STAR BLUNT 12MM

## (undated) DEVICE — TIP SONAPET BARACUDA LG

## (undated) DEVICE — MARKERS SPHERZ PASSIVE

## (undated) DEVICE — TUBE FRAZIER 5MM 2FT SOFT TIP